# Patient Record
Sex: FEMALE | Race: WHITE | NOT HISPANIC OR LATINO | Employment: OTHER | URBAN - METROPOLITAN AREA
[De-identification: names, ages, dates, MRNs, and addresses within clinical notes are randomized per-mention and may not be internally consistent; named-entity substitution may affect disease eponyms.]

---

## 2021-05-06 ENCOUNTER — HOSPITAL ENCOUNTER (INPATIENT)
Facility: HOSPITAL | Age: 86
LOS: 6 days | Discharge: HOME HEALTH | DRG: 291 | End: 2021-05-12
Attending: EMERGENCY MEDICINE | Admitting: INTERNAL MEDICINE
Payer: MEDICARE

## 2021-05-06 DIAGNOSIS — I48.91 ATRIAL FIBRILLATION WITH RVR: ICD-10-CM

## 2021-05-06 DIAGNOSIS — Z95.828 STATUS POST PERIPHERALLY INSERTED CENTRAL CATHETER (PICC) CENTRAL LINE PLACEMENT: ICD-10-CM

## 2021-05-06 DIAGNOSIS — E11.9 TYPE 2 DIABETES MELLITUS WITHOUT COMPLICATION, WITHOUT LONG-TERM CURRENT USE OF INSULIN: ICD-10-CM

## 2021-05-06 DIAGNOSIS — R06.02 SHORTNESS OF BREATH: ICD-10-CM

## 2021-05-06 DIAGNOSIS — J18.9 MULTIFOCAL PNEUMONIA: ICD-10-CM

## 2021-05-06 DIAGNOSIS — I48.91 NEW ONSET ATRIAL FIBRILLATION: ICD-10-CM

## 2021-05-06 DIAGNOSIS — I50.9 HEART FAILURE: ICD-10-CM

## 2021-05-06 DIAGNOSIS — R57.0 CARDIOGENIC SHOCK: Primary | ICD-10-CM

## 2021-05-06 DIAGNOSIS — E66.9 OBESITY, UNSPECIFIED CLASSIFICATION, UNSPECIFIED OBESITY TYPE, UNSPECIFIED WHETHER SERIOUS COMORBIDITY PRESENT: ICD-10-CM

## 2021-05-06 PROBLEM — I10 HTN (HYPERTENSION): Status: ACTIVE | Noted: 2021-05-06

## 2021-05-06 PROBLEM — J96.02 ACUTE RESPIRATORY FAILURE WITH HYPERCAPNIA: Status: ACTIVE | Noted: 2021-05-06

## 2021-05-06 PROBLEM — E78.5 HLD (HYPERLIPIDEMIA): Status: ACTIVE | Noted: 2021-05-06

## 2021-05-06 LAB
ALBUMIN SERPL BCP-MCNC: 3.4 G/DL (ref 3.5–5.2)
ALLENS TEST: ABNORMAL
ALP SERPL-CCNC: 61 U/L (ref 55–135)
ALT SERPL W/O P-5'-P-CCNC: 38 U/L (ref 10–44)
ANION GAP SERPL CALC-SCNC: 12 MMOL/L (ref 8–16)
AST SERPL-CCNC: 49 U/L (ref 10–40)
BASOPHILS # BLD AUTO: 0.04 K/UL (ref 0–0.2)
BASOPHILS NFR BLD: 0.3 % (ref 0–1.9)
BILIRUB SERPL-MCNC: 0.9 MG/DL (ref 0.1–1)
BNP SERPL-MCNC: 698 PG/ML (ref 0–99)
BUN SERPL-MCNC: 22 MG/DL (ref 8–23)
CALCIUM SERPL-MCNC: 9.1 MG/DL (ref 8.7–10.5)
CHLORIDE SERPL-SCNC: 104 MMOL/L (ref 95–110)
CO2 SERPL-SCNC: 20 MMOL/L (ref 23–29)
CREAT SERPL-MCNC: 0.7 MG/DL (ref 0.5–1.4)
CTP QC/QA: YES
DELSYS: ABNORMAL
DIFFERENTIAL METHOD: ABNORMAL
EOSINOPHIL # BLD AUTO: 0.1 K/UL (ref 0–0.5)
EOSINOPHIL NFR BLD: 0.5 % (ref 0–8)
ERYTHROCYTE [DISTWIDTH] IN BLOOD BY AUTOMATED COUNT: 15.9 % (ref 11.5–14.5)
EST. GFR  (AFRICAN AMERICAN): >60 ML/MIN/1.73 M^2
EST. GFR  (NON AFRICAN AMERICAN): >60 ML/MIN/1.73 M^2
GLUCOSE SERPL-MCNC: 78 MG/DL (ref 70–110)
HCO3 UR-SCNC: 24.3 MMOL/L (ref 24–28)
HCT VFR BLD AUTO: 44.2 % (ref 37–48.5)
HGB BLD-MCNC: 14 G/DL (ref 12–16)
IMM GRANULOCYTES # BLD AUTO: 0.05 K/UL (ref 0–0.04)
IMM GRANULOCYTES NFR BLD AUTO: 0.4 % (ref 0–0.5)
LACTATE SERPL-SCNC: 8.2 MMOL/L (ref 0.5–2.2)
LYMPHOCYTES # BLD AUTO: 1.9 K/UL (ref 1–4.8)
LYMPHOCYTES NFR BLD: 14.4 % (ref 18–48)
MCH RBC QN AUTO: 29.9 PG (ref 27–31)
MCHC RBC AUTO-ENTMCNC: 31.7 G/DL (ref 32–36)
MCV RBC AUTO: 94 FL (ref 82–98)
MONOCYTES # BLD AUTO: 1.2 K/UL (ref 0.3–1)
MONOCYTES NFR BLD: 9 % (ref 4–15)
NEUTROPHILS # BLD AUTO: 9.9 K/UL (ref 1.8–7.7)
NEUTROPHILS NFR BLD: 75.4 % (ref 38–73)
NRBC BLD-RTO: 0 /100 WBC
PCO2 BLDA: 71 MMHG (ref 35–45)
PH SMN: 7.14 [PH] (ref 7.35–7.45)
PLATELET # BLD AUTO: 296 K/UL (ref 150–450)
PMV BLD AUTO: 11.3 FL (ref 9.2–12.9)
PO2 BLDA: 36 MMHG (ref 40–60)
POC BE: -5 MMOL/L
POC SATURATED O2: 50 % (ref 95–100)
POC TCO2: 26 MMOL/L (ref 24–29)
POCT GLUCOSE: 115 MG/DL (ref 70–110)
POCT GLUCOSE: 160 MG/DL (ref 70–110)
POCT GLUCOSE: 83 MG/DL (ref 70–110)
POTASSIUM SERPL-SCNC: 4.7 MMOL/L (ref 3.5–5.1)
PROCALCITONIN SERPL IA-MCNC: 0.05 NG/ML
PROT SERPL-MCNC: 6.6 G/DL (ref 6–8.4)
RBC # BLD AUTO: 4.68 M/UL (ref 4–5.4)
SAMPLE: ABNORMAL
SARS-COV-2 RDRP RESP QL NAA+PROBE: NEGATIVE
SITE: ABNORMAL
SODIUM SERPL-SCNC: 136 MMOL/L (ref 136–145)
TROPONIN I SERPL DL<=0.01 NG/ML-MCNC: 0.03 NG/ML (ref 0–0.03)
TROPONIN I SERPL DL<=0.01 NG/ML-MCNC: 0.05 NG/ML (ref 0–0.03)
TSH SERPL DL<=0.005 MIU/L-ACNC: 1.92 UIU/ML (ref 0.4–4)
WBC # BLD AUTO: 13.15 K/UL (ref 3.9–12.7)

## 2021-05-06 PROCEDURE — 96375 TX/PRO/DX INJ NEW DRUG ADDON: CPT

## 2021-05-06 PROCEDURE — 93010 ELECTROCARDIOGRAM REPORT: CPT | Mod: ,,, | Performed by: INTERNAL MEDICINE

## 2021-05-06 PROCEDURE — 80053 COMPREHEN METABOLIC PANEL: CPT | Performed by: STUDENT IN AN ORGANIZED HEALTH CARE EDUCATION/TRAINING PROGRAM

## 2021-05-06 PROCEDURE — 36620 INSERTION CATHETER ARTERY: CPT

## 2021-05-06 PROCEDURE — 27000221 HC OXYGEN, UP TO 24 HOURS

## 2021-05-06 PROCEDURE — 12000002 HC ACUTE/MED SURGE SEMI-PRIVATE ROOM

## 2021-05-06 PROCEDURE — 84484 ASSAY OF TROPONIN QUANT: CPT | Performed by: STUDENT IN AN ORGANIZED HEALTH CARE EDUCATION/TRAINING PROGRAM

## 2021-05-06 PROCEDURE — 83605 ASSAY OF LACTIC ACID: CPT | Performed by: STUDENT IN AN ORGANIZED HEALTH CARE EDUCATION/TRAINING PROGRAM

## 2021-05-06 PROCEDURE — 36620 INSERTION CATHETER ARTERY: CPT | Mod: ,,, | Performed by: EMERGENCY MEDICINE

## 2021-05-06 PROCEDURE — 84145 PROCALCITONIN (PCT): CPT | Performed by: STUDENT IN AN ORGANIZED HEALTH CARE EDUCATION/TRAINING PROGRAM

## 2021-05-06 PROCEDURE — 99285 EMERGENCY DEPT VISIT HI MDM: CPT | Mod: 25

## 2021-05-06 PROCEDURE — 36620 PR INSERT CATH,ART,PERCUT,SHORTTERM: ICD-10-PCS | Mod: ,,, | Performed by: EMERGENCY MEDICINE

## 2021-05-06 PROCEDURE — 99900035 HC TECH TIME PER 15 MIN (STAT)

## 2021-05-06 PROCEDURE — 99285 EMERGENCY DEPT VISIT HI MDM: CPT | Mod: CR,25,CS, | Performed by: EMERGENCY MEDICINE

## 2021-05-06 PROCEDURE — 25000003 PHARM REV CODE 250: Performed by: EMERGENCY MEDICINE

## 2021-05-06 PROCEDURE — 63600175 PHARM REV CODE 636 W HCPCS: Performed by: STUDENT IN AN ORGANIZED HEALTH CARE EDUCATION/TRAINING PROGRAM

## 2021-05-06 PROCEDURE — 83880 ASSAY OF NATRIURETIC PEPTIDE: CPT | Performed by: STUDENT IN AN ORGANIZED HEALTH CARE EDUCATION/TRAINING PROGRAM

## 2021-05-06 PROCEDURE — 93005 ELECTROCARDIOGRAM TRACING: CPT

## 2021-05-06 PROCEDURE — 94761 N-INVAS EAR/PLS OXIMETRY MLT: CPT

## 2021-05-06 PROCEDURE — U0002 COVID-19 LAB TEST NON-CDC: HCPCS | Performed by: EMERGENCY MEDICINE

## 2021-05-06 PROCEDURE — 94660 CPAP INITIATION&MGMT: CPT

## 2021-05-06 PROCEDURE — 25000003 PHARM REV CODE 250

## 2021-05-06 PROCEDURE — 85025 COMPLETE CBC W/AUTO DIFF WBC: CPT | Performed by: STUDENT IN AN ORGANIZED HEALTH CARE EDUCATION/TRAINING PROGRAM

## 2021-05-06 PROCEDURE — 25000003 PHARM REV CODE 250: Performed by: STUDENT IN AN ORGANIZED HEALTH CARE EDUCATION/TRAINING PROGRAM

## 2021-05-06 PROCEDURE — 96374 THER/PROPH/DIAG INJ IV PUSH: CPT

## 2021-05-06 PROCEDURE — 82962 GLUCOSE BLOOD TEST: CPT

## 2021-05-06 PROCEDURE — 99285 PR EMERGENCY DEPT VISIT,LEVEL V: ICD-10-PCS | Mod: CR,25,CS, | Performed by: EMERGENCY MEDICINE

## 2021-05-06 PROCEDURE — 84443 ASSAY THYROID STIM HORMONE: CPT | Performed by: STUDENT IN AN ORGANIZED HEALTH CARE EDUCATION/TRAINING PROGRAM

## 2021-05-06 PROCEDURE — 27000190 HC CPAP FULL FACE MASK W/VALVE

## 2021-05-06 PROCEDURE — 93010 EKG 12-LEAD: ICD-10-PCS | Mod: ,,, | Performed by: INTERNAL MEDICINE

## 2021-05-06 RX ORDER — NOREPINEPHRINE BITARTRATE/D5W 4MG/250ML
PLASTIC BAG, INJECTION (ML) INTRAVENOUS
Status: COMPLETED
Start: 2021-05-06 | End: 2021-05-06

## 2021-05-06 RX ORDER — NOREPINEPHRINE BITARTRATE/D5W 4MG/250ML
0-3 PLASTIC BAG, INJECTION (ML) INTRAVENOUS CONTINUOUS
Status: DISCONTINUED | OUTPATIENT
Start: 2021-05-06 | End: 2021-05-08

## 2021-05-06 RX ORDER — FUROSEMIDE 10 MG/ML
40 INJECTION INTRAMUSCULAR; INTRAVENOUS
Status: COMPLETED | OUTPATIENT
Start: 2021-05-06 | End: 2021-05-06

## 2021-05-06 RX ORDER — INSULIN DEGLUDEC 100 U/ML
40 INJECTION, SOLUTION SUBCUTANEOUS DAILY
Status: ON HOLD | COMMUNITY
Start: 2021-03-25 | End: 2021-05-12 | Stop reason: SDUPTHER

## 2021-05-06 RX ORDER — CEFTRIAXONE 1 G/1
1 INJECTION, POWDER, FOR SOLUTION INTRAMUSCULAR; INTRAVENOUS
Status: COMPLETED | OUTPATIENT
Start: 2021-05-06 | End: 2021-05-06

## 2021-05-06 RX ORDER — METOPROLOL TARTRATE 25 MG/1
25 TABLET, FILM COATED ORAL 2 TIMES DAILY
Status: DISCONTINUED | OUTPATIENT
Start: 2021-05-06 | End: 2021-05-06

## 2021-05-06 RX ORDER — ETOMIDATE 2 MG/ML
INJECTION INTRAVENOUS
Status: DISPENSED
Start: 2021-05-06 | End: 2021-05-07

## 2021-05-06 RX ORDER — DILTIAZEM HYDROCHLORIDE 5 MG/ML
10 INJECTION INTRAVENOUS
Status: COMPLETED | OUTPATIENT
Start: 2021-05-06 | End: 2021-05-06

## 2021-05-06 RX ORDER — ONDANSETRON 2 MG/ML
4 INJECTION INTRAMUSCULAR; INTRAVENOUS
Status: COMPLETED | OUTPATIENT
Start: 2021-05-06 | End: 2021-05-06

## 2021-05-06 RX ORDER — LIDOCAINE HYDROCHLORIDE 10 MG/ML
5 INJECTION INFILTRATION; PERINEURAL ONCE
Status: COMPLETED | OUTPATIENT
Start: 2021-05-07 | End: 2021-05-07

## 2021-05-06 RX ORDER — DOBUTAMINE HYDROCHLORIDE 400 MG/100ML
2.5 INJECTION INTRAVENOUS CONTINUOUS
Status: DISCONTINUED | OUTPATIENT
Start: 2021-05-06 | End: 2021-05-07

## 2021-05-06 RX ORDER — LEVOTHYROXINE SODIUM 75 UG/1
75 TABLET ORAL
COMMUNITY
Start: 2021-05-05

## 2021-05-06 RX ORDER — MECLIZINE HYDROCHLORIDE 25 MG/1
25 TABLET ORAL DAILY
COMMUNITY
Start: 2021-05-05

## 2021-05-06 RX ORDER — CITALOPRAM 20 MG/1
20 TABLET, FILM COATED ORAL DAILY
COMMUNITY
Start: 2021-02-11

## 2021-05-06 RX ORDER — SUCCINYLCHOLINE CHLORIDE 20 MG/ML
INJECTION INTRAMUSCULAR; INTRAVENOUS
Status: DISPENSED
Start: 2021-05-06 | End: 2021-05-07

## 2021-05-06 RX ADMIN — DOBUTAMINE HYDROCHLORIDE 2.5 MCG/KG/MIN: 400 INJECTION INTRAVENOUS at 10:05

## 2021-05-06 RX ADMIN — ONDANSETRON 4 MG: 2 INJECTION INTRAMUSCULAR; INTRAVENOUS at 09:05

## 2021-05-06 RX ADMIN — Medication 0.15 MCG/KG/MIN: at 10:05

## 2021-05-06 RX ADMIN — DILTIAZEM HYDROCHLORIDE 10 MG: 5 INJECTION INTRAVENOUS at 06:05

## 2021-05-06 RX ADMIN — AZITHROMYCIN MONOHYDRATE 500 MG: 500 INJECTION, POWDER, LYOPHILIZED, FOR SOLUTION INTRAVENOUS at 08:05

## 2021-05-06 RX ADMIN — METOPROLOL TARTRATE 25 MG: 25 TABLET, FILM COATED ORAL at 08:05

## 2021-05-06 RX ADMIN — FUROSEMIDE 40 MG: 10 INJECTION, SOLUTION INTRAMUSCULAR; INTRAVENOUS at 07:05

## 2021-05-06 RX ADMIN — Medication 0.1 MCG/KG/MIN: at 10:05

## 2021-05-06 RX ADMIN — CEFTRIAXONE 1 G: 1 INJECTION, POWDER, FOR SOLUTION INTRAMUSCULAR; INTRAVENOUS at 08:05

## 2021-05-07 PROBLEM — I48.91 ATRIAL FIBRILLATION WITH RVR: Status: ACTIVE | Noted: 2021-05-07

## 2021-05-07 PROBLEM — R57.0 CARDIOGENIC SHOCK: Status: ACTIVE | Noted: 2021-05-07

## 2021-05-07 LAB
ALBUMIN SERPL BCP-MCNC: 3.1 G/DL (ref 3.5–5.2)
ALBUMIN SERPL BCP-MCNC: 3.3 G/DL (ref 3.5–5.2)
ALLENS TEST: ABNORMAL
ALP SERPL-CCNC: 62 U/L (ref 55–135)
ALP SERPL-CCNC: 67 U/L (ref 55–135)
ALT SERPL W/O P-5'-P-CCNC: 46 U/L (ref 10–44)
ALT SERPL W/O P-5'-P-CCNC: 47 U/L (ref 10–44)
ANION GAP SERPL CALC-SCNC: 10 MMOL/L (ref 8–16)
ANION GAP SERPL CALC-SCNC: 12 MMOL/L (ref 8–16)
ANION GAP SERPL CALC-SCNC: 13 MMOL/L (ref 8–16)
APTT BLDCRRT: 25.4 SEC (ref 21–32)
APTT BLDCRRT: 55.2 SEC (ref 21–32)
ASCENDING AORTA: 3.66 CM
AST SERPL-CCNC: 65 U/L (ref 10–40)
AST SERPL-CCNC: 68 U/L (ref 10–40)
AV INDEX (PROSTH): 0.16
AV MEAN GRADIENT: 50 MMHG
AV PEAK GRADIENT: 73 MMHG
AV VALVE AREA: 0.5 CM2
AV VELOCITY RATIO: 0.16
BASOPHILS # BLD AUTO: 0.03 K/UL (ref 0–0.2)
BASOPHILS # BLD AUTO: 0.04 K/UL (ref 0–0.2)
BASOPHILS NFR BLD: 0.2 % (ref 0–1.9)
BASOPHILS NFR BLD: 0.2 % (ref 0–1.9)
BILIRUB SERPL-MCNC: 0.8 MG/DL (ref 0.1–1)
BILIRUB SERPL-MCNC: 0.9 MG/DL (ref 0.1–1)
BILIRUB UR QL STRIP: NEGATIVE
BSA FOR ECHO PROCEDURE: 1.68 M2
BUN SERPL-MCNC: 25 MG/DL (ref 8–23)
BUN SERPL-MCNC: 27 MG/DL (ref 8–23)
BUN SERPL-MCNC: 30 MG/DL (ref 8–23)
CALCIUM SERPL-MCNC: 8.5 MG/DL (ref 8.7–10.5)
CALCIUM SERPL-MCNC: 8.5 MG/DL (ref 8.7–10.5)
CALCIUM SERPL-MCNC: 9 MG/DL (ref 8.7–10.5)
CHLORIDE SERPL-SCNC: 102 MMOL/L (ref 95–110)
CHLORIDE SERPL-SCNC: 96 MMOL/L (ref 95–110)
CHLORIDE SERPL-SCNC: 98 MMOL/L (ref 95–110)
CLARITY UR REFRACT.AUTO: CLEAR
CO2 SERPL-SCNC: 19 MMOL/L (ref 23–29)
CO2 SERPL-SCNC: 24 MMOL/L (ref 23–29)
CO2 SERPL-SCNC: 32 MMOL/L (ref 23–29)
COLOR UR AUTO: COLORLESS
CREAT SERPL-MCNC: 1.1 MG/DL (ref 0.5–1.4)
CREAT SERPL-MCNC: 1.1 MG/DL (ref 0.5–1.4)
CREAT SERPL-MCNC: 1.3 MG/DL (ref 0.5–1.4)
CV ECHO LV RWT: 0.3 CM
DELSYS: ABNORMAL
DIFFERENTIAL METHOD: ABNORMAL
DIFFERENTIAL METHOD: ABNORMAL
DOP CALC AO PEAK VEL: 4.27 M/S
DOP CALC AO VTI: 96.51 CM
DOP CALC LVOT AREA: 3.2 CM2
DOP CALC LVOT DIAMETER: 2.01 CM
DOP CALC LVOT PEAK VEL: 0.68 M/S
DOP CALC LVOT STROKE VOLUME: 47.89 CM3
DOP CALCLVOT PEAK VEL VTI: 15.1 CM
ECHO LV POSTERIOR WALL: 0.8 CM (ref 0.6–1.1)
EJECTION FRACTION: 38 %
EOSINOPHIL # BLD AUTO: 0 K/UL (ref 0–0.5)
EOSINOPHIL # BLD AUTO: 0 K/UL (ref 0–0.5)
EOSINOPHIL NFR BLD: 0 % (ref 0–8)
EOSINOPHIL NFR BLD: 0 % (ref 0–8)
EP: 8
ERYTHROCYTE [DISTWIDTH] IN BLOOD BY AUTOMATED COUNT: 15.5 % (ref 11.5–14.5)
ERYTHROCYTE [DISTWIDTH] IN BLOOD BY AUTOMATED COUNT: 15.6 % (ref 11.5–14.5)
ERYTHROCYTE [SEDIMENTATION RATE] IN BLOOD BY WESTERGREN METHOD: 15 MM/H
EST. GFR  (AFRICAN AMERICAN): 42.9 ML/MIN/1.73 M^2
EST. GFR  (AFRICAN AMERICAN): 52.5 ML/MIN/1.73 M^2
EST. GFR  (AFRICAN AMERICAN): 52.5 ML/MIN/1.73 M^2
EST. GFR  (NON AFRICAN AMERICAN): 37.2 ML/MIN/1.73 M^2
EST. GFR  (NON AFRICAN AMERICAN): 45.6 ML/MIN/1.73 M^2
EST. GFR  (NON AFRICAN AMERICAN): 45.6 ML/MIN/1.73 M^2
ESTIMATED AVG GLUCOSE: 186 MG/DL (ref 68–131)
FIO2: 100
FIO2: 32
FLOW: 3
FRACTIONAL SHORTENING: 18 % (ref 28–44)
GLUCOSE SERPL-MCNC: 133 MG/DL (ref 70–110)
GLUCOSE SERPL-MCNC: 231 MG/DL (ref 70–110)
GLUCOSE SERPL-MCNC: 337 MG/DL (ref 70–110)
GLUCOSE UR QL STRIP: NEGATIVE
HBA1C MFR BLD: 8.1 % (ref 4–5.6)
HCO3 UR-SCNC: 20.1 MMOL/L (ref 24–28)
HCO3 UR-SCNC: 23.6 MMOL/L (ref 24–28)
HCO3 UR-SCNC: 26.8 MMOL/L (ref 24–28)
HCO3 UR-SCNC: 31.8 MMOL/L (ref 24–28)
HCT VFR BLD AUTO: 41 % (ref 37–48.5)
HCT VFR BLD AUTO: 42.4 % (ref 37–48.5)
HGB BLD-MCNC: 13.4 G/DL (ref 12–16)
HGB BLD-MCNC: 13.7 G/DL (ref 12–16)
HGB UR QL STRIP: NEGATIVE
IMM GRANULOCYTES # BLD AUTO: 0.1 K/UL (ref 0–0.04)
IMM GRANULOCYTES # BLD AUTO: 0.15 K/UL (ref 0–0.04)
IMM GRANULOCYTES NFR BLD AUTO: 0.7 % (ref 0–0.5)
IMM GRANULOCYTES NFR BLD AUTO: 0.8 % (ref 0–0.5)
INR PPP: 1.2 (ref 0.8–1.2)
INTERVENTRICULAR SEPTUM: 0.77 CM (ref 0.6–1.1)
IP: 15
KETONES UR QL STRIP: NEGATIVE
LA MAJOR: 5.62 CM
LA MINOR: 5.62 CM
LA WIDTH: 3.5 CM
LACTATE SERPL-SCNC: 1.6 MMOL/L (ref 0.5–2.2)
LACTATE SERPL-SCNC: 1.8 MMOL/L (ref 0.5–2.2)
LACTATE SERPL-SCNC: 2.2 MMOL/L (ref 0.5–2.2)
LACTATE SERPL-SCNC: 3.4 MMOL/L (ref 0.5–2.2)
LDH SERPL L TO P-CCNC: 3.55 MMOL/L (ref 0.36–1.25)
LEFT ATRIUM SIZE: 3.25 CM
LEFT ATRIUM VOLUME INDEX MOD: 32 ML/M2
LEFT ATRIUM VOLUME INDEX: 33.3 ML/M2
LEFT ATRIUM VOLUME MOD: 52.16 CM3
LEFT ATRIUM VOLUME: 54.34 CM3
LEFT INTERNAL DIMENSION IN SYSTOLE: 4.34 CM (ref 2.1–4)
LEFT VENTRICLE DIASTOLIC VOLUME INDEX: 82.07 ML/M2
LEFT VENTRICLE DIASTOLIC VOLUME: 133.77 ML
LEFT VENTRICLE MASS INDEX: 89 G/M2
LEFT VENTRICLE SYSTOLIC VOLUME INDEX: 52 ML/M2
LEFT VENTRICLE SYSTOLIC VOLUME: 84.72 ML
LEFT VENTRICULAR INTERNAL DIMENSION IN DIASTOLE: 5.27 CM (ref 3.5–6)
LEFT VENTRICULAR MASS: 145.08 G
LEUKOCYTE ESTERASE UR QL STRIP: NEGATIVE
LYMPHOCYTES # BLD AUTO: 1.4 K/UL (ref 1–4.8)
LYMPHOCYTES # BLD AUTO: 1.5 K/UL (ref 1–4.8)
LYMPHOCYTES NFR BLD: 10.3 % (ref 18–48)
LYMPHOCYTES NFR BLD: 7.7 % (ref 18–48)
MAGNESIUM SERPL-MCNC: 1.7 MG/DL (ref 1.6–2.6)
MAGNESIUM SERPL-MCNC: 2.3 MG/DL (ref 1.6–2.6)
MCH RBC QN AUTO: 30.3 PG (ref 27–31)
MCH RBC QN AUTO: 30.7 PG (ref 27–31)
MCHC RBC AUTO-ENTMCNC: 32.3 G/DL (ref 32–36)
MCHC RBC AUTO-ENTMCNC: 32.7 G/DL (ref 32–36)
MCV RBC AUTO: 94 FL (ref 82–98)
MCV RBC AUTO: 94 FL (ref 82–98)
MIN VOL: 6
MODE: ABNORMAL
MONOCYTES # BLD AUTO: 1.8 K/UL (ref 0.3–1)
MONOCYTES # BLD AUTO: 2.1 K/UL (ref 0.3–1)
MONOCYTES NFR BLD: 11.5 % (ref 4–15)
MONOCYTES NFR BLD: 11.9 % (ref 4–15)
MV PEAK E VEL: 1.33 M/S
NEUTROPHILS # BLD AUTO: 11.4 K/UL (ref 1.8–7.7)
NEUTROPHILS # BLD AUTO: 14.8 K/UL (ref 1.8–7.7)
NEUTROPHILS NFR BLD: 76.9 % (ref 38–73)
NEUTROPHILS NFR BLD: 79.8 % (ref 38–73)
NITRITE UR QL STRIP: NEGATIVE
NRBC BLD-RTO: 0 /100 WBC
NRBC BLD-RTO: 0 /100 WBC
PCO2 BLDA: 41.9 MMHG (ref 35–45)
PCO2 BLDA: 53.8 MMHG (ref 35–45)
PCO2 BLDA: 56.9 MMHG (ref 35–45)
PCO2 BLDA: 61.9 MMHG (ref 35–45)
PH SMN: 7.19 [PH] (ref 7.35–7.45)
PH SMN: 7.28 [PH] (ref 7.35–7.45)
PH SMN: 7.29 [PH] (ref 7.35–7.45)
PH SMN: 7.38 [PH] (ref 7.35–7.45)
PH UR STRIP: 5 [PH] (ref 5–8)
PISA TR MAX VEL: 3.11 M/S
PLATELET # BLD AUTO: 298 K/UL (ref 150–450)
PLATELET # BLD AUTO: 306 K/UL (ref 150–450)
PLATELET BLD QL SMEAR: ABNORMAL
PMV BLD AUTO: 10.9 FL (ref 9.2–12.9)
PMV BLD AUTO: 11.2 FL (ref 9.2–12.9)
PO2 BLDA: 312 MMHG (ref 80–100)
PO2 BLDA: 35 MMHG (ref 40–60)
PO2 BLDA: 36 MMHG (ref 40–60)
PO2 BLDA: 40 MMHG (ref 40–60)
POC BE: -5 MMOL/L
POC BE: -6 MMOL/L
POC BE: 0 MMOL/L
POC BE: 7 MMOL/L
POC SATURATED O2: 100 % (ref 95–100)
POC SATURATED O2: 53 % (ref 95–100)
POC SATURATED O2: 66 % (ref 95–100)
POC SATURATED O2: 67 % (ref 95–100)
POC TCO2: 21 MMOL/L (ref 23–27)
POC TCO2: 25 MMOL/L (ref 24–29)
POC TCO2: 29 MMOL/L (ref 24–29)
POC TCO2: 33 MMOL/L (ref 24–29)
POCT GLUCOSE: 110 MG/DL (ref 70–110)
POCT GLUCOSE: 158 MG/DL (ref 70–110)
POCT GLUCOSE: 216 MG/DL (ref 70–110)
POCT GLUCOSE: 231 MG/DL (ref 70–110)
POCT GLUCOSE: 317 MG/DL (ref 70–110)
POCT GLUCOSE: 349 MG/DL (ref 70–110)
POTASSIUM SERPL-SCNC: 4 MMOL/L (ref 3.5–5.1)
POTASSIUM SERPL-SCNC: 4.1 MMOL/L (ref 3.5–5.1)
POTASSIUM SERPL-SCNC: 4.2 MMOL/L (ref 3.5–5.1)
POTASSIUM SERPL-SCNC: 5.5 MMOL/L (ref 3.5–5.1)
PROT SERPL-MCNC: 6.1 G/DL (ref 6–8.4)
PROT SERPL-MCNC: 6.3 G/DL (ref 6–8.4)
PROT UR QL STRIP: NEGATIVE
PROTHROMBIN TIME: 12.9 SEC (ref 9–12.5)
RA MAJOR: 4.65 CM
RA PRESSURE: 8 MMHG
RA WIDTH: 3.6 CM
RBC # BLD AUTO: 4.36 M/UL (ref 4–5.4)
RBC # BLD AUTO: 4.52 M/UL (ref 4–5.4)
RIGHT VENTRICULAR END-DIASTOLIC DIMENSION: 3.42 CM
RV TISSUE DOPPLER FREE WALL SYSTOLIC VELOCITY 1 (APICAL 4 CHAMBER VIEW): 8.86 CM/S
SAMPLE: ABNORMAL
SINUS: 3.12 CM
SITE: ABNORMAL
SODIUM SERPL-SCNC: 134 MMOL/L (ref 136–145)
SODIUM SERPL-SCNC: 134 MMOL/L (ref 136–145)
SODIUM SERPL-SCNC: 138 MMOL/L (ref 136–145)
SP GR UR STRIP: 1 (ref 1–1.03)
SP02: 95
SP02: 99
SPONT RATE: 19
STJ: 2.7 CM
TR MAX PG: 39 MMHG
TRICUSPID ANNULAR PLANE SYSTOLIC EXCURSION: 1.01 CM
TROPONIN I SERPL DL<=0.01 NG/ML-MCNC: 0.07 NG/ML (ref 0–0.03)
TV REST PULMONARY ARTERY PRESSURE: 47 MMHG
URN SPEC COLLECT METH UR: ABNORMAL
VANCOMYCIN SERPL-MCNC: 13 UG/ML
WBC # BLD AUTO: 14.79 K/UL (ref 3.9–12.7)
WBC # BLD AUTO: 18.6 K/UL (ref 3.9–12.7)

## 2021-05-07 PROCEDURE — 99223 PR INITIAL HOSPITAL CARE,LEVL III: ICD-10-PCS | Mod: AI,GC,, | Performed by: HOSPITALIST

## 2021-05-07 PROCEDURE — 25000003 PHARM REV CODE 250

## 2021-05-07 PROCEDURE — 82803 BLOOD GASES ANY COMBINATION: CPT

## 2021-05-07 PROCEDURE — 84132 ASSAY OF SERUM POTASSIUM: CPT | Performed by: INTERNAL MEDICINE

## 2021-05-07 PROCEDURE — 27000221 HC OXYGEN, UP TO 24 HOURS

## 2021-05-07 PROCEDURE — 85610 PROTHROMBIN TIME: CPT | Performed by: STUDENT IN AN ORGANIZED HEALTH CARE EDUCATION/TRAINING PROGRAM

## 2021-05-07 PROCEDURE — 99223 1ST HOSP IP/OBS HIGH 75: CPT | Mod: AI,GC,, | Performed by: HOSPITALIST

## 2021-05-07 PROCEDURE — 80048 BASIC METABOLIC PNL TOTAL CA: CPT | Performed by: STUDENT IN AN ORGANIZED HEALTH CARE EDUCATION/TRAINING PROGRAM

## 2021-05-07 PROCEDURE — 87040 BLOOD CULTURE FOR BACTERIA: CPT | Mod: 59 | Performed by: INTERNAL MEDICINE

## 2021-05-07 PROCEDURE — 63600175 PHARM REV CODE 636 W HCPCS: Performed by: INTERNAL MEDICINE

## 2021-05-07 PROCEDURE — 85730 THROMBOPLASTIN TIME PARTIAL: CPT | Performed by: STUDENT IN AN ORGANIZED HEALTH CARE EDUCATION/TRAINING PROGRAM

## 2021-05-07 PROCEDURE — 20000000 HC ICU ROOM

## 2021-05-07 PROCEDURE — 25000003 PHARM REV CODE 250: Performed by: STUDENT IN AN ORGANIZED HEALTH CARE EDUCATION/TRAINING PROGRAM

## 2021-05-07 PROCEDURE — 84484 ASSAY OF TROPONIN QUANT: CPT | Performed by: STUDENT IN AN ORGANIZED HEALTH CARE EDUCATION/TRAINING PROGRAM

## 2021-05-07 PROCEDURE — 99233 SBSQ HOSP IP/OBS HIGH 50: CPT | Mod: ,,, | Performed by: INTERNAL MEDICINE

## 2021-05-07 PROCEDURE — 25000003 PHARM REV CODE 250: Performed by: INTERNAL MEDICINE

## 2021-05-07 PROCEDURE — 81003 URINALYSIS AUTO W/O SCOPE: CPT | Performed by: INTERNAL MEDICINE

## 2021-05-07 PROCEDURE — 85347 COAGULATION TIME ACTIVATED: CPT

## 2021-05-07 PROCEDURE — 25000242 PHARM REV CODE 250 ALT 637 W/ HCPCS: Performed by: INTERNAL MEDICINE

## 2021-05-07 PROCEDURE — 99900035 HC TECH TIME PER 15 MIN (STAT)

## 2021-05-07 PROCEDURE — 94660 CPAP INITIATION&MGMT: CPT

## 2021-05-07 PROCEDURE — 37799 UNLISTED PX VASCULAR SURGERY: CPT

## 2021-05-07 PROCEDURE — 63600175 PHARM REV CODE 636 W HCPCS: Performed by: STUDENT IN AN ORGANIZED HEALTH CARE EDUCATION/TRAINING PROGRAM

## 2021-05-07 PROCEDURE — 85025 COMPLETE CBC W/AUTO DIFF WBC: CPT | Mod: 91 | Performed by: INTERNAL MEDICINE

## 2021-05-07 PROCEDURE — 83036 HEMOGLOBIN GLYCOSYLATED A1C: CPT | Performed by: INTERNAL MEDICINE

## 2021-05-07 PROCEDURE — 83605 ASSAY OF LACTIC ACID: CPT | Performed by: INTERNAL MEDICINE

## 2021-05-07 PROCEDURE — 85025 COMPLETE CBC W/AUTO DIFF WBC: CPT | Performed by: STUDENT IN AN ORGANIZED HEALTH CARE EDUCATION/TRAINING PROGRAM

## 2021-05-07 PROCEDURE — 83605 ASSAY OF LACTIC ACID: CPT

## 2021-05-07 PROCEDURE — 85730 THROMBOPLASTIN TIME PARTIAL: CPT | Mod: 91 | Performed by: INTERNAL MEDICINE

## 2021-05-07 PROCEDURE — 83735 ASSAY OF MAGNESIUM: CPT | Mod: 91 | Performed by: INTERNAL MEDICINE

## 2021-05-07 PROCEDURE — 99233 PR SUBSEQUENT HOSPITAL CARE,LEVL III: ICD-10-PCS | Mod: ,,, | Performed by: INTERNAL MEDICINE

## 2021-05-07 PROCEDURE — 80053 COMPREHEN METABOLIC PANEL: CPT | Performed by: INTERNAL MEDICINE

## 2021-05-07 PROCEDURE — 94761 N-INVAS EAR/PLS OXIMETRY MLT: CPT

## 2021-05-07 PROCEDURE — 83735 ASSAY OF MAGNESIUM: CPT | Performed by: INTERNAL MEDICINE

## 2021-05-07 PROCEDURE — 80202 ASSAY OF VANCOMYCIN: CPT | Performed by: INTERNAL MEDICINE

## 2021-05-07 PROCEDURE — 25000003 PHARM REV CODE 250: Performed by: EMERGENCY MEDICINE

## 2021-05-07 RX ORDER — LEVOTHYROXINE SODIUM 75 UG/1
75 TABLET ORAL
Status: DISCONTINUED | OUTPATIENT
Start: 2021-05-07 | End: 2021-05-12 | Stop reason: HOSPADM

## 2021-05-07 RX ORDER — CEFEPIME HYDROCHLORIDE 1 G/1
1 INJECTION, POWDER, FOR SOLUTION INTRAMUSCULAR; INTRAVENOUS
Status: DISCONTINUED | OUTPATIENT
Start: 2021-05-07 | End: 2021-05-07

## 2021-05-07 RX ORDER — POTASSIUM CHLORIDE 29.8 MG/ML
80 INJECTION INTRAVENOUS
Status: DISCONTINUED | OUTPATIENT
Start: 2021-05-07 | End: 2021-05-12

## 2021-05-07 RX ORDER — SODIUM CHLORIDE 0.9 % (FLUSH) 0.9 %
.1-1 SYRINGE (ML) INJECTION
Status: DISCONTINUED | OUTPATIENT
Start: 2021-05-07 | End: 2021-05-12 | Stop reason: HOSPADM

## 2021-05-07 RX ORDER — SODIUM CHLORIDE 9 MG/ML
INJECTION, SOLUTION INTRAVENOUS CONTINUOUS
Status: DISCONTINUED | OUTPATIENT
Start: 2021-05-07 | End: 2021-05-12 | Stop reason: HOSPADM

## 2021-05-07 RX ORDER — FUROSEMIDE 10 MG/ML
60 INJECTION INTRAMUSCULAR; INTRAVENOUS ONCE
Status: COMPLETED | OUTPATIENT
Start: 2021-05-07 | End: 2021-05-07

## 2021-05-07 RX ORDER — FENOFIBRATE 160 MG/1
160 TABLET ORAL DAILY
Status: DISCONTINUED | OUTPATIENT
Start: 2021-05-07 | End: 2021-05-07

## 2021-05-07 RX ORDER — FUROSEMIDE 10 MG/ML
40 INJECTION INTRAMUSCULAR; INTRAVENOUS EVERY 8 HOURS
Status: DISCONTINUED | OUTPATIENT
Start: 2021-05-07 | End: 2021-05-07

## 2021-05-07 RX ORDER — SODIUM CHLORIDE 0.9 % (FLUSH) 0.9 %
10 SYRINGE (ML) INJECTION
Status: DISCONTINUED | OUTPATIENT
Start: 2021-05-07 | End: 2021-05-12 | Stop reason: HOSPADM

## 2021-05-07 RX ORDER — MAGNESIUM SULFATE HEPTAHYDRATE 40 MG/ML
2 INJECTION, SOLUTION INTRAVENOUS ONCE
Status: COMPLETED | OUTPATIENT
Start: 2021-05-07 | End: 2021-05-07

## 2021-05-07 RX ORDER — MAGNESIUM SULFATE HEPTAHYDRATE 40 MG/ML
4 INJECTION, SOLUTION INTRAVENOUS
Status: DISCONTINUED | OUTPATIENT
Start: 2021-05-07 | End: 2021-05-12

## 2021-05-07 RX ORDER — TALC
6 POWDER (GRAM) TOPICAL NIGHTLY PRN
Status: DISCONTINUED | OUTPATIENT
Start: 2021-05-07 | End: 2021-05-12 | Stop reason: HOSPADM

## 2021-05-07 RX ORDER — VANCOMYCIN HCL IN 5 % DEXTROSE 1G/250ML
15 PLASTIC BAG, INJECTION (ML) INTRAVENOUS ONCE
Status: COMPLETED | OUTPATIENT
Start: 2021-05-07 | End: 2021-05-07

## 2021-05-07 RX ORDER — MUPIROCIN 20 MG/G
OINTMENT TOPICAL 2 TIMES DAILY
Status: COMPLETED | OUTPATIENT
Start: 2021-05-07 | End: 2021-05-11

## 2021-05-07 RX ORDER — MAGNESIUM SULFATE HEPTAHYDRATE 40 MG/ML
2 INJECTION, SOLUTION INTRAVENOUS
Status: DISCONTINUED | OUTPATIENT
Start: 2021-05-07 | End: 2021-05-12

## 2021-05-07 RX ORDER — POTASSIUM CHLORIDE 29.8 MG/ML
40 INJECTION INTRAVENOUS
Status: DISCONTINUED | OUTPATIENT
Start: 2021-05-07 | End: 2021-05-12

## 2021-05-07 RX ORDER — ATORVASTATIN CALCIUM 10 MG/1
10 TABLET, FILM COATED ORAL NIGHTLY
Status: DISCONTINUED | OUTPATIENT
Start: 2021-05-08 | End: 2021-05-12 | Stop reason: HOSPADM

## 2021-05-07 RX ORDER — ACETAMINOPHEN 325 MG/1
650 TABLET ORAL EVERY 6 HOURS PRN
Status: DISCONTINUED | OUTPATIENT
Start: 2021-05-07 | End: 2021-05-12 | Stop reason: HOSPADM

## 2021-05-07 RX ORDER — HEPARIN SODIUM,PORCINE/D5W 25000/250
0-40 INTRAVENOUS SOLUTION INTRAVENOUS CONTINUOUS
Status: DISCONTINUED | OUTPATIENT
Start: 2021-05-07 | End: 2021-05-08

## 2021-05-07 RX ORDER — GLUCAGON 1 MG
1 KIT INJECTION
Status: DISCONTINUED | OUTPATIENT
Start: 2021-05-07 | End: 2021-05-08

## 2021-05-07 RX ORDER — POTASSIUM CHLORIDE 14.9 MG/ML
60 INJECTION INTRAVENOUS
Status: DISCONTINUED | OUTPATIENT
Start: 2021-05-07 | End: 2021-05-12

## 2021-05-07 RX ORDER — INSULIN ASPART 100 [IU]/ML
0-5 INJECTION, SOLUTION INTRAVENOUS; SUBCUTANEOUS EVERY 6 HOURS PRN
Status: DISCONTINUED | OUTPATIENT
Start: 2021-05-07 | End: 2021-05-08

## 2021-05-07 RX ORDER — CITALOPRAM 20 MG/1
20 TABLET, FILM COATED ORAL DAILY
Status: DISCONTINUED | OUTPATIENT
Start: 2021-05-07 | End: 2021-05-07

## 2021-05-07 RX ORDER — LIDOCAINE HYDROCHLORIDE 10 MG/ML
INJECTION INFILTRATION; PERINEURAL
Status: COMPLETED
Start: 2021-05-07 | End: 2021-05-07

## 2021-05-07 RX ORDER — CEFEPIME HYDROCHLORIDE 2 G/1
2 INJECTION, POWDER, FOR SOLUTION INTRAVENOUS
Status: DISCONTINUED | OUTPATIENT
Start: 2021-05-07 | End: 2021-05-10

## 2021-05-07 RX ADMIN — MUPIROCIN: 20 OINTMENT TOPICAL at 08:05

## 2021-05-07 RX ADMIN — VANCOMYCIN HYDROCHLORIDE 1000 MG: 1 INJECTION, POWDER, LYOPHILIZED, FOR SOLUTION INTRAVENOUS at 03:05

## 2021-05-07 RX ADMIN — CEFEPIME 1 G: 1 INJECTION, POWDER, FOR SOLUTION INTRAMUSCULAR; INTRAVENOUS at 12:05

## 2021-05-07 RX ADMIN — INSULIN ASPART 2 UNITS: 100 INJECTION, SOLUTION INTRAVENOUS; SUBCUTANEOUS at 06:05

## 2021-05-07 RX ADMIN — VANCOMYCIN HYDROCHLORIDE 1250 MG: 1.25 INJECTION, POWDER, LYOPHILIZED, FOR SOLUTION INTRAVENOUS at 01:05

## 2021-05-07 RX ADMIN — Medication 0.2 MCG/KG/MIN: at 02:05

## 2021-05-07 RX ADMIN — CEFEPIME 1 G: 1 INJECTION, POWDER, FOR SOLUTION INTRAMUSCULAR; INTRAVENOUS at 08:05

## 2021-05-07 RX ADMIN — ACETAMINOPHEN 650 MG: 325 TABLET ORAL at 08:05

## 2021-05-07 RX ADMIN — EPINEPHRINE 0.04 MCG/KG/MIN: 1 INJECTION INTRAMUSCULAR; INTRAVENOUS; SUBCUTANEOUS at 12:05

## 2021-05-07 RX ADMIN — LEVOTHYROXINE SODIUM 75 MCG: 75 TABLET ORAL at 05:05

## 2021-05-07 RX ADMIN — AMIODARONE HYDROCHLORIDE 1 MG/MIN: 1.8 INJECTION, SOLUTION INTRAVENOUS at 05:05

## 2021-05-07 RX ADMIN — CEFEPIME 2 G: 2 INJECTION, POWDER, FOR SOLUTION INTRAVENOUS at 08:05

## 2021-05-07 RX ADMIN — CITALOPRAM HYDROBROMIDE 20 MG: 20 TABLET ORAL at 08:05

## 2021-05-07 RX ADMIN — Medication 0.12 MCG/KG/MIN: at 01:05

## 2021-05-07 RX ADMIN — LIDOCAINE HYDROCHLORIDE: 10 INJECTION, SOLUTION INFILTRATION; PERINEURAL at 03:05

## 2021-05-07 RX ADMIN — FUROSEMIDE 20 MG/HR: 10 INJECTION, SOLUTION INTRAMUSCULAR; INTRAVENOUS at 08:05

## 2021-05-07 RX ADMIN — FUROSEMIDE 20 MG/HR: 10 INJECTION, SOLUTION INTRAMUSCULAR; INTRAVENOUS at 12:05

## 2021-05-07 RX ADMIN — INSULIN ASPART 2 UNITS: 100 INJECTION, SOLUTION INTRAVENOUS; SUBCUTANEOUS at 11:05

## 2021-05-07 RX ADMIN — MAGNESIUM SULFATE 2 G: 2 INJECTION INTRAVENOUS at 06:05

## 2021-05-07 RX ADMIN — SODIUM CHLORIDE: 0.9 INJECTION, SOLUTION INTRAVENOUS at 06:05

## 2021-05-07 RX ADMIN — Medication 0.18 MCG/KG/MIN: at 09:05

## 2021-05-07 RX ADMIN — FUROSEMIDE 40 MG: 10 INJECTION, SOLUTION INTRAMUSCULAR; INTRAVENOUS at 05:05

## 2021-05-07 RX ADMIN — HEPARIN SODIUM AND DEXTROSE 12 UNITS/KG/HR: 10000; 5 INJECTION INTRAVENOUS at 11:05

## 2021-05-07 RX ADMIN — FUROSEMIDE 60 MG: 10 INJECTION, SOLUTION INTRAMUSCULAR; INTRAVENOUS at 11:05

## 2021-05-07 RX ADMIN — LIDOCAINE HYDROCHLORIDE 5 ML: 10 INJECTION, SOLUTION INFILTRATION; PERINEURAL at 12:05

## 2021-05-07 RX ADMIN — FENOFIBRATE 160 MG: 160 TABLET ORAL at 08:05

## 2021-05-07 RX ADMIN — INSULIN ASPART 4 UNITS: 100 INJECTION, SOLUTION INTRAVENOUS; SUBCUTANEOUS at 06:05

## 2021-05-07 RX ADMIN — FUROSEMIDE 40 MG: 10 INJECTION, SOLUTION INTRAMUSCULAR; INTRAVENOUS at 02:05

## 2021-05-08 PROBLEM — J96.02 ACUTE RESPIRATORY FAILURE WITH HYPERCAPNIA: Status: RESOLVED | Noted: 2021-05-06 | Resolved: 2021-05-08

## 2021-05-08 PROBLEM — I35.0 SEVERE AORTIC STENOSIS: Status: ACTIVE | Noted: 2021-05-08

## 2021-05-08 LAB
ALBUMIN SERPL BCP-MCNC: 3.3 G/DL (ref 3.5–5.2)
ALLENS TEST: ABNORMAL
ALLENS TEST: ABNORMAL
ALP SERPL-CCNC: 71 U/L (ref 55–135)
ALT SERPL W/O P-5'-P-CCNC: 43 U/L (ref 10–44)
ANION GAP SERPL CALC-SCNC: 10 MMOL/L (ref 8–16)
ANION GAP SERPL CALC-SCNC: 13 MMOL/L (ref 8–16)
ANION GAP SERPL CALC-SCNC: 14 MMOL/L (ref 8–16)
APTT BLDCRRT: 36.2 SEC (ref 21–32)
APTT BLDCRRT: 40.7 SEC (ref 21–32)
APTT BLDCRRT: 74.1 SEC (ref 21–32)
AST SERPL-CCNC: 48 U/L (ref 10–40)
BASOPHILS # BLD AUTO: 0.05 K/UL (ref 0–0.2)
BASOPHILS # BLD AUTO: 0.05 K/UL (ref 0–0.2)
BASOPHILS NFR BLD: 0.3 % (ref 0–1.9)
BASOPHILS NFR BLD: 0.4 % (ref 0–1.9)
BILIRUB SERPL-MCNC: 0.6 MG/DL (ref 0.1–1)
BUN SERPL-MCNC: 28 MG/DL (ref 8–23)
BUN SERPL-MCNC: 29 MG/DL (ref 8–23)
BUN SERPL-MCNC: 30 MG/DL (ref 8–23)
CALCIUM SERPL-MCNC: 8.7 MG/DL (ref 8.7–10.5)
CALCIUM SERPL-MCNC: 8.8 MG/DL (ref 8.7–10.5)
CALCIUM SERPL-MCNC: 8.8 MG/DL (ref 8.7–10.5)
CHLORIDE SERPL-SCNC: 91 MMOL/L (ref 95–110)
CHLORIDE SERPL-SCNC: 93 MMOL/L (ref 95–110)
CHLORIDE SERPL-SCNC: 94 MMOL/L (ref 95–110)
CO2 SERPL-SCNC: 30 MMOL/L (ref 23–29)
CO2 SERPL-SCNC: 31 MMOL/L (ref 23–29)
CO2 SERPL-SCNC: 34 MMOL/L (ref 23–29)
CREAT SERPL-MCNC: 1.1 MG/DL (ref 0.5–1.4)
CREAT SERPL-MCNC: 1.2 MG/DL (ref 0.5–1.4)
CREAT SERPL-MCNC: 1.2 MG/DL (ref 0.5–1.4)
DELSYS: ABNORMAL
DELSYS: ABNORMAL
DIFFERENTIAL METHOD: ABNORMAL
DIFFERENTIAL METHOD: ABNORMAL
EOSINOPHIL # BLD AUTO: 0.1 K/UL (ref 0–0.5)
EOSINOPHIL # BLD AUTO: 0.1 K/UL (ref 0–0.5)
EOSINOPHIL NFR BLD: 0.3 % (ref 0–8)
EOSINOPHIL NFR BLD: 0.4 % (ref 0–8)
ERYTHROCYTE [DISTWIDTH] IN BLOOD BY AUTOMATED COUNT: 15.8 % (ref 11.5–14.5)
ERYTHROCYTE [DISTWIDTH] IN BLOOD BY AUTOMATED COUNT: 15.9 % (ref 11.5–14.5)
EST. GFR  (AFRICAN AMERICAN): 47.3 ML/MIN/1.73 M^2
EST. GFR  (AFRICAN AMERICAN): 47.3 ML/MIN/1.73 M^2
EST. GFR  (AFRICAN AMERICAN): 52.5 ML/MIN/1.73 M^2
EST. GFR  (NON AFRICAN AMERICAN): 41 ML/MIN/1.73 M^2
EST. GFR  (NON AFRICAN AMERICAN): 41 ML/MIN/1.73 M^2
EST. GFR  (NON AFRICAN AMERICAN): 45.6 ML/MIN/1.73 M^2
FIO2: 32
GLUCOSE SERPL-MCNC: 279 MG/DL (ref 70–110)
GLUCOSE SERPL-MCNC: 287 MG/DL (ref 70–110)
GLUCOSE SERPL-MCNC: 457 MG/DL (ref 70–110)
HCO3 UR-SCNC: 36.2 MMOL/L (ref 24–28)
HCO3 UR-SCNC: 36.8 MMOL/L (ref 24–28)
HCT VFR BLD AUTO: 43.1 % (ref 37–48.5)
HCT VFR BLD AUTO: 43.5 % (ref 37–48.5)
HGB BLD-MCNC: 13.8 G/DL (ref 12–16)
HGB BLD-MCNC: 13.9 G/DL (ref 12–16)
IMM GRANULOCYTES # BLD AUTO: 0.07 K/UL (ref 0–0.04)
IMM GRANULOCYTES # BLD AUTO: 0.07 K/UL (ref 0–0.04)
IMM GRANULOCYTES NFR BLD AUTO: 0.5 % (ref 0–0.5)
IMM GRANULOCYTES NFR BLD AUTO: 0.5 % (ref 0–0.5)
LYMPHOCYTES # BLD AUTO: 2.2 K/UL (ref 1–4.8)
LYMPHOCYTES # BLD AUTO: 2.2 K/UL (ref 1–4.8)
LYMPHOCYTES NFR BLD: 15.3 % (ref 18–48)
LYMPHOCYTES NFR BLD: 15.6 % (ref 18–48)
MAGNESIUM SERPL-MCNC: 2.1 MG/DL (ref 1.6–2.6)
MCH RBC QN AUTO: 29.6 PG (ref 27–31)
MCH RBC QN AUTO: 30 PG (ref 27–31)
MCHC RBC AUTO-ENTMCNC: 32 G/DL (ref 32–36)
MCHC RBC AUTO-ENTMCNC: 32 G/DL (ref 32–36)
MCV RBC AUTO: 93 FL (ref 82–98)
MCV RBC AUTO: 94 FL (ref 82–98)
MODE: ABNORMAL
MONOCYTES # BLD AUTO: 1.7 K/UL (ref 0.3–1)
MONOCYTES # BLD AUTO: 1.8 K/UL (ref 0.3–1)
MONOCYTES NFR BLD: 12.2 % (ref 4–15)
MONOCYTES NFR BLD: 12.2 % (ref 4–15)
NEUTROPHILS # BLD AUTO: 10 K/UL (ref 1.8–7.7)
NEUTROPHILS # BLD AUTO: 10.2 K/UL (ref 1.8–7.7)
NEUTROPHILS NFR BLD: 70.9 % (ref 38–73)
NEUTROPHILS NFR BLD: 71.4 % (ref 38–73)
NRBC BLD-RTO: 0 /100 WBC
NRBC BLD-RTO: 0 /100 WBC
PCO2 BLDA: 62.5 MMHG (ref 35–45)
PCO2 BLDA: 64.4 MMHG (ref 35–45)
PH SMN: 7.37 [PH] (ref 7.35–7.45)
PH SMN: 7.37 [PH] (ref 7.35–7.45)
PLATELET # BLD AUTO: 316 K/UL (ref 150–450)
PLATELET # BLD AUTO: 331 K/UL (ref 150–450)
PMV BLD AUTO: 11.2 FL (ref 9.2–12.9)
PMV BLD AUTO: 11.6 FL (ref 9.2–12.9)
PO2 BLDA: 31 MMHG (ref 40–60)
PO2 BLDA: 31 MMHG (ref 40–60)
POC BE: 11 MMOL/L
POC BE: 11 MMOL/L
POC SATURATED O2: 54 % (ref 95–100)
POC SATURATED O2: 55 % (ref 95–100)
POC TCO2: 38 MMOL/L (ref 24–29)
POC TCO2: 39 MMOL/L (ref 24–29)
POCT GLUCOSE: 227 MG/DL (ref 70–110)
POCT GLUCOSE: 250 MG/DL (ref 70–110)
POCT GLUCOSE: 377 MG/DL (ref 70–110)
POCT GLUCOSE: 455 MG/DL (ref 70–110)
POTASSIUM SERPL-SCNC: 3.7 MMOL/L (ref 3.5–5.1)
POTASSIUM SERPL-SCNC: 3.9 MMOL/L (ref 3.5–5.1)
POTASSIUM SERPL-SCNC: 5.3 MMOL/L (ref 3.5–5.1)
PROT SERPL-MCNC: 6.7 G/DL (ref 6–8.4)
RBC # BLD AUTO: 4.63 M/UL (ref 4–5.4)
RBC # BLD AUTO: 4.66 M/UL (ref 4–5.4)
SAMPLE: ABNORMAL
SAMPLE: ABNORMAL
SITE: ABNORMAL
SITE: ABNORMAL
SODIUM SERPL-SCNC: 135 MMOL/L (ref 136–145)
SODIUM SERPL-SCNC: 137 MMOL/L (ref 136–145)
SODIUM SERPL-SCNC: 138 MMOL/L (ref 136–145)
SP02: 97
VANCOMYCIN SERPL-MCNC: 16.8 UG/ML
WBC # BLD AUTO: 14.06 K/UL (ref 3.9–12.7)
WBC # BLD AUTO: 14.36 K/UL (ref 3.9–12.7)

## 2021-05-08 PROCEDURE — 27000221 HC OXYGEN, UP TO 24 HOURS

## 2021-05-08 PROCEDURE — 80053 COMPREHEN METABOLIC PANEL: CPT | Performed by: INTERNAL MEDICINE

## 2021-05-08 PROCEDURE — 63600175 PHARM REV CODE 636 W HCPCS: Performed by: STUDENT IN AN ORGANIZED HEALTH CARE EDUCATION/TRAINING PROGRAM

## 2021-05-08 PROCEDURE — 99233 SBSQ HOSP IP/OBS HIGH 50: CPT | Mod: ,,, | Performed by: INTERNAL MEDICINE

## 2021-05-08 PROCEDURE — 25000003 PHARM REV CODE 250: Performed by: STUDENT IN AN ORGANIZED HEALTH CARE EDUCATION/TRAINING PROGRAM

## 2021-05-08 PROCEDURE — 20000000 HC ICU ROOM

## 2021-05-08 PROCEDURE — 83735 ASSAY OF MAGNESIUM: CPT | Performed by: INTERNAL MEDICINE

## 2021-05-08 PROCEDURE — 80202 ASSAY OF VANCOMYCIN: CPT | Performed by: INTERNAL MEDICINE

## 2021-05-08 PROCEDURE — 85730 THROMBOPLASTIN TIME PARTIAL: CPT | Performed by: STUDENT IN AN ORGANIZED HEALTH CARE EDUCATION/TRAINING PROGRAM

## 2021-05-08 PROCEDURE — 80048 BASIC METABOLIC PNL TOTAL CA: CPT | Mod: 91 | Performed by: STUDENT IN AN ORGANIZED HEALTH CARE EDUCATION/TRAINING PROGRAM

## 2021-05-08 PROCEDURE — 85730 THROMBOPLASTIN TIME PARTIAL: CPT | Mod: 91 | Performed by: INTERNAL MEDICINE

## 2021-05-08 PROCEDURE — 25000003 PHARM REV CODE 250: Performed by: INTERNAL MEDICINE

## 2021-05-08 PROCEDURE — 63600175 PHARM REV CODE 636 W HCPCS: Performed by: INTERNAL MEDICINE

## 2021-05-08 PROCEDURE — 85025 COMPLETE CBC W/AUTO DIFF WBC: CPT | Performed by: STUDENT IN AN ORGANIZED HEALTH CARE EDUCATION/TRAINING PROGRAM

## 2021-05-08 PROCEDURE — 94761 N-INVAS EAR/PLS OXIMETRY MLT: CPT

## 2021-05-08 PROCEDURE — 25000003 PHARM REV CODE 250: Performed by: EMERGENCY MEDICINE

## 2021-05-08 PROCEDURE — 85025 COMPLETE CBC W/AUTO DIFF WBC: CPT | Mod: 91 | Performed by: INTERNAL MEDICINE

## 2021-05-08 PROCEDURE — C9399 UNCLASSIFIED DRUGS OR BIOLOG: HCPCS | Performed by: NURSE PRACTITIONER

## 2021-05-08 PROCEDURE — 99233 PR SUBSEQUENT HOSPITAL CARE,LEVL III: ICD-10-PCS | Mod: ,,, | Performed by: INTERNAL MEDICINE

## 2021-05-08 PROCEDURE — 99900035 HC TECH TIME PER 15 MIN (STAT)

## 2021-05-08 PROCEDURE — 63600175 PHARM REV CODE 636 W HCPCS: Performed by: NURSE PRACTITIONER

## 2021-05-08 PROCEDURE — 82803 BLOOD GASES ANY COMBINATION: CPT

## 2021-05-08 PROCEDURE — 25000003 PHARM REV CODE 250: Performed by: NURSE PRACTITIONER

## 2021-05-08 PROCEDURE — 80048 BASIC METABOLIC PNL TOTAL CA: CPT | Performed by: INTERNAL MEDICINE

## 2021-05-08 RX ORDER — IBUPROFEN 200 MG
16 TABLET ORAL
Status: DISCONTINUED | OUTPATIENT
Start: 2021-05-08 | End: 2021-05-12 | Stop reason: HOSPADM

## 2021-05-08 RX ORDER — IBUPROFEN 200 MG
24 TABLET ORAL
Status: DISCONTINUED | OUTPATIENT
Start: 2021-05-08 | End: 2021-05-12 | Stop reason: HOSPADM

## 2021-05-08 RX ORDER — MAGNESIUM SULFATE HEPTAHYDRATE 40 MG/ML
2 INJECTION, SOLUTION INTRAVENOUS ONCE
Status: COMPLETED | OUTPATIENT
Start: 2021-05-08 | End: 2021-05-08

## 2021-05-08 RX ORDER — GLUCAGON 1 MG
1 KIT INJECTION
Status: DISCONTINUED | OUTPATIENT
Start: 2021-05-08 | End: 2021-05-12 | Stop reason: HOSPADM

## 2021-05-08 RX ORDER — INSULIN ASPART 100 [IU]/ML
0-5 INJECTION, SOLUTION INTRAVENOUS; SUBCUTANEOUS
Status: DISCONTINUED | OUTPATIENT
Start: 2021-05-08 | End: 2021-05-12 | Stop reason: HOSPADM

## 2021-05-08 RX ORDER — INSULIN ASPART 100 [IU]/ML
5 INJECTION, SOLUTION INTRAVENOUS; SUBCUTANEOUS
Status: DISCONTINUED | OUTPATIENT
Start: 2021-05-08 | End: 2021-05-08

## 2021-05-08 RX ORDER — NOREPINEPHRINE BITARTRATE/D5W 8 MG/250ML
0-3 PLASTIC BAG, INJECTION (ML) INTRAVENOUS CONTINUOUS
Status: DISCONTINUED | OUTPATIENT
Start: 2021-05-08 | End: 2021-05-09

## 2021-05-08 RX ORDER — INSULIN ASPART 100 [IU]/ML
3 INJECTION, SOLUTION INTRAVENOUS; SUBCUTANEOUS
Status: DISCONTINUED | OUTPATIENT
Start: 2021-05-08 | End: 2021-05-09

## 2021-05-08 RX ORDER — HYDROCODONE BITARTRATE AND ACETAMINOPHEN 5; 325 MG/1; MG/1
1 TABLET ORAL EVERY 6 HOURS PRN
Status: DISCONTINUED | OUTPATIENT
Start: 2021-05-08 | End: 2021-05-12 | Stop reason: HOSPADM

## 2021-05-08 RX ADMIN — ACETAMINOPHEN 650 MG: 325 TABLET ORAL at 05:05

## 2021-05-08 RX ADMIN — APIXABAN 5 MG: 5 TABLET, FILM COATED ORAL at 09:05

## 2021-05-08 RX ADMIN — INSULIN ASPART 2 UNITS: 100 INJECTION, SOLUTION INTRAVENOUS; SUBCUTANEOUS at 05:05

## 2021-05-08 RX ADMIN — POTASSIUM BICARBONATE 40 MEQ: 391 TABLET, EFFERVESCENT ORAL at 09:05

## 2021-05-08 RX ADMIN — INSULIN ASPART 3 UNITS: 100 INJECTION, SOLUTION INTRAVENOUS; SUBCUTANEOUS at 05:05

## 2021-05-08 RX ADMIN — HYDROCODONE BITARTRATE AND ACETAMINOPHEN 1 TABLET: 5; 325 TABLET ORAL at 08:05

## 2021-05-08 RX ADMIN — Medication 0.18 MCG/KG/MIN: at 02:05

## 2021-05-08 RX ADMIN — HYDROCODONE BITARTRATE AND ACETAMINOPHEN 1 TABLET: 5; 325 TABLET ORAL at 03:05

## 2021-05-08 RX ADMIN — HYDROCODONE BITARTRATE AND ACETAMINOPHEN 1 TABLET: 5; 325 TABLET ORAL at 09:05

## 2021-05-08 RX ADMIN — AMIODARONE HYDROCHLORIDE 0.5 MG/MIN: 1.8 INJECTION, SOLUTION INTRAVENOUS at 12:05

## 2021-05-08 RX ADMIN — INSULIN DETEMIR 10 UNITS: 100 INJECTION, SOLUTION SUBCUTANEOUS at 09:05

## 2021-05-08 RX ADMIN — VANCOMYCIN HYDROCHLORIDE 750 MG: 750 INJECTION, POWDER, LYOPHILIZED, FOR SOLUTION INTRAVENOUS at 09:05

## 2021-05-08 RX ADMIN — LEVOTHYROXINE SODIUM 75 MCG: 75 TABLET ORAL at 05:05

## 2021-05-08 RX ADMIN — FUROSEMIDE 20 MG/HR: 10 INJECTION, SOLUTION INTRAMUSCULAR; INTRAVENOUS at 06:05

## 2021-05-08 RX ADMIN — HEPARIN SODIUM AND DEXTROSE 14 UNITS/KG/HR: 10000; 5 INJECTION INTRAVENOUS at 05:05

## 2021-05-08 RX ADMIN — ACETAMINOPHEN 650 MG: 325 TABLET ORAL at 11:05

## 2021-05-08 RX ADMIN — MAGNESIUM SULFATE 2 G: 2 INJECTION INTRAVENOUS at 08:05

## 2021-05-08 RX ADMIN — MUPIROCIN: 20 OINTMENT TOPICAL at 08:05

## 2021-05-08 RX ADMIN — AMIODARONE HYDROCHLORIDE 0.5 MG/MIN: 1.8 INJECTION, SOLUTION INTRAVENOUS at 09:05

## 2021-05-08 RX ADMIN — ATORVASTATIN CALCIUM 10 MG: 20 TABLET, FILM COATED ORAL at 09:05

## 2021-05-08 RX ADMIN — POTASSIUM CHLORIDE 40 MEQ: 400 INJECTION, SOLUTION INTRAVENOUS at 02:05

## 2021-05-08 RX ADMIN — INSULIN ASPART 8 UNITS: 100 INJECTION, SOLUTION INTRAVENOUS; SUBCUTANEOUS at 12:05

## 2021-05-08 RX ADMIN — POTASSIUM CHLORIDE 40 MEQ: 400 INJECTION, SOLUTION INTRAVENOUS at 05:05

## 2021-05-08 RX ADMIN — CEFEPIME 2 G: 2 INJECTION, POWDER, FOR SOLUTION INTRAVENOUS at 09:05

## 2021-05-08 RX ADMIN — CEFEPIME 2 G: 2 INJECTION, POWDER, FOR SOLUTION INTRAVENOUS at 08:05

## 2021-05-08 RX ADMIN — MUPIROCIN: 20 OINTMENT TOPICAL at 09:05

## 2021-05-08 RX ADMIN — Medication 0.14 MCG/KG/MIN: at 03:05

## 2021-05-09 PROBLEM — E66.9 OBESITY: Status: ACTIVE | Noted: 2021-05-09

## 2021-05-09 LAB
ALBUMIN SERPL BCP-MCNC: 3.4 G/DL (ref 3.5–5.2)
ALLENS TEST: ABNORMAL
ALP SERPL-CCNC: 68 U/L (ref 55–135)
ALT SERPL W/O P-5'-P-CCNC: 34 U/L (ref 10–44)
ANION GAP SERPL CALC-SCNC: 11 MMOL/L (ref 8–16)
ANION GAP SERPL CALC-SCNC: 9 MMOL/L (ref 8–16)
AST SERPL-CCNC: 31 U/L (ref 10–40)
BASOPHILS # BLD AUTO: 0.07 K/UL (ref 0–0.2)
BASOPHILS NFR BLD: 0.4 % (ref 0–1.9)
BILIRUB SERPL-MCNC: 0.5 MG/DL (ref 0.1–1)
BUN SERPL-MCNC: 25 MG/DL (ref 8–23)
BUN SERPL-MCNC: 29 MG/DL (ref 8–23)
CALCIUM SERPL-MCNC: 9.1 MG/DL (ref 8.7–10.5)
CALCIUM SERPL-MCNC: 9.7 MG/DL (ref 8.7–10.5)
CHLORIDE SERPL-SCNC: 91 MMOL/L (ref 95–110)
CHLORIDE SERPL-SCNC: 92 MMOL/L (ref 95–110)
CO2 SERPL-SCNC: 36 MMOL/L (ref 23–29)
CO2 SERPL-SCNC: 36 MMOL/L (ref 23–29)
CREAT SERPL-MCNC: 0.9 MG/DL (ref 0.5–1.4)
CREAT SERPL-MCNC: 0.9 MG/DL (ref 0.5–1.4)
DELSYS: ABNORMAL
DIFFERENTIAL METHOD: ABNORMAL
EOSINOPHIL # BLD AUTO: 0.3 K/UL (ref 0–0.5)
EOSINOPHIL NFR BLD: 1.9 % (ref 0–8)
ERYTHROCYTE [DISTWIDTH] IN BLOOD BY AUTOMATED COUNT: 15.9 % (ref 11.5–14.5)
EST. GFR  (AFRICAN AMERICAN): >60 ML/MIN/1.73 M^2
EST. GFR  (AFRICAN AMERICAN): >60 ML/MIN/1.73 M^2
EST. GFR  (NON AFRICAN AMERICAN): 58.1 ML/MIN/1.73 M^2
EST. GFR  (NON AFRICAN AMERICAN): 58.1 ML/MIN/1.73 M^2
FIO2: 32
FLOW: 3
GLUCOSE SERPL-MCNC: 238 MG/DL (ref 70–110)
GLUCOSE SERPL-MCNC: 248 MG/DL (ref 70–110)
HCO3 UR-SCNC: 40 MMOL/L (ref 24–28)
HCT VFR BLD AUTO: 45.6 % (ref 37–48.5)
HGB BLD-MCNC: 14.4 G/DL (ref 12–16)
IMM GRANULOCYTES # BLD AUTO: 0.06 K/UL (ref 0–0.04)
IMM GRANULOCYTES NFR BLD AUTO: 0.4 % (ref 0–0.5)
LYMPHOCYTES # BLD AUTO: 1.6 K/UL (ref 1–4.8)
LYMPHOCYTES NFR BLD: 10.3 % (ref 18–48)
MAGNESIUM SERPL-MCNC: 2 MG/DL (ref 1.6–2.6)
MCH RBC QN AUTO: 30.4 PG (ref 27–31)
MCHC RBC AUTO-ENTMCNC: 31.6 G/DL (ref 32–36)
MCV RBC AUTO: 96 FL (ref 82–98)
MODE: ABNORMAL
MONOCYTES # BLD AUTO: 1.8 K/UL (ref 0.3–1)
MONOCYTES NFR BLD: 11.3 % (ref 4–15)
NEUTROPHILS # BLD AUTO: 12 K/UL (ref 1.8–7.7)
NEUTROPHILS NFR BLD: 75.7 % (ref 38–73)
NRBC BLD-RTO: 0 /100 WBC
PCO2 BLDA: 68.9 MMHG (ref 35–45)
PH SMN: 7.37 [PH] (ref 7.35–7.45)
PLATELET # BLD AUTO: 330 K/UL (ref 150–450)
PMV BLD AUTO: 11.3 FL (ref 9.2–12.9)
PO2 BLDA: 32 MMHG (ref 40–60)
POC BE: 15 MMOL/L
POC SATURATED O2: 57 % (ref 95–100)
POC TCO2: 42 MMOL/L (ref 24–29)
POCT GLUCOSE: 134 MG/DL (ref 70–110)
POCT GLUCOSE: 203 MG/DL (ref 70–110)
POCT GLUCOSE: 210 MG/DL (ref 70–110)
POCT GLUCOSE: 321 MG/DL (ref 70–110)
POCT GLUCOSE: 380 MG/DL (ref 70–110)
POTASSIUM SERPL-SCNC: 3.8 MMOL/L (ref 3.5–5.1)
POTASSIUM SERPL-SCNC: 4.1 MMOL/L (ref 3.5–5.1)
PROT SERPL-MCNC: 7.1 G/DL (ref 6–8.4)
RBC # BLD AUTO: 4.74 M/UL (ref 4–5.4)
SAMPLE: ABNORMAL
SITE: ABNORMAL
SODIUM SERPL-SCNC: 136 MMOL/L (ref 136–145)
SODIUM SERPL-SCNC: 139 MMOL/L (ref 136–145)
SP02: 97
WBC # BLD AUTO: 15.9 K/UL (ref 3.9–12.7)

## 2021-05-09 PROCEDURE — 99233 PR SUBSEQUENT HOSPITAL CARE,LEVL III: ICD-10-PCS | Mod: ,,, | Performed by: INTERNAL MEDICINE

## 2021-05-09 PROCEDURE — 63600175 PHARM REV CODE 636 W HCPCS: Performed by: STUDENT IN AN ORGANIZED HEALTH CARE EDUCATION/TRAINING PROGRAM

## 2021-05-09 PROCEDURE — C9399 UNCLASSIFIED DRUGS OR BIOLOG: HCPCS | Performed by: NURSE PRACTITIONER

## 2021-05-09 PROCEDURE — 25000003 PHARM REV CODE 250: Performed by: INTERNAL MEDICINE

## 2021-05-09 PROCEDURE — 25000003 PHARM REV CODE 250: Performed by: STUDENT IN AN ORGANIZED HEALTH CARE EDUCATION/TRAINING PROGRAM

## 2021-05-09 PROCEDURE — 93010 ELECTROCARDIOGRAM REPORT: CPT | Mod: ,,, | Performed by: INTERNAL MEDICINE

## 2021-05-09 PROCEDURE — 20000000 HC ICU ROOM

## 2021-05-09 PROCEDURE — 93010 EKG 12-LEAD: ICD-10-PCS | Mod: ,,, | Performed by: INTERNAL MEDICINE

## 2021-05-09 PROCEDURE — 83735 ASSAY OF MAGNESIUM: CPT | Performed by: INTERNAL MEDICINE

## 2021-05-09 PROCEDURE — 99222 1ST HOSP IP/OBS MODERATE 55: CPT | Mod: ,,, | Performed by: NURSE PRACTITIONER

## 2021-05-09 PROCEDURE — 94761 N-INVAS EAR/PLS OXIMETRY MLT: CPT

## 2021-05-09 PROCEDURE — 25000003 PHARM REV CODE 250: Performed by: NURSE PRACTITIONER

## 2021-05-09 PROCEDURE — 99222 PR INITIAL HOSPITAL CARE,LEVL II: ICD-10-PCS | Mod: ,,, | Performed by: NURSE PRACTITIONER

## 2021-05-09 PROCEDURE — 99233 SBSQ HOSP IP/OBS HIGH 50: CPT | Mod: ,,, | Performed by: INTERNAL MEDICINE

## 2021-05-09 PROCEDURE — 99900035 HC TECH TIME PER 15 MIN (STAT)

## 2021-05-09 PROCEDURE — 27000221 HC OXYGEN, UP TO 24 HOURS

## 2021-05-09 PROCEDURE — 93005 ELECTROCARDIOGRAM TRACING: CPT

## 2021-05-09 PROCEDURE — 85025 COMPLETE CBC W/AUTO DIFF WBC: CPT | Performed by: STUDENT IN AN ORGANIZED HEALTH CARE EDUCATION/TRAINING PROGRAM

## 2021-05-09 PROCEDURE — 80048 BASIC METABOLIC PNL TOTAL CA: CPT | Performed by: STUDENT IN AN ORGANIZED HEALTH CARE EDUCATION/TRAINING PROGRAM

## 2021-05-09 PROCEDURE — 80053 COMPREHEN METABOLIC PANEL: CPT | Performed by: INTERNAL MEDICINE

## 2021-05-09 PROCEDURE — 82803 BLOOD GASES ANY COMBINATION: CPT

## 2021-05-09 RX ORDER — POLYETHYLENE GLYCOL 3350 17 G/17G
17 POWDER, FOR SOLUTION ORAL DAILY
Status: DISCONTINUED | OUTPATIENT
Start: 2021-05-09 | End: 2021-05-12 | Stop reason: HOSPADM

## 2021-05-09 RX ORDER — AMOXICILLIN 250 MG
1 CAPSULE ORAL 2 TIMES DAILY
Status: DISCONTINUED | OUTPATIENT
Start: 2021-05-09 | End: 2021-05-12 | Stop reason: HOSPADM

## 2021-05-09 RX ORDER — INSULIN ASPART 100 [IU]/ML
7 INJECTION, SOLUTION INTRAVENOUS; SUBCUTANEOUS
Status: DISCONTINUED | OUTPATIENT
Start: 2021-05-10 | End: 2021-05-10

## 2021-05-09 RX ORDER — NOREPINEPHRINE BITARTRATE/D5W 8 MG/250ML
0-3 PLASTIC BAG, INJECTION (ML) INTRAVENOUS CONTINUOUS
Status: DISCONTINUED | OUTPATIENT
Start: 2021-05-09 | End: 2021-05-11

## 2021-05-09 RX ORDER — INSULIN ASPART 100 [IU]/ML
5 INJECTION, SOLUTION INTRAVENOUS; SUBCUTANEOUS
Status: DISCONTINUED | OUTPATIENT
Start: 2021-05-09 | End: 2021-05-09

## 2021-05-09 RX ORDER — AMIODARONE HYDROCHLORIDE 200 MG/1
400 TABLET ORAL 2 TIMES DAILY
Status: DISCONTINUED | OUTPATIENT
Start: 2021-05-09 | End: 2021-05-11

## 2021-05-09 RX ADMIN — INSULIN ASPART 4 UNITS: 100 INJECTION, SOLUTION INTRAVENOUS; SUBCUTANEOUS at 08:05

## 2021-05-09 RX ADMIN — INSULIN DETEMIR 14 UNITS: 100 INJECTION, SOLUTION SUBCUTANEOUS at 09:05

## 2021-05-09 RX ADMIN — DOCUSATE SODIUM 50MG AND SENNOSIDES 8.6MG 1 TABLET: 8.6; 5 TABLET, FILM COATED ORAL at 08:05

## 2021-05-09 RX ADMIN — AMIODARONE HYDROCHLORIDE 0.5 MG/MIN: 1.8 INJECTION, SOLUTION INTRAVENOUS at 09:05

## 2021-05-09 RX ADMIN — Medication 0.12 MCG/KG/MIN: at 02:05

## 2021-05-09 RX ADMIN — INSULIN ASPART 5 UNITS: 100 INJECTION, SOLUTION INTRAVENOUS; SUBCUTANEOUS at 12:05

## 2021-05-09 RX ADMIN — MUPIROCIN: 20 OINTMENT TOPICAL at 08:05

## 2021-05-09 RX ADMIN — INSULIN ASPART 5 UNITS: 100 INJECTION, SOLUTION INTRAVENOUS; SUBCUTANEOUS at 08:05

## 2021-05-09 RX ADMIN — LEVOTHYROXINE SODIUM 75 MCG: 75 TABLET ORAL at 05:05

## 2021-05-09 RX ADMIN — SODIUM CHLORIDE 5 ML/HR: 0.9 INJECTION, SOLUTION INTRAVENOUS at 11:05

## 2021-05-09 RX ADMIN — AMIODARONE HYDROCHLORIDE 400 MG: 200 TABLET ORAL at 02:05

## 2021-05-09 RX ADMIN — CEFEPIME 2 G: 2 INJECTION, POWDER, FOR SOLUTION INTRAVENOUS at 09:05

## 2021-05-09 RX ADMIN — ATORVASTATIN CALCIUM 10 MG: 20 TABLET, FILM COATED ORAL at 08:05

## 2021-05-09 RX ADMIN — INSULIN ASPART 2 UNITS: 100 INJECTION, SOLUTION INTRAVENOUS; SUBCUTANEOUS at 04:05

## 2021-05-09 RX ADMIN — MUPIROCIN: 20 OINTMENT TOPICAL at 09:05

## 2021-05-09 RX ADMIN — CEFEPIME 2 G: 2 INJECTION, POWDER, FOR SOLUTION INTRAVENOUS at 08:05

## 2021-05-09 RX ADMIN — INSULIN DETEMIR 4 UNITS: 100 INJECTION, SOLUTION SUBCUTANEOUS at 10:05

## 2021-05-09 RX ADMIN — AMIODARONE HYDROCHLORIDE 400 MG: 200 TABLET ORAL at 08:05

## 2021-05-09 RX ADMIN — FUROSEMIDE 20 MG/HR: 10 INJECTION, SOLUTION INTRAMUSCULAR; INTRAVENOUS at 09:05

## 2021-05-09 RX ADMIN — Medication 0.16 MCG/KG/MIN: at 04:05

## 2021-05-09 RX ADMIN — APIXABAN 5 MG: 5 TABLET, FILM COATED ORAL at 08:05

## 2021-05-09 RX ADMIN — INSULIN ASPART 5 UNITS: 100 INJECTION, SOLUTION INTRAVENOUS; SUBCUTANEOUS at 04:05

## 2021-05-09 RX ADMIN — POTASSIUM CHLORIDE 40 MEQ: 400 INJECTION, SOLUTION INTRAVENOUS at 06:05

## 2021-05-09 RX ADMIN — POLYETHYLENE GLYCOL 3350 17 G: 17 POWDER, FOR SOLUTION ORAL at 02:05

## 2021-05-10 LAB
ALBUMIN SERPL BCP-MCNC: 3.3 G/DL (ref 3.5–5.2)
ALLENS TEST: ABNORMAL
ALLENS TEST: ABNORMAL
ALP SERPL-CCNC: 70 U/L (ref 55–135)
ALT SERPL W/O P-5'-P-CCNC: 30 U/L (ref 10–44)
ANION GAP SERPL CALC-SCNC: 12 MMOL/L (ref 8–16)
ANION GAP SERPL CALC-SCNC: 9 MMOL/L (ref 8–16)
ANION GAP SERPL CALC-SCNC: 9 MMOL/L (ref 8–16)
AST SERPL-CCNC: 26 U/L (ref 10–40)
BASOPHILS # BLD AUTO: 0.09 K/UL (ref 0–0.2)
BASOPHILS NFR BLD: 0.7 % (ref 0–1.9)
BILIRUB SERPL-MCNC: 0.9 MG/DL (ref 0.1–1)
BUN SERPL-MCNC: 24 MG/DL (ref 8–23)
BUN SERPL-MCNC: 33 MG/DL (ref 8–23)
BUN SERPL-MCNC: 33 MG/DL (ref 8–23)
CALCIUM SERPL-MCNC: 10 MG/DL (ref 8.7–10.5)
CALCIUM SERPL-MCNC: 10.2 MG/DL (ref 8.7–10.5)
CALCIUM SERPL-MCNC: 10.3 MG/DL (ref 8.7–10.5)
CHLORIDE SERPL-SCNC: 90 MMOL/L (ref 95–110)
CHLORIDE SERPL-SCNC: 90 MMOL/L (ref 95–110)
CHLORIDE SERPL-SCNC: 91 MMOL/L (ref 95–110)
CO2 SERPL-SCNC: 37 MMOL/L (ref 23–29)
CO2 SERPL-SCNC: 37 MMOL/L (ref 23–29)
CO2 SERPL-SCNC: 38 MMOL/L (ref 23–29)
CREAT SERPL-MCNC: 0.8 MG/DL (ref 0.5–1.4)
CREAT SERPL-MCNC: 1 MG/DL (ref 0.5–1.4)
CREAT SERPL-MCNC: 1 MG/DL (ref 0.5–1.4)
DELSYS: ABNORMAL
DELSYS: ABNORMAL
DIFFERENTIAL METHOD: ABNORMAL
EOSINOPHIL # BLD AUTO: 0.2 K/UL (ref 0–0.5)
EOSINOPHIL NFR BLD: 1.5 % (ref 0–8)
ERYTHROCYTE [DISTWIDTH] IN BLOOD BY AUTOMATED COUNT: 15.7 % (ref 11.5–14.5)
EST. GFR  (AFRICAN AMERICAN): 58.9 ML/MIN/1.73 M^2
EST. GFR  (AFRICAN AMERICAN): 58.9 ML/MIN/1.73 M^2
EST. GFR  (AFRICAN AMERICAN): >60 ML/MIN/1.73 M^2
EST. GFR  (NON AFRICAN AMERICAN): 51.1 ML/MIN/1.73 M^2
EST. GFR  (NON AFRICAN AMERICAN): 51.1 ML/MIN/1.73 M^2
EST. GFR  (NON AFRICAN AMERICAN): >60 ML/MIN/1.73 M^2
FIO2: 32
FLOW: 3
GLUCOSE SERPL-MCNC: 130 MG/DL (ref 70–110)
GLUCOSE SERPL-MCNC: 140 MG/DL (ref 70–110)
GLUCOSE SERPL-MCNC: 232 MG/DL (ref 70–110)
HCO3 UR-SCNC: 41.5 MMOL/L (ref 24–28)
HCO3 UR-SCNC: 42.6 MMOL/L (ref 24–28)
HCT VFR BLD AUTO: 46.2 % (ref 37–48.5)
HGB BLD-MCNC: 14.8 G/DL (ref 12–16)
IMM GRANULOCYTES # BLD AUTO: 0.04 K/UL (ref 0–0.04)
IMM GRANULOCYTES NFR BLD AUTO: 0.3 % (ref 0–0.5)
LACTATE SERPL-SCNC: 1.2 MMOL/L (ref 0.5–2.2)
LYMPHOCYTES # BLD AUTO: 2.4 K/UL (ref 1–4.8)
LYMPHOCYTES NFR BLD: 17.6 % (ref 18–48)
MAGNESIUM SERPL-MCNC: 1.8 MG/DL (ref 1.6–2.6)
MAGNESIUM SERPL-MCNC: 2.6 MG/DL (ref 1.6–2.6)
MCH RBC QN AUTO: 29.1 PG (ref 27–31)
MCHC RBC AUTO-ENTMCNC: 32 G/DL (ref 32–36)
MCV RBC AUTO: 91 FL (ref 82–98)
MODE: ABNORMAL
MONOCYTES # BLD AUTO: 1.7 K/UL (ref 0.3–1)
MONOCYTES NFR BLD: 12.1 % (ref 4–15)
NEUTROPHILS # BLD AUTO: 9.3 K/UL (ref 1.8–7.7)
NEUTROPHILS NFR BLD: 67.8 % (ref 38–73)
NRBC BLD-RTO: 0 /100 WBC
PCO2 BLDA: 64.4 MMHG (ref 35–45)
PCO2 BLDA: 66.6 MMHG (ref 35–45)
PH SMN: 7.41 [PH] (ref 7.35–7.45)
PH SMN: 7.42 [PH] (ref 7.35–7.45)
PLATELET # BLD AUTO: 362 K/UL (ref 150–450)
PMV BLD AUTO: 10.9 FL (ref 9.2–12.9)
PO2 BLDA: 35 MMHG (ref 40–60)
PO2 BLDA: 38 MMHG (ref 40–60)
POC BE: 17 MMOL/L
POC BE: 18 MMOL/L
POC SATURATED O2: 65 % (ref 95–100)
POC SATURATED O2: 70 % (ref 95–100)
POC TCO2: 43 MMOL/L (ref 24–29)
POC TCO2: 45 MMOL/L (ref 24–29)
POCT GLUCOSE: 122 MG/DL (ref 70–110)
POCT GLUCOSE: 148 MG/DL (ref 70–110)
POCT GLUCOSE: 161 MG/DL (ref 70–110)
POCT GLUCOSE: 236 MG/DL (ref 70–110)
POTASSIUM SERPL-SCNC: 4.1 MMOL/L (ref 3.5–5.1)
POTASSIUM SERPL-SCNC: 4.2 MMOL/L (ref 3.5–5.1)
POTASSIUM SERPL-SCNC: 4.2 MMOL/L (ref 3.5–5.1)
PROT SERPL-MCNC: 7.2 G/DL (ref 6–8.4)
RBC # BLD AUTO: 5.09 M/UL (ref 4–5.4)
SAMPLE: ABNORMAL
SAMPLE: ABNORMAL
SITE: ABNORMAL
SITE: ABNORMAL
SODIUM SERPL-SCNC: 136 MMOL/L (ref 136–145)
SODIUM SERPL-SCNC: 137 MMOL/L (ref 136–145)
SODIUM SERPL-SCNC: 140 MMOL/L (ref 136–145)
SP02: 95
WBC # BLD AUTO: 13.7 K/UL (ref 3.9–12.7)

## 2021-05-10 PROCEDURE — 97116 GAIT TRAINING THERAPY: CPT

## 2021-05-10 PROCEDURE — 82803 BLOOD GASES ANY COMBINATION: CPT

## 2021-05-10 PROCEDURE — 80048 BASIC METABOLIC PNL TOTAL CA: CPT | Performed by: STUDENT IN AN ORGANIZED HEALTH CARE EDUCATION/TRAINING PROGRAM

## 2021-05-10 PROCEDURE — 99233 PR SUBSEQUENT HOSPITAL CARE,LEVL III: ICD-10-PCS | Mod: ,,, | Performed by: INTERNAL MEDICINE

## 2021-05-10 PROCEDURE — 80048 BASIC METABOLIC PNL TOTAL CA: CPT | Mod: 91 | Performed by: STUDENT IN AN ORGANIZED HEALTH CARE EDUCATION/TRAINING PROGRAM

## 2021-05-10 PROCEDURE — 83605 ASSAY OF LACTIC ACID: CPT | Performed by: STUDENT IN AN ORGANIZED HEALTH CARE EDUCATION/TRAINING PROGRAM

## 2021-05-10 PROCEDURE — 97535 SELF CARE MNGMENT TRAINING: CPT

## 2021-05-10 PROCEDURE — 63600175 PHARM REV CODE 636 W HCPCS: Performed by: STUDENT IN AN ORGANIZED HEALTH CARE EDUCATION/TRAINING PROGRAM

## 2021-05-10 PROCEDURE — 83735 ASSAY OF MAGNESIUM: CPT | Performed by: INTERNAL MEDICINE

## 2021-05-10 PROCEDURE — 99233 SBSQ HOSP IP/OBS HIGH 50: CPT | Mod: ,,, | Performed by: INTERNAL MEDICINE

## 2021-05-10 PROCEDURE — 20000000 HC ICU ROOM

## 2021-05-10 PROCEDURE — 94761 N-INVAS EAR/PLS OXIMETRY MLT: CPT

## 2021-05-10 PROCEDURE — 25000003 PHARM REV CODE 250: Performed by: INTERNAL MEDICINE

## 2021-05-10 PROCEDURE — 25000003 PHARM REV CODE 250: Performed by: STUDENT IN AN ORGANIZED HEALTH CARE EDUCATION/TRAINING PROGRAM

## 2021-05-10 PROCEDURE — 80053 COMPREHEN METABOLIC PANEL: CPT | Performed by: INTERNAL MEDICINE

## 2021-05-10 PROCEDURE — 99900035 HC TECH TIME PER 15 MIN (STAT)

## 2021-05-10 PROCEDURE — 27000221 HC OXYGEN, UP TO 24 HOURS

## 2021-05-10 PROCEDURE — 97165 OT EVAL LOW COMPLEX 30 MIN: CPT

## 2021-05-10 PROCEDURE — 83735 ASSAY OF MAGNESIUM: CPT | Mod: 91 | Performed by: STUDENT IN AN ORGANIZED HEALTH CARE EDUCATION/TRAINING PROGRAM

## 2021-05-10 PROCEDURE — 97162 PT EVAL MOD COMPLEX 30 MIN: CPT

## 2021-05-10 PROCEDURE — 85025 COMPLETE CBC W/AUTO DIFF WBC: CPT | Performed by: STUDENT IN AN ORGANIZED HEALTH CARE EDUCATION/TRAINING PROGRAM

## 2021-05-10 RX ORDER — CITALOPRAM 20 MG/1
20 TABLET, FILM COATED ORAL DAILY
Status: DISCONTINUED | OUTPATIENT
Start: 2021-05-10 | End: 2021-05-12 | Stop reason: HOSPADM

## 2021-05-10 RX ORDER — FUROSEMIDE 40 MG/1
40 TABLET ORAL 2 TIMES DAILY
Status: DISCONTINUED | OUTPATIENT
Start: 2021-05-10 | End: 2021-05-11

## 2021-05-10 RX ORDER — INSULIN ASPART 100 [IU]/ML
6 INJECTION, SOLUTION INTRAVENOUS; SUBCUTANEOUS
Status: DISCONTINUED | OUTPATIENT
Start: 2021-05-10 | End: 2021-05-10

## 2021-05-10 RX ORDER — INSULIN ASPART 100 [IU]/ML
8 INJECTION, SOLUTION INTRAVENOUS; SUBCUTANEOUS
Status: DISCONTINUED | OUTPATIENT
Start: 2021-05-10 | End: 2021-05-12 | Stop reason: HOSPADM

## 2021-05-10 RX ADMIN — AMIODARONE HYDROCHLORIDE 400 MG: 200 TABLET ORAL at 08:05

## 2021-05-10 RX ADMIN — FUROSEMIDE 40 MG: 40 TABLET ORAL at 12:05

## 2021-05-10 RX ADMIN — CITALOPRAM HYDROBROMIDE 20 MG: 20 TABLET ORAL at 11:05

## 2021-05-10 RX ADMIN — INSULIN ASPART 8 UNITS: 100 INJECTION, SOLUTION INTRAVENOUS; SUBCUTANEOUS at 05:05

## 2021-05-10 RX ADMIN — INSULIN ASPART 2 UNITS: 100 INJECTION, SOLUTION INTRAVENOUS; SUBCUTANEOUS at 12:05

## 2021-05-10 RX ADMIN — DOCUSATE SODIUM 50MG AND SENNOSIDES 8.6MG 1 TABLET: 8.6; 5 TABLET, FILM COATED ORAL at 08:05

## 2021-05-10 RX ADMIN — CEFEPIME 2 G: 2 INJECTION, POWDER, FOR SOLUTION INTRAVENOUS at 08:05

## 2021-05-10 RX ADMIN — ATORVASTATIN CALCIUM 10 MG: 20 TABLET, FILM COATED ORAL at 08:05

## 2021-05-10 RX ADMIN — INSULIN DETEMIR 14 UNITS: 100 INJECTION, SOLUTION SUBCUTANEOUS at 09:05

## 2021-05-10 RX ADMIN — MUPIROCIN: 20 OINTMENT TOPICAL at 08:05

## 2021-05-10 RX ADMIN — INSULIN ASPART 6 UNITS: 100 INJECTION, SOLUTION INTRAVENOUS; SUBCUTANEOUS at 08:05

## 2021-05-10 RX ADMIN — APIXABAN 5 MG: 5 TABLET, FILM COATED ORAL at 08:05

## 2021-05-10 RX ADMIN — POLYETHYLENE GLYCOL 3350 17 G: 17 POWDER, FOR SOLUTION ORAL at 08:05

## 2021-05-10 RX ADMIN — MAGNESIUM SULFATE 2 G: 2 INJECTION INTRAVENOUS at 03:05

## 2021-05-10 RX ADMIN — LEVOTHYROXINE SODIUM 75 MCG: 75 TABLET ORAL at 08:05

## 2021-05-10 RX ADMIN — INSULIN ASPART 6 UNITS: 100 INJECTION, SOLUTION INTRAVENOUS; SUBCUTANEOUS at 12:05

## 2021-05-11 LAB
ALBUMIN SERPL BCP-MCNC: 2.9 G/DL (ref 3.5–5.2)
ALLENS TEST: ABNORMAL
ALP SERPL-CCNC: 56 U/L (ref 55–135)
ALT SERPL W/O P-5'-P-CCNC: 25 U/L (ref 10–44)
ANION GAP SERPL CALC-SCNC: 10 MMOL/L (ref 8–16)
ANION GAP SERPL CALC-SCNC: 11 MMOL/L (ref 8–16)
AST SERPL-CCNC: 29 U/L (ref 10–40)
BASOPHILS # BLD AUTO: 0.05 K/UL (ref 0–0.2)
BASOPHILS NFR BLD: 0.4 % (ref 0–1.9)
BILIRUB SERPL-MCNC: 0.8 MG/DL (ref 0.1–1)
BUN SERPL-MCNC: 33 MG/DL (ref 8–23)
BUN SERPL-MCNC: 39 MG/DL (ref 8–23)
CALCIUM SERPL-MCNC: 9.6 MG/DL (ref 8.7–10.5)
CALCIUM SERPL-MCNC: 9.8 MG/DL (ref 8.7–10.5)
CHLORIDE SERPL-SCNC: 90 MMOL/L (ref 95–110)
CHLORIDE SERPL-SCNC: 91 MMOL/L (ref 95–110)
CO2 SERPL-SCNC: 32 MMOL/L (ref 23–29)
CO2 SERPL-SCNC: 34 MMOL/L (ref 23–29)
CREAT SERPL-MCNC: 0.8 MG/DL (ref 0.5–1.4)
CREAT SERPL-MCNC: 1.3 MG/DL (ref 0.5–1.4)
DELSYS: ABNORMAL
DIFFERENTIAL METHOD: ABNORMAL
EOSINOPHIL # BLD AUTO: 0.2 K/UL (ref 0–0.5)
EOSINOPHIL NFR BLD: 1.7 % (ref 0–8)
ERYTHROCYTE [DISTWIDTH] IN BLOOD BY AUTOMATED COUNT: 15.5 % (ref 11.5–14.5)
ERYTHROCYTE [SEDIMENTATION RATE] IN BLOOD BY WESTERGREN METHOD: 18 MM/H
EST. GFR  (AFRICAN AMERICAN): 42.9 ML/MIN/1.73 M^2
EST. GFR  (AFRICAN AMERICAN): >60 ML/MIN/1.73 M^2
EST. GFR  (NON AFRICAN AMERICAN): 37.2 ML/MIN/1.73 M^2
EST. GFR  (NON AFRICAN AMERICAN): >60 ML/MIN/1.73 M^2
GLUCOSE SERPL-MCNC: 193 MG/DL (ref 70–110)
GLUCOSE SERPL-MCNC: 78 MG/DL (ref 70–110)
HCO3 UR-SCNC: 41.7 MMOL/L (ref 24–28)
HCT VFR BLD AUTO: 43.8 % (ref 37–48.5)
HGB BLD-MCNC: 14.1 G/DL (ref 12–16)
IMM GRANULOCYTES # BLD AUTO: 0.04 K/UL (ref 0–0.04)
IMM GRANULOCYTES NFR BLD AUTO: 0.3 % (ref 0–0.5)
LYMPHOCYTES # BLD AUTO: 2 K/UL (ref 1–4.8)
LYMPHOCYTES NFR BLD: 17.1 % (ref 18–48)
MAGNESIUM SERPL-MCNC: 2.3 MG/DL (ref 1.6–2.6)
MCH RBC QN AUTO: 30 PG (ref 27–31)
MCHC RBC AUTO-ENTMCNC: 32.2 G/DL (ref 32–36)
MCV RBC AUTO: 93 FL (ref 82–98)
MODE: ABNORMAL
MONOCYTES # BLD AUTO: 1.4 K/UL (ref 0.3–1)
MONOCYTES NFR BLD: 12 % (ref 4–15)
NEUTROPHILS # BLD AUTO: 7.9 K/UL (ref 1.8–7.7)
NEUTROPHILS NFR BLD: 68.5 % (ref 38–73)
NRBC BLD-RTO: 0 /100 WBC
PCO2 BLDA: 61.3 MMHG (ref 35–45)
PH SMN: 7.44 [PH] (ref 7.35–7.45)
PHOSPHATE SERPL-MCNC: 4.1 MG/DL (ref 2.7–4.5)
PLATELET # BLD AUTO: 329 K/UL (ref 150–450)
PMV BLD AUTO: 11 FL (ref 9.2–12.9)
PO2 BLDA: 34 MMHG (ref 40–60)
POC BE: 18 MMOL/L
POC SATURATED O2: 65 % (ref 95–100)
POC TCO2: 44 MMOL/L (ref 24–29)
POCT GLUCOSE: 119 MG/DL (ref 70–110)
POCT GLUCOSE: 134 MG/DL (ref 70–110)
POCT GLUCOSE: 174 MG/DL (ref 70–110)
POCT GLUCOSE: 236 MG/DL (ref 70–110)
POTASSIUM SERPL-SCNC: 3.9 MMOL/L (ref 3.5–5.1)
POTASSIUM SERPL-SCNC: 4.8 MMOL/L (ref 3.5–5.1)
POTASSIUM SERPL-SCNC: 5.1 MMOL/L (ref 3.5–5.1)
PROT SERPL-MCNC: 6.2 G/DL (ref 6–8.4)
RBC # BLD AUTO: 4.7 M/UL (ref 4–5.4)
SAMPLE: ABNORMAL
SITE: ABNORMAL
SODIUM SERPL-SCNC: 133 MMOL/L (ref 136–145)
SODIUM SERPL-SCNC: 135 MMOL/L (ref 136–145)
SP02: 95
WBC # BLD AUTO: 11.46 K/UL (ref 3.9–12.7)

## 2021-05-11 PROCEDURE — 82803 BLOOD GASES ANY COMBINATION: CPT

## 2021-05-11 PROCEDURE — 25000003 PHARM REV CODE 250: Performed by: STUDENT IN AN ORGANIZED HEALTH CARE EDUCATION/TRAINING PROGRAM

## 2021-05-11 PROCEDURE — 80053 COMPREHEN METABOLIC PANEL: CPT | Performed by: INTERNAL MEDICINE

## 2021-05-11 PROCEDURE — 83735 ASSAY OF MAGNESIUM: CPT | Performed by: INTERNAL MEDICINE

## 2021-05-11 PROCEDURE — 63600175 PHARM REV CODE 636 W HCPCS: Performed by: STUDENT IN AN ORGANIZED HEALTH CARE EDUCATION/TRAINING PROGRAM

## 2021-05-11 PROCEDURE — 99232 PR SUBSEQUENT HOSPITAL CARE,LEVL II: ICD-10-PCS | Mod: ,,, | Performed by: NURSE PRACTITIONER

## 2021-05-11 PROCEDURE — 25000003 PHARM REV CODE 250: Performed by: INTERNAL MEDICINE

## 2021-05-11 PROCEDURE — 99232 SBSQ HOSP IP/OBS MODERATE 35: CPT | Mod: ,,, | Performed by: NURSE PRACTITIONER

## 2021-05-11 PROCEDURE — 20600001 HC STEP DOWN PRIVATE ROOM

## 2021-05-11 PROCEDURE — 99233 SBSQ HOSP IP/OBS HIGH 50: CPT | Mod: ,,, | Performed by: INTERNAL MEDICINE

## 2021-05-11 PROCEDURE — 85025 COMPLETE CBC W/AUTO DIFF WBC: CPT | Performed by: STUDENT IN AN ORGANIZED HEALTH CARE EDUCATION/TRAINING PROGRAM

## 2021-05-11 PROCEDURE — C9399 UNCLASSIFIED DRUGS OR BIOLOG: HCPCS | Performed by: NURSE PRACTITIONER

## 2021-05-11 PROCEDURE — 99233 PR SUBSEQUENT HOSPITAL CARE,LEVL III: ICD-10-PCS | Mod: ,,, | Performed by: INTERNAL MEDICINE

## 2021-05-11 PROCEDURE — 99900035 HC TECH TIME PER 15 MIN (STAT)

## 2021-05-11 PROCEDURE — 84132 ASSAY OF SERUM POTASSIUM: CPT | Performed by: INTERNAL MEDICINE

## 2021-05-11 PROCEDURE — 63600175 PHARM REV CODE 636 W HCPCS: Performed by: NURSE PRACTITIONER

## 2021-05-11 PROCEDURE — 84100 ASSAY OF PHOSPHORUS: CPT | Performed by: INTERNAL MEDICINE

## 2021-05-11 PROCEDURE — 25000003 PHARM REV CODE 250: Performed by: NURSE PRACTITIONER

## 2021-05-11 PROCEDURE — 80048 BASIC METABOLIC PNL TOTAL CA: CPT | Performed by: STUDENT IN AN ORGANIZED HEALTH CARE EDUCATION/TRAINING PROGRAM

## 2021-05-11 RX ORDER — POTASSIUM CHLORIDE 20 MEQ/1
20 TABLET, EXTENDED RELEASE ORAL ONCE
Status: DISCONTINUED | OUTPATIENT
Start: 2021-05-11 | End: 2021-05-11

## 2021-05-11 RX ORDER — AMIODARONE HYDROCHLORIDE 200 MG/1
200 TABLET ORAL DAILY
Status: DISCONTINUED | OUTPATIENT
Start: 2021-05-20 | End: 2021-05-12 | Stop reason: HOSPADM

## 2021-05-11 RX ORDER — AMIODARONE HYDROCHLORIDE 200 MG/1
400 TABLET ORAL 2 TIMES DAILY
Status: DISCONTINUED | OUTPATIENT
Start: 2021-05-11 | End: 2021-05-12 | Stop reason: HOSPADM

## 2021-05-11 RX ORDER — FUROSEMIDE 40 MG/1
40 TABLET ORAL 2 TIMES DAILY
Status: DISCONTINUED | OUTPATIENT
Start: 2021-05-12 | End: 2021-05-12 | Stop reason: HOSPADM

## 2021-05-11 RX ORDER — NOREPINEPHRINE BITARTRATE/D5W 8 MG/250ML
0-3 PLASTIC BAG, INJECTION (ML) INTRAVENOUS CONTINUOUS
Status: DISCONTINUED | OUTPATIENT
Start: 2021-05-11 | End: 2021-05-11

## 2021-05-11 RX ADMIN — INSULIN ASPART 8 UNITS: 100 INJECTION, SOLUTION INTRAVENOUS; SUBCUTANEOUS at 08:05

## 2021-05-11 RX ADMIN — AMIODARONE HYDROCHLORIDE 400 MG: 200 TABLET ORAL at 08:05

## 2021-05-11 RX ADMIN — INSULIN ASPART 8 UNITS: 100 INJECTION, SOLUTION INTRAVENOUS; SUBCUTANEOUS at 12:05

## 2021-05-11 RX ADMIN — DOCUSATE SODIUM 50MG AND SENNOSIDES 8.6MG 1 TABLET: 8.6; 5 TABLET, FILM COATED ORAL at 09:05

## 2021-05-11 RX ADMIN — INSULIN DETEMIR 14 UNITS: 100 INJECTION, SOLUTION SUBCUTANEOUS at 09:05

## 2021-05-11 RX ADMIN — INSULIN ASPART 8 UNITS: 100 INJECTION, SOLUTION INTRAVENOUS; SUBCUTANEOUS at 05:05

## 2021-05-11 RX ADMIN — CITALOPRAM HYDROBROMIDE 20 MG: 20 TABLET ORAL at 09:05

## 2021-05-11 RX ADMIN — APIXABAN 5 MG: 5 TABLET, FILM COATED ORAL at 09:05

## 2021-05-11 RX ADMIN — Medication 0.02 MCG/KG/MIN: at 02:05

## 2021-05-11 RX ADMIN — AMIODARONE HYDROCHLORIDE 400 MG: 200 TABLET ORAL at 09:05

## 2021-05-11 RX ADMIN — APIXABAN 5 MG: 5 TABLET, FILM COATED ORAL at 08:05

## 2021-05-11 RX ADMIN — POTASSIUM CHLORIDE 40 MEQ: 400 INJECTION, SOLUTION INTRAVENOUS at 03:05

## 2021-05-11 RX ADMIN — MUPIROCIN: 20 OINTMENT TOPICAL at 08:05

## 2021-05-11 RX ADMIN — INSULIN ASPART 1 UNITS: 100 INJECTION, SOLUTION INTRAVENOUS; SUBCUTANEOUS at 09:05

## 2021-05-11 RX ADMIN — FUROSEMIDE 40 MG: 40 TABLET ORAL at 08:05

## 2021-05-11 RX ADMIN — ATORVASTATIN CALCIUM 10 MG: 20 TABLET, FILM COATED ORAL at 09:05

## 2021-05-11 RX ADMIN — LEVOTHYROXINE SODIUM 75 MCG: 75 TABLET ORAL at 05:05

## 2021-05-11 RX ADMIN — MUPIROCIN: 20 OINTMENT TOPICAL at 09:05

## 2021-05-12 VITALS
RESPIRATION RATE: 18 BRPM | BODY MASS INDEX: 30.24 KG/M2 | DIASTOLIC BLOOD PRESSURE: 51 MMHG | TEMPERATURE: 98 F | HEART RATE: 81 BPM | SYSTOLIC BLOOD PRESSURE: 85 MMHG | HEIGHT: 59 IN | WEIGHT: 150 LBS | OXYGEN SATURATION: 95 %

## 2021-05-12 LAB
ALBUMIN SERPL BCP-MCNC: 2.9 G/DL (ref 3.5–5.2)
ALP SERPL-CCNC: 60 U/L (ref 55–135)
ALT SERPL W/O P-5'-P-CCNC: 22 U/L (ref 10–44)
ANION GAP SERPL CALC-SCNC: 11 MMOL/L (ref 8–16)
AST SERPL-CCNC: 24 U/L (ref 10–40)
BACTERIA BLD CULT: NORMAL
BACTERIA BLD CULT: NORMAL
BASOPHILS # BLD AUTO: 0.08 K/UL (ref 0–0.2)
BASOPHILS NFR BLD: 0.6 % (ref 0–1.9)
BILIRUB SERPL-MCNC: 0.5 MG/DL (ref 0.1–1)
BUN SERPL-MCNC: 38 MG/DL (ref 8–23)
CALCIUM SERPL-MCNC: 10.3 MG/DL (ref 8.7–10.5)
CHLORIDE SERPL-SCNC: 98 MMOL/L (ref 95–110)
CO2 SERPL-SCNC: 27 MMOL/L (ref 23–29)
CREAT SERPL-MCNC: 1 MG/DL (ref 0.5–1.4)
DIFFERENTIAL METHOD: ABNORMAL
EOSINOPHIL # BLD AUTO: 0.3 K/UL (ref 0–0.5)
EOSINOPHIL NFR BLD: 2.6 % (ref 0–8)
ERYTHROCYTE [DISTWIDTH] IN BLOOD BY AUTOMATED COUNT: 15.3 % (ref 11.5–14.5)
EST. GFR  (AFRICAN AMERICAN): 58.9 ML/MIN/1.73 M^2
EST. GFR  (NON AFRICAN AMERICAN): 51.1 ML/MIN/1.73 M^2
GLUCOSE SERPL-MCNC: 205 MG/DL (ref 70–110)
HCT VFR BLD AUTO: 43.7 % (ref 37–48.5)
HGB BLD-MCNC: 14.3 G/DL (ref 12–16)
IMM GRANULOCYTES # BLD AUTO: 0.06 K/UL (ref 0–0.04)
IMM GRANULOCYTES NFR BLD AUTO: 0.5 % (ref 0–0.5)
LYMPHOCYTES # BLD AUTO: 2.1 K/UL (ref 1–4.8)
LYMPHOCYTES NFR BLD: 16.8 % (ref 18–48)
MAGNESIUM SERPL-MCNC: 2 MG/DL (ref 1.6–2.6)
MCH RBC QN AUTO: 30.1 PG (ref 27–31)
MCHC RBC AUTO-ENTMCNC: 32.7 G/DL (ref 32–36)
MCV RBC AUTO: 92 FL (ref 82–98)
MONOCYTES # BLD AUTO: 1.5 K/UL (ref 0.3–1)
MONOCYTES NFR BLD: 12.4 % (ref 4–15)
NEUTROPHILS # BLD AUTO: 8.3 K/UL (ref 1.8–7.7)
NEUTROPHILS NFR BLD: 67.1 % (ref 38–73)
NRBC BLD-RTO: 0 /100 WBC
PHOSPHATE SERPL-MCNC: 3.8 MG/DL (ref 2.7–4.5)
PLATELET # BLD AUTO: 328 K/UL (ref 150–450)
PMV BLD AUTO: 11.2 FL (ref 9.2–12.9)
POCT GLUCOSE: 154 MG/DL (ref 70–110)
POCT GLUCOSE: 236 MG/DL (ref 70–110)
POTASSIUM SERPL-SCNC: 4.4 MMOL/L (ref 3.5–5.1)
PROT SERPL-MCNC: 6.3 G/DL (ref 6–8.4)
RBC # BLD AUTO: 4.75 M/UL (ref 4–5.4)
SODIUM SERPL-SCNC: 136 MMOL/L (ref 136–145)
WBC # BLD AUTO: 12.34 K/UL (ref 3.9–12.7)

## 2021-05-12 PROCEDURE — 97116 GAIT TRAINING THERAPY: CPT | Mod: CQ

## 2021-05-12 PROCEDURE — 80053 COMPREHEN METABOLIC PANEL: CPT | Performed by: INTERNAL MEDICINE

## 2021-05-12 PROCEDURE — 25000003 PHARM REV CODE 250: Performed by: INTERNAL MEDICINE

## 2021-05-12 PROCEDURE — 84100 ASSAY OF PHOSPHORUS: CPT | Performed by: INTERNAL MEDICINE

## 2021-05-12 PROCEDURE — 83735 ASSAY OF MAGNESIUM: CPT | Performed by: INTERNAL MEDICINE

## 2021-05-12 PROCEDURE — 36415 COLL VENOUS BLD VENIPUNCTURE: CPT | Performed by: INTERNAL MEDICINE

## 2021-05-12 PROCEDURE — 25000003 PHARM REV CODE 250: Performed by: STUDENT IN AN ORGANIZED HEALTH CARE EDUCATION/TRAINING PROGRAM

## 2021-05-12 PROCEDURE — 85025 COMPLETE CBC W/AUTO DIFF WBC: CPT | Performed by: STUDENT IN AN ORGANIZED HEALTH CARE EDUCATION/TRAINING PROGRAM

## 2021-05-12 PROCEDURE — 97535 SELF CARE MNGMENT TRAINING: CPT | Mod: CO

## 2021-05-12 PROCEDURE — 99232 SBSQ HOSP IP/OBS MODERATE 35: CPT | Mod: ,,, | Performed by: INTERNAL MEDICINE

## 2021-05-12 PROCEDURE — 99232 PR SUBSEQUENT HOSPITAL CARE,LEVL II: ICD-10-PCS | Mod: ,,, | Performed by: INTERNAL MEDICINE

## 2021-05-12 RX ORDER — INSULIN DEGLUDEC 100 U/ML
18 INJECTION, SOLUTION SUBCUTANEOUS NIGHTLY
Qty: 3 SYRINGE | Refills: 3 | OUTPATIENT
Start: 2021-05-12

## 2021-05-12 RX ORDER — AMIODARONE HYDROCHLORIDE 400 MG/1
400 TABLET ORAL 2 TIMES DAILY
Qty: 60 TABLET | Refills: 11 | Status: SHIPPED | OUTPATIENT
Start: 2021-05-12 | End: 2021-05-12

## 2021-05-12 RX ORDER — FUROSEMIDE 40 MG/1
40 TABLET ORAL 2 TIMES DAILY
Qty: 60 TABLET | Refills: 11 | Status: SHIPPED | OUTPATIENT
Start: 2021-05-12 | End: 2021-06-02 | Stop reason: SDUPTHER

## 2021-05-12 RX ORDER — DAPAGLIFLOZIN 5 MG/1
5 TABLET, FILM COATED ORAL DAILY
Qty: 90 TABLET | Refills: 1 | Status: SHIPPED | OUTPATIENT
Start: 2021-05-12

## 2021-05-12 RX ORDER — AMIODARONE HYDROCHLORIDE 200 MG/1
200 TABLET ORAL DAILY
Qty: 54 TABLET | Refills: 11 | Status: SHIPPED | OUTPATIENT
Start: 2021-05-20 | End: 2021-06-02

## 2021-05-12 RX ORDER — ATORVASTATIN CALCIUM 10 MG/1
10 TABLET, FILM COATED ORAL NIGHTLY
Qty: 90 TABLET | Refills: 3 | Status: SHIPPED | OUTPATIENT
Start: 2021-05-12 | End: 2021-06-02

## 2021-05-12 RX ORDER — AMIODARONE HYDROCHLORIDE 400 MG/1
400 TABLET ORAL 2 TIMES DAILY
Qty: 16 TABLET | Refills: 0 | Status: SHIPPED | OUTPATIENT
Start: 2021-05-12 | End: 2021-05-20

## 2021-05-12 RX ORDER — AMIODARONE HYDROCHLORIDE 200 MG/1
200 TABLET ORAL DAILY
Qty: 50 TABLET | Refills: 11 | Status: SHIPPED | OUTPATIENT
Start: 2021-05-20 | End: 2021-05-12

## 2021-05-12 RX ADMIN — INSULIN ASPART 8 UNITS: 100 INJECTION, SOLUTION INTRAVENOUS; SUBCUTANEOUS at 11:05

## 2021-05-12 RX ADMIN — CITALOPRAM HYDROBROMIDE 20 MG: 20 TABLET ORAL at 08:05

## 2021-05-12 RX ADMIN — FUROSEMIDE 40 MG: 40 TABLET ORAL at 08:05

## 2021-05-12 RX ADMIN — LEVOTHYROXINE SODIUM 75 MCG: 75 TABLET ORAL at 05:05

## 2021-05-12 RX ADMIN — APIXABAN 5 MG: 5 TABLET, FILM COATED ORAL at 08:05

## 2021-05-12 RX ADMIN — INSULIN ASPART 2 UNITS: 100 INJECTION, SOLUTION INTRAVENOUS; SUBCUTANEOUS at 11:05

## 2021-05-12 RX ADMIN — AMIODARONE HYDROCHLORIDE 400 MG: 200 TABLET ORAL at 08:05

## 2021-05-12 RX ADMIN — INSULIN ASPART 8 UNITS: 100 INJECTION, SOLUTION INTRAVENOUS; SUBCUTANEOUS at 07:05

## 2021-05-26 DIAGNOSIS — I35.0 SEVERE AORTIC STENOSIS: ICD-10-CM

## 2021-05-26 DIAGNOSIS — I50.9 ACUTE DECOMPENSATED HEART FAILURE: Primary | ICD-10-CM

## 2021-05-27 DIAGNOSIS — I35.0 AORTIC VALVE STENOSIS, ETIOLOGY OF CARDIAC VALVE DISEASE UNSPECIFIED: Primary | ICD-10-CM

## 2021-05-27 DIAGNOSIS — I50.9 ACUTE DECOMPENSATED HEART FAILURE: ICD-10-CM

## 2021-05-27 RX ORDER — SODIUM CHLORIDE 9 MG/ML
INJECTION, SOLUTION INTRAVENOUS CONTINUOUS
Status: CANCELLED | OUTPATIENT
Start: 2021-05-27 | End: 2021-05-27

## 2021-05-27 RX ORDER — DIPHENHYDRAMINE HCL 50 MG
50 CAPSULE ORAL ONCE
Status: CANCELLED | OUTPATIENT
Start: 2021-05-27 | End: 2021-05-27

## 2021-05-28 ENCOUNTER — OFFICE VISIT (OUTPATIENT)
Dept: CARDIOLOGY | Facility: CLINIC | Age: 86
End: 2021-05-28
Payer: MEDICARE

## 2021-05-28 ENCOUNTER — HOSPITAL ENCOUNTER (OUTPATIENT)
Dept: RADIOLOGY | Facility: HOSPITAL | Age: 86
Discharge: HOME | End: 2021-05-28
Attending: INTERNAL MEDICINE
Payer: MEDICARE

## 2021-05-28 VITALS
HEART RATE: 71 BPM | SYSTOLIC BLOOD PRESSURE: 110 MMHG | OXYGEN SATURATION: 98 % | HEIGHT: 60 IN | WEIGHT: 142.63 LBS | DIASTOLIC BLOOD PRESSURE: 59 MMHG | BODY MASS INDEX: 28 KG/M2

## 2021-05-28 DIAGNOSIS — I50.9 ACUTE DECOMPENSATED HEART FAILURE: ICD-10-CM

## 2021-05-28 DIAGNOSIS — I35.0 SEVERE AORTIC STENOSIS: ICD-10-CM

## 2021-05-28 DIAGNOSIS — R57.0 CARDIOGENIC SHOCK: ICD-10-CM

## 2021-05-28 DIAGNOSIS — Z79.01 ANTICOAGULATED: ICD-10-CM

## 2021-05-28 DIAGNOSIS — I35.0 SEVERE AORTIC STENOSIS: Primary | ICD-10-CM

## 2021-05-28 DIAGNOSIS — I48.21 PERMANENT ATRIAL FIBRILLATION: ICD-10-CM

## 2021-05-28 DIAGNOSIS — I10 HYPERTENSION, UNSPECIFIED TYPE: ICD-10-CM

## 2021-05-28 PROCEDURE — 74174 CTA ABD&PLVS W/CONTRAST: CPT | Mod: TC

## 2021-05-28 PROCEDURE — 99999 PR PBB SHADOW E&M-EST. PATIENT-LVL IV: ICD-10-PCS | Mod: PBBFAC,,, | Performed by: INTERNAL MEDICINE

## 2021-05-28 PROCEDURE — 99204 OFFICE O/P NEW MOD 45 MIN: CPT | Mod: S$GLB,,, | Performed by: INTERNAL MEDICINE

## 2021-05-28 PROCEDURE — 1126F PR PAIN SEVERITY QUANTIFIED, NO PAIN PRESENT: ICD-10-PCS | Mod: S$GLB,,, | Performed by: INTERNAL MEDICINE

## 2021-05-28 PROCEDURE — 1159F MED LIST DOCD IN RCRD: CPT | Mod: S$GLB,,, | Performed by: INTERNAL MEDICINE

## 2021-05-28 PROCEDURE — 74174 CTA ABD&PLVS W/CONTRAST: CPT | Mod: 26,,, | Performed by: RADIOLOGY

## 2021-05-28 PROCEDURE — 99204 PR OFFICE/OUTPT VISIT, NEW, LEVL IV, 45-59 MIN: ICD-10-PCS | Mod: S$GLB,,, | Performed by: INTERNAL MEDICINE

## 2021-05-28 PROCEDURE — 25500020 PHARM REV CODE 255: Performed by: INTERNAL MEDICINE

## 2021-05-28 PROCEDURE — 1159F PR MEDICATION LIST DOCUMENTED IN MEDICAL RECORD: ICD-10-PCS | Mod: S$GLB,,, | Performed by: INTERNAL MEDICINE

## 2021-05-28 PROCEDURE — 71275 CT ANGIOGRAPHY CHEST: CPT | Mod: TC

## 2021-05-28 PROCEDURE — 99999 PR PBB SHADOW E&M-EST. PATIENT-LVL IV: CPT | Mod: PBBFAC,,, | Performed by: INTERNAL MEDICINE

## 2021-05-28 PROCEDURE — 71275 CT ANGIOGRAPHY CHEST: CPT | Mod: 26,,, | Performed by: RADIOLOGY

## 2021-05-28 PROCEDURE — 71275 CTA CARDIAC TAVR_PARTNERS (XPD): ICD-10-PCS | Mod: 26,,, | Performed by: RADIOLOGY

## 2021-05-28 PROCEDURE — 74174 CTA CARDIAC TAVR_PARTNERS (XPD): ICD-10-PCS | Mod: 26,,, | Performed by: RADIOLOGY

## 2021-05-28 PROCEDURE — 1126F AMNT PAIN NOTED NONE PRSNT: CPT | Mod: S$GLB,,, | Performed by: INTERNAL MEDICINE

## 2021-05-28 RX ADMIN — IOHEXOL 100 ML: 350 INJECTION, SOLUTION INTRAVENOUS at 09:05

## 2021-06-02 ENCOUNTER — OFFICE VISIT (OUTPATIENT)
Dept: CARDIOLOGY | Facility: CLINIC | Age: 86
End: 2021-06-02
Payer: MEDICARE

## 2021-06-02 VITALS
HEART RATE: 73 BPM | DIASTOLIC BLOOD PRESSURE: 67 MMHG | BODY MASS INDEX: 29.07 KG/M2 | HEIGHT: 59 IN | OXYGEN SATURATION: 95 % | SYSTOLIC BLOOD PRESSURE: 129 MMHG | WEIGHT: 144.19 LBS

## 2021-06-02 DIAGNOSIS — E78.5 HYPERLIPIDEMIA, UNSPECIFIED HYPERLIPIDEMIA TYPE: ICD-10-CM

## 2021-06-02 DIAGNOSIS — I48.91 ATRIAL FIBRILLATION, UNSPECIFIED TYPE: Primary | ICD-10-CM

## 2021-06-02 DIAGNOSIS — I10 ESSENTIAL HYPERTENSION: ICD-10-CM

## 2021-06-02 DIAGNOSIS — I35.0 SEVERE AORTIC STENOSIS: ICD-10-CM

## 2021-06-02 DIAGNOSIS — I50.20 HEART FAILURE WITH REDUCED EJECTION FRACTION: ICD-10-CM

## 2021-06-02 PROCEDURE — 99204 PR OFFICE/OUTPT VISIT, NEW, LEVL IV, 45-59 MIN: ICD-10-PCS | Mod: GC,S$GLB,, | Performed by: INTERNAL MEDICINE

## 2021-06-02 PROCEDURE — 99999 PR PBB SHADOW E&M-EST. PATIENT-LVL IV: ICD-10-PCS | Mod: PBBFAC,GC,, | Performed by: INTERNAL MEDICINE

## 2021-06-02 PROCEDURE — 99999 PR PBB SHADOW E&M-EST. PATIENT-LVL IV: CPT | Mod: PBBFAC,GC,, | Performed by: INTERNAL MEDICINE

## 2021-06-02 PROCEDURE — 99204 OFFICE O/P NEW MOD 45 MIN: CPT | Mod: GC,S$GLB,, | Performed by: INTERNAL MEDICINE

## 2021-06-02 RX ORDER — AMIODARONE HYDROCHLORIDE 200 MG/1
200 TABLET ORAL DAILY
Qty: 90 TABLET | Refills: 3 | Status: SHIPPED | OUTPATIENT
Start: 2021-06-02 | End: 2022-04-12 | Stop reason: SDUPTHER

## 2021-06-02 RX ORDER — CARVEDILOL 6.25 MG/1
3.12 TABLET ORAL 2 TIMES DAILY WITH MEALS
Qty: 30 TABLET | Refills: 11 | Status: SHIPPED | OUTPATIENT
Start: 2021-06-02 | End: 2022-04-11 | Stop reason: SDUPTHER

## 2021-06-02 RX ORDER — VALSARTAN 160 MG/1
80 TABLET ORAL DAILY
Qty: 45 TABLET | Refills: 3 | Status: SHIPPED | OUTPATIENT
Start: 2021-06-02 | End: 2022-04-12 | Stop reason: SDUPTHER

## 2021-06-02 RX ORDER — ATORVASTATIN CALCIUM 40 MG/1
40 TABLET, FILM COATED ORAL DAILY
COMMUNITY
Start: 2021-05-20 | End: 2022-04-12 | Stop reason: SDUPTHER

## 2021-06-02 RX ORDER — FUROSEMIDE 40 MG/1
40 TABLET ORAL 2 TIMES DAILY
Qty: 60 TABLET | Refills: 11 | Status: SHIPPED | OUTPATIENT
Start: 2021-06-02 | End: 2022-04-11 | Stop reason: SDUPTHER

## 2021-06-07 ENCOUNTER — OFFICE VISIT (OUTPATIENT)
Dept: PULMONOLOGY | Facility: CLINIC | Age: 86
End: 2021-06-07
Payer: MEDICARE

## 2021-06-07 ENCOUNTER — LAB VISIT (OUTPATIENT)
Dept: LAB | Facility: HOSPITAL | Age: 86
End: 2021-06-07
Attending: INTERNAL MEDICINE
Payer: MEDICARE

## 2021-06-07 ENCOUNTER — TELEPHONE (OUTPATIENT)
Dept: INTERVENTIONAL RADIOLOGY/VASCULAR | Facility: HOSPITAL | Age: 86
End: 2021-06-07

## 2021-06-07 VITALS
DIASTOLIC BLOOD PRESSURE: 70 MMHG | HEART RATE: 71 BPM | SYSTOLIC BLOOD PRESSURE: 130 MMHG | BODY MASS INDEX: 29.11 KG/M2 | WEIGHT: 144.38 LBS | OXYGEN SATURATION: 94 % | HEIGHT: 59 IN

## 2021-06-07 DIAGNOSIS — I10 ESSENTIAL HYPERTENSION: ICD-10-CM

## 2021-06-07 DIAGNOSIS — R91.1 PULMONARY NODULE: Primary | ICD-10-CM

## 2021-06-07 LAB
CHOLEST SERPL-MCNC: 134 MG/DL (ref 120–199)
CHOLEST/HDLC SERPL: 4.1 {RATIO} (ref 2–5)
HDLC SERPL-MCNC: 33 MG/DL (ref 40–75)
HDLC SERPL: 24.6 % (ref 20–50)
LDLC SERPL CALC-MCNC: 70.8 MG/DL (ref 63–159)
NONHDLC SERPL-MCNC: 101 MG/DL
TRIGL SERPL-MCNC: 151 MG/DL (ref 30–150)

## 2021-06-07 PROCEDURE — 1126F AMNT PAIN NOTED NONE PRSNT: CPT | Mod: S$GLB,,, | Performed by: INTERNAL MEDICINE

## 2021-06-07 PROCEDURE — 1101F PR PT FALLS ASSESS DOC 0-1 FALLS W/OUT INJ PAST YR: ICD-10-PCS | Mod: CPTII,S$GLB,, | Performed by: INTERNAL MEDICINE

## 2021-06-07 PROCEDURE — 3288F FALL RISK ASSESSMENT DOCD: CPT | Mod: CPTII,S$GLB,, | Performed by: INTERNAL MEDICINE

## 2021-06-07 PROCEDURE — 1111F DSCHRG MED/CURRENT MED MERGE: CPT | Mod: CPTII,S$GLB,, | Performed by: INTERNAL MEDICINE

## 2021-06-07 PROCEDURE — 99999 PR PBB SHADOW E&M-EST. PATIENT-LVL IV: CPT | Mod: PBBFAC,,, | Performed by: INTERNAL MEDICINE

## 2021-06-07 PROCEDURE — 99999 PR PBB SHADOW E&M-EST. PATIENT-LVL IV: ICD-10-PCS | Mod: PBBFAC,,, | Performed by: INTERNAL MEDICINE

## 2021-06-07 PROCEDURE — 99204 OFFICE O/P NEW MOD 45 MIN: CPT | Mod: S$GLB,,, | Performed by: INTERNAL MEDICINE

## 2021-06-07 PROCEDURE — 1111F PR DISCHARGE MEDS RECONCILED W/ CURRENT OUTPATIENT MED LIST: ICD-10-PCS | Mod: CPTII,S$GLB,, | Performed by: INTERNAL MEDICINE

## 2021-06-07 PROCEDURE — 1159F PR MEDICATION LIST DOCUMENTED IN MEDICAL RECORD: ICD-10-PCS | Mod: S$GLB,,, | Performed by: INTERNAL MEDICINE

## 2021-06-07 PROCEDURE — 1126F PR PAIN SEVERITY QUANTIFIED, NO PAIN PRESENT: ICD-10-PCS | Mod: S$GLB,,, | Performed by: INTERNAL MEDICINE

## 2021-06-07 PROCEDURE — 1159F MED LIST DOCD IN RCRD: CPT | Mod: S$GLB,,, | Performed by: INTERNAL MEDICINE

## 2021-06-07 PROCEDURE — 3288F PR FALLS RISK ASSESSMENT DOCUMENTED: ICD-10-PCS | Mod: CPTII,S$GLB,, | Performed by: INTERNAL MEDICINE

## 2021-06-07 PROCEDURE — 80061 LIPID PANEL: CPT | Performed by: INTERNAL MEDICINE

## 2021-06-07 PROCEDURE — 99204 PR OFFICE/OUTPT VISIT, NEW, LEVL IV, 45-59 MIN: ICD-10-PCS | Mod: S$GLB,,, | Performed by: INTERNAL MEDICINE

## 2021-06-07 PROCEDURE — 36415 COLL VENOUS BLD VENIPUNCTURE: CPT | Performed by: INTERNAL MEDICINE

## 2021-06-07 PROCEDURE — 1101F PT FALLS ASSESS-DOCD LE1/YR: CPT | Mod: CPTII,S$GLB,, | Performed by: INTERNAL MEDICINE

## 2021-06-10 ENCOUNTER — LAB VISIT (OUTPATIENT)
Dept: LAB | Facility: HOSPITAL | Age: 86
End: 2021-06-10
Payer: MEDICARE

## 2021-06-10 ENCOUNTER — OFFICE VISIT (OUTPATIENT)
Dept: INTERVENTIONAL RADIOLOGY/VASCULAR | Facility: CLINIC | Age: 86
End: 2021-06-10
Payer: MEDICARE

## 2021-06-10 VITALS
WEIGHT: 145.06 LBS | HEART RATE: 76 BPM | SYSTOLIC BLOOD PRESSURE: 93 MMHG | HEIGHT: 59 IN | BODY MASS INDEX: 29.24 KG/M2 | DIASTOLIC BLOOD PRESSURE: 59 MMHG

## 2021-06-10 DIAGNOSIS — D49.9 NEOPLASM: ICD-10-CM

## 2021-06-10 DIAGNOSIS — R91.1 LESION OF RIGHT LUNG: ICD-10-CM

## 2021-06-10 DIAGNOSIS — Z01.812 PRE-PROCEDURE LAB EXAM: ICD-10-CM

## 2021-06-10 DIAGNOSIS — R91.1 LESION OF RIGHT LUNG: Primary | ICD-10-CM

## 2021-06-10 LAB
BASOPHILS # BLD AUTO: 0.09 K/UL (ref 0–0.2)
BASOPHILS NFR BLD: 0.9 % (ref 0–1.9)
DIFFERENTIAL METHOD: ABNORMAL
EOSINOPHIL # BLD AUTO: 0.6 K/UL (ref 0–0.5)
EOSINOPHIL NFR BLD: 5.8 % (ref 0–8)
ERYTHROCYTE [DISTWIDTH] IN BLOOD BY AUTOMATED COUNT: 15.1 % (ref 11.5–14.5)
HCT VFR BLD AUTO: 47.1 % (ref 37–48.5)
HGB BLD-MCNC: 14.7 G/DL (ref 12–16)
IMM GRANULOCYTES # BLD AUTO: 0.04 K/UL (ref 0–0.04)
IMM GRANULOCYTES NFR BLD AUTO: 0.4 % (ref 0–0.5)
INR PPP: 1 (ref 0.8–1.2)
LYMPHOCYTES # BLD AUTO: 2.2 K/UL (ref 1–4.8)
LYMPHOCYTES NFR BLD: 22.7 % (ref 18–48)
MCH RBC QN AUTO: 29.2 PG (ref 27–31)
MCHC RBC AUTO-ENTMCNC: 31.2 G/DL (ref 32–36)
MCV RBC AUTO: 94 FL (ref 82–98)
MONOCYTES # BLD AUTO: 1.1 K/UL (ref 0.3–1)
MONOCYTES NFR BLD: 11.6 % (ref 4–15)
NEUTROPHILS # BLD AUTO: 5.6 K/UL (ref 1.8–7.7)
NEUTROPHILS NFR BLD: 58.6 % (ref 38–73)
NRBC BLD-RTO: 0 /100 WBC
PLATELET # BLD AUTO: 234 K/UL (ref 150–450)
PMV BLD AUTO: 11.6 FL (ref 9.2–12.9)
PROTHROMBIN TIME: 11.4 SEC (ref 9–12.5)
RBC # BLD AUTO: 5.04 M/UL (ref 4–5.4)
WBC # BLD AUTO: 9.55 K/UL (ref 3.9–12.7)

## 2021-06-10 PROCEDURE — 85610 PROTHROMBIN TIME: CPT | Performed by: FAMILY MEDICINE

## 2021-06-10 PROCEDURE — 99999 PR PBB SHADOW E&M-EST. PATIENT-LVL III: ICD-10-PCS | Mod: PBBFAC,,, | Performed by: FAMILY MEDICINE

## 2021-06-10 PROCEDURE — 1111F DSCHRG MED/CURRENT MED MERGE: CPT | Mod: CPTII,S$GLB,, | Performed by: FAMILY MEDICINE

## 2021-06-10 PROCEDURE — 1159F PR MEDICATION LIST DOCUMENTED IN MEDICAL RECORD: ICD-10-PCS | Mod: S$GLB,,, | Performed by: FAMILY MEDICINE

## 2021-06-10 PROCEDURE — 99204 PR OFFICE/OUTPT VISIT, NEW, LEVL IV, 45-59 MIN: ICD-10-PCS | Mod: S$GLB,CS,, | Performed by: FAMILY MEDICINE

## 2021-06-10 PROCEDURE — 1111F PR DISCHARGE MEDS RECONCILED W/ CURRENT OUTPATIENT MED LIST: ICD-10-PCS | Mod: CPTII,S$GLB,, | Performed by: FAMILY MEDICINE

## 2021-06-10 PROCEDURE — 1159F MED LIST DOCD IN RCRD: CPT | Mod: S$GLB,,, | Performed by: FAMILY MEDICINE

## 2021-06-10 PROCEDURE — 99999 PR PBB SHADOW E&M-EST. PATIENT-LVL III: CPT | Mod: PBBFAC,,, | Performed by: FAMILY MEDICINE

## 2021-06-10 PROCEDURE — 99204 OFFICE O/P NEW MOD 45 MIN: CPT | Mod: S$GLB,CS,, | Performed by: FAMILY MEDICINE

## 2021-06-10 PROCEDURE — 36415 COLL VENOUS BLD VENIPUNCTURE: CPT | Performed by: FAMILY MEDICINE

## 2021-06-10 PROCEDURE — 85025 COMPLETE CBC W/AUTO DIFF WBC: CPT | Performed by: FAMILY MEDICINE

## 2021-06-15 RX ORDER — FENTANYL CITRATE 50 UG/ML
50 INJECTION, SOLUTION INTRAMUSCULAR; INTRAVENOUS
Status: CANCELLED | OUTPATIENT
Start: 2021-06-15

## 2021-06-15 RX ORDER — MIDAZOLAM HYDROCHLORIDE 1 MG/ML
1 INJECTION INTRAMUSCULAR; INTRAVENOUS
Status: CANCELLED | OUTPATIENT
Start: 2021-06-15

## 2021-06-29 ENCOUNTER — HOSPITAL ENCOUNTER (OUTPATIENT)
Dept: RADIOLOGY | Facility: HOSPITAL | Age: 86
Discharge: HOME | End: 2021-06-29
Attending: RADIOLOGY
Payer: MEDICARE

## 2021-06-29 ENCOUNTER — HOSPITAL ENCOUNTER (OUTPATIENT)
Dept: INTERVENTIONAL RADIOLOGY/VASCULAR | Facility: HOSPITAL | Age: 86
Discharge: HOME | End: 2021-06-29
Attending: FAMILY MEDICINE
Payer: MEDICARE

## 2021-06-29 VITALS
HEART RATE: 68 BPM | TEMPERATURE: 97 F | HEIGHT: 59 IN | RESPIRATION RATE: 18 BRPM | WEIGHT: 143 LBS | DIASTOLIC BLOOD PRESSURE: 56 MMHG | SYSTOLIC BLOOD PRESSURE: 94 MMHG | BODY MASS INDEX: 28.83 KG/M2 | OXYGEN SATURATION: 94 %

## 2021-06-29 DIAGNOSIS — R91.1 LESION OF RIGHT LUNG: ICD-10-CM

## 2021-06-29 DIAGNOSIS — Z98.890 STATUS POST THORACENTESIS: ICD-10-CM

## 2021-06-29 DIAGNOSIS — D49.9 NEOPLASM: ICD-10-CM

## 2021-06-29 PROCEDURE — 88333 PATH CONSLTJ SURG CYTO XM 1: CPT | Performed by: PATHOLOGY

## 2021-06-29 PROCEDURE — 63600175 PHARM REV CODE 636 W HCPCS: Performed by: RADIOLOGY

## 2021-06-29 PROCEDURE — 99152 MOD SED SAME PHYS/QHP 5/>YRS: CPT | Mod: ,,, | Performed by: RADIOLOGY

## 2021-06-29 PROCEDURE — 25000003 PHARM REV CODE 250: Performed by: PHYSICIAN ASSISTANT

## 2021-06-29 PROCEDURE — 82962 GLUCOSE BLOOD TEST: CPT

## 2021-06-29 PROCEDURE — 88333 PATH CONSLTJ SURG CYTO XM 1: CPT | Mod: 26,,, | Performed by: PATHOLOGY

## 2021-06-29 PROCEDURE — 32408 IR BIOPSY LUNG W/ GUIDANCE: ICD-10-PCS | Mod: RT,,, | Performed by: RADIOLOGY

## 2021-06-29 PROCEDURE — 88360 TUMOR IMMUNOHISTOCHEM/MANUAL: CPT | Mod: 59 | Performed by: PATHOLOGY

## 2021-06-29 PROCEDURE — 88381 MICRODISSECTION MANUAL: CPT | Performed by: PATHOLOGY

## 2021-06-29 PROCEDURE — 88305 TISSUE EXAM BY PATHOLOGIST: CPT | Mod: 26,,, | Performed by: PATHOLOGY

## 2021-06-29 PROCEDURE — 71045 XR CHEST AP PORTABLE: ICD-10-PCS | Mod: 26,77,, | Performed by: RADIOLOGY

## 2021-06-29 PROCEDURE — 71045 X-RAY EXAM CHEST 1 VIEW: CPT | Mod: 26,77,, | Performed by: RADIOLOGY

## 2021-06-29 PROCEDURE — 81445 SO NEO GSAP 5-50DNA/DNA&RNA: CPT | Performed by: PATHOLOGY

## 2021-06-29 PROCEDURE — 27201068 IR BIOPSY LUNG W/ GUIDANCE

## 2021-06-29 PROCEDURE — 88305 TISSUE EXAM BY PATHOLOGIST: ICD-10-PCS | Mod: 26,,, | Performed by: PATHOLOGY

## 2021-06-29 PROCEDURE — 88333 PR  INTRAOPERATIVE CYTO PATH CONSULT, INITIAL SITE: ICD-10-PCS | Mod: 26,,, | Performed by: PATHOLOGY

## 2021-06-29 PROCEDURE — 71045 XR CHEST AP PORTABLE: ICD-10-PCS | Mod: 26,76,, | Performed by: RADIOLOGY

## 2021-06-29 PROCEDURE — 99152 PR MOD CONSCIOUS SEDATION, SAME PHYS, 5+ YRS, FIRST 15 MIN: ICD-10-PCS | Mod: ,,, | Performed by: RADIOLOGY

## 2021-06-29 PROCEDURE — 88305 TISSUE EXAM BY PATHOLOGIST: CPT | Performed by: PATHOLOGY

## 2021-06-29 PROCEDURE — 71045 X-RAY EXAM CHEST 1 VIEW: CPT | Mod: TC,FY

## 2021-06-29 PROCEDURE — 32408 CORE NDL BX LNG/MED PERQ: CPT | Mod: RT,,, | Performed by: RADIOLOGY

## 2021-06-29 PROCEDURE — 71045 X-RAY EXAM CHEST 1 VIEW: CPT | Mod: 26,76,, | Performed by: RADIOLOGY

## 2021-06-29 RX ORDER — FENTANYL CITRATE 50 UG/ML
INJECTION, SOLUTION INTRAMUSCULAR; INTRAVENOUS CODE/TRAUMA/SEDATION MEDICATION
Status: COMPLETED | OUTPATIENT
Start: 2021-06-29 | End: 2021-06-29

## 2021-06-29 RX ORDER — MIDAZOLAM HYDROCHLORIDE 1 MG/ML
INJECTION INTRAMUSCULAR; INTRAVENOUS CODE/TRAUMA/SEDATION MEDICATION
Status: COMPLETED | OUTPATIENT
Start: 2021-06-29 | End: 2021-06-29

## 2021-06-29 RX ORDER — SODIUM CHLORIDE 9 MG/ML
INJECTION, SOLUTION INTRAVENOUS CONTINUOUS
Status: DISCONTINUED | OUTPATIENT
Start: 2021-06-29 | End: 2021-06-30 | Stop reason: HOSPADM

## 2021-06-29 RX ADMIN — MIDAZOLAM HYDROCHLORIDE 0.5 MG: 1 INJECTION INTRAMUSCULAR; INTRAVENOUS at 09:06

## 2021-06-29 RX ADMIN — SODIUM CHLORIDE: 0.9 INJECTION, SOLUTION INTRAVENOUS at 08:06

## 2021-06-29 RX ADMIN — FENTANYL CITRATE 25 MCG: 50 INJECTION, SOLUTION INTRAMUSCULAR; INTRAVENOUS at 09:06

## 2021-07-01 ENCOUNTER — TELEPHONE (OUTPATIENT)
Dept: PULMONOLOGY | Facility: CLINIC | Age: 86
End: 2021-07-01

## 2021-07-08 ENCOUNTER — IMMUNIZATION (OUTPATIENT)
Dept: PRIMARY CARE CLINIC | Facility: CLINIC | Age: 86
End: 2021-07-08
Payer: MEDICARE

## 2021-07-08 DIAGNOSIS — Z23 NEED FOR VACCINATION: Primary | ICD-10-CM

## 2021-07-09 ENCOUNTER — TELEPHONE (OUTPATIENT)
Dept: PULMONOLOGY | Facility: CLINIC | Age: 86
End: 2021-07-09

## 2021-07-09 DIAGNOSIS — C34.90 MALIGNANT NEOPLASM OF UNSPECIFIED PART OF UNSPECIFIED BRONCHUS OR LUNG: ICD-10-CM

## 2021-07-09 DIAGNOSIS — C34.11 MALIGNANT NEOPLASM OF UPPER LOBE, RIGHT BRONCHUS OR LUNG: ICD-10-CM

## 2021-07-12 ENCOUNTER — TELEPHONE (OUTPATIENT)
Dept: HEMATOLOGY/ONCOLOGY | Facility: CLINIC | Age: 86
End: 2021-07-12

## 2021-07-12 ENCOUNTER — OFFICE VISIT (OUTPATIENT)
Dept: CARDIOLOGY | Facility: CLINIC | Age: 86
End: 2021-07-12
Payer: MEDICARE

## 2021-07-12 VITALS
BODY MASS INDEX: 29.11 KG/M2 | HEIGHT: 59 IN | WEIGHT: 144.38 LBS | HEART RATE: 70 BPM | SYSTOLIC BLOOD PRESSURE: 138 MMHG | DIASTOLIC BLOOD PRESSURE: 64 MMHG

## 2021-07-12 DIAGNOSIS — I10 ESSENTIAL HYPERTENSION: ICD-10-CM

## 2021-07-12 DIAGNOSIS — I35.0 SEVERE AORTIC STENOSIS: ICD-10-CM

## 2021-07-12 DIAGNOSIS — I50.42 CHRONIC COMBINED SYSTOLIC AND DIASTOLIC HEART FAILURE: Primary | ICD-10-CM

## 2021-07-12 DIAGNOSIS — I48.0 PAROXYSMAL ATRIAL FIBRILLATION: ICD-10-CM

## 2021-07-12 DIAGNOSIS — I50.20 HEART FAILURE WITH REDUCED EJECTION FRACTION: ICD-10-CM

## 2021-07-12 DIAGNOSIS — I34.0 NONRHEUMATIC MITRAL VALVE REGURGITATION: ICD-10-CM

## 2021-07-12 DIAGNOSIS — I48.91 ATRIAL FIBRILLATION, UNSPECIFIED TYPE: ICD-10-CM

## 2021-07-12 PROCEDURE — 99213 PR OFFICE/OUTPT VISIT, EST, LEVL III, 20-29 MIN: ICD-10-PCS | Mod: S$GLB,,, | Performed by: INTERNAL MEDICINE

## 2021-07-12 PROCEDURE — 1126F AMNT PAIN NOTED NONE PRSNT: CPT | Mod: S$GLB,,, | Performed by: INTERNAL MEDICINE

## 2021-07-12 PROCEDURE — 1126F PR PAIN SEVERITY QUANTIFIED, NO PAIN PRESENT: ICD-10-PCS | Mod: S$GLB,,, | Performed by: INTERNAL MEDICINE

## 2021-07-12 PROCEDURE — 99213 OFFICE O/P EST LOW 20 MIN: CPT | Mod: S$GLB,,, | Performed by: INTERNAL MEDICINE

## 2021-07-12 PROCEDURE — 1159F PR MEDICATION LIST DOCUMENTED IN MEDICAL RECORD: ICD-10-PCS | Mod: S$GLB,,, | Performed by: INTERNAL MEDICINE

## 2021-07-12 PROCEDURE — 99999 PR PBB SHADOW E&M-EST. PATIENT-LVL III: CPT | Mod: PBBFAC,,, | Performed by: INTERNAL MEDICINE

## 2021-07-12 PROCEDURE — 1159F MED LIST DOCD IN RCRD: CPT | Mod: S$GLB,,, | Performed by: INTERNAL MEDICINE

## 2021-07-12 PROCEDURE — 99999 PR PBB SHADOW E&M-EST. PATIENT-LVL III: ICD-10-PCS | Mod: PBBFAC,,, | Performed by: INTERNAL MEDICINE

## 2021-07-15 ENCOUNTER — HOSPITAL ENCOUNTER (OUTPATIENT)
Dept: RADIOLOGY | Facility: HOSPITAL | Age: 86
Discharge: HOME | End: 2021-07-15
Attending: INTERNAL MEDICINE
Payer: MEDICARE

## 2021-07-15 DIAGNOSIS — C34.90 MALIGNANT NEOPLASM OF UNSPECIFIED PART OF UNSPECIFIED BRONCHUS OR LUNG: ICD-10-CM

## 2021-07-15 DIAGNOSIS — C34.11 MALIGNANT NEOPLASM OF UPPER LOBE, RIGHT BRONCHUS OR LUNG: ICD-10-CM

## 2021-07-15 LAB — POCT GLUCOSE: 206 MG/DL (ref 70–110)

## 2021-07-15 PROCEDURE — 78815 PET IMAGE W/CT SKULL-THIGH: CPT | Mod: TC,PI

## 2021-07-15 PROCEDURE — 25500020 PHARM REV CODE 255: Performed by: INTERNAL MEDICINE

## 2021-07-15 PROCEDURE — A9698 NON-RAD CONTRAST MATERIALNOC: HCPCS | Performed by: INTERNAL MEDICINE

## 2021-07-15 PROCEDURE — 78815 PET IMAGE W/CT SKULL-THIGH: CPT | Mod: 26,PS,, | Performed by: RADIOLOGY

## 2021-07-15 PROCEDURE — 78815 NM PET CT ROUTINE: ICD-10-PCS | Mod: 26,PS,, | Performed by: RADIOLOGY

## 2021-07-15 RX ADMIN — IOHEXOL 1000 ML: 9 SOLUTION ORAL at 07:07

## 2021-07-19 ENCOUNTER — OFFICE VISIT (OUTPATIENT)
Dept: HEMATOLOGY/ONCOLOGY | Facility: CLINIC | Age: 86
End: 2021-07-19
Payer: MEDICARE

## 2021-07-19 VITALS
WEIGHT: 146.63 LBS | SYSTOLIC BLOOD PRESSURE: 105 MMHG | RESPIRATION RATE: 16 BRPM | HEIGHT: 59 IN | TEMPERATURE: 98 F | DIASTOLIC BLOOD PRESSURE: 56 MMHG | OXYGEN SATURATION: 94 % | HEART RATE: 77 BPM | BODY MASS INDEX: 29.56 KG/M2

## 2021-07-19 DIAGNOSIS — C34.11 MALIGNANT NEOPLASM OF UPPER LOBE, RIGHT BRONCHUS OR LUNG: ICD-10-CM

## 2021-07-19 DIAGNOSIS — C34.11 PRIMARY ADENOCARCINOMA OF UPPER LOBE OF RIGHT LUNG: ICD-10-CM

## 2021-07-19 PROCEDURE — 1159F MED LIST DOCD IN RCRD: CPT | Mod: CPTII,S$GLB,, | Performed by: STUDENT IN AN ORGANIZED HEALTH CARE EDUCATION/TRAINING PROGRAM

## 2021-07-19 PROCEDURE — 1101F PR PT FALLS ASSESS DOC 0-1 FALLS W/OUT INJ PAST YR: ICD-10-PCS | Mod: CPTII,S$GLB,, | Performed by: STUDENT IN AN ORGANIZED HEALTH CARE EDUCATION/TRAINING PROGRAM

## 2021-07-19 PROCEDURE — 99205 OFFICE O/P NEW HI 60 MIN: CPT | Mod: S$GLB,,, | Performed by: STUDENT IN AN ORGANIZED HEALTH CARE EDUCATION/TRAINING PROGRAM

## 2021-07-19 PROCEDURE — 99999 PR PBB SHADOW E&M-EST. PATIENT-LVL V: CPT | Mod: PBBFAC,,, | Performed by: STUDENT IN AN ORGANIZED HEALTH CARE EDUCATION/TRAINING PROGRAM

## 2021-07-19 PROCEDURE — 3288F FALL RISK ASSESSMENT DOCD: CPT | Mod: CPTII,S$GLB,, | Performed by: STUDENT IN AN ORGANIZED HEALTH CARE EDUCATION/TRAINING PROGRAM

## 2021-07-19 PROCEDURE — 1101F PT FALLS ASSESS-DOCD LE1/YR: CPT | Mod: CPTII,S$GLB,, | Performed by: STUDENT IN AN ORGANIZED HEALTH CARE EDUCATION/TRAINING PROGRAM

## 2021-07-19 PROCEDURE — 99999 PR PBB SHADOW E&M-EST. PATIENT-LVL V: ICD-10-PCS | Mod: PBBFAC,,, | Performed by: STUDENT IN AN ORGANIZED HEALTH CARE EDUCATION/TRAINING PROGRAM

## 2021-07-19 PROCEDURE — 1159F PR MEDICATION LIST DOCUMENTED IN MEDICAL RECORD: ICD-10-PCS | Mod: CPTII,S$GLB,, | Performed by: STUDENT IN AN ORGANIZED HEALTH CARE EDUCATION/TRAINING PROGRAM

## 2021-07-19 PROCEDURE — 99205 PR OFFICE/OUTPT VISIT, NEW, LEVL V, 60-74 MIN: ICD-10-PCS | Mod: S$GLB,,, | Performed by: STUDENT IN AN ORGANIZED HEALTH CARE EDUCATION/TRAINING PROGRAM

## 2021-07-19 PROCEDURE — 1126F AMNT PAIN NOTED NONE PRSNT: CPT | Mod: CPTII,S$GLB,, | Performed by: STUDENT IN AN ORGANIZED HEALTH CARE EDUCATION/TRAINING PROGRAM

## 2021-07-19 PROCEDURE — 1126F PR PAIN SEVERITY QUANTIFIED, NO PAIN PRESENT: ICD-10-PCS | Mod: CPTII,S$GLB,, | Performed by: STUDENT IN AN ORGANIZED HEALTH CARE EDUCATION/TRAINING PROGRAM

## 2021-07-19 PROCEDURE — 3288F PR FALLS RISK ASSESSMENT DOCUMENTED: ICD-10-PCS | Mod: CPTII,S$GLB,, | Performed by: STUDENT IN AN ORGANIZED HEALTH CARE EDUCATION/TRAINING PROGRAM

## 2021-07-23 ENCOUNTER — TELEPHONE (OUTPATIENT)
Dept: HEMATOLOGY/ONCOLOGY | Facility: CLINIC | Age: 86
End: 2021-07-23

## 2021-07-23 LAB
FINAL PATHOLOGIC DIAGNOSIS: NORMAL
GROSS: NORMAL
Lab: NORMAL
MICROSCOPIC EXAM: NORMAL
SUPPLEMENTAL DIAGNOSIS: NORMAL

## 2021-07-30 ENCOUNTER — OFFICE VISIT (OUTPATIENT)
Dept: RADIATION ONCOLOGY | Facility: CLINIC | Age: 86
End: 2021-07-30
Payer: MEDICARE

## 2021-07-30 ENCOUNTER — LAB VISIT (OUTPATIENT)
Dept: LAB | Facility: HOSPITAL | Age: 86
End: 2021-07-30
Attending: STUDENT IN AN ORGANIZED HEALTH CARE EDUCATION/TRAINING PROGRAM
Payer: MEDICARE

## 2021-07-30 ENCOUNTER — IMMUNIZATION (OUTPATIENT)
Dept: PRIMARY CARE CLINIC | Facility: CLINIC | Age: 86
End: 2021-07-30
Payer: MEDICARE

## 2021-07-30 VITALS
DIASTOLIC BLOOD PRESSURE: 68 MMHG | SYSTOLIC BLOOD PRESSURE: 120 MMHG | RESPIRATION RATE: 19 BRPM | HEART RATE: 65 BPM | BODY MASS INDEX: 28.83 KG/M2 | TEMPERATURE: 97 F | WEIGHT: 143 LBS | OXYGEN SATURATION: 96 % | HEIGHT: 59 IN

## 2021-07-30 DIAGNOSIS — C34.11 PRIMARY ADENOCARCINOMA OF UPPER LOBE OF RIGHT LUNG: ICD-10-CM

## 2021-07-30 DIAGNOSIS — Z23 NEED FOR VACCINATION: Primary | ICD-10-CM

## 2021-07-30 LAB
CREAT SERPL-MCNC: 0.9 MG/DL (ref 0.5–1.4)
EST. GFR  (AFRICAN AMERICAN): >60 ML/MIN/1.73 M^2
EST. GFR  (NON AFRICAN AMERICAN): 58.1 ML/MIN/1.73 M^2

## 2021-07-30 PROCEDURE — 1159F PR MEDICATION LIST DOCUMENTED IN MEDICAL RECORD: ICD-10-PCS | Mod: CPTII,S$GLB,, | Performed by: RADIOLOGY

## 2021-07-30 PROCEDURE — 99999 PR PBB SHADOW E&M-EST. PATIENT-LVL IV: CPT | Mod: PBBFAC,,, | Performed by: RADIOLOGY

## 2021-07-30 PROCEDURE — 99205 PR OFFICE/OUTPT VISIT, NEW, LEVL V, 60-74 MIN: ICD-10-PCS | Mod: S$GLB,,, | Performed by: RADIOLOGY

## 2021-07-30 PROCEDURE — 1126F PR PAIN SEVERITY QUANTIFIED, NO PAIN PRESENT: ICD-10-PCS | Mod: CPTII,S$GLB,, | Performed by: RADIOLOGY

## 2021-07-30 PROCEDURE — 3288F PR FALLS RISK ASSESSMENT DOCUMENTED: ICD-10-PCS | Mod: CPTII,S$GLB,, | Performed by: RADIOLOGY

## 2021-07-30 PROCEDURE — 1101F PT FALLS ASSESS-DOCD LE1/YR: CPT | Mod: CPTII,S$GLB,, | Performed by: RADIOLOGY

## 2021-07-30 PROCEDURE — 1101F PR PT FALLS ASSESS DOC 0-1 FALLS W/OUT INJ PAST YR: ICD-10-PCS | Mod: CPTII,S$GLB,, | Performed by: RADIOLOGY

## 2021-07-30 PROCEDURE — 82565 ASSAY OF CREATININE: CPT | Performed by: STUDENT IN AN ORGANIZED HEALTH CARE EDUCATION/TRAINING PROGRAM

## 2021-07-30 PROCEDURE — 36415 COLL VENOUS BLD VENIPUNCTURE: CPT | Performed by: STUDENT IN AN ORGANIZED HEALTH CARE EDUCATION/TRAINING PROGRAM

## 2021-07-30 PROCEDURE — 3288F FALL RISK ASSESSMENT DOCD: CPT | Mod: CPTII,S$GLB,, | Performed by: RADIOLOGY

## 2021-07-30 PROCEDURE — 99999 PR PBB SHADOW E&M-EST. PATIENT-LVL IV: ICD-10-PCS | Mod: PBBFAC,,, | Performed by: RADIOLOGY

## 2021-07-30 PROCEDURE — 1159F MED LIST DOCD IN RCRD: CPT | Mod: CPTII,S$GLB,, | Performed by: RADIOLOGY

## 2021-07-30 PROCEDURE — 99205 OFFICE O/P NEW HI 60 MIN: CPT | Mod: S$GLB,,, | Performed by: RADIOLOGY

## 2021-07-30 PROCEDURE — 1126F AMNT PAIN NOTED NONE PRSNT: CPT | Mod: CPTII,S$GLB,, | Performed by: RADIOLOGY

## 2021-08-06 ENCOUNTER — HOSPITAL ENCOUNTER (OUTPATIENT)
Dept: RADIATION THERAPY | Facility: HOSPITAL | Age: 86
Discharge: HOME | End: 2021-08-06
Attending: RADIOLOGY
Payer: MEDICARE

## 2021-08-06 PROCEDURE — 77334 RADIATION TREATMENT AID(S): CPT | Mod: 26,,, | Performed by: RADIOLOGY

## 2021-08-06 PROCEDURE — 77263 PR  RADIATION THERAPY PLAN COMPLEX: ICD-10-PCS | Mod: ,,, | Performed by: RADIOLOGY

## 2021-08-06 PROCEDURE — 77263 THER RADIOLOGY TX PLNG CPLX: CPT | Mod: ,,, | Performed by: RADIOLOGY

## 2021-08-06 PROCEDURE — 77334 RADIATION TREATMENT AID(S): CPT | Mod: TC | Performed by: RADIOLOGY

## 2021-08-06 PROCEDURE — 77290 THER RAD SIMULAJ FIELD CPLX: CPT | Mod: TC | Performed by: RADIOLOGY

## 2021-08-06 PROCEDURE — 77334 PR  RADN TREATMENT AID(S) COMPLX: ICD-10-PCS | Mod: 26,,, | Performed by: RADIOLOGY

## 2021-08-06 PROCEDURE — 77014 HC CT GUIDANCE RADIATION THERAPY FLDS PLACEMENT: CPT | Mod: TC | Performed by: RADIOLOGY

## 2021-08-09 PROCEDURE — 77301 RADIOTHERAPY DOSE PLAN IMRT: CPT | Mod: TC | Performed by: RADIOLOGY

## 2021-08-09 PROCEDURE — 77293 RESPIRATOR MOTION MGMT SIMUL: CPT | Mod: TC | Performed by: RADIOLOGY

## 2021-08-09 PROCEDURE — 77301 PR  INTEN MOD RADIOTHER PLAN W/DOSE VOL HIST: ICD-10-PCS | Mod: 26,,, | Performed by: RADIOLOGY

## 2021-08-09 PROCEDURE — 77293 PR RESPIRATORY MOTION MGMT SIMULATION: ICD-10-PCS | Mod: 26,,, | Performed by: RADIOLOGY

## 2021-08-09 PROCEDURE — 77293 RESPIRATOR MOTION MGMT SIMUL: CPT | Mod: 26,,, | Performed by: RADIOLOGY

## 2021-08-09 PROCEDURE — 77301 RADIOTHERAPY DOSE PLAN IMRT: CPT | Mod: 26,,, | Performed by: RADIOLOGY

## 2021-08-10 PROCEDURE — 77470 SPECIAL RADIATION TREATMENT: CPT | Mod: 26,59,, | Performed by: RADIOLOGY

## 2021-08-10 PROCEDURE — 77300 PR RADIATION THERAPY,DOSIMETRY PLAN: ICD-10-PCS | Mod: 26,,, | Performed by: RADIOLOGY

## 2021-08-10 PROCEDURE — 77370 RADIATION PHYSICS CONSULT: CPT | Performed by: RADIOLOGY

## 2021-08-10 PROCEDURE — 77470 SPECIAL RADIATION TREATMENT: CPT | Mod: 59,TC | Performed by: RADIOLOGY

## 2021-08-10 PROCEDURE — 77338 PR  MLC IMRT DESIGN & CONSTRUCTION PER IMRT PLAN: ICD-10-PCS | Mod: 26,,, | Performed by: RADIOLOGY

## 2021-08-10 PROCEDURE — 77470 PR  SPECIAL RADIATION TREATMENT: ICD-10-PCS | Mod: 26,59,, | Performed by: RADIOLOGY

## 2021-08-10 PROCEDURE — 77338 DESIGN MLC DEVICE FOR IMRT: CPT | Mod: 26,,, | Performed by: RADIOLOGY

## 2021-08-10 PROCEDURE — 77338 DESIGN MLC DEVICE FOR IMRT: CPT | Mod: TC | Performed by: RADIOLOGY

## 2021-08-10 PROCEDURE — 77300 RADIATION THERAPY DOSE PLAN: CPT | Mod: 26,,, | Performed by: RADIOLOGY

## 2021-08-10 PROCEDURE — 77300 RADIATION THERAPY DOSE PLAN: CPT | Mod: TC | Performed by: RADIOLOGY

## 2021-08-16 PROCEDURE — 77373 STRTCTC BDY RAD THER TX DLVR: CPT | Performed by: RADIOLOGY

## 2021-08-16 PROCEDURE — 77014 PR  CT GUIDANCE PLACEMENT RAD THERAPY FIELDS: CPT | Mod: 26,,, | Performed by: RADIOLOGY

## 2021-08-16 PROCEDURE — 77014 PR  CT GUIDANCE PLACEMENT RAD THERAPY FIELDS: ICD-10-PCS | Mod: 26,,, | Performed by: RADIOLOGY

## 2021-08-16 PROCEDURE — 77014 HC CT GUIDANCE RADIATION THERAPY FLDS PLACEMENT: CPT | Mod: TC | Performed by: RADIOLOGY

## 2021-08-17 ENCOUNTER — LAB VISIT (OUTPATIENT)
Dept: LAB | Facility: HOSPITAL | Age: 86
End: 2021-08-17
Attending: INTERNAL MEDICINE
Payer: MEDICARE

## 2021-08-17 ENCOUNTER — OFFICE VISIT (OUTPATIENT)
Dept: CARDIOLOGY | Facility: CLINIC | Age: 86
End: 2021-08-17
Payer: MEDICARE

## 2021-08-17 VITALS
DIASTOLIC BLOOD PRESSURE: 53 MMHG | WEIGHT: 144.38 LBS | HEART RATE: 63 BPM | HEIGHT: 59 IN | SYSTOLIC BLOOD PRESSURE: 96 MMHG | BODY MASS INDEX: 29.11 KG/M2

## 2021-08-17 DIAGNOSIS — I50.42 CHRONIC COMBINED SYSTOLIC AND DIASTOLIC HEART FAILURE: ICD-10-CM

## 2021-08-17 DIAGNOSIS — I34.0 NONRHEUMATIC MITRAL VALVE REGURGITATION: ICD-10-CM

## 2021-08-17 DIAGNOSIS — I48.91 ATRIAL FIBRILLATION, UNSPECIFIED TYPE: ICD-10-CM

## 2021-08-17 DIAGNOSIS — C34.90 ADENOCARCINOMA OF LUNG, UNSPECIFIED LATERALITY: ICD-10-CM

## 2021-08-17 DIAGNOSIS — I35.0 SEVERE AORTIC STENOSIS: ICD-10-CM

## 2021-08-17 DIAGNOSIS — Z79.01 LONG TERM (CURRENT) USE OF ANTICOAGULANTS: ICD-10-CM

## 2021-08-17 DIAGNOSIS — E78.5 HYPERLIPIDEMIA, UNSPECIFIED HYPERLIPIDEMIA TYPE: ICD-10-CM

## 2021-08-17 DIAGNOSIS — I50.20 HEART FAILURE WITH REDUCED EJECTION FRACTION: Primary | ICD-10-CM

## 2021-08-17 LAB
ANION GAP SERPL CALC-SCNC: 11 MMOL/L (ref 8–16)
BUN SERPL-MCNC: 24 MG/DL (ref 8–23)
CALCIUM SERPL-MCNC: 9.8 MG/DL (ref 8.7–10.5)
CHLORIDE SERPL-SCNC: 96 MMOL/L (ref 95–110)
CO2 SERPL-SCNC: 31 MMOL/L (ref 23–29)
CREAT SERPL-MCNC: 1 MG/DL (ref 0.5–1.4)
EST. GFR  (AFRICAN AMERICAN): 58.9 ML/MIN/1.73 M^2
EST. GFR  (NON AFRICAN AMERICAN): 51.1 ML/MIN/1.73 M^2
GLUCOSE SERPL-MCNC: 233 MG/DL (ref 70–110)
POTASSIUM SERPL-SCNC: 4.2 MMOL/L (ref 3.5–5.1)
SODIUM SERPL-SCNC: 138 MMOL/L (ref 136–145)

## 2021-08-17 PROCEDURE — 99214 OFFICE O/P EST MOD 30 MIN: CPT | Mod: S$GLB,,, | Performed by: INTERNAL MEDICINE

## 2021-08-17 PROCEDURE — 3288F FALL RISK ASSESSMENT DOCD: CPT | Mod: CPTII,S$GLB,, | Performed by: INTERNAL MEDICINE

## 2021-08-17 PROCEDURE — 3288F PR FALLS RISK ASSESSMENT DOCUMENTED: ICD-10-PCS | Mod: CPTII,S$GLB,, | Performed by: INTERNAL MEDICINE

## 2021-08-17 PROCEDURE — 1126F AMNT PAIN NOTED NONE PRSNT: CPT | Mod: CPTII,S$GLB,, | Performed by: INTERNAL MEDICINE

## 2021-08-17 PROCEDURE — 1159F PR MEDICATION LIST DOCUMENTED IN MEDICAL RECORD: ICD-10-PCS | Mod: CPTII,S$GLB,, | Performed by: INTERNAL MEDICINE

## 2021-08-17 PROCEDURE — 99214 PR OFFICE/OUTPT VISIT, EST, LEVL IV, 30-39 MIN: ICD-10-PCS | Mod: S$GLB,,, | Performed by: INTERNAL MEDICINE

## 2021-08-17 PROCEDURE — 1126F PR PAIN SEVERITY QUANTIFIED, NO PAIN PRESENT: ICD-10-PCS | Mod: CPTII,S$GLB,, | Performed by: INTERNAL MEDICINE

## 2021-08-17 PROCEDURE — 99999 PR PBB SHADOW E&M-EST. PATIENT-LVL IV: ICD-10-PCS | Mod: PBBFAC,,, | Performed by: INTERNAL MEDICINE

## 2021-08-17 PROCEDURE — 99999 PR PBB SHADOW E&M-EST. PATIENT-LVL IV: CPT | Mod: PBBFAC,,, | Performed by: INTERNAL MEDICINE

## 2021-08-17 PROCEDURE — 1101F PT FALLS ASSESS-DOCD LE1/YR: CPT | Mod: CPTII,S$GLB,, | Performed by: INTERNAL MEDICINE

## 2021-08-17 PROCEDURE — 80048 BASIC METABOLIC PNL TOTAL CA: CPT | Performed by: INTERNAL MEDICINE

## 2021-08-17 PROCEDURE — 36415 COLL VENOUS BLD VENIPUNCTURE: CPT | Performed by: INTERNAL MEDICINE

## 2021-08-17 PROCEDURE — 1101F PR PT FALLS ASSESS DOC 0-1 FALLS W/OUT INJ PAST YR: ICD-10-PCS | Mod: CPTII,S$GLB,, | Performed by: INTERNAL MEDICINE

## 2021-08-17 PROCEDURE — 1159F MED LIST DOCD IN RCRD: CPT | Mod: CPTII,S$GLB,, | Performed by: INTERNAL MEDICINE

## 2021-08-18 DIAGNOSIS — C34.11 PRIMARY ADENOCARCINOMA OF UPPER LOBE OF RIGHT LUNG: Primary | ICD-10-CM

## 2021-08-18 PROCEDURE — 77014 PR  CT GUIDANCE PLACEMENT RAD THERAPY FIELDS: ICD-10-PCS | Mod: 26,,, | Performed by: RADIOLOGY

## 2021-08-18 PROCEDURE — 77014 PR  CT GUIDANCE PLACEMENT RAD THERAPY FIELDS: CPT | Mod: 26,,, | Performed by: RADIOLOGY

## 2021-08-18 PROCEDURE — 77014 HC CT GUIDANCE RADIATION THERAPY FLDS PLACEMENT: CPT | Mod: TC,59 | Performed by: RADIOLOGY

## 2021-08-18 PROCEDURE — 77373 STRTCTC BDY RAD THER TX DLVR: CPT | Performed by: RADIOLOGY

## 2021-08-23 PROCEDURE — 77014 PR  CT GUIDANCE PLACEMENT RAD THERAPY FIELDS: ICD-10-PCS | Mod: 26,,, | Performed by: RADIOLOGY

## 2021-08-23 PROCEDURE — 77014 PR  CT GUIDANCE PLACEMENT RAD THERAPY FIELDS: CPT | Mod: 26,,, | Performed by: RADIOLOGY

## 2021-08-23 PROCEDURE — 77014 HC CT GUIDANCE RADIATION THERAPY FLDS PLACEMENT: CPT | Mod: TC | Performed by: RADIOLOGY

## 2021-08-23 PROCEDURE — 77373 STRTCTC BDY RAD THER TX DLVR: CPT | Performed by: RADIOLOGY

## 2021-08-25 ENCOUNTER — TELEPHONE (OUTPATIENT)
Dept: HEMATOLOGY/ONCOLOGY | Facility: CLINIC | Age: 86
End: 2021-08-25

## 2021-08-25 PROCEDURE — 77373 STRTCTC BDY RAD THER TX DLVR: CPT | Performed by: RADIOLOGY

## 2021-08-25 PROCEDURE — 77014 PR  CT GUIDANCE PLACEMENT RAD THERAPY FIELDS: CPT | Mod: 26,,, | Performed by: RADIOLOGY

## 2021-08-25 PROCEDURE — 77014 PR  CT GUIDANCE PLACEMENT RAD THERAPY FIELDS: ICD-10-PCS | Mod: 26,,, | Performed by: RADIOLOGY

## 2021-08-25 PROCEDURE — 77014 HC CT GUIDANCE RADIATION THERAPY FLDS PLACEMENT: CPT | Mod: TC | Performed by: RADIOLOGY

## 2021-08-27 PROCEDURE — 77336 RADIATION PHYSICS CONSULT: CPT | Performed by: RADIOLOGY

## 2021-09-15 ENCOUNTER — TELEPHONE (OUTPATIENT)
Dept: RADIATION ONCOLOGY | Facility: CLINIC | Age: 86
End: 2021-09-15

## 2021-10-19 ENCOUNTER — TELEPHONE (OUTPATIENT)
Dept: PALLIATIVE MEDICINE | Facility: CLINIC | Age: 86
End: 2021-10-19

## 2021-11-12 ENCOUNTER — HOSPITAL ENCOUNTER (OUTPATIENT)
Dept: RADIOLOGY | Facility: HOSPITAL | Age: 86
Discharge: HOME | End: 2021-11-12
Attending: RADIOLOGY
Payer: MEDICARE

## 2021-11-12 ENCOUNTER — OFFICE VISIT (OUTPATIENT)
Dept: RADIATION ONCOLOGY | Facility: CLINIC | Age: 86
End: 2021-11-12
Payer: MEDICARE

## 2021-11-12 VITALS
SYSTOLIC BLOOD PRESSURE: 104 MMHG | DIASTOLIC BLOOD PRESSURE: 57 MMHG | TEMPERATURE: 98 F | RESPIRATION RATE: 18 BRPM | OXYGEN SATURATION: 98 % | HEART RATE: 59 BPM | HEIGHT: 59 IN | BODY MASS INDEX: 28.83 KG/M2 | WEIGHT: 143 LBS

## 2021-11-12 DIAGNOSIS — C34.11 PRIMARY ADENOCARCINOMA OF UPPER LOBE OF RIGHT LUNG: ICD-10-CM

## 2021-11-12 DIAGNOSIS — C34.11 PRIMARY ADENOCARCINOMA OF UPPER LOBE OF RIGHT LUNG: Primary | ICD-10-CM

## 2021-11-12 PROCEDURE — 1126F AMNT PAIN NOTED NONE PRSNT: CPT | Mod: CPTII,S$GLB,, | Performed by: RADIOLOGY

## 2021-11-12 PROCEDURE — 1101F PR PT FALLS ASSESS DOC 0-1 FALLS W/OUT INJ PAST YR: ICD-10-PCS | Mod: CPTII,S$GLB,, | Performed by: RADIOLOGY

## 2021-11-12 PROCEDURE — 99999 PR PBB SHADOW E&M-EST. PATIENT-LVL IV: CPT | Mod: PBBFAC,,, | Performed by: RADIOLOGY

## 2021-11-12 PROCEDURE — 1126F PR PAIN SEVERITY QUANTIFIED, NO PAIN PRESENT: ICD-10-PCS | Mod: CPTII,S$GLB,, | Performed by: RADIOLOGY

## 2021-11-12 PROCEDURE — 99999 PR PBB SHADOW E&M-EST. PATIENT-LVL IV: ICD-10-PCS | Mod: PBBFAC,,, | Performed by: RADIOLOGY

## 2021-11-12 PROCEDURE — 1159F MED LIST DOCD IN RCRD: CPT | Mod: CPTII,S$GLB,, | Performed by: RADIOLOGY

## 2021-11-12 PROCEDURE — 3288F PR FALLS RISK ASSESSMENT DOCUMENTED: ICD-10-PCS | Mod: CPTII,S$GLB,, | Performed by: RADIOLOGY

## 2021-11-12 PROCEDURE — 99024 POSTOP FOLLOW-UP VISIT: CPT | Mod: S$GLB,,, | Performed by: RADIOLOGY

## 2021-11-12 PROCEDURE — 1101F PT FALLS ASSESS-DOCD LE1/YR: CPT | Mod: CPTII,S$GLB,, | Performed by: RADIOLOGY

## 2021-11-12 PROCEDURE — 3288F FALL RISK ASSESSMENT DOCD: CPT | Mod: CPTII,S$GLB,, | Performed by: RADIOLOGY

## 2021-11-12 PROCEDURE — 71250 CT CHEST WITHOUT CONTRAST: ICD-10-PCS | Mod: 26,,, | Performed by: RADIOLOGY

## 2021-11-12 PROCEDURE — 99024 PR POST-OP FOLLOW-UP VISIT: ICD-10-PCS | Mod: S$GLB,,, | Performed by: RADIOLOGY

## 2021-11-12 PROCEDURE — 71250 CT THORAX DX C-: CPT | Mod: 26,,, | Performed by: RADIOLOGY

## 2021-11-12 PROCEDURE — 71250 CT THORAX DX C-: CPT | Mod: TC

## 2021-11-12 PROCEDURE — 1159F PR MEDICATION LIST DOCUMENTED IN MEDICAL RECORD: ICD-10-PCS | Mod: CPTII,S$GLB,, | Performed by: RADIOLOGY

## 2021-11-30 ENCOUNTER — LAB VISIT (OUTPATIENT)
Dept: LAB | Facility: HOSPITAL | Age: 86
End: 2021-11-30
Attending: INTERNAL MEDICINE
Payer: MEDICARE

## 2021-11-30 ENCOUNTER — OFFICE VISIT (OUTPATIENT)
Dept: CARDIOLOGY | Facility: CLINIC | Age: 86
End: 2021-11-30
Payer: MEDICARE

## 2021-11-30 VITALS
WEIGHT: 146.19 LBS | DIASTOLIC BLOOD PRESSURE: 62 MMHG | HEART RATE: 55 BPM | SYSTOLIC BLOOD PRESSURE: 146 MMHG | BODY MASS INDEX: 29.47 KG/M2 | HEIGHT: 59 IN

## 2021-11-30 DIAGNOSIS — C34.90 MALIGNANT NEOPLASM OF LUNG, UNSPECIFIED LATERALITY, UNSPECIFIED PART OF LUNG: ICD-10-CM

## 2021-11-30 DIAGNOSIS — I48.91 ATRIAL FIBRILLATION, UNSPECIFIED TYPE: ICD-10-CM

## 2021-11-30 DIAGNOSIS — E78.5 HYPERLIPIDEMIA, UNSPECIFIED HYPERLIPIDEMIA TYPE: ICD-10-CM

## 2021-11-30 DIAGNOSIS — Z79.01 LONG TERM (CURRENT) USE OF ANTICOAGULANTS: ICD-10-CM

## 2021-11-30 DIAGNOSIS — I35.0 SEVERE AORTIC STENOSIS: Primary | ICD-10-CM

## 2021-11-30 DIAGNOSIS — I51.9 SYSTOLIC DYSFUNCTION: ICD-10-CM

## 2021-11-30 DIAGNOSIS — I50.20 HEART FAILURE WITH REDUCED EJECTION FRACTION: ICD-10-CM

## 2021-11-30 LAB
ALBUMIN SERPL BCP-MCNC: 3.8 G/DL (ref 3.5–5.2)
ALP SERPL-CCNC: 102 U/L (ref 55–135)
ALT SERPL W/O P-5'-P-CCNC: 26 U/L (ref 10–44)
ANION GAP SERPL CALC-SCNC: 10 MMOL/L (ref 8–16)
AST SERPL-CCNC: 23 U/L (ref 10–40)
BASOPHILS # BLD AUTO: 0.06 K/UL (ref 0–0.2)
BASOPHILS NFR BLD: 0.7 % (ref 0–1.9)
BILIRUB SERPL-MCNC: 0.4 MG/DL (ref 0.1–1)
BUN SERPL-MCNC: 27 MG/DL (ref 8–23)
CALCIUM SERPL-MCNC: 9.8 MG/DL (ref 8.7–10.5)
CHLORIDE SERPL-SCNC: 99 MMOL/L (ref 95–110)
CHOLEST SERPL-MCNC: 172 MG/DL (ref 120–199)
CHOLEST/HDLC SERPL: 4.4 {RATIO} (ref 2–5)
CO2 SERPL-SCNC: 26 MMOL/L (ref 23–29)
CREAT SERPL-MCNC: 0.8 MG/DL (ref 0.5–1.4)
DIFFERENTIAL METHOD: ABNORMAL
EOSINOPHIL # BLD AUTO: 0.2 K/UL (ref 0–0.5)
EOSINOPHIL NFR BLD: 2.4 % (ref 0–8)
ERYTHROCYTE [DISTWIDTH] IN BLOOD BY AUTOMATED COUNT: 13.7 % (ref 11.5–14.5)
EST. GFR  (AFRICAN AMERICAN): >60 ML/MIN/1.73 M^2
EST. GFR  (NON AFRICAN AMERICAN): >60 ML/MIN/1.73 M^2
GLUCOSE SERPL-MCNC: 207 MG/DL (ref 70–110)
HCT VFR BLD AUTO: 45.6 % (ref 37–48.5)
HDLC SERPL-MCNC: 39 MG/DL (ref 40–75)
HDLC SERPL: 22.7 % (ref 20–50)
HGB BLD-MCNC: 14.5 G/DL (ref 12–16)
IMM GRANULOCYTES # BLD AUTO: 0.03 K/UL (ref 0–0.04)
IMM GRANULOCYTES NFR BLD AUTO: 0.4 % (ref 0–0.5)
LDLC SERPL CALC-MCNC: 84 MG/DL (ref 63–159)
LYMPHOCYTES # BLD AUTO: 1.9 K/UL (ref 1–4.8)
LYMPHOCYTES NFR BLD: 22.5 % (ref 18–48)
MCH RBC QN AUTO: 31.3 PG (ref 27–31)
MCHC RBC AUTO-ENTMCNC: 31.8 G/DL (ref 32–36)
MCV RBC AUTO: 98 FL (ref 82–98)
MONOCYTES # BLD AUTO: 1 K/UL (ref 0.3–1)
MONOCYTES NFR BLD: 12.2 % (ref 4–15)
NEUTROPHILS # BLD AUTO: 5.2 K/UL (ref 1.8–7.7)
NEUTROPHILS NFR BLD: 61.8 % (ref 38–73)
NONHDLC SERPL-MCNC: 133 MG/DL
NRBC BLD-RTO: 0 /100 WBC
PLATELET # BLD AUTO: 245 K/UL (ref 150–450)
PMV BLD AUTO: 10.9 FL (ref 9.2–12.9)
POTASSIUM SERPL-SCNC: 4.2 MMOL/L (ref 3.5–5.1)
PROT SERPL-MCNC: 7.4 G/DL (ref 6–8.4)
RBC # BLD AUTO: 4.64 M/UL (ref 4–5.4)
SODIUM SERPL-SCNC: 135 MMOL/L (ref 136–145)
TRIGL SERPL-MCNC: 245 MG/DL (ref 30–150)
TSH SERPL DL<=0.005 MIU/L-ACNC: 1.97 UIU/ML (ref 0.4–4)
WBC # BLD AUTO: 8.45 K/UL (ref 3.9–12.7)

## 2021-11-30 PROCEDURE — 85025 COMPLETE CBC W/AUTO DIFF WBC: CPT | Performed by: INTERNAL MEDICINE

## 2021-11-30 PROCEDURE — 99999 PR PBB SHADOW E&M-EST. PATIENT-LVL III: ICD-10-PCS | Mod: PBBFAC,,, | Performed by: INTERNAL MEDICINE

## 2021-11-30 PROCEDURE — 80053 COMPREHEN METABOLIC PANEL: CPT | Performed by: INTERNAL MEDICINE

## 2021-11-30 PROCEDURE — 99213 PR OFFICE/OUTPT VISIT, EST, LEVL III, 20-29 MIN: ICD-10-PCS | Mod: S$GLB,,, | Performed by: INTERNAL MEDICINE

## 2021-11-30 PROCEDURE — 80061 LIPID PANEL: CPT | Performed by: INTERNAL MEDICINE

## 2021-11-30 PROCEDURE — 99213 OFFICE O/P EST LOW 20 MIN: CPT | Mod: S$GLB,,, | Performed by: INTERNAL MEDICINE

## 2021-11-30 PROCEDURE — 99999 PR PBB SHADOW E&M-EST. PATIENT-LVL III: CPT | Mod: PBBFAC,,, | Performed by: INTERNAL MEDICINE

## 2021-11-30 PROCEDURE — 84443 ASSAY THYROID STIM HORMONE: CPT | Performed by: INTERNAL MEDICINE

## 2021-11-30 PROCEDURE — 36415 COLL VENOUS BLD VENIPUNCTURE: CPT | Performed by: INTERNAL MEDICINE

## 2022-01-08 ENCOUNTER — DOCUMENTATION ONLY (OUTPATIENT)
Dept: HEMATOLOGY/ONCOLOGY | Facility: CLINIC | Age: 87
End: 2022-01-08
Payer: MEDICARE

## 2022-01-08 NOTE — NURSING
Oncology Navigation   Intake  Cancer Type: Thoracic  Internal / External Referral: Internal  Referral Source: Referral  Date of Referral: 2021  Date Worked: 2022  First Appointment Available: 2021  Appointment Date: 2021  First Available Date vs. Scheduled Date (days): 0  Multiple appointments: Yes (PET Scan )     Treatment  Current Status: Surveillance    Surgery: N/A    Medical Oncologist: Dr. Penny  Consult Date: 2021    Radiation Oncologist: Dr. Kaur  Start Date: 2021  End Date: 2021  Total cGy: 48  Total Treatments: 4 (6 month f/u with Dr. Kaur from 2021)    Procedures: PET scan  PET Scan Schedule Date: 7/15/2021          Radiation Oncologist: Dr. Kaur    Support Systems: Children; Family members     Acuity  Stage: I-II  Systemic Treatment - predicted or initiated: Oral targeted therapy only (0)  ECO-1 (+0)  Comorbidities in Medical History: 3-5 (+1)   Needed: No  Support: Patient reports adequate support system  Verbalizes Financial Concerns: No  Transportation: Adequate transportation for treatment  Verbalizes the need for more education: No  Other Factors (+1 for Each): 0  Navigation Acuity: 3     Follow Up  No follow-ups on file.

## 2022-01-21 ENCOUNTER — IMMUNIZATION (OUTPATIENT)
Dept: PRIMARY CARE CLINIC | Facility: CLINIC | Age: 87
End: 2022-01-21
Payer: MEDICARE

## 2022-01-21 DIAGNOSIS — Z23 NEED FOR VACCINATION: Primary | ICD-10-CM

## 2022-01-21 PROCEDURE — 0004A COVID-19, MRNA, LNP-S, PF, 30 MCG/0.3 ML DOSE VACCINE: CPT | Mod: PBBFAC | Performed by: FAMILY MEDICINE

## 2022-02-02 ENCOUNTER — OFFICE VISIT (OUTPATIENT)
Dept: HEMATOLOGY/ONCOLOGY | Facility: CLINIC | Age: 87
End: 2022-02-02
Payer: MEDICARE

## 2022-02-02 VITALS
WEIGHT: 147.06 LBS | SYSTOLIC BLOOD PRESSURE: 138 MMHG | OXYGEN SATURATION: 96 % | TEMPERATURE: 98 F | HEART RATE: 58 BPM | BODY MASS INDEX: 29.65 KG/M2 | HEIGHT: 59 IN | DIASTOLIC BLOOD PRESSURE: 65 MMHG | RESPIRATION RATE: 16 BRPM

## 2022-02-02 DIAGNOSIS — E11.9 TYPE 2 DIABETES MELLITUS WITHOUT COMPLICATION, WITHOUT LONG-TERM CURRENT USE OF INSULIN: ICD-10-CM

## 2022-02-02 DIAGNOSIS — C34.11 PRIMARY ADENOCARCINOMA OF UPPER LOBE OF RIGHT LUNG: Primary | ICD-10-CM

## 2022-02-02 DIAGNOSIS — I48.0 PAROXYSMAL ATRIAL FIBRILLATION: ICD-10-CM

## 2022-02-02 DIAGNOSIS — J84.10 CALCIFIED GRANULOMA OF LUNG: ICD-10-CM

## 2022-02-02 DIAGNOSIS — I48.91 ATRIAL FIBRILLATION, UNSPECIFIED TYPE: ICD-10-CM

## 2022-02-02 DIAGNOSIS — I70.0 ATHEROSCLEROSIS OF AORTA: ICD-10-CM

## 2022-02-02 PROBLEM — J98.4 CALCIFIED GRANULOMA OF LUNG: Status: ACTIVE | Noted: 2021-06-29

## 2022-02-02 PROCEDURE — 3052F PR MOST RECENT HEMOGLOBIN A1C LEVEL 8.0 - < 9.0%: ICD-10-PCS | Mod: CPTII,S$GLB,, | Performed by: STUDENT IN AN ORGANIZED HEALTH CARE EDUCATION/TRAINING PROGRAM

## 2022-02-02 PROCEDURE — 99215 PR OFFICE/OUTPT VISIT, EST, LEVL V, 40-54 MIN: ICD-10-PCS | Mod: S$GLB,,, | Performed by: STUDENT IN AN ORGANIZED HEALTH CARE EDUCATION/TRAINING PROGRAM

## 2022-02-02 PROCEDURE — 99999 PR PBB SHADOW E&M-EST. PATIENT-LVL III: CPT | Mod: PBBFAC,,, | Performed by: STUDENT IN AN ORGANIZED HEALTH CARE EDUCATION/TRAINING PROGRAM

## 2022-02-02 PROCEDURE — 1101F PR PT FALLS ASSESS DOC 0-1 FALLS W/OUT INJ PAST YR: ICD-10-PCS | Mod: CPTII,S$GLB,, | Performed by: STUDENT IN AN ORGANIZED HEALTH CARE EDUCATION/TRAINING PROGRAM

## 2022-02-02 PROCEDURE — 1159F MED LIST DOCD IN RCRD: CPT | Mod: CPTII,S$GLB,, | Performed by: STUDENT IN AN ORGANIZED HEALTH CARE EDUCATION/TRAINING PROGRAM

## 2022-02-02 PROCEDURE — 3052F HG A1C>EQUAL 8.0%<EQUAL 9.0%: CPT | Mod: CPTII,S$GLB,, | Performed by: STUDENT IN AN ORGANIZED HEALTH CARE EDUCATION/TRAINING PROGRAM

## 2022-02-02 PROCEDURE — 99999 PR PBB SHADOW E&M-EST. PATIENT-LVL III: ICD-10-PCS | Mod: PBBFAC,,, | Performed by: STUDENT IN AN ORGANIZED HEALTH CARE EDUCATION/TRAINING PROGRAM

## 2022-02-02 PROCEDURE — 1126F PR PAIN SEVERITY QUANTIFIED, NO PAIN PRESENT: ICD-10-PCS | Mod: CPTII,S$GLB,, | Performed by: STUDENT IN AN ORGANIZED HEALTH CARE EDUCATION/TRAINING PROGRAM

## 2022-02-02 PROCEDURE — 1159F PR MEDICATION LIST DOCUMENTED IN MEDICAL RECORD: ICD-10-PCS | Mod: CPTII,S$GLB,, | Performed by: STUDENT IN AN ORGANIZED HEALTH CARE EDUCATION/TRAINING PROGRAM

## 2022-02-02 PROCEDURE — 1101F PT FALLS ASSESS-DOCD LE1/YR: CPT | Mod: CPTII,S$GLB,, | Performed by: STUDENT IN AN ORGANIZED HEALTH CARE EDUCATION/TRAINING PROGRAM

## 2022-02-02 PROCEDURE — 3288F PR FALLS RISK ASSESSMENT DOCUMENTED: ICD-10-PCS | Mod: CPTII,S$GLB,, | Performed by: STUDENT IN AN ORGANIZED HEALTH CARE EDUCATION/TRAINING PROGRAM

## 2022-02-02 PROCEDURE — 1126F AMNT PAIN NOTED NONE PRSNT: CPT | Mod: CPTII,S$GLB,, | Performed by: STUDENT IN AN ORGANIZED HEALTH CARE EDUCATION/TRAINING PROGRAM

## 2022-02-02 PROCEDURE — 99215 OFFICE O/P EST HI 40 MIN: CPT | Mod: S$GLB,,, | Performed by: STUDENT IN AN ORGANIZED HEALTH CARE EDUCATION/TRAINING PROGRAM

## 2022-02-02 PROCEDURE — 3288F FALL RISK ASSESSMENT DOCD: CPT | Mod: CPTII,S$GLB,, | Performed by: STUDENT IN AN ORGANIZED HEALTH CARE EDUCATION/TRAINING PROGRAM

## 2022-02-02 NOTE — ASSESSMENT & PLAN NOTE
Hemoglobin a1c 5/2021 8.1, adequately controlled.  Without symptoms of severe hyperglycemia.  -continue medical management with pcp

## 2022-02-02 NOTE — ASSESSMENT & PLAN NOTE
11/12/2021 CT showing expected post-treatment changes.  No new symptoms since her last visit.  -continue surveillance scans and follow up with radiation oncology  -follow up with me in 6 months.  -if her lung cancer recurs, one potential option would be targeted therapy with capmatinib.

## 2022-02-02 NOTE — PROGRESS NOTES
PATIENT: Stacia Perry  MRN: [id number]  DATE: 2/2/2022      Diagnosis:   1. Primary adenocarcinoma of upper lobe of right lung    2. Calcified granuloma of lung, right    3. Atherosclerosis of aorta    4. Type 2 diabetes mellitus without complication, without long-term current use of insulin    5. Atrial fibrillation, unspecified type    6. Paroxysmal atrial fibrillation        Chief Complaint: Malignant neoplasm of upper lobe, right bronchus or lung       Oncologic History:    Oncologic History 1. Right upper lobe lung adenocarcinoma    Oncologic Treatment 1. SBRT 8/16/21-8/25/21    Pathology 6/29/21  LUNG, RIGHT UPPER LOBE, CT-GUIDED BIOPSY WITH PATHOLOGIST ASSISTED ADEQUACY   (CYTOLOGY AND CELL BLOCK):   -Positive for at least adenocarcinoma in situ, with areas suspicious for   invasive adenocarcinoma.  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        Subjective:    Interval History: Ms. Perry is here for new patient consultation.    87 y/o woman with newly diagnosed heart failure of unknown etiology (LVEF 38%), severe aortic stenosis presenting with  cardiogenic 2/2 HFrEF and severe AS.  She was admitted to the hospital 5/6/21 when she was found to be in afib with RVR.  On admission, she decompensated with respiratory failure and hypotension and was admitted to the CCU.  Her afib was able to be controlled and they were also able to wean her off of pressors.  She was diuresed during her hospital stay.  She then followed up with cardiology for evaluation for TAVR.  As part of her TAVR workup, she had an incidental finding of a 2.5x1.9cm soft tissue mass in the right upper lobe.  She was referred to pulmonology and who then recommended an IR guided biopsy.  6/29/21 biopsy, pathology finalized of at least adenocarcinoma in situ with areas suspicious for invasive adenocarcinoma.  7/15/21 PET CT again identified the known RUL mass, SUV max 2 and similar size 2.4x1.9cm.  No hypermetabolic  lymphadenopathy identified.  Focal hypermetabolic uptake noted in ribs T6, T7, and T8 but are thought to be healing fractures.  No distant metastases identified.  She was treated with SBRT 21-21.  --  Overall no new symptoms to report. Denies fevers, chills, chest pain, shortness of breath, abdominal pain, nausea, vomiting, diarrhea, or constipation.  Appetite and weight are stable.  Activity level is at baseline.  No more falls since her last visit.  She is wearing a pinned picture of a recently  family member.  When asked if she needs onc psych, she tells me she does not need a referral at this time.  She is independent in ADLs.  She is a never smoker.    Her daughter accompanies her at this visit.    Past Medical History:   Past Medical History:   Diagnosis Date    Anticoagulant long-term use     Aortic valve stenosis     Arthritis     CHF (congestive heart failure)     Diabetes mellitus     Hypercholesteremia     Hypertension     Primary adenocarcinoma of upper lobe of right lung 2021    Thyroid disease        Past Surgical HIstory:   Past Surgical History:   Procedure Laterality Date    CATARACT EXTRACTION, BILATERAL      EYE SURGERY      FOOT FRACTURE SURGERY Right     HYSTERECTOMY         Family History:   Family History   Problem Relation Age of Onset    Diabetes Mother     Heart disease Father     Diabetes Father     Diabetes Sister     Diabetes Brother     No Known Problems Maternal Aunt     No Known Problems Maternal Uncle     No Known Problems Paternal Aunt     No Known Problems Paternal Uncle     No Known Problems Maternal Grandmother     No Known Problems Maternal Grandfather     No Known Problems Paternal Grandmother     No Known Problems Paternal Grandfather     Heart attack Brother     Diabetes Brother     Stroke Son     Diabetes Daughter     Anemia Neg Hx     Arrhythmia Neg Hx     Asthma Neg Hx     Clotting disorder Neg Hx     Fainting Neg  Hx     Heart failure Neg Hx     Hyperlipidemia Neg Hx     Hypertension Neg Hx     Atrial Septal Defect Neg Hx        Social History:  reports that she has never smoked. She has never used smokeless tobacco. She reports that she does not drink alcohol and does not use drugs.    Allergies:  Review of patient's allergies indicates:  No Known Allergies    Medications:  Current Outpatient Medications   Medication Sig Dispense Refill    amiodarone (PACERONE) 200 MG Tab Take 1 tablet (200 mg total) by mouth once daily. 90 tablet 3    apixaban (ELIQUIS) 5 mg Tab Take 1 tablet (5 mg total) by mouth 2 (two) times daily. 180 tablet 3    atorvastatin (LIPITOR) 40 MG tablet Take 40 mg by mouth once daily.      carvediloL (COREG) 6.25 MG tablet Take 0.5 tablets (3.125 mg total) by mouth 2 (two) times daily with meals. 30 tablet 11    citalopram (CELEXA) 20 MG tablet Take 20 mg by mouth once daily.      dapagliflozin (FARXIGA) 5 mg Tab tablet Take 1 tablet (5 mg total) by mouth once daily. 90 tablet 1    furosemide (LASIX) 40 MG tablet Take 1 tablet (40 mg total) by mouth 2 (two) times daily. 60 tablet 11    levothyroxine (SYNTHROID) 75 MCG tablet Take 75 mcg by mouth before breakfast.       meclizine (ANTIVERT) 25 mg tablet Take 25 mg by mouth once daily.       metformin (GLUCOPHAGE) 500 MG tablet Take 1,000 mg by mouth 2 (two) times daily with meals.       TRESIBA FLEXTOUCH U-100 100 unit/mL (3 mL) InPn Inject 18 Units into the skin every evening. 3 Syringe 3    valsartan (DIOVAN) 160 MG tablet Take 0.5 tablets (80 mg total) by mouth once daily. 45 tablet 3     No current facility-administered medications for this visit.       Review of Systems   Constitutional: Negative for activity change, appetite change, chills, diaphoresis, fatigue, fever and unexpected weight change.   HENT: Negative for nosebleeds and trouble swallowing.    Eyes: Negative for visual disturbance.   Respiratory: Negative for cough, chest  "tightness, shortness of breath and wheezing.    Cardiovascular: Negative for chest pain and leg swelling.   Gastrointestinal: Negative for abdominal distention, abdominal pain, blood in stool, constipation, diarrhea, nausea and vomiting.   Endocrine: Negative for cold intolerance and heat intolerance.   Genitourinary: Negative for difficulty urinating and dysuria.   Musculoskeletal: Negative for arthralgias and back pain.   Skin: Negative for color change.   Neurological: Negative for dizziness, weakness, light-headedness, numbness and headaches.   Hematological: Negative for adenopathy. Does not bruise/bleed easily.   Psychiatric/Behavioral: Positive for dysphoric mood. Negative for confusion.       ECOG Performance Status:     ECOG SCORE    1 - Restricted in strenuous activity-ambulatory and able to carry out work of a light nature         Objective:      Vitals:   Vitals:    02/02/22 0938   BP: 138/65   BP Location: Left arm   Patient Position: Sitting   BP Method: Medium (Automatic)   Pulse: (!) 58   Resp: 16   Temp: 98 °F (36.7 °C)   TempSrc: Oral   SpO2: 96%   Weight: 66.7 kg (147 lb 0.8 oz)   Height: 4' 11" (1.499 m)     BMI: Body mass index is 29.7 kg/m².    Physical Exam  Constitutional:       General: She is not in acute distress.     Appearance: Normal appearance. She is not ill-appearing.   HENT:      Head: Normocephalic and atraumatic.      Mouth/Throat:      Pharynx: No oropharyngeal exudate or posterior oropharyngeal erythema.   Eyes:      General: No scleral icterus.     Extraocular Movements: Extraocular movements intact.      Conjunctiva/sclera: Conjunctivae normal.      Pupils: Pupils are equal, round, and reactive to light.   Cardiovascular:      Rate and Rhythm: Normal rate and regular rhythm.      Heart sounds: Murmur heard.   No friction rub. No gallop.    Pulmonary:      Effort: Pulmonary effort is normal. No respiratory distress.      Breath sounds: No stridor. No wheezing, rhonchi or rales. "      Comments: Right upper lobe slightly decreased breath sounds  Abdominal:      General: Bowel sounds are normal. There is no distension.      Palpations: Abdomen is soft. There is no mass.      Tenderness: There is no abdominal tenderness. There is no guarding or rebound.   Musculoskeletal:         General: Normal range of motion.      Cervical back: Normal range of motion and neck supple.      Right lower leg: No edema.      Left lower leg: No edema.   Skin:     General: Skin is warm and dry.   Neurological:      General: No focal deficit present.      Mental Status: She is alert.         Laboratory Data:   Results for HOMER OLMOS (MRN [id number]) as of 2/2/2022 10:03   Ref. Range 11/30/2021 09:41   WBC Latest Ref Range: 3.90 - 12.70 K/uL 8.45   RBC Latest Ref Range: 4.00 - 5.40 M/uL 4.64   Hemoglobin Latest Ref Range: 12.0 - 16.0 g/dL 14.5   Hematocrit Latest Ref Range: 37.0 - 48.5 % 45.6   MCV Latest Ref Range: 82 - 98 fL 98   MCH Latest Ref Range: 27.0 - 31.0 pg 31.3 (H)   MCHC Latest Ref Range: 32.0 - 36.0 g/dL 31.8 (L)   RDW Latest Ref Range: 11.5 - 14.5 % 13.7   Platelets Latest Ref Range: 150 - 450 K/uL 245   MPV Latest Ref Range: 9.2 - 12.9 fL 10.9   Gran % Latest Ref Range: 38.0 - 73.0 % 61.8   Lymph % Latest Ref Range: 18.0 - 48.0 % 22.5   Mono % Latest Ref Range: 4.0 - 15.0 % 12.2   Eosinophil % Latest Ref Range: 0.0 - 8.0 % 2.4   Basophil % Latest Ref Range: 0.0 - 1.9 % 0.7   Immature Granulocytes Latest Ref Range: 0.0 - 0.5 % 0.4   Gran # (ANC) Latest Ref Range: 1.8 - 7.7 K/uL 5.2   Lymph # Latest Ref Range: 1.0 - 4.8 K/uL 1.9   Mono # Latest Ref Range: 0.3 - 1.0 K/uL 1.0   Eos # Latest Ref Range: 0.0 - 0.5 K/uL 0.2   Baso # Latest Ref Range: 0.00 - 0.20 K/uL 0.06   Immature Grans (Abs) Latest Ref Range: 0.00 - 0.04 K/uL 0.03   nRBC Latest Ref Range: 0 /100 WBC 0   Differential Method Unknown Automated   Sodium Latest Ref Range: 136 - 145 mmol/L 135 (L)   Potassium Latest Ref Range: 3.5 - 5.1  mmol/L 4.2   Chloride Latest Ref Range: 95 - 110 mmol/L 99   CO2 Latest Ref Range: 23 - 29 mmol/L 26   Anion Gap Latest Ref Range: 8 - 16 mmol/L 10   BUN Latest Ref Range: 8 - 23 mg/dL 27 (H)   Creatinine Latest Ref Range: 0.5 - 1.4 mg/dL 0.8   eGFR if non African American Latest Ref Range: >60 mL/min/1.73 m^2 >60.0   eGFR if African American Latest Ref Range: >60 mL/min/1.73 m^2 >60.0   Glucose Latest Ref Range: 70 - 110 mg/dL 207 (H)   Calcium Latest Ref Range: 8.7 - 10.5 mg/dL 9.8   Alkaline Phosphatase Latest Ref Range: 55 - 135 U/L 102   PROTEIN TOTAL Latest Ref Range: 6.0 - 8.4 g/dL 7.4   Albumin Latest Ref Range: 3.5 - 5.2 g/dL 3.8   BILIRUBIN TOTAL Latest Ref Range: 0.1 - 1.0 mg/dL 0.4   AST Latest Ref Range: 10 - 40 U/L 23   ALT Latest Ref Range: 10 - 44 U/L 26   Triglycerides Latest Ref Range: 30 - 150 mg/dL 245 (H)   Cholesterol Latest Ref Range: 120 - 199 mg/dL 172   HDL Latest Ref Range: 40 - 75 mg/dL 39 (L)   HDL/Cholesterol Ratio Latest Ref Range: 20.0 - 50.0 % 22.7   LDL Cholesterol External Latest Ref Range: 63.0 - 159.0 mg/dL 84.0   Non-HDL Cholesterol Latest Units: mg/dL 133   Total Cholesterol/HDL Ratio Latest Ref Range: 2.0 - 5.0  4.4   TSH Latest Ref Range: 0.400 - 4.000 uIU/mL 1.970     Imagin/12/21 CT chest without contrast  COMPARISON:  Chest radiograph 2021     FINDINGS:  Base of Neck: Punctate calcification in the left thyroid lobe.     Aorta: Left-sided aortic arch with 3 arterial branches. The aorta maintains normal caliber, contour and course. There is moderate calcification of the aortic arch and descending aorta.     Heart/pericardium: Normal size.  No pericardial effusion. There is significant multi-vessel coronary artery calcification.  Calcifications of the aortic valve.     Lora/Mediastinum: No pathologic roger enlargement.     Esophagus: Normal.     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     Thoracic soft tissues: Normal.     Bones: Kyphotic curvature of thoracic spine.  Degenerative changes of the spine.  No acute fracture. No suspicious lytic or sclerotic lesion.  Healing rib fractures on the right.     Airways: Patent.     Lungs/Pleura: Wedge-shaped area of consolidation with air bronchograms within the posterior segment right upper lobe.  The patient's known biopsy-proven mass is difficult to clearly define in the setting of consolidation.     No new nodules or masses.  No evidence of pneumothorax or large pleural effusion.     Mild mosaic attenuation bilaterally, more predominant in the right lung, which is nonspecific.     Calcified granuloma in the right middle lobe.     Bibasilar dependent atelectasis.     No pleural fluid or thickening.No pleural calcification.     Impression:     Status post radiation to patient's known right upper lobe adenocarcinoma.  Wedge-shaped area of consolidation with air bronchograms in the right upper lobe.  The lesion is difficult to define in the setting of consolidation.  This is likely predominantly post treatment change.  Superimposed infectious process is not totally excluded.  No evidence to suggest metastatic disease.       Assessment:       1. Primary adenocarcinoma of upper lobe of right lung    2. Calcified granuloma of lung, right    3. Atherosclerosis of aorta    4. Type 2 diabetes mellitus without complication, without long-term current use of insulin    5. Atrial fibrillation, unspecified type    6. Paroxysmal atrial fibrillation           Plan:       Problem List Items Addressed This Visit        Pulmonary    Calcified granuloma of lung, right    Overview     CXR-5/6/2021   "Right lung base calcified granuloma unchanged."         Current Assessment & Plan     Asymptomatic  -will be monitored with scans for her cancer            Cardiac/Vascular    Atrial fibrillation    Current Assessment & Plan     Sinus rhythm on exam. No new " "symptoms.  -continue carvedilol, amiodarone, and apixaban         Atherosclerosis of aorta    Overview     CT Chest-11/12/2021   "There is moderate calcification of the aortic arch and descending aorta."         Current Assessment & Plan     Asymptomatic.  -continue medical management            Oncology    Primary adenocarcinoma of upper lobe of right lung - Primary    Overview     6/29/21 biopsy, pathology finalized of at least adenocarcinoma in situ with areas suspicious for invasive adenocarcinoma.  PD-L1 0%, MET Exon 14 skipping mutation ([id number]) identified.  7/15/21 PET CT again identified the known RUL mass, SUV max 2 and similar size 2.4x1.9cm.  No hypermetabolic lymphadenopathy identified.  Focal hypermetabolic uptake noted in ribs T6, T7, and T8 but are thought to be healing fractures.  No distant metastases identified.  8/16/21-8/25/21 completed SBRT 48 Gy/4 Fx         Current Assessment & Plan     11/12/2021 CT showing expected post-treatment changes.  No new symptoms since her last visit.  -continue surveillance scans and follow up with radiation oncology  -follow up with me in 6 months.  -if her lung cancer recurs, one potential option would be targeted therapy with capmatinib.            Endocrine    Type 2 diabetes mellitus without complication, without long-term current use of insulin    Current Assessment & Plan     Hemoglobin a1c 5/2021 8.1, adequately controlled.  Without symptoms of severe hyperglycemia.  -continue medical management with pcp               No orders of the defined types were placed in this encounter.        Jon Penny MD  Hematology Oncology    "

## 2022-03-07 ENCOUNTER — TELEPHONE (OUTPATIENT)
Dept: CARDIOLOGY | Facility: CLINIC | Age: 87
End: 2022-03-07

## 2022-03-07 NOTE — TELEPHONE ENCOUNTER
----- Message from Juan Ramon Thorne sent at 3/7/2022  9:24 AM CST -----  Regarding: Labs  Please contact Kesha regarding labs for PT before her June visit.         Thanks       Kesha [phone]

## 2022-04-11 DIAGNOSIS — I50.20 HEART FAILURE WITH REDUCED EJECTION FRACTION: ICD-10-CM

## 2022-04-11 DIAGNOSIS — I48.0 PAROXYSMAL ATRIAL FIBRILLATION: ICD-10-CM

## 2022-04-11 DIAGNOSIS — I48.91 ATRIAL FIBRILLATION, UNSPECIFIED TYPE: ICD-10-CM

## 2022-04-11 DIAGNOSIS — I10 ESSENTIAL HYPERTENSION: ICD-10-CM

## 2022-04-11 RX ORDER — FUROSEMIDE 40 MG/1
40 TABLET ORAL 2 TIMES DAILY
Qty: 60 TABLET | Refills: 11 | Status: SHIPPED | OUTPATIENT
Start: 2022-04-11 | End: 2022-04-12 | Stop reason: SDUPTHER

## 2022-04-11 RX ORDER — CARVEDILOL 6.25 MG/1
3.12 TABLET ORAL 2 TIMES DAILY WITH MEALS
Qty: 30 TABLET | Refills: 11 | Status: SHIPPED | OUTPATIENT
Start: 2022-04-11 | End: 2022-04-12 | Stop reason: SDUPTHER

## 2022-04-12 RX ORDER — FUROSEMIDE 40 MG/1
40 TABLET ORAL 2 TIMES DAILY
Qty: 60 TABLET | Refills: 11 | Status: SHIPPED | OUTPATIENT
Start: 2022-04-12 | End: 2023-06-27

## 2022-04-12 RX ORDER — AMIODARONE HYDROCHLORIDE 200 MG/1
200 TABLET ORAL DAILY
Qty: 90 TABLET | Refills: 3 | Status: SHIPPED | OUTPATIENT
Start: 2022-04-12 | End: 2023-03-30

## 2022-04-12 RX ORDER — VALSARTAN 160 MG/1
80 TABLET ORAL DAILY
Qty: 45 TABLET | Refills: 3 | Status: SHIPPED | OUTPATIENT
Start: 2022-04-12 | End: 2023-03-30

## 2022-04-12 RX ORDER — ATORVASTATIN CALCIUM 40 MG/1
40 TABLET, FILM COATED ORAL DAILY
Qty: 30 TABLET | Refills: 6 | Status: SHIPPED | OUTPATIENT
Start: 2022-04-12 | End: 2022-11-09 | Stop reason: SDUPTHER

## 2022-04-12 RX ORDER — CARVEDILOL 6.25 MG/1
3.12 TABLET ORAL 2 TIMES DAILY WITH MEALS
Qty: 30 TABLET | Refills: 11 | Status: SHIPPED | OUTPATIENT
Start: 2022-04-12 | End: 2023-06-29

## 2022-05-06 ENCOUNTER — HOSPITAL ENCOUNTER (OUTPATIENT)
Dept: RADIOLOGY | Facility: HOSPITAL | Age: 87
Discharge: HOME | End: 2022-05-06
Attending: RADIOLOGY
Payer: MEDICARE

## 2022-05-06 ENCOUNTER — OFFICE VISIT (OUTPATIENT)
Dept: RADIATION ONCOLOGY | Facility: CLINIC | Age: 87
End: 2022-05-06
Payer: MEDICARE

## 2022-05-06 VITALS
TEMPERATURE: 97 F | DIASTOLIC BLOOD PRESSURE: 60 MMHG | HEIGHT: 59 IN | HEART RATE: 61 BPM | RESPIRATION RATE: 16 BRPM | OXYGEN SATURATION: 95 % | BODY MASS INDEX: 29.56 KG/M2 | SYSTOLIC BLOOD PRESSURE: 122 MMHG | WEIGHT: 146.63 LBS

## 2022-05-06 DIAGNOSIS — C34.11 PRIMARY ADENOCARCINOMA OF UPPER LOBE OF RIGHT LUNG: ICD-10-CM

## 2022-05-06 DIAGNOSIS — C34.11 PRIMARY ADENOCARCINOMA OF UPPER LOBE OF RIGHT LUNG: Primary | ICD-10-CM

## 2022-05-06 PROCEDURE — 1159F PR MEDICATION LIST DOCUMENTED IN MEDICAL RECORD: ICD-10-PCS | Mod: CPTII,S$GLB,, | Performed by: RADIOLOGY

## 2022-05-06 PROCEDURE — 99999 PR PBB SHADOW E&M-EST. PATIENT-LVL IV: CPT | Mod: PBBFAC,,, | Performed by: RADIOLOGY

## 2022-05-06 PROCEDURE — 1126F PR PAIN SEVERITY QUANTIFIED, NO PAIN PRESENT: ICD-10-PCS | Mod: CPTII,S$GLB,, | Performed by: RADIOLOGY

## 2022-05-06 PROCEDURE — 1101F PR PT FALLS ASSESS DOC 0-1 FALLS W/OUT INJ PAST YR: ICD-10-PCS | Mod: CPTII,S$GLB,, | Performed by: RADIOLOGY

## 2022-05-06 PROCEDURE — 1159F MED LIST DOCD IN RCRD: CPT | Mod: CPTII,S$GLB,, | Performed by: RADIOLOGY

## 2022-05-06 PROCEDURE — 1101F PT FALLS ASSESS-DOCD LE1/YR: CPT | Mod: CPTII,S$GLB,, | Performed by: RADIOLOGY

## 2022-05-06 PROCEDURE — 3288F PR FALLS RISK ASSESSMENT DOCUMENTED: ICD-10-PCS | Mod: CPTII,S$GLB,, | Performed by: RADIOLOGY

## 2022-05-06 PROCEDURE — 71250 CT CHEST WITHOUT CONTRAST: ICD-10-PCS | Mod: 26,,, | Performed by: RADIOLOGY

## 2022-05-06 PROCEDURE — 1126F AMNT PAIN NOTED NONE PRSNT: CPT | Mod: CPTII,S$GLB,, | Performed by: RADIOLOGY

## 2022-05-06 PROCEDURE — 99213 OFFICE O/P EST LOW 20 MIN: CPT | Mod: S$GLB,,, | Performed by: RADIOLOGY

## 2022-05-06 PROCEDURE — 3288F FALL RISK ASSESSMENT DOCD: CPT | Mod: CPTII,S$GLB,, | Performed by: RADIOLOGY

## 2022-05-06 PROCEDURE — 99999 PR PBB SHADOW E&M-EST. PATIENT-LVL IV: ICD-10-PCS | Mod: PBBFAC,,, | Performed by: RADIOLOGY

## 2022-05-06 PROCEDURE — 99213 PR OFFICE/OUTPT VISIT, EST, LEVL III, 20-29 MIN: ICD-10-PCS | Mod: S$GLB,,, | Performed by: RADIOLOGY

## 2022-05-06 PROCEDURE — 71250 CT THORAX DX C-: CPT | Mod: TC

## 2022-05-06 PROCEDURE — 71250 CT THORAX DX C-: CPT | Mod: 26,,, | Performed by: RADIOLOGY

## 2022-05-06 NOTE — PROGRESS NOTES
05/06/2022    Radiation Oncology Follow-Up Visit    Prior Radiation History:    Site  Technique  Energy  Dose/Fx (Gy)  #Fx  Total Dose (Gy)  Start Date  End Date  Elapsed Days    RUL Lung  SBRT  6X  12  4 / 4  48  8/16/2021 8/25/2021  9        Assessment   This is an 87 y.o. y/o female with Stage IA3 (cT1c cN0 M0) RUL NSCLC (adeno) diagnosed on biopsy 6/29/21. She completed definitive SBRT 48 Gy in 4 fx on 8/25/21.     No late toxicity from treatment. CT Chest today demonstrates improvement in radiation pneumonitis in the RUL but increased consolidation in the treatment field. I think this is most likely fibrosis from prior radiation, but given rounded appearance inferiorly, I will plan for short interval f/u.        Plan   1) I will see her back in 3 months with CT Chest prior for re-staging.        Chief Complaint   Patient presents with    Follow-up       HPI: Feeling well since last visit. Denies fevers, weight loss, chest pain, cough, or dyspnea.       Past Medical History:   Diagnosis Date    Anticoagulant long-term use     Aortic valve stenosis     Arthritis     CHF (congestive heart failure)     Diabetes mellitus     Hypercholesteremia     Hypertension     Primary adenocarcinoma of upper lobe of right lung 7/19/2021    Thyroid disease        Past Surgical History:   Procedure Laterality Date    CATARACT EXTRACTION, BILATERAL      EYE SURGERY      FOOT FRACTURE SURGERY Right     HYSTERECTOMY         Social History     Tobacco Use    Smoking status: Never Smoker    Smokeless tobacco: Never Used   Substance Use Topics    Alcohol use: No    Drug use: No       Cancer-related family history is not on file.    Current Outpatient Medications on File Prior to Visit   Medication Sig Dispense Refill    amiodarone (PACERONE) 200 MG Tab Take 1 tablet (200 mg total) by mouth once daily. 90 tablet 3    apixaban (ELIQUIS) 5 mg Tab Take 1 tablet (5 mg total) by mouth 2 (two) times daily. 180 tablet 3     atorvastatin (LIPITOR) 40 MG tablet Take 1 tablet (40 mg total) by mouth once daily. 30 tablet 6    carvediloL (COREG) 6.25 MG tablet Take 0.5 tablets (3.125 mg total) by mouth 2 (two) times daily with meals. 30 tablet 11    citalopram (CELEXA) 20 MG tablet Take 20 mg by mouth once daily.      dapagliflozin (FARXIGA) 5 mg Tab tablet Take 1 tablet (5 mg total) by mouth once daily. 90 tablet 1    furosemide (LASIX) 40 MG tablet Take 1 tablet (40 mg total) by mouth 2 (two) times daily. 60 tablet 11    levothyroxine (SYNTHROID) 75 MCG tablet Take 75 mcg by mouth before breakfast.       meclizine (ANTIVERT) 25 mg tablet Take 25 mg by mouth once daily.       metformin (GLUCOPHAGE) 500 MG tablet Take 1,000 mg by mouth 2 (two) times daily with meals.       TRESIBA FLEXTOUCH U-100 100 unit/mL (3 mL) InPn Inject 18 Units into the skin every evening. 3 Syringe 3    valsartan (DIOVAN) 160 MG tablet Take 0.5 tablets (80 mg total) by mouth once daily. 45 tablet 3     No current facility-administered medications on file prior to visit.       Review of patient's allergies indicates:  No Known Allergies    Review of Systems   Constitutional: Negative for fever and weight loss.   HENT: Positive for hearing loss. Negative for ear pain and sore throat.    Eyes: Negative for blurred vision and double vision.   Respiratory: Negative for cough, hemoptysis and shortness of breath.    Cardiovascular: Negative for chest pain and leg swelling.   Gastrointestinal: Negative for abdominal pain, constipation, diarrhea, heartburn and nausea.   Genitourinary: Negative for dysuria and hematuria.   Musculoskeletal: Negative for falls and joint pain.   Neurological: Negative for tingling, speech change, focal weakness, seizures and headaches.   Psychiatric/Behavioral: Negative for depression. The patient is not nervous/anxious.         Vital Signs: /60 (BP Location: Right arm, Patient Position: Sitting)   Pulse 61   Temp 97.1 °F (36.2  "°C)   Resp 16   Ht 4' 11" (1.499 m)   Wt 66.5 kg (146 lb 9.7 oz)   SpO2 95%   BMI 29.61 kg/m²     ECOG Performance Status: 2 - Ambulates, capable of self care only    Physical Exam  Vitals reviewed.   Constitutional:       Appearance: Normal appearance.   HENT:      Head: Normocephalic and atraumatic.   Eyes:      General: No scleral icterus.     Extraocular Movements: Extraocular movements intact.   Pulmonary:      Effort: No accessory muscle usage or respiratory distress.   Abdominal:      General: There is no distension.   Musculoskeletal:         General: Normal range of motion.      Cervical back: Normal range of motion and neck supple.   Lymphadenopathy:      Cervical: No cervical adenopathy.   Skin:     General: Skin is warm and dry.   Neurological:      Mental Status: She is alert and oriented to person, place, and time.      Cranial Nerves: No cranial nerve deficit.   Psychiatric:         Mood and Affect: Mood and affect normal.         Judgment: Judgment normal.          Labs:    Imaging: I have personally reviewed the patient's available images and reports and summarized pertinent findings above in HPI.     Pathology: No new path        "

## 2022-06-21 ENCOUNTER — OFFICE VISIT (OUTPATIENT)
Dept: CARDIOLOGY | Facility: CLINIC | Age: 87
End: 2022-06-21
Payer: MEDICARE

## 2022-06-21 ENCOUNTER — LAB VISIT (OUTPATIENT)
Dept: LAB | Facility: HOSPITAL | Age: 87
End: 2022-06-21
Attending: INTERNAL MEDICINE
Payer: MEDICARE

## 2022-06-21 ENCOUNTER — TELEPHONE (OUTPATIENT)
Dept: CARDIOLOGY | Facility: CLINIC | Age: 87
End: 2022-06-21

## 2022-06-21 VITALS
DIASTOLIC BLOOD PRESSURE: 60 MMHG | OXYGEN SATURATION: 91 % | WEIGHT: 148.81 LBS | HEIGHT: 59 IN | BODY MASS INDEX: 30 KG/M2 | SYSTOLIC BLOOD PRESSURE: 118 MMHG | HEART RATE: 64 BPM

## 2022-06-21 DIAGNOSIS — I10 ESSENTIAL HYPERTENSION: ICD-10-CM

## 2022-06-21 DIAGNOSIS — I35.0 SEVERE AORTIC STENOSIS: ICD-10-CM

## 2022-06-21 DIAGNOSIS — E11.9 TYPE 2 DIABETES MELLITUS WITHOUT COMPLICATION, WITHOUT LONG-TERM CURRENT USE OF INSULIN: ICD-10-CM

## 2022-06-21 DIAGNOSIS — I35.0 SEVERE AORTIC STENOSIS: Primary | ICD-10-CM

## 2022-06-21 DIAGNOSIS — I50.20 HEART FAILURE WITH REDUCED EJECTION FRACTION: ICD-10-CM

## 2022-06-21 DIAGNOSIS — I70.0 ATHEROSCLEROSIS OF AORTA: ICD-10-CM

## 2022-06-21 DIAGNOSIS — E78.5 HYPERLIPIDEMIA, UNSPECIFIED HYPERLIPIDEMIA TYPE: ICD-10-CM

## 2022-06-21 LAB
ALBUMIN SERPL BCP-MCNC: 3.7 G/DL (ref 3.5–5.2)
ALP SERPL-CCNC: 74 U/L (ref 55–135)
ALT SERPL W/O P-5'-P-CCNC: 16 U/L (ref 10–44)
ANION GAP SERPL CALC-SCNC: 10 MMOL/L (ref 8–16)
AST SERPL-CCNC: 14 U/L (ref 10–40)
BASOPHILS # BLD AUTO: 0.06 K/UL (ref 0–0.2)
BASOPHILS NFR BLD: 0.6 % (ref 0–1.9)
BILIRUB SERPL-MCNC: 0.3 MG/DL (ref 0.1–1)
BNP SERPL-MCNC: 127 PG/ML (ref 0–99)
BUN SERPL-MCNC: 24 MG/DL (ref 8–23)
CALCIUM SERPL-MCNC: 9.7 MG/DL (ref 8.7–10.5)
CHLORIDE SERPL-SCNC: 101 MMOL/L (ref 95–110)
CO2 SERPL-SCNC: 32 MMOL/L (ref 23–29)
CREAT SERPL-MCNC: 0.9 MG/DL (ref 0.5–1.4)
DIFFERENTIAL METHOD: ABNORMAL
EOSINOPHIL # BLD AUTO: 0.2 K/UL (ref 0–0.5)
EOSINOPHIL NFR BLD: 1.9 % (ref 0–8)
ERYTHROCYTE [DISTWIDTH] IN BLOOD BY AUTOMATED COUNT: 14.2 % (ref 11.5–14.5)
EST. GFR  (AFRICAN AMERICAN): >60 ML/MIN/1.73 M^2
EST. GFR  (NON AFRICAN AMERICAN): 57.7 ML/MIN/1.73 M^2
GLUCOSE SERPL-MCNC: 158 MG/DL (ref 70–110)
HCT VFR BLD AUTO: 42.9 % (ref 37–48.5)
HGB BLD-MCNC: 13.9 G/DL (ref 12–16)
IMM GRANULOCYTES # BLD AUTO: 0.05 K/UL (ref 0–0.04)
IMM GRANULOCYTES NFR BLD AUTO: 0.5 % (ref 0–0.5)
LYMPHOCYTES # BLD AUTO: 1.7 K/UL (ref 1–4.8)
LYMPHOCYTES NFR BLD: 17.6 % (ref 18–48)
MCH RBC QN AUTO: 31.2 PG (ref 27–31)
MCHC RBC AUTO-ENTMCNC: 32.4 G/DL (ref 32–36)
MCV RBC AUTO: 96 FL (ref 82–98)
MONOCYTES # BLD AUTO: 1.1 K/UL (ref 0.3–1)
MONOCYTES NFR BLD: 11.1 % (ref 4–15)
NEUTROPHILS # BLD AUTO: 6.6 K/UL (ref 1.8–7.7)
NEUTROPHILS NFR BLD: 68.3 % (ref 38–73)
NRBC BLD-RTO: 0 /100 WBC
PLATELET # BLD AUTO: 280 K/UL (ref 150–450)
PMV BLD AUTO: 10.9 FL (ref 9.2–12.9)
POTASSIUM SERPL-SCNC: 4.6 MMOL/L (ref 3.5–5.1)
PROT SERPL-MCNC: 7.2 G/DL (ref 6–8.4)
RBC # BLD AUTO: 4.45 M/UL (ref 4–5.4)
SODIUM SERPL-SCNC: 143 MMOL/L (ref 136–145)
WBC # BLD AUTO: 9.72 K/UL (ref 3.9–12.7)

## 2022-06-21 PROCEDURE — 1159F MED LIST DOCD IN RCRD: CPT | Mod: CPTII,S$GLB,, | Performed by: INTERNAL MEDICINE

## 2022-06-21 PROCEDURE — 85025 COMPLETE CBC W/AUTO DIFF WBC: CPT | Performed by: INTERNAL MEDICINE

## 2022-06-21 PROCEDURE — 1126F AMNT PAIN NOTED NONE PRSNT: CPT | Mod: CPTII,S$GLB,, | Performed by: INTERNAL MEDICINE

## 2022-06-21 PROCEDURE — 1126F PR PAIN SEVERITY QUANTIFIED, NO PAIN PRESENT: ICD-10-PCS | Mod: CPTII,S$GLB,, | Performed by: INTERNAL MEDICINE

## 2022-06-21 PROCEDURE — 99999 PR PBB SHADOW E&M-EST. PATIENT-LVL III: ICD-10-PCS | Mod: PBBFAC,,, | Performed by: INTERNAL MEDICINE

## 2022-06-21 PROCEDURE — 99999 PR PBB SHADOW E&M-EST. PATIENT-LVL III: CPT | Mod: PBBFAC,,, | Performed by: INTERNAL MEDICINE

## 2022-06-21 PROCEDURE — 3288F FALL RISK ASSESSMENT DOCD: CPT | Mod: CPTII,S$GLB,, | Performed by: INTERNAL MEDICINE

## 2022-06-21 PROCEDURE — 1159F PR MEDICATION LIST DOCUMENTED IN MEDICAL RECORD: ICD-10-PCS | Mod: CPTII,S$GLB,, | Performed by: INTERNAL MEDICINE

## 2022-06-21 PROCEDURE — 83880 ASSAY OF NATRIURETIC PEPTIDE: CPT | Performed by: INTERNAL MEDICINE

## 2022-06-21 PROCEDURE — 36415 COLL VENOUS BLD VENIPUNCTURE: CPT | Performed by: INTERNAL MEDICINE

## 2022-06-21 PROCEDURE — 1101F PR PT FALLS ASSESS DOC 0-1 FALLS W/OUT INJ PAST YR: ICD-10-PCS | Mod: CPTII,S$GLB,, | Performed by: INTERNAL MEDICINE

## 2022-06-21 PROCEDURE — 99214 PR OFFICE/OUTPT VISIT, EST, LEVL IV, 30-39 MIN: ICD-10-PCS | Mod: S$GLB,,, | Performed by: INTERNAL MEDICINE

## 2022-06-21 PROCEDURE — 80053 COMPREHEN METABOLIC PANEL: CPT | Performed by: INTERNAL MEDICINE

## 2022-06-21 PROCEDURE — 3288F PR FALLS RISK ASSESSMENT DOCUMENTED: ICD-10-PCS | Mod: CPTII,S$GLB,, | Performed by: INTERNAL MEDICINE

## 2022-06-21 PROCEDURE — 99214 OFFICE O/P EST MOD 30 MIN: CPT | Mod: S$GLB,,, | Performed by: INTERNAL MEDICINE

## 2022-06-21 PROCEDURE — 1101F PT FALLS ASSESS-DOCD LE1/YR: CPT | Mod: CPTII,S$GLB,, | Performed by: INTERNAL MEDICINE

## 2022-06-21 NOTE — PROGRESS NOTES
Subjective:    Patient ID:  Stacia Perry is a 87 y.o. female who presents for follow-up of aortic stenosis atrial fibrillation    HPI   The patient is a 87 year old female  admitted at [facility] from 5/7 - 5/12/21 for acute decompensated HF and AF (new dx) with RVR.TTE with EF 38%, low-normal RV function, severe aortic stenosis , mod-severe MR and PASP 50. She was seen in IC clinic 5/28 with plans to evaluate incidental mass found on CT scan before proceeding with TAVR evaluation.  Lung biopsy revealed adenocarcinoma .She was evaluated by Oncology. She was deemed not a surgical candidate and stereotactic body radiotherapy (SBRT) was done. See recent oncology and radiotherapy notes that indicate stability. Her daughter reports increased fatigue and TORRES since her last visit but no chest pain      Summary 5/7/21    · The left ventricle is normal in size with moderately decreased systolic function.  · The estimated ejection fraction is 38%.  · There is moderate left ventricular global hypokinesis.  · Left ventricular diastolic dysfunction.  · Normal right ventricular size with low normal right ventricular systolic function.  · There is severe aortic valve stenosis.  · Aortic valve area is 0.50 cm2; peak velocity is 4.27 m/s; mean gradient is 50 mmHg.  · The MR is moderate to moderate-severe ( 2-3+).  · Mild tricuspid regurgitation.  · Intermediate central venous pressure (8 mmHg).  · The estimated PA systolic pressure is 47 mmHg.  · There is pulmonary hypertension.       Lab Results   Component Value Date     (L) 11/30/2021    K 4.2 11/30/2021    CL 99 11/30/2021    CO2 26 11/30/2021    BUN 27 (H) 11/30/2021    CREATININE 0.8 11/30/2021     (H) 11/30/2021    HGBA1C 8.1 (H) 05/07/2021    MG 2.0 05/12/2021    AST 23 11/30/2021    ALT 26 11/30/2021    ALBUMIN 3.8 11/30/2021    PROT 7.4 11/30/2021    BILITOT 0.4 11/30/2021    WBC 8.45 11/30/2021    HGB 14.5 11/30/2021    HCT 45.6 11/30/2021    MCV 98 11/30/2021      11/30/2021    INR 1.0 06/10/2021    TSH 1.970 11/30/2021         Lab Results   Component Value Date    CHOL 172 11/30/2021    HDL 39 (L) 11/30/2021    TRIG 245 (H) 11/30/2021       Lab Results   Component Value Date    LDLCALC 84.0 11/30/2021       Past Medical History:   Diagnosis Date    Anticoagulant long-term use     Aortic valve stenosis     Arthritis     CHF (congestive heart failure)     Diabetes mellitus     Hypercholesteremia     Hypertension     Primary adenocarcinoma of upper lobe of right lung 7/19/2021    Thyroid disease        Current Outpatient Medications:     amiodarone (PACERONE) 200 MG Tab, Take 1 tablet (200 mg total) by mouth once daily., Disp: 90 tablet, Rfl: 3    apixaban (ELIQUIS) 5 mg Tab, Take 1 tablet (5 mg total) by mouth 2 (two) times daily., Disp: 180 tablet, Rfl: 3    atorvastatin (LIPITOR) 40 MG tablet, Take 1 tablet (40 mg total) by mouth once daily., Disp: 30 tablet, Rfl: 6    carvediloL (COREG) 6.25 MG tablet, Take 0.5 tablets (3.125 mg total) by mouth 2 (two) times daily with meals., Disp: 30 tablet, Rfl: 11    citalopram (CELEXA) 20 MG tablet, Take 20 mg by mouth once daily., Disp: , Rfl:     dapagliflozin (FARXIGA) 5 mg Tab tablet, Take 1 tablet (5 mg total) by mouth once daily., Disp: 90 tablet, Rfl: 1    furosemide (LASIX) 40 MG tablet, Take 1 tablet (40 mg total) by mouth 2 (two) times daily., Disp: 60 tablet, Rfl: 11    levothyroxine (SYNTHROID) 75 MCG tablet, Take 75 mcg by mouth before breakfast. , Disp: , Rfl:     meclizine (ANTIVERT) 25 mg tablet, Take 25 mg by mouth once daily. , Disp: , Rfl:     metformin (GLUCOPHAGE) 500 MG tablet, Take 1,000 mg by mouth 2 (two) times daily with meals. , Disp: , Rfl:     TRESIBA FLEXTOUCH U-100 100 unit/mL (3 mL) InPn, Inject 18 Units into the skin every evening., Disp: 3 Syringe, Rfl: 3    valsartan (DIOVAN) 160 MG tablet, Take 0.5 tablets (80 mg total) by mouth once daily., Disp: 45 tablet, Rfl:  "3          Review of Systems   Constitutional: Positive for malaise/fatigue. Negative for decreased appetite, diaphoresis, fever, weight gain and weight loss.   HENT: Negative for congestion, ear discharge, ear pain and nosebleeds.    Eyes: Negative for blurred vision, double vision and visual disturbance.   Cardiovascular: Positive for dyspnea on exertion. Negative for chest pain, claudication, cyanosis, irregular heartbeat, leg swelling, near-syncope, orthopnea, palpitations, paroxysmal nocturnal dyspnea and syncope.   Respiratory: Negative for cough, hemoptysis, shortness of breath, sleep disturbances due to breathing, snoring, sputum production and wheezing.    Endocrine: Negative for polydipsia, polyphagia and polyuria.   Hematologic/Lymphatic: Negative for adenopathy and bleeding problem. Does not bruise/bleed easily.   Skin: Negative for color change, nail changes, poor wound healing and rash.   Musculoskeletal: Negative for muscle cramps and muscle weakness.   Gastrointestinal: Negative for abdominal pain, anorexia, change in bowel habit, hematochezia, nausea and vomiting.   Genitourinary: Negative for dysuria, frequency and hematuria.   Neurological: Negative for brief paralysis, difficulty with concentration, excessive daytime sleepiness, dizziness, focal weakness, headaches, light-headedness, seizures, vertigo and weakness.   Psychiatric/Behavioral: Negative for altered mental status and depression.   Allergic/Immunologic: Negative for persistent infections.        Objective:/60   Pulse 64   Ht 4' 11" (1.499 m)   Wt 67.5 kg (148 lb 13 oz)   SpO2 (!) 91%   BMI 30.06 kg/m²             Physical Exam  Constitutional:       Appearance: She is well-developed. She is obese.   HENT:      Head: Normocephalic.      Right Ear: External ear normal.      Left Ear: External ear normal.      Nose: Nose normal.   Eyes:      General: No scleral icterus.     Conjunctiva/sclera: Conjunctivae normal.      Pupils: " Pupils are equal, round, and reactive to light.   Neck:      Thyroid: No thyromegaly.      Vascular: No JVD.      Trachea: No tracheal deviation.   Cardiovascular:      Rate and Rhythm: Normal rate and regular rhythm.      Pulses: Intact distal pulses.           Carotid pulses are 2+ on the right side with bruit and 2+ on the left side with bruit.       Dorsalis pedis pulses are 0 on the right side and 1+ on the left side.        Posterior tibial pulses are 1+ on the right side and 0 on the left side.      Heart sounds: Murmur heard.    Harsh early systolic murmur is present at the upper right sternal border and upper left sternal border radiating to the neck.    No friction rub. No gallop.      Comments: JVP normal  Pulmonary:      Effort: Pulmonary effort is normal. No respiratory distress.      Breath sounds: Normal breath sounds. No wheezing or rales.   Chest:      Chest wall: No tenderness.   Abdominal:      General: Bowel sounds are normal. There is no distension.      Palpations: Abdomen is soft.      Tenderness: There is no abdominal tenderness. There is no guarding.   Musculoskeletal:         General: No tenderness. Normal range of motion.      Cervical back: Normal range of motion.   Lymphadenopathy:      Comments: Palpation of lymph nodes of neck and groin normal   Skin:     General: Skin is warm and dry.      Coloration: Skin is not pale.      Findings: No erythema or rash.      Comments: Palpation of skin normal   Neurological:      Mental Status: She is alert and oriented to person, place, and time.      Cranial Nerves: No cranial nerve deficit.      Motor: No abnormal muscle tone.      Coordination: Coordination normal.   Psychiatric:         Behavior: Behavior normal.         Thought Content: Thought content normal.         Judgment: Judgment normal.           Assessment:       1. Severe aortic stenosis    2. Atherosclerosis of aorta    3. Essential hypertension    4. Heart failure with reduced  ejection fraction    5. Type 2 diabetes mellitus without complication, without long-term current use of insulin    6. Hyperlipidemia, unspecified hyperlipidemia type         Plan:       Stacia was seen today for atrial fibrillation.    Diagnoses and all orders for this visit:    Severe aortic stenosis  -     Echo Saline Bubble? No; Future  -     CBC Auto Differential; Future; Expected date: 06/21/2022  -     B-TYPE NATRIURETIC PEPTIDE; Future; Expected date: 06/21/2022    Atherosclerosis of aorta    Essential hypertension  -     Comprehensive Metabolic Panel; Future; Expected date: 06/21/2022    Heart failure with reduced ejection fraction  -     Echo Saline Bubble? No; Future  -     B-TYPE NATRIURETIC PEPTIDE; Future; Expected date: 06/21/2022    Type 2 diabetes mellitus without complication, without long-term current use of insulin    Hyperlipidemia, unspecified hyperlipidemia type

## 2022-07-14 ENCOUNTER — TELEPHONE (OUTPATIENT)
Dept: CARDIOLOGY | Facility: CLINIC | Age: 87
End: 2022-07-14
Payer: MEDICARE

## 2022-07-14 ENCOUNTER — HOSPITAL ENCOUNTER (OUTPATIENT)
Dept: CARDIOLOGY | Facility: HOSPITAL | Age: 87
Discharge: HOME | End: 2022-07-14
Attending: INTERNAL MEDICINE
Payer: MEDICARE

## 2022-07-14 VITALS
SYSTOLIC BLOOD PRESSURE: 108 MMHG | HEIGHT: 59 IN | DIASTOLIC BLOOD PRESSURE: 60 MMHG | WEIGHT: 148 LBS | HEART RATE: 62 BPM | BODY MASS INDEX: 29.84 KG/M2

## 2022-07-14 DIAGNOSIS — I35.0 SEVERE AORTIC STENOSIS: ICD-10-CM

## 2022-07-14 DIAGNOSIS — I50.20 HEART FAILURE WITH REDUCED EJECTION FRACTION: ICD-10-CM

## 2022-07-14 LAB
ASCENDING AORTA: 3.18 CM
AV INDEX (PROSTH): 0.15
AV MEAN GRADIENT: 53 MMHG
AV PEAK GRADIENT: 75 MMHG
AV VALVE AREA: 0.55 CM2
AV VELOCITY RATIO: 0.16
BSA FOR ECHO PROCEDURE: 1.67 M2
CV ECHO LV RWT: 0.38 CM
DOP CALC AO PEAK VEL: 4.32 M/S
DOP CALC AO VTI: 119.89 CM
DOP CALC LVOT AREA: 3.6 CM2
DOP CALC LVOT DIAMETER: 2.14 CM
DOP CALC LVOT PEAK VEL: 0.69 M/S
DOP CALC LVOT STROKE VOLUME: 66.51 CM3
DOP CALCLVOT PEAK VEL VTI: 18.5 CM
E WAVE DECELERATION TIME: 169.71 MSEC
E/A RATIO: 3.57
E/E' RATIO: 25 M/S
ECHO LV POSTERIOR WALL: 0.96 CM (ref 0.6–1.1)
EJECTION FRACTION: 55 %
FRACTIONAL SHORTENING: 30 % (ref 28–44)
INTERVENTRICULAR SEPTUM: 0.96 CM (ref 0.6–1.1)
LA MAJOR: 5.89 CM
LA MINOR: 5.98 CM
LA WIDTH: 4.65 CM
LEFT ATRIUM SIZE: 4.02 CM
LEFT ATRIUM VOLUME INDEX MOD: 59.3 ML/M2
LEFT ATRIUM VOLUME INDEX: 58.2 ML/M2
LEFT ATRIUM VOLUME MOD: 96.14 CM3
LEFT ATRIUM VOLUME: 94.3 CM3
LEFT INTERNAL DIMENSION IN SYSTOLE: 3.49 CM (ref 2.1–4)
LEFT VENTRICLE DIASTOLIC VOLUME INDEX: 73.53 ML/M2
LEFT VENTRICLE DIASTOLIC VOLUME: 119.12 ML
LEFT VENTRICLE MASS INDEX: 107 G/M2
LEFT VENTRICLE SYSTOLIC VOLUME INDEX: 31.3 ML/M2
LEFT VENTRICLE SYSTOLIC VOLUME: 50.69 ML
LEFT VENTRICULAR INTERNAL DIMENSION IN DIASTOLE: 5.02 CM (ref 3.5–6)
LEFT VENTRICULAR MASS: 173.45 G
LV LATERAL E/E' RATIO: 17.86 M/S
LV SEPTAL E/E' RATIO: 41.67 M/S
MV PEAK A VEL: 0.35 M/S
MV PEAK E VEL: 1.25 M/S
MV STENOSIS PRESSURE HALF TIME: 49.22 MS
MV VALVE AREA P 1/2 METHOD: 4.47 CM2
PISA TR MAX VEL: 3.48 M/S
RA MAJOR: 4.86 CM
RA PRESSURE: 3 MMHG
RA WIDTH: 3.86 CM
RIGHT VENTRICULAR END-DIASTOLIC DIMENSION: 3.41 CM
RV TISSUE DOPPLER FREE WALL SYSTOLIC VELOCITY 1 (APICAL 4 CHAMBER VIEW): 4.48 CM/S
SINUS: 2.96 CM
STJ: 2.8 CM
TDI LATERAL: 0.07 M/S
TDI SEPTAL: 0.03 M/S
TDI: 0.05 M/S
TR MAX PG: 48 MMHG
TRICUSPID ANNULAR PLANE SYSTOLIC EXCURSION: 1.23 CM
TV REST PULMONARY ARTERY PRESSURE: 51 MMHG

## 2022-07-14 PROCEDURE — 93306 TTE W/DOPPLER COMPLETE: CPT | Mod: 26,,, | Performed by: INTERNAL MEDICINE

## 2022-07-14 PROCEDURE — 93306 ECHO (CUPID ONLY): ICD-10-PCS | Mod: 26,,, | Performed by: INTERNAL MEDICINE

## 2022-07-14 PROCEDURE — 93306 TTE W/DOPPLER COMPLETE: CPT

## 2022-07-19 ENCOUNTER — TELEPHONE (OUTPATIENT)
Dept: CARDIOLOGY | Facility: CLINIC | Age: 87
End: 2022-07-19
Payer: MEDICARE

## 2022-08-05 ENCOUNTER — HOSPITAL ENCOUNTER (OUTPATIENT)
Dept: RADIOLOGY | Facility: HOSPITAL | Age: 87
Discharge: HOME | End: 2022-08-05
Attending: RADIOLOGY
Payer: MEDICARE

## 2022-08-05 DIAGNOSIS — C34.11 PRIMARY ADENOCARCINOMA OF UPPER LOBE OF RIGHT LUNG: ICD-10-CM

## 2022-08-05 PROCEDURE — 71250 CT THORAX DX C-: CPT | Mod: 26,,, | Performed by: RADIOLOGY

## 2022-08-05 PROCEDURE — 71250 CT THORAX DX C-: CPT | Mod: TC

## 2022-08-05 PROCEDURE — 71250 CT CHEST WITHOUT CONTRAST: ICD-10-PCS | Mod: 26,,, | Performed by: RADIOLOGY

## 2022-08-12 ENCOUNTER — OFFICE VISIT (OUTPATIENT)
Dept: RADIATION ONCOLOGY | Facility: CLINIC | Age: 87
End: 2022-08-12
Payer: MEDICARE

## 2022-08-12 VITALS
RESPIRATION RATE: 15 BRPM | HEART RATE: 59 BPM | HEIGHT: 59 IN | OXYGEN SATURATION: 94 % | SYSTOLIC BLOOD PRESSURE: 113 MMHG | DIASTOLIC BLOOD PRESSURE: 55 MMHG | BODY MASS INDEX: 29.82 KG/M2 | WEIGHT: 147.94 LBS

## 2022-08-12 DIAGNOSIS — Z85.118 PERSONAL HISTORY OF LUNG CANCER: Primary | ICD-10-CM

## 2022-08-12 DIAGNOSIS — C34.11 PRIMARY ADENOCARCINOMA OF UPPER LOBE OF RIGHT LUNG: ICD-10-CM

## 2022-08-12 PROCEDURE — 1159F MED LIST DOCD IN RCRD: CPT | Mod: CPTII,S$GLB,, | Performed by: RADIOLOGY

## 2022-08-12 PROCEDURE — 1101F PT FALLS ASSESS-DOCD LE1/YR: CPT | Mod: CPTII,S$GLB,, | Performed by: RADIOLOGY

## 2022-08-12 PROCEDURE — 1101F PR PT FALLS ASSESS DOC 0-1 FALLS W/OUT INJ PAST YR: ICD-10-PCS | Mod: CPTII,S$GLB,, | Performed by: RADIOLOGY

## 2022-08-12 PROCEDURE — 1159F PR MEDICATION LIST DOCUMENTED IN MEDICAL RECORD: ICD-10-PCS | Mod: CPTII,S$GLB,, | Performed by: RADIOLOGY

## 2022-08-12 PROCEDURE — 99213 OFFICE O/P EST LOW 20 MIN: CPT | Mod: S$GLB,,, | Performed by: RADIOLOGY

## 2022-08-12 PROCEDURE — 99999 PR PBB SHADOW E&M-EST. PATIENT-LVL III: ICD-10-PCS | Mod: PBBFAC,,, | Performed by: RADIOLOGY

## 2022-08-12 PROCEDURE — 3288F FALL RISK ASSESSMENT DOCD: CPT | Mod: CPTII,S$GLB,, | Performed by: RADIOLOGY

## 2022-08-12 PROCEDURE — 3288F PR FALLS RISK ASSESSMENT DOCUMENTED: ICD-10-PCS | Mod: CPTII,S$GLB,, | Performed by: RADIOLOGY

## 2022-08-12 PROCEDURE — 99999 PR PBB SHADOW E&M-EST. PATIENT-LVL III: CPT | Mod: PBBFAC,,, | Performed by: RADIOLOGY

## 2022-08-12 PROCEDURE — 1126F AMNT PAIN NOTED NONE PRSNT: CPT | Mod: CPTII,S$GLB,, | Performed by: RADIOLOGY

## 2022-08-12 PROCEDURE — 1126F PR PAIN SEVERITY QUANTIFIED, NO PAIN PRESENT: ICD-10-PCS | Mod: CPTII,S$GLB,, | Performed by: RADIOLOGY

## 2022-08-12 PROCEDURE — 99213 PR OFFICE/OUTPT VISIT, EST, LEVL III, 20-29 MIN: ICD-10-PCS | Mod: S$GLB,,, | Performed by: RADIOLOGY

## 2022-08-12 NOTE — PROGRESS NOTES
08/12/2022    Radiation Oncology Follow-Up Visit    Prior Radiation History:    Site  Technique  Energy  Dose/Fx (Gy)  #Fx  Total Dose (Gy)  Start Date  End Date  Elapsed Days    RUL Lung  SBRT  6X  12  4 / 4  48  8/16/2021 8/25/2021  9        Assessment   This is an 87 y.o. y/o female with Stage IA3 (cT1c cN0 M0) RUL NSCLC (adeno) diagnosed on biopsy 6/29/21. She completed definitive SBRT 48 Gy in 4 fx on 8/25/21.     No late toxicity from treatment. CT Chest 8/5/22 demonstrated stable size of the treated RUL consolidation indicating post-radiation fibrosis. Mild interval enlargement of mediastinal nodes compared to imaging from 1 year ago; this may be inflammatory reaction from the RUL changes. Will plan to perform PET staging at next follow-up in 3 months to better assess.     At this point she has no evidence of active disease and should be okay for any cardiac procedures from my standpoint.        Plan   1) I will see her back in 3 months with PET/CT prior for re-staging.        Chief Complaint   Patient presents with    Follow-up       HPI: Feeling well since last visit. Stable dry cough, otherwise denies fevers, weight loss, chest pain, or dyspnea.       Past Medical History:   Diagnosis Date    Anticoagulant long-term use     Aortic valve stenosis     Arthritis     CHF (congestive heart failure)     Diabetes mellitus     Hypercholesteremia     Hypertension     Primary adenocarcinoma of upper lobe of right lung 7/19/2021    Thyroid disease        Past Surgical History:   Procedure Laterality Date    CATARACT EXTRACTION, BILATERAL      EYE SURGERY      FOOT FRACTURE SURGERY Right     HYSTERECTOMY         Social History     Tobacco Use    Smoking status: Never Smoker    Smokeless tobacco: Never Used   Substance Use Topics    Alcohol use: No    Drug use: No       Cancer-related family history is not on file.    Current Outpatient Medications on File Prior to Visit   Medication Sig Dispense  Refill    amiodarone (PACERONE) 200 MG Tab Take 1 tablet (200 mg total) by mouth once daily. 90 tablet 3    apixaban (ELIQUIS) 5 mg Tab Take 1 tablet (5 mg total) by mouth 2 (two) times daily. 180 tablet 3    atorvastatin (LIPITOR) 40 MG tablet Take 1 tablet (40 mg total) by mouth once daily. 30 tablet 6    carvediloL (COREG) 6.25 MG tablet Take 0.5 tablets (3.125 mg total) by mouth 2 (two) times daily with meals. 30 tablet 11    citalopram (CELEXA) 20 MG tablet Take 20 mg by mouth once daily.      dapagliflozin (FARXIGA) 5 mg Tab tablet Take 1 tablet (5 mg total) by mouth once daily. 90 tablet 1    furosemide (LASIX) 40 MG tablet Take 1 tablet (40 mg total) by mouth 2 (two) times daily. 60 tablet 11    levothyroxine (SYNTHROID) 75 MCG tablet Take 75 mcg by mouth before breakfast.       meclizine (ANTIVERT) 25 mg tablet Take 25 mg by mouth once daily.       metformin (GLUCOPHAGE) 500 MG tablet Take 1,000 mg by mouth 2 (two) times daily with meals.       TRESIBA FLEXTOUCH U-100 100 unit/mL (3 mL) InPn Inject 18 Units into the skin every evening. 3 Syringe 3    valsartan (DIOVAN) 160 MG tablet Take 0.5 tablets (80 mg total) by mouth once daily. 45 tablet 3     No current facility-administered medications on file prior to visit.       Review of patient's allergies indicates:  No Known Allergies    Review of Systems   Constitutional: Negative for fever and weight loss.   HENT: Positive for hearing loss. Negative for ear pain and sore throat.    Eyes: Negative for blurred vision and double vision.   Respiratory: Positive for cough. Negative for hemoptysis and shortness of breath.    Cardiovascular: Negative for chest pain and leg swelling.   Gastrointestinal: Negative for abdominal pain, constipation, diarrhea, heartburn and nausea.   Genitourinary: Negative for dysuria and hematuria.   Musculoskeletal: Negative for falls and joint pain.   Neurological: Negative for tingling, speech change, focal weakness,  "seizures and headaches.   Psychiatric/Behavioral: Negative for depression. The patient is not nervous/anxious.         Vital Signs: BP (!) 113/55 (BP Location: Right arm, Patient Position: Sitting)   Pulse (!) 59   Resp 15   Ht 4' 11" (1.499 m)   Wt 67.1 kg (147 lb 14.9 oz)   SpO2 (!) 94%   BMI 29.88 kg/m²     ECOG Performance Status: 2 - Ambulates, capable of self care only    Physical Exam  Vitals reviewed.   Constitutional:       Appearance: Normal appearance.   HENT:      Head: Normocephalic and atraumatic.   Eyes:      General: No scleral icterus.     Extraocular Movements: Extraocular movements intact.   Pulmonary:      Effort: No accessory muscle usage or respiratory distress.   Abdominal:      General: There is no distension.   Musculoskeletal:         General: Normal range of motion.      Cervical back: Normal range of motion and neck supple.   Lymphadenopathy:      Cervical: No cervical adenopathy.   Skin:     General: Skin is warm and dry.   Neurological:      Mental Status: She is alert and oriented to person, place, and time.      Cranial Nerves: No cranial nerve deficit.   Psychiatric:         Mood and Affect: Mood and affect normal.         Judgment: Judgment normal.          Labs:    Imaging: I have personally reviewed the patient's available images and reports and summarized pertinent findings above in HPI.     Pathology: No new path          "

## 2022-11-07 ENCOUNTER — HOSPITAL ENCOUNTER (INPATIENT)
Facility: HOSPITAL | Age: 87
LOS: 2 days | Discharge: HOME | DRG: 291 | End: 2022-11-09
Attending: STUDENT IN AN ORGANIZED HEALTH CARE EDUCATION/TRAINING PROGRAM | Admitting: HOSPITALIST
Payer: MEDICARE

## 2022-11-07 DIAGNOSIS — I50.20 HEART FAILURE WITH REDUCED EJECTION FRACTION: Chronic | ICD-10-CM

## 2022-11-07 DIAGNOSIS — R09.02 HYPOXIA: ICD-10-CM

## 2022-11-07 DIAGNOSIS — I50.9 CONGESTIVE HEART FAILURE, UNSPECIFIED HF CHRONICITY, UNSPECIFIED HEART FAILURE TYPE: Primary | ICD-10-CM

## 2022-11-07 DIAGNOSIS — R06.02 SOB (SHORTNESS OF BREATH): ICD-10-CM

## 2022-11-07 DIAGNOSIS — R07.9 CHEST PAIN: ICD-10-CM

## 2022-11-07 LAB
ALBUMIN SERPL BCP-MCNC: 2.9 G/DL (ref 3.5–5.2)
ALP SERPL-CCNC: 144 U/L (ref 55–135)
ALT SERPL W/O P-5'-P-CCNC: 126 U/L (ref 10–44)
ANION GAP SERPL CALC-SCNC: 11 MMOL/L (ref 8–16)
ANISOCYTOSIS BLD QL SMEAR: SLIGHT
AST SERPL-CCNC: 100 U/L (ref 10–40)
BASOPHILS # BLD AUTO: 0.02 K/UL (ref 0–0.2)
BASOPHILS NFR BLD: 0.3 % (ref 0–1.9)
BILIRUB SERPL-MCNC: 0.5 MG/DL (ref 0.1–1)
BNP SERPL-MCNC: 1014 PG/ML (ref 0–99)
BUN SERPL-MCNC: 18 MG/DL (ref 8–23)
BURR CELLS BLD QL SMEAR: ABNORMAL
CALCIUM SERPL-MCNC: 8.7 MG/DL (ref 8.7–10.5)
CHLORIDE SERPL-SCNC: 102 MMOL/L (ref 95–110)
CO2 SERPL-SCNC: 27 MMOL/L (ref 23–29)
CREAT SERPL-MCNC: 1 MG/DL (ref 0.5–1.4)
DIFFERENTIAL METHOD: ABNORMAL
EOSINOPHIL # BLD AUTO: 0 K/UL (ref 0–0.5)
EOSINOPHIL NFR BLD: 0.2 % (ref 0–8)
ERYTHROCYTE [DISTWIDTH] IN BLOOD BY AUTOMATED COUNT: 16.2 % (ref 11.5–14.5)
EST. GFR  (NO RACE VARIABLE): 54.2 ML/MIN/1.73 M^2
GLUCOSE SERPL-MCNC: 191 MG/DL (ref 70–110)
HCT VFR BLD AUTO: 40 % (ref 37–48.5)
HCV AB SERPL QL IA: NORMAL
HGB BLD-MCNC: 12.7 G/DL (ref 12–16)
HIV 1+2 AB+HIV1 P24 AG SERPL QL IA: NORMAL
IMM GRANULOCYTES # BLD AUTO: 0.02 K/UL (ref 0–0.04)
IMM GRANULOCYTES NFR BLD AUTO: 0.3 % (ref 0–0.5)
INFLUENZA A, MOLECULAR: NOT DETECTED
INFLUENZA B, MOLECULAR: NOT DETECTED
INR PPP: 1.1 (ref 0.8–1.2)
LACTATE SERPL-SCNC: 2 MMOL/L (ref 0.5–2.2)
LYMPHOCYTES # BLD AUTO: 1.4 K/UL (ref 1–4.8)
LYMPHOCYTES NFR BLD: 23 % (ref 18–48)
MAGNESIUM SERPL-MCNC: 2 MG/DL (ref 1.6–2.6)
MCH RBC QN AUTO: 29.9 PG (ref 27–31)
MCHC RBC AUTO-ENTMCNC: 31.8 G/DL (ref 32–36)
MCV RBC AUTO: 94 FL (ref 82–98)
MONOCYTES # BLD AUTO: 0.8 K/UL (ref 0.3–1)
MONOCYTES NFR BLD: 12.8 % (ref 4–15)
NEUTROPHILS # BLD AUTO: 3.9 K/UL (ref 1.8–7.7)
NEUTROPHILS NFR BLD: 63.4 % (ref 38–73)
NRBC BLD-RTO: 0 /100 WBC
OVALOCYTES BLD QL SMEAR: ABNORMAL
PLATELET # BLD AUTO: 226 K/UL (ref 150–450)
PLATELET BLD QL SMEAR: ABNORMAL
PMV BLD AUTO: 11 FL (ref 9.2–12.9)
POIKILOCYTOSIS BLD QL SMEAR: SLIGHT
POLYCHROMASIA BLD QL SMEAR: ABNORMAL
POTASSIUM SERPL-SCNC: 4.7 MMOL/L (ref 3.5–5.1)
PROT SERPL-MCNC: 6.4 G/DL (ref 6–8.4)
PROTHROMBIN TIME: 11.7 SEC (ref 9–12.5)
RBC # BLD AUTO: 4.25 M/UL (ref 4–5.4)
RSV AG BY MOLECULAR METHOD: NOT DETECTED
SARS-COV-2 RNA RESP QL NAA+PROBE: NOT DETECTED
SODIUM SERPL-SCNC: 140 MMOL/L (ref 136–145)
TROPONIN I SERPL DL<=0.01 NG/ML-MCNC: 0.06 NG/ML (ref 0–0.03)
WBC # BLD AUTO: 6.17 K/UL (ref 3.9–12.7)

## 2022-11-07 PROCEDURE — 81001 URINALYSIS AUTO W/SCOPE: CPT

## 2022-11-07 PROCEDURE — 99285 EMERGENCY DEPT VISIT HI MDM: CPT | Mod: CS,,, | Performed by: STUDENT IN AN ORGANIZED HEALTH CARE EDUCATION/TRAINING PROGRAM

## 2022-11-07 PROCEDURE — 99285 EMERGENCY DEPT VISIT HI MDM: CPT | Mod: 25

## 2022-11-07 PROCEDURE — 83735 ASSAY OF MAGNESIUM: CPT | Performed by: EMERGENCY MEDICINE

## 2022-11-07 PROCEDURE — 63600175 PHARM REV CODE 636 W HCPCS

## 2022-11-07 PROCEDURE — 80053 COMPREHEN METABOLIC PANEL: CPT | Performed by: EMERGENCY MEDICINE

## 2022-11-07 PROCEDURE — 12000002 HC ACUTE/MED SURGE SEMI-PRIVATE ROOM

## 2022-11-07 PROCEDURE — 0241U SARS-COV2 (COVID) WITH FLU/RSV BY PCR: CPT | Performed by: EMERGENCY MEDICINE

## 2022-11-07 PROCEDURE — 87389 HIV-1 AG W/HIV-1&-2 AB AG IA: CPT | Performed by: PHYSICIAN ASSISTANT

## 2022-11-07 PROCEDURE — 93010 EKG 12-LEAD: ICD-10-PCS | Mod: ,,, | Performed by: INTERNAL MEDICINE

## 2022-11-07 PROCEDURE — 96374 THER/PROPH/DIAG INJ IV PUSH: CPT

## 2022-11-07 PROCEDURE — 93005 ELECTROCARDIOGRAM TRACING: CPT

## 2022-11-07 PROCEDURE — 84484 ASSAY OF TROPONIN QUANT: CPT | Mod: 91

## 2022-11-07 PROCEDURE — 84484 ASSAY OF TROPONIN QUANT: CPT | Performed by: EMERGENCY MEDICINE

## 2022-11-07 PROCEDURE — 86803 HEPATITIS C AB TEST: CPT | Performed by: PHYSICIAN ASSISTANT

## 2022-11-07 PROCEDURE — 93010 ELECTROCARDIOGRAM REPORT: CPT | Mod: ,,, | Performed by: INTERNAL MEDICINE

## 2022-11-07 PROCEDURE — 83605 ASSAY OF LACTIC ACID: CPT | Performed by: EMERGENCY MEDICINE

## 2022-11-07 PROCEDURE — 83036 HEMOGLOBIN GLYCOSYLATED A1C: CPT | Performed by: PHYSICIAN ASSISTANT

## 2022-11-07 PROCEDURE — 85025 COMPLETE CBC W/AUTO DIFF WBC: CPT | Performed by: EMERGENCY MEDICINE

## 2022-11-07 PROCEDURE — 83880 ASSAY OF NATRIURETIC PEPTIDE: CPT | Performed by: EMERGENCY MEDICINE

## 2022-11-07 PROCEDURE — 99285 PR EMERGENCY DEPT VISIT,LEVEL V: ICD-10-PCS | Mod: CS,,, | Performed by: STUDENT IN AN ORGANIZED HEALTH CARE EDUCATION/TRAINING PROGRAM

## 2022-11-07 PROCEDURE — 85610 PROTHROMBIN TIME: CPT | Performed by: EMERGENCY MEDICINE

## 2022-11-07 RX ORDER — SODIUM CHLORIDE 0.9 % (FLUSH) 0.9 %
5 SYRINGE (ML) INJECTION
Status: DISCONTINUED | OUTPATIENT
Start: 2022-11-07 | End: 2022-11-09 | Stop reason: HOSPADM

## 2022-11-07 RX ORDER — INSULIN ASPART 100 [IU]/ML
0-5 INJECTION, SOLUTION INTRAVENOUS; SUBCUTANEOUS
Status: DISCONTINUED | OUTPATIENT
Start: 2022-11-07 | End: 2022-11-09 | Stop reason: HOSPADM

## 2022-11-07 RX ORDER — IBUPROFEN 200 MG
24 TABLET ORAL
Status: DISCONTINUED | OUTPATIENT
Start: 2022-11-07 | End: 2022-11-09 | Stop reason: HOSPADM

## 2022-11-07 RX ORDER — GLUCAGON 1 MG
1 KIT INJECTION
Status: DISCONTINUED | OUTPATIENT
Start: 2022-11-07 | End: 2022-11-09 | Stop reason: HOSPADM

## 2022-11-07 RX ORDER — IBUPROFEN 200 MG
16 TABLET ORAL
Status: DISCONTINUED | OUTPATIENT
Start: 2022-11-07 | End: 2022-11-09 | Stop reason: HOSPADM

## 2022-11-07 RX ORDER — MAG HYDROX/ALUMINUM HYD/SIMETH 200-200-20
30 SUSPENSION, ORAL (FINAL DOSE FORM) ORAL 4 TIMES DAILY PRN
Status: DISCONTINUED | OUTPATIENT
Start: 2022-11-07 | End: 2022-11-09 | Stop reason: HOSPADM

## 2022-11-07 RX ORDER — CARVEDILOL 3.12 MG/1
3.12 TABLET ORAL 2 TIMES DAILY WITH MEALS
Status: DISCONTINUED | OUTPATIENT
Start: 2022-11-08 | End: 2022-11-08

## 2022-11-07 RX ORDER — AMIODARONE HYDROCHLORIDE 200 MG/1
200 TABLET ORAL DAILY
Status: DISCONTINUED | OUTPATIENT
Start: 2022-11-08 | End: 2022-11-09 | Stop reason: HOSPADM

## 2022-11-07 RX ORDER — ONDANSETRON 8 MG/1
8 TABLET, ORALLY DISINTEGRATING ORAL EVERY 8 HOURS PRN
Status: DISCONTINUED | OUTPATIENT
Start: 2022-11-07 | End: 2022-11-09 | Stop reason: HOSPADM

## 2022-11-07 RX ORDER — BISACODYL 10 MG
10 SUPPOSITORY, RECTAL RECTAL DAILY PRN
Status: DISCONTINUED | OUTPATIENT
Start: 2022-11-07 | End: 2022-11-09 | Stop reason: HOSPADM

## 2022-11-07 RX ORDER — VALSARTAN 40 MG/1
80 TABLET ORAL DAILY
Status: DISCONTINUED | OUTPATIENT
Start: 2022-11-08 | End: 2022-11-08

## 2022-11-07 RX ORDER — ACETAMINOPHEN 500 MG
1000 TABLET ORAL EVERY 8 HOURS PRN
Status: DISCONTINUED | OUTPATIENT
Start: 2022-11-07 | End: 2022-11-09 | Stop reason: HOSPADM

## 2022-11-07 RX ORDER — MECLIZINE HYDROCHLORIDE 25 MG/1
25 TABLET ORAL DAILY
Status: DISCONTINUED | OUTPATIENT
Start: 2022-11-08 | End: 2022-11-09 | Stop reason: HOSPADM

## 2022-11-07 RX ORDER — CITALOPRAM 20 MG/1
20 TABLET, FILM COATED ORAL DAILY
Status: DISCONTINUED | OUTPATIENT
Start: 2022-11-08 | End: 2022-11-09 | Stop reason: HOSPADM

## 2022-11-07 RX ORDER — IPRATROPIUM BROMIDE AND ALBUTEROL SULFATE 2.5; .5 MG/3ML; MG/3ML
3 SOLUTION RESPIRATORY (INHALATION) EVERY 4 HOURS PRN
Status: DISCONTINUED | OUTPATIENT
Start: 2022-11-07 | End: 2022-11-09 | Stop reason: HOSPADM

## 2022-11-07 RX ORDER — ATORVASTATIN CALCIUM 40 MG/1
40 TABLET, FILM COATED ORAL DAILY
Status: DISCONTINUED | OUTPATIENT
Start: 2022-11-08 | End: 2022-11-09 | Stop reason: HOSPADM

## 2022-11-07 RX ORDER — TALC
6 POWDER (GRAM) TOPICAL NIGHTLY PRN
Status: DISCONTINUED | OUTPATIENT
Start: 2022-11-07 | End: 2022-11-09 | Stop reason: HOSPADM

## 2022-11-07 RX ORDER — POLYETHYLENE GLYCOL 3350 17 G/17G
17 POWDER, FOR SOLUTION ORAL 2 TIMES DAILY PRN
Status: DISCONTINUED | OUTPATIENT
Start: 2022-11-07 | End: 2022-11-09 | Stop reason: HOSPADM

## 2022-11-07 RX ORDER — PROCHLORPERAZINE EDISYLATE 5 MG/ML
5 INJECTION INTRAMUSCULAR; INTRAVENOUS EVERY 6 HOURS PRN
Status: DISCONTINUED | OUTPATIENT
Start: 2022-11-07 | End: 2022-11-09 | Stop reason: HOSPADM

## 2022-11-07 RX ORDER — FUROSEMIDE 10 MG/ML
80 INJECTION INTRAMUSCULAR; INTRAVENOUS
Status: COMPLETED | OUTPATIENT
Start: 2022-11-07 | End: 2022-11-07

## 2022-11-07 RX ORDER — ACETAMINOPHEN 325 MG/1
650 TABLET ORAL EVERY 4 HOURS PRN
Status: DISCONTINUED | OUTPATIENT
Start: 2022-11-07 | End: 2022-11-09 | Stop reason: HOSPADM

## 2022-11-07 RX ORDER — SIMETHICONE 80 MG
1 TABLET,CHEWABLE ORAL 4 TIMES DAILY PRN
Status: DISCONTINUED | OUTPATIENT
Start: 2022-11-07 | End: 2022-11-09 | Stop reason: HOSPADM

## 2022-11-07 RX ORDER — NALOXONE HCL 0.4 MG/ML
0.02 VIAL (ML) INJECTION
Status: DISCONTINUED | OUTPATIENT
Start: 2022-11-07 | End: 2022-11-09 | Stop reason: HOSPADM

## 2022-11-07 RX ORDER — FUROSEMIDE 10 MG/ML
40 INJECTION INTRAMUSCULAR; INTRAVENOUS 2 TIMES DAILY
Status: DISCONTINUED | OUTPATIENT
Start: 2022-11-08 | End: 2022-11-08

## 2022-11-07 RX ADMIN — FUROSEMIDE 80 MG: 10 INJECTION, SOLUTION INTRAMUSCULAR; INTRAVENOUS at 10:11

## 2022-11-07 NOTE — Clinical Note
Diagnosis: SOB (shortness of breath) [105790]   Admitting Provider:: NELIDA FRANCIS [[id number]]   Future Attending Provider: NELIDA FRANCIS [[id number]]   Reason for IP Medical Treatment  (Clinical interventions that can only be accomplished in the IP setting? ) :: CHF exacerbation   Estimated Length of Stay:: 2 midnights   I certify that Inpatient services for greater than or equal to 2 midnights are medically necessary:: Yes   Plans for Post-Acute care--if anticipated (pick the single best option):: A. No post acute care anticipated at this time   Special Needs:: No Special Needs [1]

## 2022-11-07 NOTE — FIRST PROVIDER EVALUATION
"Medical screening examination initiated.  I have conducted a focused provider triage encounter, findings are as follows:    Brief history of present illness:  87 YO F presents w/ progressive exertional dyspnea. Now unable to speak in complete sentences. H/o HF. +increased leg swelling. SpO2 noted to be 91% on RA. No prior oxygen requirement    Vitals:    11/07/22 1727   BP: (!) 153/61   Pulse: 61   Resp: (!) 24   Temp: (!) 61 °F (16.1 °C)   TempSrc: Oral   SpO2: (!) 92%   Weight: 64.9 kg (143 lb)   Height: 4' 11" (1.499 m)       Pertinent physical exam:  tachypneic. 1+ nonpitting edema BLE    Brief workup plan:  CBC, BNP, TnI, ECG    Preliminary workup initiated; this workup will be continued and followed by the physician or advanced practice provider that is assigned to the patient when roomed.  "

## 2022-11-08 PROBLEM — E78.5 HLD (HYPERLIPIDEMIA): Chronic | Status: ACTIVE | Noted: 2021-05-06

## 2022-11-08 PROBLEM — C34.11 PRIMARY ADENOCARCINOMA OF UPPER LOBE OF RIGHT LUNG: Chronic | Status: ACTIVE | Noted: 2021-07-19

## 2022-11-08 PROBLEM — I70.0 ATHEROSCLEROSIS OF AORTA: Chronic | Status: ACTIVE | Noted: 2022-02-02

## 2022-11-08 PROBLEM — I10 ESSENTIAL HYPERTENSION: Chronic | Status: ACTIVE | Noted: 2021-05-06

## 2022-11-08 PROBLEM — I48.91 ATRIAL FIBRILLATION: Chronic | Status: ACTIVE | Noted: 2021-05-07

## 2022-11-08 PROBLEM — R57.0 CARDIOGENIC SHOCK: Status: RESOLVED | Noted: 2021-05-07 | Resolved: 2022-11-08

## 2022-11-08 PROBLEM — I50.20 HEART FAILURE WITH REDUCED EJECTION FRACTION: Chronic | Status: ACTIVE | Noted: 2021-05-06

## 2022-11-08 PROBLEM — I50.23 ACUTE ON CHRONIC HFREF (HEART FAILURE WITH REDUCED EJECTION FRACTION): Status: ACTIVE | Noted: 2022-11-07

## 2022-11-08 PROBLEM — I35.0 SEVERE AORTIC STENOSIS: Chronic | Status: ACTIVE | Noted: 2021-05-08

## 2022-11-08 PROBLEM — R74.01 TRANSAMINITIS: Status: ACTIVE | Noted: 2022-11-08

## 2022-11-08 PROBLEM — J18.9 PNEUMONIA DUE TO INFECTIOUS ORGANISM: Status: RESOLVED | Noted: 2021-05-06 | Resolved: 2022-11-08

## 2022-11-08 PROBLEM — R79.89 ELEVATED TROPONIN: Status: ACTIVE | Noted: 2022-11-08

## 2022-11-08 PROBLEM — E11.9 TYPE 2 DIABETES MELLITUS WITHOUT COMPLICATION, WITHOUT LONG-TERM CURRENT USE OF INSULIN: Chronic | Status: ACTIVE | Noted: 2021-05-06

## 2022-11-08 LAB
ALBUMIN SERPL BCP-MCNC: 2.9 G/DL (ref 3.5–5.2)
ALP SERPL-CCNC: 144 U/L (ref 55–135)
ALT SERPL W/O P-5'-P-CCNC: 114 U/L (ref 10–44)
ANION GAP SERPL CALC-SCNC: 11 MMOL/L (ref 8–16)
ANISOCYTOSIS BLD QL SMEAR: SLIGHT
AST SERPL-CCNC: 75 U/L (ref 10–40)
BACTERIA #/AREA URNS AUTO: ABNORMAL /HPF
BASOPHILS # BLD AUTO: 0.03 K/UL (ref 0–0.2)
BASOPHILS NFR BLD: 0.4 % (ref 0–1.9)
BILIRUB SERPL-MCNC: 0.6 MG/DL (ref 0.1–1)
BILIRUB UR QL STRIP: NEGATIVE
BUN SERPL-MCNC: 17 MG/DL (ref 8–23)
BURR CELLS BLD QL SMEAR: ABNORMAL
CALCIUM SERPL-MCNC: 8.9 MG/DL (ref 8.7–10.5)
CHLORIDE SERPL-SCNC: 102 MMOL/L (ref 95–110)
CHOLEST SERPL-MCNC: 90 MG/DL (ref 120–199)
CHOLEST/HDLC SERPL: 3.8 {RATIO} (ref 2–5)
CLARITY UR REFRACT.AUTO: CLEAR
CO2 SERPL-SCNC: 26 MMOL/L (ref 23–29)
COLOR UR AUTO: ABNORMAL
CREAT SERPL-MCNC: 0.8 MG/DL (ref 0.5–1.4)
DIFFERENTIAL METHOD: ABNORMAL
EOSINOPHIL # BLD AUTO: 0 K/UL (ref 0–0.5)
EOSINOPHIL NFR BLD: 0.4 % (ref 0–8)
ERYTHROCYTE [DISTWIDTH] IN BLOOD BY AUTOMATED COUNT: 16 % (ref 11.5–14.5)
EST. GFR  (NO RACE VARIABLE): >60 ML/MIN/1.73 M^2
ESTIMATED AVG GLUCOSE: 171 MG/DL (ref 68–131)
GLUCOSE SERPL-MCNC: 192 MG/DL (ref 70–110)
GLUCOSE UR QL STRIP: ABNORMAL
HBA1C MFR BLD: 7.6 % (ref 4–5.6)
HCT VFR BLD AUTO: 41.7 % (ref 37–48.5)
HDLC SERPL-MCNC: 24 MG/DL (ref 40–75)
HDLC SERPL: 26.7 % (ref 20–50)
HGB BLD-MCNC: 13 G/DL (ref 12–16)
HGB UR QL STRIP: NEGATIVE
HYALINE CASTS UR QL AUTO: 3 /LPF
IMM GRANULOCYTES # BLD AUTO: 0.02 K/UL (ref 0–0.04)
IMM GRANULOCYTES NFR BLD AUTO: 0.3 % (ref 0–0.5)
KETONES UR QL STRIP: NEGATIVE
LDLC SERPL CALC-MCNC: 34.6 MG/DL (ref 63–159)
LEUKOCYTE ESTERASE UR QL STRIP: NEGATIVE
LYMPHOCYTES # BLD AUTO: 1.6 K/UL (ref 1–4.8)
LYMPHOCYTES NFR BLD: 24.3 % (ref 18–48)
MAGNESIUM SERPL-MCNC: 2.1 MG/DL (ref 1.6–2.6)
MCH RBC QN AUTO: 30 PG (ref 27–31)
MCHC RBC AUTO-ENTMCNC: 31.2 G/DL (ref 32–36)
MCV RBC AUTO: 96 FL (ref 82–98)
MICROSCOPIC COMMENT: ABNORMAL
MONOCYTES # BLD AUTO: 0.7 K/UL (ref 0.3–1)
MONOCYTES NFR BLD: 10.4 % (ref 4–15)
NEUTROPHILS # BLD AUTO: 4.3 K/UL (ref 1.8–7.7)
NEUTROPHILS NFR BLD: 64.2 % (ref 38–73)
NITRITE UR QL STRIP: NEGATIVE
NONHDLC SERPL-MCNC: 66 MG/DL
NRBC BLD-RTO: 0 /100 WBC
OVALOCYTES BLD QL SMEAR: ABNORMAL
PH UR STRIP: 7 [PH] (ref 5–8)
PLATELET # BLD AUTO: 223 K/UL (ref 150–450)
PLATELET BLD QL SMEAR: ABNORMAL
PMV BLD AUTO: 10.4 FL (ref 9.2–12.9)
POCT GLUCOSE: 118 MG/DL (ref 70–110)
POCT GLUCOSE: 144 MG/DL (ref 70–110)
POCT GLUCOSE: 157 MG/DL (ref 70–110)
POCT GLUCOSE: 199 MG/DL (ref 70–110)
POCT GLUCOSE: 215 MG/DL (ref 70–110)
POIKILOCYTOSIS BLD QL SMEAR: SLIGHT
POTASSIUM SERPL-SCNC: 3.9 MMOL/L (ref 3.5–5.1)
PROT SERPL-MCNC: 6.4 G/DL (ref 6–8.4)
PROT UR QL STRIP: NEGATIVE
RBC # BLD AUTO: 4.34 M/UL (ref 4–5.4)
RBC #/AREA URNS AUTO: 2 /HPF (ref 0–4)
SODIUM SERPL-SCNC: 139 MMOL/L (ref 136–145)
SP GR UR STRIP: 1.01 (ref 1–1.03)
TRIGL SERPL-MCNC: 157 MG/DL (ref 30–150)
TROPONIN I SERPL DL<=0.01 NG/ML-MCNC: 0.06 NG/ML (ref 0–0.03)
TROPONIN I SERPL DL<=0.01 NG/ML-MCNC: 0.07 NG/ML (ref 0–0.03)
TSH SERPL DL<=0.005 MIU/L-ACNC: 1.49 UIU/ML (ref 0.4–4)
URN SPEC COLLECT METH UR: ABNORMAL
WBC # BLD AUTO: 6.74 K/UL (ref 3.9–12.7)
WBC #/AREA URNS AUTO: 3 /HPF (ref 0–5)
YEAST UR QL AUTO: ABNORMAL

## 2022-11-08 PROCEDURE — 80053 COMPREHEN METABOLIC PANEL: CPT

## 2022-11-08 PROCEDURE — 12000002 HC ACUTE/MED SURGE SEMI-PRIVATE ROOM

## 2022-11-08 PROCEDURE — 99223 PR INITIAL HOSPITAL CARE,LEVL III: ICD-10-PCS | Mod: ,,,

## 2022-11-08 PROCEDURE — 99223 1ST HOSP IP/OBS HIGH 75: CPT | Mod: ,,,

## 2022-11-08 PROCEDURE — 84443 ASSAY THYROID STIM HORMONE: CPT

## 2022-11-08 PROCEDURE — 84484 ASSAY OF TROPONIN QUANT: CPT

## 2022-11-08 PROCEDURE — 63600175 PHARM REV CODE 636 W HCPCS

## 2022-11-08 PROCEDURE — 36415 COLL VENOUS BLD VENIPUNCTURE: CPT

## 2022-11-08 PROCEDURE — 83735 ASSAY OF MAGNESIUM: CPT

## 2022-11-08 PROCEDURE — 85025 COMPLETE CBC W/AUTO DIFF WBC: CPT

## 2022-11-08 PROCEDURE — 25000003 PHARM REV CODE 250: Performed by: HOSPITALIST

## 2022-11-08 PROCEDURE — 63600175 PHARM REV CODE 636 W HCPCS: Performed by: HOSPITALIST

## 2022-11-08 PROCEDURE — 25000003 PHARM REV CODE 250

## 2022-11-08 PROCEDURE — 80061 LIPID PANEL: CPT

## 2022-11-08 RX ORDER — INSULIN ASPART 100 [IU]/ML
3 INJECTION, SOLUTION INTRAVENOUS; SUBCUTANEOUS
Status: DISCONTINUED | OUTPATIENT
Start: 2022-11-08 | End: 2022-11-09 | Stop reason: HOSPADM

## 2022-11-08 RX ORDER — FUROSEMIDE 10 MG/ML
40 INJECTION INTRAMUSCULAR; INTRAVENOUS 3 TIMES DAILY
Status: DISCONTINUED | OUTPATIENT
Start: 2022-11-08 | End: 2022-11-09

## 2022-11-08 RX ORDER — MUPIROCIN 20 MG/G
OINTMENT TOPICAL 2 TIMES DAILY
Status: DISCONTINUED | OUTPATIENT
Start: 2022-11-08 | End: 2022-11-09 | Stop reason: HOSPADM

## 2022-11-08 RX ADMIN — CARVEDILOL 3.12 MG: 3.12 TABLET, FILM COATED ORAL at 08:11

## 2022-11-08 RX ADMIN — AMIODARONE HYDROCHLORIDE 200 MG: 200 TABLET ORAL at 08:11

## 2022-11-08 RX ADMIN — MECLIZINE HYDROCHLORIDE 25 MG: 25 TABLET ORAL at 08:11

## 2022-11-08 RX ADMIN — INSULIN ASPART 3 UNITS: 100 INJECTION, SOLUTION INTRAVENOUS; SUBCUTANEOUS at 04:11

## 2022-11-08 RX ADMIN — INSULIN ASPART 2 UNITS: 100 INJECTION, SOLUTION INTRAVENOUS; SUBCUTANEOUS at 11:11

## 2022-11-08 RX ADMIN — FUROSEMIDE 40 MG: 10 INJECTION, SOLUTION INTRAMUSCULAR; INTRAVENOUS at 08:11

## 2022-11-08 RX ADMIN — LEVOTHYROXINE SODIUM 75 MCG: 25 TABLET ORAL at 07:11

## 2022-11-08 RX ADMIN — APIXABAN 5 MG: 5 TABLET, FILM COATED ORAL at 08:11

## 2022-11-08 RX ADMIN — INSULIN ASPART 3 UNITS: 100 INJECTION, SOLUTION INTRAVENOUS; SUBCUTANEOUS at 11:11

## 2022-11-08 RX ADMIN — FUROSEMIDE 40 MG: 10 INJECTION, SOLUTION INTRAMUSCULAR; INTRAVENOUS at 03:11

## 2022-11-08 RX ADMIN — CITALOPRAM HYDROBROMIDE 20 MG: 20 TABLET ORAL at 08:11

## 2022-11-08 RX ADMIN — MUPIROCIN: 20 OINTMENT TOPICAL at 09:11

## 2022-11-08 RX ADMIN — ATORVASTATIN CALCIUM 40 MG: 40 TABLET, FILM COATED ORAL at 08:11

## 2022-11-08 RX ADMIN — CARVEDILOL 3.12 MG: 3.12 TABLET, FILM COATED ORAL at 04:11

## 2022-11-08 RX ADMIN — INSULIN ASPART 3 UNITS: 100 INJECTION, SOLUTION INTRAVENOUS; SUBCUTANEOUS at 08:11

## 2022-11-08 NOTE — ASSESSMENT & PLAN NOTE
Severe aortic stenosis  SOB (shortness of breath)  She takes furosemide 40 mg BID at home. Give IV furosemide 40 mg TID. Continue home carvedilol. Hold home valsartan.

## 2022-11-08 NOTE — ASSESSMENT & PLAN NOTE
Patient's FSGs are controlled on current medication regimen.  Last A1c reviewed-   Lab Results   Component Value Date    HGBA1C 7.6 (H) 11/07/2022     Most recent fingerstick glucose reviewed-   Recent Labs   Lab 11/08/22  0101   POCTGLUCOSE 118*     Current correctional scale  Low  Maintain anti-hyperglycemic dose as follows-   Antihyperglycemics (From admission, onward)    Start     Stop Route Frequency Ordered    11/09/22 2100  insulin detemir U-100 pen 10 Units         -- SubQ Nightly 11/08/22 0135    11/08/22 0715  insulin aspart U-100 pen 3 Units         -- SubQ 3 times daily with meals 11/08/22 0135    11/07/22 2356  insulin aspart U-100 pen 0-5 Units         -- SubQ Before meals & nightly PRN 11/07/22 2259        Hold Oral hypoglycemics while patient is in the hospital.

## 2022-11-08 NOTE — ASSESSMENT & PLAN NOTE
Shortness of Breath  - Patient is identified as having Combined Systolic and Diastolic heart failure that is Acute on Chronic.   - CHF is currently uncontrolled due to volume overload due to: Continued edema of extremities and Hepatic congestion/ascites and Pulmonary edema/pleural effusion on CXR.   - Latest ECHO performed and demonstrates- Results for orders placed during the hospital encounter of 07/14/22    Echo Saline Bubble? No    Interpretation Summary  · The left ventricle is normal in size with mild eccentric hypertrophy and low normal systolic function.  · The estimated ejection fraction is 55%.  · Grade III left ventricular diastolic dysfunction.  · Normal right ventricular size with mildly reduced right ventricular systolic function.  · Severe left atrial enlargement.  · Mild right atrial enlargement.  · There is severe aortic valve stenosis.  · Aortic valve area is 0.55 cm2; peak velocity is 4.32 m/s; mean gradient is 53 mmHg.  · Mild aortic regurgitation.  · Moderate-to-severe mitral regurgitation.  · Moderate tricuspid regurgitation.  · Normal central venous pressure (3 mmHg).  · The estimated PA systolic pressure is 51 mmHg.  · There is mild-moderate pulmonary hypertension.  .   - Continue Beta Blocker ACE/ARB Furosemide and monitor clinical status closely.   - given IV lasix 80mg x1 in ED. Will continue IV lasix at this time.  - tachypneic and hypoxic on admit; now satting >95% on 2L O2  - Monitor on telemetry.   - Patient is on CHF pathway.    - Monitor strict Is&Os and daily weights.    - Place on fluid restriction of 1.5 L.   - Continue to stress to patient importance of self efficacy and  on diet for CHF.   - Last BNP reviewed- and noted below   Recent Labs   Lab 11/07/22  1815   BNP 1,014*

## 2022-11-08 NOTE — PLAN OF CARE
[facility] ([place] [address])  Initial Discharge Assessment       Primary Care Provider: Maureen Parnell MD    Admission Diagnosis: SOB (shortness of breath) [R06.02]  Chest pain [R07.9]    Admission Date: 11/7/2022  Expected Discharge Date:     Discharge Barriers Identified: None    Payor: [employer] / Plan: Home Team Therapy 65 / Product Type: Medicare Advantage /     Extended Emergency Contact Information  Primary Emergency Contact: Kesha Marino  Mobile Phone: [phone]  Relation: Daughter  Secondary Emergency Contact: Alecia Saeed  Mobile Phone: [phone]  Relation: Daughter  Preferred language: English   needed? No    Discharge Plan A: Home Health  Discharge Plan B: Home with family      [facility] [place] [id number]  [place] - [address]  [address]  [place] [address]  Phone: [phone] Fax: [phone]      Initial Assessment (most recent)       Adult Discharge Assessment - 11/08/22 1432          Discharge Assessment    Assessment Type Discharge Planning Assessment     Confirmed/corrected address, phone number and insurance Yes     Confirmed Demographics Correct on Facesheet     Source of Information patient;family     When was your last doctors appointment? 09/05/22     Communicated MICHAEL with patient/caregiver Date not available/Unable to determine     Reason For Admission shortness of breath     Lives With alone     Facility Arrived From: home     Do you expect to return to your current living situation? Yes     Do you have help at home or someone to help you manage your care at home? Yes     Who are your caregiver(s) and their phone number(s)? daughter's Kesha [phone] and Alecia [phone]     Prior to hospitilization cognitive status: No Deficits     Current cognitive status: No Deficits     Walking or Climbing Stairs Difficulty other (see comments)   has been more unsteady at home    Dressing/Bathing  Difficulty none     Do you have any problems with: Errands/Grocery     Home Layout Able to live on 1st floor     Equipment Currently Used at Home none     Readmission within 30 days? No     Patient currently being followed by outpatient case management? No     Do you currently have service(s) that help you manage your care at home? No     Do you take prescription medications? Yes     Do you have prescription coverage? Yes     Coverage [employer]     Do you have any problems affording any of your prescribed medications? No     Is the patient taking medications as prescribed? yes     Who is going to help you get home at discharge? daughter's Shawna     How do you get to doctors appointments? family or friend will provide     Are you on dialysis? No     Do you take coumadin? No     Discharge Plan A Home Health     Discharge Plan B Home with family     DME Needed Upon Discharge  other (see comments)   pending PT/OT evals    Discharge Plan discussed with: Patient;Adult children     Discharge Barriers Identified None        Physical Activity    On average, how many days per week do you engage in moderate to strenuous exercise (like a brisk walk)? 0 days     On average, how many minutes do you engage in exercise at this level? 0 min        Financial Resource Strain    How hard is it for you to pay for the very basics like food, housing, medical care, and heating? Not hard at all        Housing Stability    In the last 12 months, was there a time when you were not able to pay the mortgage or rent on time? No     In the last 12 months, how many places have you lived? 1     In the last 12 months, was there a time when you did not have a steady place to sleep or slept in a shelter (including now)? No        Transportation Needs    In the past 12 months, has lack of transportation kept you from medical appointments or from getting medications? No     In the past 12 months, has lack of  transportation kept you from meetings, work, or from getting things needed for daily living? No        Food Insecurity    Within the past 12 months, you worried that your food would run out before you got the money to buy more. Never true     Within the past 12 months, the food you bought just didn't last and you didn't have money to get more. Never true                      SW met with pateint and her adult dtr Lisandra Azevedo [phone] at bedside.  Patient lives alone in a [place] with one step up.  Her dtr reports she has been falling more frequently and that her 2 sisters, Kesha and Alecia, will be helping her at home after discharge.  Patient's PCP is Ricky Chanel at [address] [facility] [address] [phone].  She last saw him about 2 months ago.  Patient's pharmacy is [facility] [phone].  Patient will likely discharge home either with family care of HH at discharge.

## 2022-11-08 NOTE — SUBJECTIVE & OBJECTIVE
Past Medical History:   Diagnosis Date    Anticoagulant long-term use     Aortic valve stenosis     Arthritis     CHF (congestive heart failure)     Diabetes mellitus     Hypercholesteremia     Hypertension     Primary adenocarcinoma of upper lobe of right lung 7/19/2021    Thyroid disease        Past Surgical History:   Procedure Laterality Date    CATARACT EXTRACTION, BILATERAL      EYE SURGERY      FOOT FRACTURE SURGERY Right     HYSTERECTOMY         Review of patient's allergies indicates:  No Known Allergies    No current facility-administered medications on file prior to encounter.     Current Outpatient Medications on File Prior to Encounter   Medication Sig    amiodarone (PACERONE) 200 MG Tab Take 1 tablet (200 mg total) by mouth once daily.    apixaban (ELIQUIS) 5 mg Tab Take 1 tablet (5 mg total) by mouth 2 (two) times daily.    atorvastatin (LIPITOR) 40 MG tablet Take 1 tablet (40 mg total) by mouth once daily.    carvediloL (COREG) 6.25 MG tablet Take 0.5 tablets (3.125 mg total) by mouth 2 (two) times daily with meals.    citalopram (CELEXA) 20 MG tablet Take 20 mg by mouth once daily.    dapagliflozin (FARXIGA) 5 mg Tab tablet Take 1 tablet (5 mg total) by mouth once daily.    furosemide (LASIX) 40 MG tablet Take 1 tablet (40 mg total) by mouth 2 (two) times daily.    levothyroxine (SYNTHROID) 75 MCG tablet Take 75 mcg by mouth before breakfast.     meclizine (ANTIVERT) 25 mg tablet Take 25 mg by mouth once daily.     metformin (GLUCOPHAGE) 500 MG tablet Take 1,000 mg by mouth 2 (two) times daily with meals.     TRESIBA FLEXTOUCH U-100 100 unit/mL (3 mL) InPn Inject 18 Units into the skin every evening.    valsartan (DIOVAN) 160 MG tablet Take 0.5 tablets (80 mg total) by mouth once daily.     Family History       Problem Relation (Age of Onset)    Diabetes Mother, Father, Sister, Brother, Brother, Daughter    Heart attack Brother    Heart disease Father    No Known Problems Maternal Aunt, Maternal  Uncle, Paternal Aunt, Paternal Uncle, Maternal Grandmother, Maternal Grandfather, Paternal Grandmother, Paternal Grandfather    Stroke Son          Tobacco Use    Smoking status: Never    Smokeless tobacco: Never   Substance and Sexual Activity    Alcohol use: No    Drug use: No    Sexual activity: Not on file     Review of Systems   Constitutional:  Negative for activity change, chills, fatigue and fever.   HENT:  Negative for trouble swallowing.    Eyes:  Negative for photophobia and visual disturbance.   Respiratory:  Positive for cough and shortness of breath. Negative for chest tightness and wheezing.    Cardiovascular:  Positive for leg swelling. Negative for chest pain and palpitations.   Gastrointestinal:  Negative for abdominal pain, constipation, diarrhea, nausea and vomiting.   Genitourinary:  Negative for dysuria, frequency and hematuria.   Musculoskeletal:  Negative for back pain, gait problem and neck pain.   Skin:  Negative for rash and wound.   Neurological:  Negative for dizziness, syncope, speech difficulty and light-headedness.   Psychiatric/Behavioral:  Negative for agitation and confusion. The patient is not nervous/anxious.    Objective:     Vital Signs (Most Recent):  Temp: 97.8 °F (36.6 °C) (11/07/22 2135)  Pulse: 61 (11/07/22 2135)  Resp: 18 (11/07/22 2135)  BP: 120/63 (11/07/22 2135)  SpO2: 97 % (11/07/22 2135) Vital Signs (24h Range):  Temp:  [97.8 °F (36.6 °C)-98.3 °F (36.8 °C)] 97.8 °F (36.6 °C)  Pulse:  [61] 61  Resp:  [18-24] 18  SpO2:  [92 %-97 %] 97 %  BP: (120-153)/(61-76) 120/63     Weight: 65.6 kg (144 lb 10 oz)  Body mass index is 29.21 kg/m².    Physical Exam  Vitals and nursing note reviewed.   Constitutional:       General: She is not in acute distress.     Appearance: She is well-developed.   HENT:      Head: Normocephalic and atraumatic.      Mouth/Throat:      Pharynx: No oropharyngeal exudate.   Eyes:      Conjunctiva/sclera: Conjunctivae normal.      Pupils: Pupils are  equal, round, and reactive to light.   Cardiovascular:      Rate and Rhythm: Normal rate and regular rhythm.      Heart sounds: Murmur heard.   Pulmonary:      Effort: Pulmonary effort is normal. No respiratory distress.      Breath sounds: Rales present. No wheezing.      Comments: On 2L NC. Rales throughout bilateral lung fields  Abdominal:      General: Bowel sounds are normal. There is no distension.      Palpations: Abdomen is soft.      Tenderness: There is no abdominal tenderness.   Musculoskeletal:         General: No tenderness. Normal range of motion.      Cervical back: Normal range of motion and neck supple.      Right lower leg: Edema (2+) present.      Left lower leg: Edema (1+) present.   Lymphadenopathy:      Cervical: No cervical adenopathy.   Skin:     General: Skin is warm and dry.      Capillary Refill: Capillary refill takes less than 2 seconds.      Findings: No rash.   Neurological:      Mental Status: She is alert and oriented to person, place, and time.      Cranial Nerves: No cranial nerve deficit.      Sensory: No sensory deficit.      Coordination: Coordination normal.   Psychiatric:         Behavior: Behavior normal.         Thought Content: Thought content normal.         Judgment: Judgment normal.         CRANIAL NERVES     CN III, IV, VI   Pupils are equal, round, and reactive to light.     Significant Labs: All pertinent labs within the past 24 hours have been reviewed.  CBC:   Recent Labs   Lab 11/07/22 1815   WBC 6.17   HGB 12.7   HCT 40.0        CMP:   Recent Labs   Lab 11/07/22 1815      K 4.7      CO2 27   *   BUN 18   CREATININE 1.0   CALCIUM 8.7   PROT 6.4   ALBUMIN 2.9*   BILITOT 0.5   ALKPHOS 144*   *   *   ANIONGAP 11     Cardiac Markers:   Recent Labs   Lab 11/07/22  1815   BNP 1,014*     Troponin:   Recent Labs   Lab 11/07/22  1815 11/07/22  2347   TROPONINI 0.058* 0.073*       Significant Imaging: I have reviewed all pertinent  imaging results/findings within the past 24 hours.  Imaging Results              X-Ray Chest PA And Lateral (Final result)  Result time 11/07/22 22:27:27      Final result by Cristobal Valle MD (11/07/22 22:27:27)                   Impression:      Cardiomegaly with pulmonary vascular congestion and bilateral edema.    Area of masslike consolidation in the right upper lobe, new from prior chest x-ray 06/29/2021, corresponding to finding seen on CT chest 08/05/2022.    Blunting of the right CP angle and posterior sulcus suggesting small effusion.      Electronically signed by: Cristobal Valle MD  Date:    11/07/2022  Time:    22:27               Narrative:    EXAMINATION:  XR CHEST PA AND LATERAL    CLINICAL HISTORY:  Shortness of breath    TECHNIQUE:  PA and lateral views of the chest were performed.    COMPARISON:  Chest x-ray 06/29/2021.  CT chest 05/06/2022.    FINDINGS:  Cardiomegaly with pulmonary vascular congestion and bilateral edema.    Area of masslike consolidation in the right upper lobe, new from prior chest x-ray 06/29/2021, corresponding to finding seen on CT chest 08/05/2022.    Blunting of the right CP angle and posterior sulcus suggesting small effusion.  No left effusion identified.  No pneumothorax.    Regional osseous structures are similar to prior.

## 2022-11-08 NOTE — H&P
[facility] ([address] [facility]  History & Physical    Patient Name: Stacia Perry  MRN: [id number]  Patient Class: IP- Inpatient  Admission Date: 11/7/2022  Attending Physician: Jaycob Chapman MD   Primary Care Provider: Maureen Parnell MD         Patient information was obtained from patient, ER records and daughter.     Subjective:     Principal Problem:Acute on chronic heart failure    Chief Complaint:   Chief Complaint   Patient presents with    Shortness of Breath     Edema, hx chf,, placed on 2l nc in triage        HPI: Stacia Perry is a 89 yo F with PMHx of combined systolic and diastolic HF, AV stenosis, A fib (on eliquis), DM who presented to ED with worsening shortness of breath for the past 3 days. Admits productive cough and  associated BLE swelling for the past 2 weeks. Daughter at bedside and reports that patient has never had an acute HF exacerbation in the past. Patient follows with cardiologist Dr. Shabazz and has been compliant with her home meds (except takes coreg 6.25 qhs rather than 3.125 BID). Denies fever, chills, chest pain, abdominal pain, n/v/d.    In ED: Initially tachypneic and hypoxic satting 92%; placed on 2L O2 with SpO2>95%. CBC unremarkable. . Transaminitis with , , Alk phos 144. BNP 1,014. Troponin 0.058. EKG sinus lorena with 1st degree AV block. Lactate 2.0. Cardiomegaly with pulmonary vascular congestion and bilateral edema, small R sided effusion, and known R upper lobe mass. Given IV lasix 80 x1.      Past Medical History:   Diagnosis Date    Anticoagulant long-term use     Aortic valve stenosis     Arthritis     CHF (congestive heart failure)     Diabetes mellitus     Hypercholesteremia     Hypertension     Primary adenocarcinoma of upper lobe of right lung 7/19/2021    Thyroid disease        Past Surgical History:   Procedure Laterality Date    CATARACT EXTRACTION, BILATERAL      EYE SURGERY      FOOT FRACTURE  SURGERY Right     HYSTERECTOMY         Review of patient's allergies indicates:  No Known Allergies    No current facility-administered medications on file prior to encounter.     Current Outpatient Medications on File Prior to Encounter   Medication Sig    amiodarone (PACERONE) 200 MG Tab Take 1 tablet (200 mg total) by mouth once daily.    apixaban (ELIQUIS) 5 mg Tab Take 1 tablet (5 mg total) by mouth 2 (two) times daily.    atorvastatin (LIPITOR) 40 MG tablet Take 1 tablet (40 mg total) by mouth once daily.    carvediloL (COREG) 6.25 MG tablet Take 0.5 tablets (3.125 mg total) by mouth 2 (two) times daily with meals.    citalopram (CELEXA) 20 MG tablet Take 20 mg by mouth once daily.    dapagliflozin (FARXIGA) 5 mg Tab tablet Take 1 tablet (5 mg total) by mouth once daily.    furosemide (LASIX) 40 MG tablet Take 1 tablet (40 mg total) by mouth 2 (two) times daily.    levothyroxine (SYNTHROID) 75 MCG tablet Take 75 mcg by mouth before breakfast.     meclizine (ANTIVERT) 25 mg tablet Take 25 mg by mouth once daily.     metformin (GLUCOPHAGE) 500 MG tablet Take 1,000 mg by mouth 2 (two) times daily with meals.     TRESIBA FLEXTOUCH U-100 100 unit/mL (3 mL) InPn Inject 18 Units into the skin every evening.    valsartan (DIOVAN) 160 MG tablet Take 0.5 tablets (80 mg total) by mouth once daily.     Family History       Problem Relation (Age of Onset)    Diabetes Mother, Father, Sister, Brother, Brother, Daughter    Heart attack Brother    Heart disease Father    No Known Problems Maternal Aunt, Maternal Uncle, Paternal Aunt, Paternal Uncle, Maternal Grandmother, Maternal Grandfather, Paternal Grandmother, Paternal Grandfather    Stroke Son          Tobacco Use    Smoking status: Never    Smokeless tobacco: Never   Substance and Sexual Activity    Alcohol use: No    Drug use: No    Sexual activity: Not on file     Review of Systems   Constitutional:  Negative for activity change, chills, fatigue and  fever.   HENT:  Negative for trouble swallowing.    Eyes:  Negative for photophobia and visual disturbance.   Respiratory:  Positive for cough and shortness of breath. Negative for chest tightness and wheezing.    Cardiovascular:  Positive for leg swelling. Negative for chest pain and palpitations.   Gastrointestinal:  Negative for abdominal pain, constipation, diarrhea, nausea and vomiting.   Genitourinary:  Negative for dysuria, frequency and hematuria.   Musculoskeletal:  Negative for back pain, gait problem and neck pain.   Skin:  Negative for rash and wound.   Neurological:  Negative for dizziness, syncope, speech difficulty and light-headedness.   Psychiatric/Behavioral:  Negative for agitation and confusion. The patient is not nervous/anxious.    Objective:     Vital Signs (Most Recent):  Temp: 97.8 °F (36.6 °C) (11/07/22 2135)  Pulse: 61 (11/07/22 2135)  Resp: 18 (11/07/22 2135)  BP: 120/63 (11/07/22 2135)  SpO2: 97 % (11/07/22 2135) Vital Signs (24h Range):  Temp:  [97.8 °F (36.6 °C)-98.3 °F (36.8 °C)] 97.8 °F (36.6 °C)  Pulse:  [61] 61  Resp:  [18-24] 18  SpO2:  [92 %-97 %] 97 %  BP: (120-153)/(61-76) 120/63     Weight: 65.6 kg (144 lb 10 oz)  Body mass index is 29.21 kg/m².    Physical Exam  Vitals and nursing note reviewed.   Constitutional:       General: She is not in acute distress.     Appearance: She is well-developed.   HENT:      Head: Normocephalic and atraumatic.      Mouth/Throat:      Pharynx: No oropharyngeal exudate.   Eyes:      Conjunctiva/sclera: Conjunctivae normal.      Pupils: Pupils are equal, round, and reactive to light.   Cardiovascular:      Rate and Rhythm: Normal rate and regular rhythm.      Heart sounds: Murmur heard.   Pulmonary:      Effort: Pulmonary effort is normal. No respiratory distress.      Breath sounds: Rales present. No wheezing.      Comments: On 2L NC. Rales throughout bilateral lung fields  Abdominal:      General: Bowel sounds are normal. There is no  distension.      Palpations: Abdomen is soft.      Tenderness: There is no abdominal tenderness.   Musculoskeletal:         General: No tenderness. Normal range of motion.      Cervical back: Normal range of motion and neck supple.      Right lower leg: Edema (2+) present.      Left lower leg: Edema (1+) present.   Lymphadenopathy:      Cervical: No cervical adenopathy.   Skin:     General: Skin is warm and dry.      Capillary Refill: Capillary refill takes less than 2 seconds.      Findings: No rash.   Neurological:      Mental Status: She is alert and oriented to person, place, and time.      Cranial Nerves: No cranial nerve deficit.      Sensory: No sensory deficit.      Coordination: Coordination normal.   Psychiatric:         Behavior: Behavior normal.         Thought Content: Thought content normal.         Judgment: Judgment normal.         CRANIAL NERVES     CN III, IV, VI   Pupils are equal, round, and reactive to light.     Significant Labs: All pertinent labs within the past 24 hours have been reviewed.  CBC:   Recent Labs   Lab 11/07/22 1815   WBC 6.17   HGB 12.7   HCT 40.0        CMP:   Recent Labs   Lab 11/07/22  1815      K 4.7      CO2 27   *   BUN 18   CREATININE 1.0   CALCIUM 8.7   PROT 6.4   ALBUMIN 2.9*   BILITOT 0.5   ALKPHOS 144*   *   *   ANIONGAP 11     Cardiac Markers:   Recent Labs   Lab 11/07/22  1815   BNP 1,014*     Troponin:   Recent Labs   Lab 11/07/22  1815 11/07/22  2347   TROPONINI 0.058* 0.073*       Significant Imaging: I have reviewed all pertinent imaging results/findings within the past 24 hours.  Imaging Results              X-Ray Chest PA And Lateral (Final result)  Result time 11/07/22 22:27:27      Final result by Cristobal Valle MD (11/07/22 22:27:27)                   Impression:      Cardiomegaly with pulmonary vascular congestion and bilateral edema.    Area of masslike consolidation in the right upper lobe, new from prior  chest x-ray 06/29/2021, corresponding to finding seen on CT chest 08/05/2022.    Blunting of the right CP angle and posterior sulcus suggesting small effusion.      Electronically signed by: Cristobal Valle MD  Date:    11/07/2022  Time:    22:27               Narrative:    EXAMINATION:  XR CHEST PA AND LATERAL    CLINICAL HISTORY:  Shortness of breath    TECHNIQUE:  PA and lateral views of the chest were performed.    COMPARISON:  Chest x-ray 06/29/2021.  CT chest 05/06/2022.    FINDINGS:  Cardiomegaly with pulmonary vascular congestion and bilateral edema.    Area of masslike consolidation in the right upper lobe, new from prior chest x-ray 06/29/2021, corresponding to finding seen on CT chest 08/05/2022.    Blunting of the right CP angle and posterior sulcus suggesting small effusion.  No left effusion identified.  No pneumothorax.    Regional osseous structures are similar to prior.                                    Assessment/Plan:     * Acute on chronic heart failure  Shortness of Breath  - Patient is identified as having Combined Systolic and Diastolic heart failure that is Acute on Chronic.   - CHF is currently uncontrolled due to volume overload due to: Continued edema of extremities and Hepatic congestion/ascites and Pulmonary edema/pleural effusion on CXR.   - Latest ECHO performed and demonstrates- Results for orders placed during the hospital encounter of 07/14/22    Echo Saline Bubble? No    Interpretation Summary  · The left ventricle is normal in size with mild eccentric hypertrophy and low normal systolic function.  · The estimated ejection fraction is 55%.  · Grade III left ventricular diastolic dysfunction.  · Normal right ventricular size with mildly reduced right ventricular systolic function.  · Severe left atrial enlargement.  · Mild right atrial enlargement.  · There is severe aortic valve stenosis.  · Aortic valve area is 0.55 cm2; peak velocity is 4.32 m/s; mean gradient is 53 mmHg.  ·  Mild aortic regurgitation.  · Moderate-to-severe mitral regurgitation.  · Moderate tricuspid regurgitation.  · Normal central venous pressure (3 mmHg).  · The estimated PA systolic pressure is 51 mmHg.  · There is mild-moderate pulmonary hypertension.  .   - Continue Beta Blocker ACE/ARB Furosemide and monitor clinical status closely.   - given IV lasix 80mg x1 in ED. Will continue IV lasix at this time.  - tachypneic and hypoxic on admit; now satting >95% on 2L O2  - Monitor on telemetry.   - Patient is on CHF pathway.    - Monitor strict Is&Os and daily weights.    - Place on fluid restriction of 1.5 L.   - Continue to stress to patient importance of self efficacy and  on diet for CHF.   - Last BNP reviewed- and noted below   Recent Labs   Lab 11/07/22  1815   BNP 1,014*       Transaminitis  - ,  on admit  - likely 2/2 hepatic congestion from CHF exacerbation  - will likely improve with diuresis  - monitor with daily cmp    Elevated troponin  - trop 0.058 >> 0.073; continue to trend  - likely 2/2 CHF exacerbation  - denies chest pain  - EKG without ischemic changes    Primary adenocarcinoma of upper lobe of right lung  - biopsy 06/2021 with adenocarcinoma  - follows with radiation oncology    Atrial fibrillation  Patient with Persistent (7 days or more) atrial fibrillation which is controlled currently with Beta Blocker and Amiodarone. Patient is currently in sinus rhythm.[id number] Score: 4.  Anticoagulation indicated. Anticoagulation done with eliquis.    HLD (hyperlipidemia)  Atherosclerosis of aorta  - continue home statin    Type 2 diabetes mellitus without complication, without long-term current use of insulin  Patient's FSGs are controlled on current medication regimen.  Last A1c reviewed-   Lab Results   Component Value Date    HGBA1C 7.6 (H) 11/07/2022     Most recent fingerstick glucose reviewed-   Recent Labs   Lab 11/08/22  0101   POCTGLUCOSE 118*     Current correctional scale   Low  Maintain anti-hyperglycemic dose as follows-   Antihyperglycemics (From admission, onward)    Start     Stop Route Frequency Ordered    11/09/22 2100  insulin detemir U-100 pen 10 Units         -- SubQ Nightly 11/08/22 0135    11/08/22 0715  insulin aspart U-100 pen 3 Units         -- SubQ 3 times daily with meals 11/08/22 0135 11/07/22 2356  insulin aspart U-100 pen 0-5 Units         -- SubQ Before meals & nightly PRN 11/07/22 2259        Hold Oral hypoglycemics while patient is in the hospital.    Essential hypertension  - controlled on admit  - continue home valsartan and coreg      VTE Risk Mitigation (From admission, onward)         Ordered     apixaban tablet 5 mg  2 times daily         11/07/22 2258     Reason for No Pharmacological VTE Prophylaxis  Once        Question:  Reasons:  Answer:  Already adequately anticoagulated on oral Anticoagulants    11/07/22 2259     IP VTE HIGH RISK PATIENT  Once         11/07/22 2259                   Vi Rios PA-C  Department of Hospital Medicine   [facility] - Intensive Care ([address])

## 2022-11-08 NOTE — SUBJECTIVE & OBJECTIVE
Interval History: Patient's two daughters are in the room with her.    Review of Systems   Constitutional:  Negative for chills and fever.   Gastrointestinal:  Negative for nausea and vomiting.   Neurological:  Negative for seizures and syncope.   Objective:     Vital Signs (Most Recent):  Temp: 98.2 °F (36.8 °C) (11/08/22 1139)  Pulse: (!) 58 (11/08/22 1139)  Resp: 19 (11/08/22 1139)  BP: (!) 91/54 (11/08/22 1139)  SpO2: (!) 94 % (11/08/22 1139)   Vital Signs (24h Range):  Temp:  [97.4 °F (36.3 °C)-98.3 °F (36.8 °C)] 98.2 °F (36.8 °C)  Pulse:  [52-61] 58  Resp:  [17-24] 19  SpO2:  [92 %-97 %] 94 %  BP: ()/(54-76) 91/54     Weight: 65.6 kg (144 lb 10 oz)  Body mass index is 29.21 kg/m².    Intake/Output Summary (Last 24 hours) at 11/8/2022 1229  Last data filed at 11/8/2022 0550  Gross per 24 hour   Intake --   Output 300 ml   Net -300 ml      Physical Exam  Vitals and nursing note reviewed.   Constitutional:       General: She is not in acute distress.     Appearance: She is well-developed. She is not toxic-appearing or diaphoretic.   Pulmonary:      Effort: Pulmonary effort is normal. No respiratory distress.   Neurological:      Mental Status: She is alert. Mental status is at baseline.      Motor: No tremor or seizure activity.   Psychiatric:         Attention and Perception: Attention normal.         Mood and Affect: Mood and affect normal.         Behavior: Behavior is cooperative.       Significant Labs: All pertinent labs within the past 24 hours have been reviewed.    Significant Imaging: I have reviewed all pertinent imaging results/findings within the past 24 hours.  X-Ray Chest PA And Lateral 11/07/22: FINDINGS:   Cardiomegaly with pulmonary vascular congestion and bilateral edema.   Area of masslike consolidation in the right upper lobe, new from prior chest x-ray 06/29/2021, corresponding to finding seen on CT chest 08/05/2022.   Blunting of the right CP angle and posterior sulcus suggesting  small effusion.  No left effusion identified.  No pneumothorax.   Regional osseous structures are similar to prior.   Impression:  Cardiomegaly with pulmonary vascular congestion and bilateral edema.   Area of masslike consolidation in the right upper lobe, new from prior chest x-ray 06/29/2021, corresponding to finding seen on CT chest 08/05/2022.   Blunting of the right CP angle and posterior sulcus suggesting small effusion.

## 2022-11-08 NOTE — ASSESSMENT & PLAN NOTE
Hold home metformin and dapagliflozin. She takes Tresiba 18 units HS. Giving insulin detemir 10 units HS, insulin aspart 3 units TID with meals. Insulin aspart sliding scale.

## 2022-11-08 NOTE — ED TRIAGE NOTES
Stacia Perry, a 88 y.o. female presents to the ED w/ complaint of SOB. Pt stated she's been progressively getting worse, denies any home O2 but is on 2L NC    Triage note:  Chief Complaint   Patient presents with    Shortness of Breath     Edema, hx chf,, placed on 2l nc in triage     Review of patient's allergies indicates:  No Known Allergies  Past Medical History:   Diagnosis Date    Anticoagulant long-term use     Aortic valve stenosis     Arthritis     CHF (congestive heart failure)     Diabetes mellitus     Hypercholesteremia     Hypertension     Primary adenocarcinoma of upper lobe of right lung 7/19/2021    Thyroid disease

## 2022-11-08 NOTE — ED PROVIDER NOTES
Source of History:  Patient and Medical Record, without language barrier or      Chief complaint:  Shortness of Breath (Edema, hx chf,, placed on 2l nc in triage)      HPI:  Stacia Perry is a 88 y.o. female with history of CHF and AV stenosis presenting with chief complaint of shortness of breath.  Patient reports a few days of progressively worsening shortness of breath that is worse on exertion.  Patient's daughter also states that she has been retaining fluid in her legs for the past 2 weeks.  Patient was paced on 2 L nasal cannula in triage for hypoxia.  Patient denies chest pain, abdominal pain, vomiting, diarrhea.    This is the extent to the patients complaints today here in the emergency department.    ROS: As per HPI and below:  Review of Systems   Constitutional:  Negative for chills and fever.   HENT:  Negative for congestion.    Eyes:  Negative for double vision.   Respiratory:  Positive for shortness of breath. Negative for cough.    Cardiovascular:  Positive for leg swelling. Negative for chest pain.   Gastrointestinal:  Negative for abdominal pain and nausea.   Genitourinary:  Negative for dysuria and hematuria.   Musculoskeletal:  Negative for myalgias.   Skin:  Negative for rash.   Neurological:  Negative for dizziness.     Review of patient's allergies indicates:  No Known Allergies    PMH:  As per HPI and below:  Past Medical History:   Diagnosis Date    Anticoagulant long-term use     Aortic valve stenosis     Arthritis     CHF (congestive heart failure)     Diabetes mellitus     Hypercholesteremia     Hypertension     Primary adenocarcinoma of upper lobe of right lung 7/19/2021    Thyroid disease      Past Surgical History:   Procedure Laterality Date    CATARACT EXTRACTION, BILATERAL      EYE SURGERY      FOOT FRACTURE SURGERY Right     HYSTERECTOMY         Social History     Tobacco Use    Smoking status: Never    Smokeless tobacco: Never   Substance Use Topics    Alcohol  "use: No    Drug use: No       Vitals:    /63 (BP Location: Right arm, Patient Position: Sitting)   Pulse 61   Temp 97.8 °F (36.6 °C) (Oral)   Resp 18   Ht 4' 11" (1.499 m)   Wt 65.6 kg (144 lb 10 oz)   SpO2 97%   Breastfeeding No   BMI 29.21 kg/m²     Physical Exam  Vitals and nursing note reviewed.   Constitutional:       General: She is not in acute distress.     Appearance: She is not toxic-appearing.   HENT:      Head: Normocephalic and atraumatic.      Nose: Nose normal.      Mouth/Throat:      Mouth: Mucous membranes are moist.   Eyes:      General: No scleral icterus.     Conjunctiva/sclera: Conjunctivae normal.   Cardiovascular:      Rate and Rhythm: Normal rate and regular rhythm.      Pulses: Normal pulses.      Heart sounds: Normal heart sounds. No murmur heard.    No friction rub. No gallop.   Pulmonary:      Effort: Pulmonary effort is normal. No respiratory distress.      Breath sounds: No stridor. Examination of the right-middle field reveals rales. Examination of the left-middle field reveals rales. Examination of the right-lower field reveals rales. Examination of the left-lower field reveals rales. Rales present. No wheezing or rhonchi.   Abdominal:      General: Abdomen is flat. There is no distension.      Palpations: Abdomen is soft.      Tenderness: There is no abdominal tenderness. There is no guarding.   Musculoskeletal:         General: No swelling or deformity.      Cervical back: Normal range of motion and neck supple.      Right lower leg: Edema (Trace) present.      Left lower leg: Edema (trace) present.   Skin:     General: Skin is warm and dry.      Capillary Refill: Capillary refill takes less than 2 seconds.      Coloration: Skin is not jaundiced.      Findings: No bruising or rash.   Neurological:      Mental Status: She is alert and oriented to person, place, and time. Mental status is at baseline.   Psychiatric:         Mood and Affect: Mood normal.         Behavior: " Behavior normal.       Procedures    Laboratory Studies:  Labs that have been ordered have been independently reviewed and interpreted by myself.  Labs Reviewed   COMPREHENSIVE METABOLIC PANEL - Abnormal; Notable for the following components:       Result Value    Glucose 191 (*)     Albumin 2.9 (*)     Alkaline Phosphatase 144 (*)      (*)      (*)     eGFR 54.2 (*)     All other components within normal limits    Narrative:     Release to patient->Immediate   CBC W/ AUTO DIFFERENTIAL - Abnormal; Notable for the following components:    MCHC 31.8 (*)     RDW 16.2 (*)     All other components within normal limits    Narrative:     Release to patient->Immediate   B-TYPE NATRIURETIC PEPTIDE - Abnormal; Notable for the following components:    BNP 1,014 (*)     All other components within normal limits    Narrative:     Release to patient->Immediate   TROPONIN I - Abnormal; Notable for the following components:    Troponin I 0.058 (*)     All other components within normal limits    Narrative:     Release to patient->Immediate   LACTIC ACID, PLASMA    Narrative:     Release to patient->Immediate   MAGNESIUM    Narrative:     Release to patient->Immediate   PROTIME-INR    Narrative:     Release to patient->Immediate   SARS-COV2 (COVID) WITH FLU/RSV BY PCR   HIV 1 / 2 ANTIBODY    Narrative:     Release to patient->Immediate   HEPATITIS C ANTIBODY    Narrative:     Release to patient->Immediate   HEMOGLOBIN A1C       Imaging Results              X-Ray Chest PA And Lateral (Final result)  Result time 11/07/22 22:27:27      Final result by Cristobal Valle MD (11/07/22 22:27:27)                   Impression:      Cardiomegaly with pulmonary vascular congestion and bilateral edema.    Area of masslike consolidation in the right upper lobe, new from prior chest x-ray 06/29/2021, corresponding to finding seen on CT chest 08/05/2022.    Blunting of the right CP angle and posterior sulcus suggesting small  effusion.      Electronically signed by: Cristobal Valle MD  Date:    11/07/2022  Time:    22:27               Narrative:    EXAMINATION:  XR CHEST PA AND LATERAL    CLINICAL HISTORY:  Shortness of breath    TECHNIQUE:  PA and lateral views of the chest were performed.    COMPARISON:  Chest x-ray 06/29/2021.  CT chest 05/06/2022.    FINDINGS:  Cardiomegaly with pulmonary vascular congestion and bilateral edema.    Area of masslike consolidation in the right upper lobe, new from prior chest x-ray 06/29/2021, corresponding to finding seen on CT chest 08/05/2022.    Blunting of the right CP angle and posterior sulcus suggesting small effusion.  No left effusion identified.  No pneumothorax.    Regional osseous structures are similar to prior.                                      EKG (independently interpreted by me):  Rate of 58, normal sinus rhythm, first-degree AV block, no ST elevations or depressions    X-rays (independently interpreted by me):  Bilateral interstitial edema, no effusions or consolidations    I decided to obtain the patient's medical records.      Medications   amiodarone tablet 200 mg (has no administration in time range)   apixaban tablet 5 mg (has no administration in time range)   atorvastatin tablet 40 mg (has no administration in time range)   carvediloL tablet 3.125 mg (has no administration in time range)   citalopram tablet 20 mg (has no administration in time range)   levothyroxine tablet 75 mcg (has no administration in time range)   meclizine tablet 25 mg (has no administration in time range)   valsartan tablet 80 mg (has no administration in time range)   sodium chloride 0.9% flush 5 mL (has no administration in time range)   albuterol-ipratropium 2.5 mg-0.5 mg/3 mL nebulizer solution 3 mL (has no administration in time range)   melatonin tablet 6 mg (has no administration in time range)   ondansetron disintegrating tablet 8 mg (has no administration in time range)   prochlorperazine  injection Soln 5 mg (has no administration in time range)   polyethylene glycol packet 17 g (has no administration in time range)   bisacodyL suppository 10 mg (has no administration in time range)   simethicone chewable tablet 80 mg (has no administration in time range)   aluminum-magnesium hydroxide-simethicone 200-200-20 mg/5 mL suspension 30 mL (has no administration in time range)   acetaminophen tablet 650 mg (has no administration in time range)   acetaminophen tablet 1,000 mg (has no administration in time range)   naloxone 0.4 mg/mL injection 0.02 mg (has no administration in time range)   glucose chewable tablet 16 g (has no administration in time range)   glucose chewable tablet 24 g (has no administration in time range)   glucagon (human recombinant) injection 1 mg (has no administration in time range)   dextrose 10% bolus 125 mL (has no administration in time range)   dextrose 10% bolus 250 mL (has no administration in time range)   insulin aspart U-100 pen 0-5 Units (has no administration in time range)   furosemide injection 40 mg (has no administration in time range)   furosemide injection 80 mg (80 mg Intravenous Given 11/7/22 2230)       MDM:    88 y.o. female with shortness of breath, hypoxia, lower extremity edema    Differential Dx:  Differential includes but is not limited to CHF exacerbation, ACS, pneumonia    ED Management:  CHF workup started.  Patient has significantly elevated BNP in the 1 thousands and mild troponin elevation.  Patient also has lower extremity edema and pulmonary edema on CXR.  Will give patient IV Lasix and admit to hospital medicine for further IV diuresis and treatment of her heart failure                   Diagnostic Impression:    1. Congestive heart failure, unspecified HF chronicity, unspecified heart failure type    2. SOB (shortness of breath)    3. Chest pain    4. Hypoxia         ED Disposition Condition    Admit Stable                  Ford Santo  MD  Resident  11/08/22 0033

## 2022-11-08 NOTE — PLAN OF CARE
Problem: Adult Inpatient Plan of Care  Goal: Plan of Care Review  Outcome: Ongoing, Progressing     Problem: Infection  Goal: Absence of Infection Signs and Symptoms  Outcome: Ongoing, Progressing     Problem: Fall Injury Risk  Goal: Absence of Fall and Fall-Related Injury  Outcome: Ongoing, Progressing   Plan of care reviewed with patient. Srict I/O's in place, O2 2l NC. Cardiac monitoring without ectopy. Will continue to monitor closely.

## 2022-11-08 NOTE — HOSPITAL COURSE
She was put on 40 mg of intravenous furosemide twice daily. Her valsartan was held to prevent hypotension but blood pressures increased as she improved. She was able to walk around on room air and felt ready to go home on 11/9/2022.

## 2022-11-08 NOTE — PLAN OF CARE
Problem: Adult Inpatient Plan of Care  Goal: Plan of Care Review  Outcome: Ongoing, Progressing     Problem: Infection  Goal: Absence of Infection Signs and Symptoms  Outcome: Ongoing, Progressing     Problem: Diabetes Comorbidity  Goal: Blood Glucose Level Within Targeted Range  Outcome: Ongoing, Progressing     Problem: Fluid Imbalance (Pneumonia)  Goal: Fluid Balance  Outcome: Ongoing, Progressing     Problem: Infection (Pneumonia)  Goal: Resolution of Infection Signs and Symptoms  Outcome: Ongoing, Progressing     Problem: Fall Injury Risk  Goal: Absence of Fall and Fall-Related Injury  Outcome: Ongoing, Progressing

## 2022-11-08 NOTE — ASSESSMENT & PLAN NOTE
- trop 0.058 >> 0.073; continue to trend  - likely 2/2 CHF exacerbation  - denies chest pain  - EKG without ischemic changes

## 2022-11-08 NOTE — ASSESSMENT & PLAN NOTE
- ,  on admit  - likely 2/2 hepatic congestion from CHF exacerbation  - will likely improve with diuresis  - monitor with daily cmp

## 2022-11-08 NOTE — ASSESSMENT & PLAN NOTE
Patient with Persistent (7 days or more) atrial fibrillation which is controlled currently with Beta Blocker and Amiodarone. Patient is currently in sinus rhythm.[id number] Score: 4.  Anticoagulation indicated. Anticoagulation done with eliquis.

## 2022-11-08 NOTE — CONSULTS
Nutrition-Related Cardiac Education      Time Spent: 15 min    Learners: Pt and Family    Nutrition Education with handouts: Educated pt on fluid and salt restriction diet for people with HF. Pt verbalized understanding. Emphasized the importance of diet adherence. Pt with no additional questions. No other needs identified. Left all education material with pt at bedside.    Comments: Pt reports good appetite. Tolerating % of meals. Pt follows diabetic diet at home. Usually 3 full meals per day. Wt stable. Denies any significant wt changes. 2+ bilateral ankle edema per chart. Pt appears nourished, NFPE not warranted. No indicators of malnutrition.    Barriers to Learning: No    Follow up: Yes    Please consult as needed.  Thank you!    Oh COBIAN

## 2022-11-08 NOTE — HPI
Stacia Perry is an 88 year old white woman with hypertension, hyperlipidemia, diabetes mellitus type 2, aortic atherosclerosis, heart failure with reduced ejection fraction and diastolic dysfunction, severe aortic stenosis, atrial fibrillation (anticoagulated on apixaban), right upper lobe lung adenocarcinoma diagnosed on 6/29/2021. She lives in [facility]. She is . Her primary care physician is Dr. Maureen Parnell. Her cardiologist is Dr. Van Shabazz.    She presented to [facility] Emergency Department on 11/7/2022 with worsening shortness of breath over the past 3 days with productive cough and bilateral lower extremity swelling for the past 2 weeks. Her daughter reported that she had a heart failure exacerbation in the past, although chart review shows that she was hospitalized at [facility] from 5/6/2021 to 5/12/2021 for cardiogenic shock during which she was treated with furosemide drip. She is prescribed furosemide 40 mg twice daily, carvedilol 3.125 mg twice daily (but takes it 6.25 mg nightly), and valsartan 160 mg daily for her heart failure.   In the emergency department, she had tachypnea, oxygen saturation of 92% (improved on 2 liters/minute of supplemental oxygen), elevated AST (100 U/L), ALT (126 U/L), alkaline phosphatase (144 U/L), BNP (1014 pg/mL), troponin (0.058 ng/mL). EKG showed sinus bradycardia with 1st degree AV block. Chest X-ray showed pulmonary vascular congestion and pulmonary edema and small right pleural effusion. She was given 80 mg of intravenous furosemide. She was admitted to Hospital Medicine Team S.

## 2022-11-08 NOTE — PROGRESS NOTES
[facility] - Intensive Care ([address] Medicine  Progress Note    Patient Name: Stacia Perry  MRN: [id number]  Patient Class: IP- Inpatient   Admission Date: 11/7/2022  Length of Stay: 1 days  Attending Physician: Jaycob Chapman MD  Primary Care Provider: Maureen Parnell MD        Subjective:     Principal Problem:Acute on chronic HFrEF (heart failure with reduced ejection fraction)        HPI:  Stacia Perry is an 88 year old white woman with hypertension, hyperlipidemia, diabetes mellitus type 2, aortic atherosclerosis, heart failure with reduced ejection fraction and diastolic dysfunction, severe aortic stenosis, atrial fibrillation (anticoagulated on apixaban), right upper lobe lung adenocarcinoma diagnosed on 6/29/2021. She lives in [facility]. She is . Her primary care physician is Dr. Maureen Parnell. Her cardiologist is Dr. Van Shabazz.    She presented to [facility] Emergency Department on 11/7/2022 with worsening shortness of breath over the past 3 days with productive cough and bilateral lower extremity swelling for the past 2 weeks. Her daughter reported that she had a heart failure exacerbation in the past, although chart review shows that she was hospitalized at [facility] from 5/6/2021 to 5/12/2021 for cardiogenic shock during which she was treated with furosemide drip. She is prescribed furosemide 40 mg twice daily, carvedilol 3.125 mg twice daily (but takes it 6.25 mg nightly), and valsartan 160 mg daily for her heart failure.   In the emergency department, she had tachypnea, oxygen saturation of 92% (improved on 2 liters/minute of supplemental oxygen), elevated AST (100 U/L), ALT (126 U/L), alkaline phosphatase (144 U/L), BNP (1014 pg/mL), troponin (0.058 ng/mL). EKG showed sinus bradycardia with 1st degree AV block. Chest X-ray showed pulmonary vascular congestion and pulmonary edema and small right pleural effusion. She was  given 80 mg of intravenous furosemide. She was admitted to Hospital Medicine Team S.      Overview/Hospital Course:  She was put on 40 mg of intravenous furosemide twice daily.      Interval History: Patient's two daughters are in the room with her.    Review of Systems   Constitutional:  Negative for chills and fever.   Gastrointestinal:  Negative for nausea and vomiting.   Neurological:  Negative for seizures and syncope.   Objective:     Vital Signs (Most Recent):  Temp: 98.2 °F (36.8 °C) (11/08/22 1139)  Pulse: (!) 58 (11/08/22 1139)  Resp: 19 (11/08/22 1139)  BP: (!) 91/54 (11/08/22 1139)  SpO2: (!) 94 % (11/08/22 1139)   Vital Signs (24h Range):  Temp:  [97.4 °F (36.3 °C)-98.3 °F (36.8 °C)] 98.2 °F (36.8 °C)  Pulse:  [52-61] 58  Resp:  [17-24] 19  SpO2:  [92 %-97 %] 94 %  BP: ()/(54-76) 91/54     Weight: 65.6 kg (144 lb 10 oz)  Body mass index is 29.21 kg/m².    Intake/Output Summary (Last 24 hours) at 11/8/2022 1229  Last data filed at 11/8/2022 0550  Gross per 24 hour   Intake --   Output 300 ml   Net -300 ml      Physical Exam  Vitals and nursing note reviewed.   Constitutional:       General: She is not in acute distress.     Appearance: She is well-developed. She is not toxic-appearing or diaphoretic.   Pulmonary:      Effort: Pulmonary effort is normal. No respiratory distress.   Neurological:      Mental Status: She is alert. Mental status is at baseline.      Motor: No tremor or seizure activity.   Psychiatric:         Attention and Perception: Attention normal.         Mood and Affect: Mood and affect normal.         Behavior: Behavior is cooperative.       Significant Labs: All pertinent labs within the past 24 hours have been reviewed.    Significant Imaging: I have reviewed all pertinent imaging results/findings within the past 24 hours.  X-Ray Chest PA And Lateral 11/07/22: FINDINGS:   Cardiomegaly with pulmonary vascular congestion and bilateral edema.   Area of masslike consolidation in  the right upper lobe, new from prior chest x-ray 06/29/2021, corresponding to finding seen on CT chest 08/05/2022.   Blunting of the right CP angle and posterior sulcus suggesting small effusion.  No left effusion identified.  No pneumothorax.   Regional osseous structures are similar to prior.   Impression:  Cardiomegaly with pulmonary vascular congestion and bilateral edema.   Area of masslike consolidation in the right upper lobe, new from prior chest x-ray 06/29/2021, corresponding to finding seen on CT chest 08/05/2022.   Blunting of the right CP angle and posterior sulcus suggesting small effusion.       Assessment/Plan:      * Acute on chronic HFrEF (heart failure with reduced ejection fraction)  Severe aortic stenosis  SOB (shortness of breath)  She takes furosemide 40 mg BID at home. Give IV furosemide 40 mg TID. Continue home carvedilol. Hold home valsartan.    Transaminitis  Hepatic congestion. Improving with diuresis.    Elevated troponin  Due to CHF.    Primary adenocarcinoma of upper lobe of right lung  Follow up with Radiation Oncology.    Atrial fibrillation  Continue home apixaban and amiodarone.    HLD (hyperlipidemia)  Atherosclerosis of aorta  Continue home atorvastatin.    Type 2 diabetes mellitus without complication, without long-term current use of insulin  Hold home metformin and dapagliflozin. She takes Tresiba 18 units HS. Giving insulin detemir 10 units HS, insulin aspart 3 units TID with meals. Insulin aspart sliding scale.    Essential hypertension  See primary problem.      VTE Risk Mitigation (From admission, onward)         Ordered     apixaban tablet 5 mg  2 times daily         11/07/22 2258     Reason for No Pharmacological VTE Prophylaxis  Once        Question:  Reasons:  Answer:  Already adequately anticoagulated on oral Anticoagulants    11/07/22 2259     IP VTE HIGH RISK PATIENT  Once         11/07/22 2259                Discharge Planning   MICHAEL:      Code Status: Full Code    Is the patient medically ready for discharge?:     Reason for patient still in hospital (select all that apply): Patient unstable, Patient trending condition and Treatment                     Jaycob Chapman MD  Department of Hospital Medicine   [facility] - Intensive Care ([place]

## 2022-11-09 VITALS
TEMPERATURE: 98 F | SYSTOLIC BLOOD PRESSURE: 106 MMHG | WEIGHT: 144.19 LBS | HEIGHT: 59 IN | OXYGEN SATURATION: 90 % | RESPIRATION RATE: 17 BRPM | DIASTOLIC BLOOD PRESSURE: 53 MMHG | BODY MASS INDEX: 29.07 KG/M2 | HEART RATE: 52 BPM

## 2022-11-09 PROBLEM — R74.01 TRANSAMINITIS: Status: RESOLVED | Noted: 2022-11-08 | Resolved: 2022-11-09

## 2022-11-09 PROBLEM — R06.02 SOB (SHORTNESS OF BREATH): Status: RESOLVED | Noted: 2022-11-07 | Resolved: 2022-11-09

## 2022-11-09 PROBLEM — I50.23 ACUTE ON CHRONIC HFREF (HEART FAILURE WITH REDUCED EJECTION FRACTION): Status: RESOLVED | Noted: 2022-11-07 | Resolved: 2022-11-09

## 2022-11-09 PROBLEM — R79.89 ELEVATED TROPONIN: Status: RESOLVED | Noted: 2022-11-08 | Resolved: 2022-11-09

## 2022-11-09 LAB
ALBUMIN SERPL BCP-MCNC: 2.7 G/DL (ref 3.5–5.2)
ALP SERPL-CCNC: 122 U/L (ref 55–135)
ALT SERPL W/O P-5'-P-CCNC: 81 U/L (ref 10–44)
ANION GAP SERPL CALC-SCNC: 12 MMOL/L (ref 8–16)
ANISOCYTOSIS BLD QL SMEAR: SLIGHT
AST SERPL-CCNC: 37 U/L (ref 10–40)
BASOPHILS # BLD AUTO: 0.02 K/UL (ref 0–0.2)
BASOPHILS NFR BLD: 0.3 % (ref 0–1.9)
BILIRUB SERPL-MCNC: 0.6 MG/DL (ref 0.1–1)
BUN SERPL-MCNC: 16 MG/DL (ref 8–23)
BURR CELLS BLD QL SMEAR: ABNORMAL
CALCIUM SERPL-MCNC: 8.5 MG/DL (ref 8.7–10.5)
CHLORIDE SERPL-SCNC: 102 MMOL/L (ref 95–110)
CO2 SERPL-SCNC: 27 MMOL/L (ref 23–29)
CREAT SERPL-MCNC: 0.8 MG/DL (ref 0.5–1.4)
DACRYOCYTES BLD QL SMEAR: ABNORMAL
DIFFERENTIAL METHOD: ABNORMAL
EOSINOPHIL # BLD AUTO: 0.1 K/UL (ref 0–0.5)
EOSINOPHIL NFR BLD: 1.7 % (ref 0–8)
ERYTHROCYTE [DISTWIDTH] IN BLOOD BY AUTOMATED COUNT: 15.9 % (ref 11.5–14.5)
EST. GFR  (NO RACE VARIABLE): >60 ML/MIN/1.73 M^2
GLUCOSE SERPL-MCNC: 163 MG/DL (ref 70–110)
HCT VFR BLD AUTO: 39.6 % (ref 37–48.5)
HGB BLD-MCNC: 12.3 G/DL (ref 12–16)
HYPOCHROMIA BLD QL SMEAR: ABNORMAL
IMM GRANULOCYTES # BLD AUTO: 0.02 K/UL (ref 0–0.04)
IMM GRANULOCYTES NFR BLD AUTO: 0.3 % (ref 0–0.5)
LYMPHOCYTES # BLD AUTO: 1.4 K/UL (ref 1–4.8)
LYMPHOCYTES NFR BLD: 23.7 % (ref 18–48)
MAGNESIUM SERPL-MCNC: 2 MG/DL (ref 1.6–2.6)
MCH RBC QN AUTO: 29.6 PG (ref 27–31)
MCHC RBC AUTO-ENTMCNC: 31.1 G/DL (ref 32–36)
MCV RBC AUTO: 95 FL (ref 82–98)
MONOCYTES # BLD AUTO: 0.6 K/UL (ref 0.3–1)
MONOCYTES NFR BLD: 10.9 % (ref 4–15)
NEUTROPHILS # BLD AUTO: 3.7 K/UL (ref 1.8–7.7)
NEUTROPHILS NFR BLD: 63.1 % (ref 38–73)
NRBC BLD-RTO: 0 /100 WBC
OVALOCYTES BLD QL SMEAR: ABNORMAL
PLATELET # BLD AUTO: 240 K/UL (ref 150–450)
PMV BLD AUTO: 10.7 FL (ref 9.2–12.9)
POCT GLUCOSE: 150 MG/DL (ref 70–110)
POCT GLUCOSE: 237 MG/DL (ref 70–110)
POIKILOCYTOSIS BLD QL SMEAR: SLIGHT
POLYCHROMASIA BLD QL SMEAR: ABNORMAL
POTASSIUM SERPL-SCNC: 3.2 MMOL/L (ref 3.5–5.1)
PROT SERPL-MCNC: 6 G/DL (ref 6–8.4)
RBC # BLD AUTO: 4.16 M/UL (ref 4–5.4)
SODIUM SERPL-SCNC: 141 MMOL/L (ref 136–145)
WBC # BLD AUTO: 5.79 K/UL (ref 3.9–12.7)

## 2022-11-09 PROCEDURE — 25000003 PHARM REV CODE 250

## 2022-11-09 PROCEDURE — 99239 PR HOSPITAL DISCHARGE DAY,>30 MIN: ICD-10-PCS | Mod: ,,, | Performed by: HOSPITALIST

## 2022-11-09 PROCEDURE — 99239 HOSP IP/OBS DSCHRG MGMT >30: CPT | Mod: ,,, | Performed by: HOSPITALIST

## 2022-11-09 PROCEDURE — 80053 COMPREHEN METABOLIC PANEL: CPT

## 2022-11-09 PROCEDURE — 36415 COLL VENOUS BLD VENIPUNCTURE: CPT

## 2022-11-09 PROCEDURE — 83735 ASSAY OF MAGNESIUM: CPT

## 2022-11-09 PROCEDURE — 85025 COMPLETE CBC W/AUTO DIFF WBC: CPT

## 2022-11-09 PROCEDURE — 63600175 PHARM REV CODE 636 W HCPCS: Performed by: HOSPITALIST

## 2022-11-09 PROCEDURE — 25000003 PHARM REV CODE 250: Performed by: HOSPITALIST

## 2022-11-09 RX ORDER — POTASSIUM CHLORIDE 20 MEQ/1
40 TABLET, EXTENDED RELEASE ORAL EVERY 4 HOURS
Status: DISCONTINUED | OUTPATIENT
Start: 2022-11-09 | End: 2022-11-09 | Stop reason: HOSPADM

## 2022-11-09 RX ORDER — ATORVASTATIN CALCIUM 40 MG/1
40 TABLET, FILM COATED ORAL DAILY
Qty: 30 TABLET | Refills: 6 | Status: SHIPPED | OUTPATIENT
Start: 2022-11-09

## 2022-11-09 RX ADMIN — AMIODARONE HYDROCHLORIDE 200 MG: 200 TABLET ORAL at 09:11

## 2022-11-09 RX ADMIN — MECLIZINE HYDROCHLORIDE 25 MG: 25 TABLET ORAL at 09:11

## 2022-11-09 RX ADMIN — POTASSIUM CHLORIDE 40 MEQ: 1500 TABLET, EXTENDED RELEASE ORAL at 09:11

## 2022-11-09 RX ADMIN — MUPIROCIN: 20 OINTMENT TOPICAL at 09:11

## 2022-11-09 RX ADMIN — FUROSEMIDE 40 MG: 10 INJECTION, SOLUTION INTRAMUSCULAR; INTRAVENOUS at 09:11

## 2022-11-09 RX ADMIN — APIXABAN 5 MG: 5 TABLET, FILM COATED ORAL at 09:11

## 2022-11-09 RX ADMIN — INSULIN ASPART 3 UNITS: 100 INJECTION, SOLUTION INTRAVENOUS; SUBCUTANEOUS at 09:11

## 2022-11-09 RX ADMIN — LEVOTHYROXINE SODIUM 75 MCG: 25 TABLET ORAL at 05:11

## 2022-11-09 RX ADMIN — CITALOPRAM HYDROBROMIDE 20 MG: 20 TABLET ORAL at 09:11

## 2022-11-09 NOTE — PLAN OF CARE
Pt A/Ox4, denies pain, denies SOB, SPO2 90% on RA, lung sounds clear.  Pt ambulatory with standby assist, gait strong/balanced.  No visible edema, peripheral pulses present. Skin warm/dry, afebrile.  Pt voiding spontaneously, tolerating PO intake well, VSS.  Pt agrees to plan of care.

## 2022-11-09 NOTE — DISCHARGE SUMMARY
[facility] - Intensive Care ([address] Medicine  Discharge Summary      Patient Name: Stacia Perry  MRN: [id number]  [facility]: [id number]  Patient Class: IP- Inpatient  Admission Date: 11/7/2022  Hospital Length of Stay: 2 days  Discharge Date and Time: 11/9/2022  1:02 PM  Attending Physician: Jaycob Chapman MD   Discharging Provider: Jaycob Chapman MD  Primary Care Provider: Maureen Parnell MD  [facility] Medicine Team: [facility] MED S Jaycob Chapman MD  Primary Care Team: [facility] MED S    HPI:   Stacia Perry is an 88 year old white woman with hypertension, hyperlipidemia, diabetes mellitus type 2, aortic atherosclerosis, heart failure with reduced ejection fraction and diastolic dysfunction, severe aortic stenosis, atrial fibrillation (anticoagulated on apixaban), right upper lobe lung adenocarcinoma diagnosed on 6/29/2021. She lives in [facility]. She is . Her primary care physician is Dr. Maureen Parnell. Her cardiologist is Dr. Van Shabazz.    She presented to [facility] Emergency Department on 11/7/2022 with worsening shortness of breath over the past 3 days with productive cough and bilateral lower extremity swelling for the past 2 weeks. Her daughter reported that she had a heart failure exacerbation in the past, although chart review shows that she was hospitalized at [facility] from 5/6/2021 to 5/12/2021 for cardiogenic shock during which she was treated with furosemide drip. She is prescribed furosemide 40 mg twice daily, carvedilol 3.125 mg twice daily (but takes it 6.25 mg nightly), and valsartan 160 mg daily for her heart failure.   In the emergency department, she had tachypnea, oxygen saturation of 92% (improved on 2 liters/minute of supplemental oxygen), elevated AST (100 U/L), ALT (126 U/L), alkaline phosphatase (144 U/L), BNP (1014 pg/mL), troponin (0.058 ng/mL). EKG showed sinus bradycardia with 1st degree AV block. Chest  X-ray showed pulmonary vascular congestion and pulmonary edema and small right pleural effusion. She was given 80 mg of intravenous furosemide. She was admitted to Hospital Medicine Team S.        Hospital Course:   She was put on 40 mg of intravenous furosemide twice daily. Her valsartan was held to prevent hypotension but blood pressures increased as she improved. She was able to walk around on room air and felt ready to go home on 11/9/2022.        Goals of Care Treatment Preferences:  Code Status: Full Code      Consults:   Consults (From admission, onward)          Status Ordering Provider     Inpatient consult to Social Work/Case Management  Once        Provider:  (Not yet assigned)    Acknowledged SHIRLEY HENLEY     Inpatient consult to Registered Dietitian/Nutritionist  Once        Provider:  (Not yet assigned)    Completed SHIRLEY HENLEY          Final Active Diagnoses:    Diagnosis Date Noted POA    Atherosclerosis of aorta [I70.0] 02/02/2022 Yes     Chronic    Primary adenocarcinoma of upper lobe of right lung [C34.11] 07/19/2021 Yes     Chronic    Severe aortic stenosis [I35.0] 05/08/2021 Yes     Chronic    Atrial fibrillation [I48.91] 05/07/2021 Yes     Chronic    Essential hypertension [I10] 05/06/2021 Yes     Chronic    Type 2 diabetes mellitus without complication, without long-term current use of insulin [E11.9] 05/06/2021 Yes     Chronic    HLD (hyperlipidemia) [E78.5] 05/06/2021 Yes     Chronic    Heart failure with reduced ejection fraction [I50.20] 05/06/2021 Yes     Chronic      Problems Resolved During this Admission:    Diagnosis Date Noted Date Resolved POA    PRINCIPAL PROBLEM:  Acute on chronic HFrEF (heart failure with reduced ejection fraction) [I50.23] 11/07/2022 11/09/2022 Yes    Elevated troponin [R77.8] 11/08/2022 11/09/2022 Yes    Transaminitis [R74.01] 11/08/2022 11/09/2022 Yes    SOB (shortness of breath) [R06.02] 11/07/2022 11/09/2022 Yes       Discharged Condition:  good    Disposition: Home or Self Care    Follow Up:   Follow-up Information       Maureen Parnell MD Follow up.    Specialty: Internal Medicine  Why: As needed  Contact information:  [address]  [facility] [id number] [phone]                           Patient Instructions:      Diet Adult Regular     Order Specific Question Answer Comments   Na restriction, if any: 2gNa    Additional restrictions: Diabetic 1800      Notify your health care provider if you experience any of the following:  difficulty breathing or increased cough     Activity as tolerated       Significant Diagnostic Studies:   X-Ray Chest PA And Lateral 11/07/22: FINDINGS:   Cardiomegaly with pulmonary vascular congestion and bilateral edema.   Area of masslike consolidation in the right upper lobe, new from prior chest x-ray 06/29/2021, corresponding to finding seen on CT chest 08/05/2022.   Blunting of the right CP angle and posterior sulcus suggesting small effusion.  No left effusion identified.  No pneumothorax.   Regional osseous structures are similar to prior.   Impression:  Cardiomegaly with pulmonary vascular congestion and bilateral edema.   Area of masslike consolidation in the right upper lobe, new from prior chest x-ray 06/29/2021, corresponding to finding seen on CT chest 08/05/2022.   Blunting of the right CP angle and posterior sulcus suggesting small effusion.      Medications:  Reconciled Home Medications:      Medication List        CONTINUE taking these medications      amiodarone 200 MG Tab  Commonly known as: PACERONE  Take 1 tablet (200 mg total) by mouth once daily.     apixaban 5 mg Tab  Commonly known as: ELIQUIS  Take 1 tablet (5 mg total) by mouth 2 (two) times daily.     atorvastatin 40 MG tablet  Commonly known as: LIPITOR  Take 1 tablet (40 mg total) by mouth once daily.     carvediloL 6.25 MG tablet  Commonly known as: COREG  Take 0.5 tablets (3.125 mg total) by mouth 2 (two) times daily with meals.      citalopram 20 MG tablet  Commonly known as: CeleXA  Take 20 mg by mouth once daily.     FARXIGA 5 mg Tab tablet  Generic drug: dapagliflozin  Take 1 tablet (5 mg total) by mouth once daily.     furosemide 40 MG tablet  Commonly known as: LASIX  Take 1 tablet (40 mg total) by mouth 2 (two) times daily.     levothyroxine 75 MCG tablet  Commonly known as: SYNTHROID  Take 75 mcg by mouth before breakfast.     meclizine 25 mg tablet  Commonly known as: ANTIVERT  Take 25 mg by mouth once daily.     metFORMIN 500 MG tablet  Commonly known as: GLUCOPHAGE  Take 1,000 mg by mouth 2 (two) times daily with meals.     TRESIBA FLEXTOUCH U-100 100 unit/mL (3 mL) insulin pen  Generic drug: insulin degludec  Inject 18 Units into the skin every evening.     valsartan 160 MG tablet  Commonly known as: DIOVAN  Take 0.5 tablets (80 mg total) by mouth once daily.              Indwelling Lines/Drains at time of discharge: None    Time spent on the discharge of patient: 35 minutes         Jaycob Chapman MD  Department of Hospital Medicine  [facility] - Intensive Care ([place]-[facility])

## 2022-11-09 NOTE — PLAN OF CARE
11/09/22 1144   Post-Acute Status   Post-Acute Authorization Other   Other Status No Post-Acute Service Needs   Discharge Delays None known at this time     Patient is being D/C today with no Social Service needs identified at this time.       Romelia Sanchez LMSW  PRN - [facility]  [phone]

## 2022-11-09 NOTE — NURSING
Patient and daughter re-educated on the need for accurate intake and output monitoring; voiced understanding. Hat in toilet.

## 2022-11-09 NOTE — PLAN OF CARE
Problem: Adult Inpatient Plan of Care  Goal: Plan of Care Review  Outcome: Ongoing, Progressing  Goal: Optimal Comfort and Wellbeing  Outcome: Ongoing, Progressing  Goal: Readiness for Transition of Care  Outcome: Ongoing, Progressing     Problem: Diabetes Comorbidity  Goal: Blood Glucose Level Within Targeted Range  Outcome: Ongoing, Progressing     Problem: Respiratory Compromise (Pneumonia)  Goal: Effective Oxygenation and Ventilation  Outcome: Ongoing, Progressing     Problem: Fall Injury Risk  Goal: Absence of Fall and Fall-Related Injury  Outcome: Ongoing, Progressing

## 2022-11-09 NOTE — NURSING
Patient due to get 40 of IV lasix, BP soft 101/54 MAP 72. Okay to give per CHARLOTTE Chamorro PA-C.

## 2022-11-10 ENCOUNTER — PATIENT OUTREACH (OUTPATIENT)
Dept: ADMINISTRATIVE | Facility: CLINIC | Age: 87
End: 2022-11-10
Payer: MEDICARE

## 2022-11-10 NOTE — PROGRESS NOTES
C3 nurse spoke with Stacia Perry 's daughter Kesha for a TCC post hospital discharge follow up call. The patient does not have a scheduled HOSFU appointment with Maureen Parnell MD  within 5-7 days post hospital discharge date 11/9/22. C3 nurse was unable to schedule HOSFU appointment in [facility].    Message sent to PCP staff requesting they contact patient and schedule follow up appointment.

## 2022-11-11 ENCOUNTER — OFFICE VISIT (OUTPATIENT)
Dept: RADIATION ONCOLOGY | Facility: CLINIC | Age: 87
End: 2022-11-11
Payer: MEDICARE

## 2022-11-11 ENCOUNTER — TELEPHONE (OUTPATIENT)
Dept: PRIMARY CARE CLINIC | Facility: CLINIC | Age: 87
End: 2022-11-11
Payer: MEDICARE

## 2022-11-11 ENCOUNTER — TELEPHONE (OUTPATIENT)
Dept: CARDIOLOGY | Facility: CLINIC | Age: 87
End: 2022-11-11
Payer: MEDICARE

## 2022-11-11 ENCOUNTER — HOSPITAL ENCOUNTER (OUTPATIENT)
Dept: RADIOLOGY | Facility: HOSPITAL | Age: 87
Discharge: HOME | End: 2022-11-11
Attending: RADIOLOGY
Payer: MEDICARE

## 2022-11-11 VITALS
RESPIRATION RATE: 16 BRPM | HEIGHT: 59 IN | TEMPERATURE: 97 F | OXYGEN SATURATION: 91 % | BODY MASS INDEX: 28.89 KG/M2 | DIASTOLIC BLOOD PRESSURE: 56 MMHG | HEART RATE: 60 BPM | SYSTOLIC BLOOD PRESSURE: 116 MMHG | WEIGHT: 143.31 LBS

## 2022-11-11 DIAGNOSIS — Z85.118 PERSONAL HISTORY OF LUNG CANCER: ICD-10-CM

## 2022-11-11 DIAGNOSIS — Z85.118 PERSONAL HISTORY OF LUNG CANCER: Primary | ICD-10-CM

## 2022-11-11 LAB — POCT GLUCOSE: 96 MG/DL (ref 70–110)

## 2022-11-11 PROCEDURE — A9698 NON-RAD CONTRAST MATERIALNOC: HCPCS | Performed by: RADIOLOGY

## 2022-11-11 PROCEDURE — 99213 PR OFFICE/OUTPT VISIT, EST, LEVL III, 20-29 MIN: ICD-10-PCS | Mod: S$GLB,,, | Performed by: RADIOLOGY

## 2022-11-11 PROCEDURE — 1111F PR DISCHARGE MEDS RECONCILED W/ CURRENT OUTPATIENT MED LIST: ICD-10-PCS | Mod: CPTII,S$GLB,, | Performed by: RADIOLOGY

## 2022-11-11 PROCEDURE — 78815 PET IMAGE W/CT SKULL-THIGH: CPT | Mod: 26,PS,, | Performed by: RADIOLOGY

## 2022-11-11 PROCEDURE — 1126F AMNT PAIN NOTED NONE PRSNT: CPT | Mod: CPTII,S$GLB,, | Performed by: RADIOLOGY

## 2022-11-11 PROCEDURE — 78815 PET IMAGE W/CT SKULL-THIGH: CPT | Mod: PS,TC

## 2022-11-11 PROCEDURE — 78815 NM PET CT ROUTINE: ICD-10-PCS | Mod: 26,PS,, | Performed by: RADIOLOGY

## 2022-11-11 PROCEDURE — 99999 PR PBB SHADOW E&M-EST. PATIENT-LVL III: CPT | Mod: PBBFAC,,, | Performed by: RADIOLOGY

## 2022-11-11 PROCEDURE — 1111F DSCHRG MED/CURRENT MED MERGE: CPT | Mod: CPTII,S$GLB,, | Performed by: RADIOLOGY

## 2022-11-11 PROCEDURE — 99999 PR PBB SHADOW E&M-EST. PATIENT-LVL III: ICD-10-PCS | Mod: PBBFAC,,, | Performed by: RADIOLOGY

## 2022-11-11 PROCEDURE — 99213 OFFICE O/P EST LOW 20 MIN: CPT | Mod: S$GLB,,, | Performed by: RADIOLOGY

## 2022-11-11 PROCEDURE — 1126F PR PAIN SEVERITY QUANTIFIED, NO PAIN PRESENT: ICD-10-PCS | Mod: CPTII,S$GLB,, | Performed by: RADIOLOGY

## 2022-11-11 PROCEDURE — 25500020 PHARM REV CODE 255: Performed by: RADIOLOGY

## 2022-11-11 RX ADMIN — BARIUM SULFATE 450 ML: 20 SUSPENSION ORAL at 07:11

## 2022-11-11 NOTE — TELEPHONE ENCOUNTER
Spoke to pt's daughter Kesha Marino. She's in the process of scheduling a hospital follow up appt. With Dr. Chanel' office.

## 2022-11-11 NOTE — TELEPHONE ENCOUNTER
"Heart Failure [facility]itional [facility]    Attempted to call pt to complete 24-72hour post discharge "check in" call. Unable to reach pt at listed phone numbers.  Was able to leave message on voicemail [facility]     Will continue to try to reach patient.      "

## 2022-11-11 NOTE — PROGRESS NOTES
11/11/2022    Radiation Oncology Follow-Up Visit    Prior Radiation History:    Site  Technique  Energy  Dose/Fx (Gy)  #Fx  Total Dose (Gy)  Start Date  End Date  Elapsed Days    RUL Lung  SBRT  6X  12  4 / 4  48  8/16/2021 8/25/2021  9        Assessment   This is an 88 y.o. y/o female with Stage IA3 (cT1c cN0 M0) RUL NSCLC (adeno) diagnosed on biopsy 6/29/21. She completed definitive SBRT 48 Gy in 4 fx on 8/25/21.     No late toxicity from treatment. PET/CT today 11/11/22 demonstrates post-radiation changes in the Right lung with only mild uptake; no concerning uptake in mediastinal nodes. No evidence of disease. She does have anterior rib fractures on the right; her daughter reports she did fall some time ago. She reports no pain today.     At this point she has no evidence of disease and should be okay for any cardiac procedures from my standpoint.        Plan   1) I will see her back in 6 months with CT Chest prior for re-staging.        Chief Complaint   Patient presents with    Follow-up         HPI: Recently admitted 11/7-11/9 for acute CHF exacerbation. Today denies any change in her baseline dyspnea on exertion. No fevers, weight loss, chest pain, or cough.       Past Medical History:   Diagnosis Date    Anticoagulant long-term use     Aortic valve stenosis     Arthritis     Cardiogenic shock 5/7/2021    CHF (congestive heart failure)     Diabetes mellitus     Hypercholesteremia     Hypertension     Pneumonia due to infectious organism 5/6/2021    Primary adenocarcinoma of upper lobe of right lung 7/19/2021    Thyroid disease        Past Surgical History:   Procedure Laterality Date    CATARACT EXTRACTION, BILATERAL      EYE SURGERY      FOOT FRACTURE SURGERY Right     HYSTERECTOMY         Social History     Tobacco Use    Smoking status: Never    Smokeless tobacco: Never   Substance Use Topics    Alcohol use: No    Drug use: No       Cancer-related family history is not on file.    Current Outpatient  Medications on File Prior to Visit   Medication Sig Dispense Refill    amiodarone (PACERONE) 200 MG Tab Take 1 tablet (200 mg total) by mouth once daily. 90 tablet 3    apixaban (ELIQUIS) 5 mg Tab Take 1 tablet (5 mg total) by mouth 2 (two) times daily. 180 tablet 3    atorvastatin (LIPITOR) 40 MG tablet Take 1 tablet (40 mg total) by mouth once daily. 30 tablet 6    carvediloL (COREG) 6.25 MG tablet Take 0.5 tablets (3.125 mg total) by mouth 2 (two) times daily with meals. 30 tablet 11    citalopram (CELEXA) 20 MG tablet Take 20 mg by mouth once daily.      dapagliflozin (FARXIGA) 5 mg Tab tablet Take 1 tablet (5 mg total) by mouth once daily. 90 tablet 1    furosemide (LASIX) 40 MG tablet Take 1 tablet (40 mg total) by mouth 2 (two) times daily. 60 tablet 11    levothyroxine (SYNTHROID) 75 MCG tablet Take 75 mcg by mouth before breakfast.       meclizine (ANTIVERT) 25 mg tablet Take 25 mg by mouth once daily.       metformin (GLUCOPHAGE) 500 MG tablet Take 1,000 mg by mouth 2 (two) times daily with meals.       TRESIBA FLEXTOUCH U-100 100 unit/mL (3 mL) InPn Inject 18 Units into the skin every evening. 3 Syringe 3    valsartan (DIOVAN) 160 MG tablet Take 0.5 tablets (80 mg total) by mouth once daily. 45 tablet 3     Current Facility-Administered Medications on File Prior to Visit   Medication Dose Route Frequency Provider Last Rate Last Admin    [COMPLETED] barium sulfate (READI-CAT) suspension 450 mL  450 mL Oral Once Randolph Kaur MD   450 mL at 11/11/22 0725       Review of patient's allergies indicates:  No Known Allergies    Review of Systems   Constitutional:  Negative for fever and weight loss.   HENT:  Positive for hearing loss. Negative for ear pain and sore throat.    Eyes:  Negative for blurred vision and double vision.   Respiratory:  Positive for shortness of breath. Negative for cough and hemoptysis.    Cardiovascular:  Negative for chest pain and leg swelling.   Gastrointestinal:  Negative for  "abdominal pain, constipation, diarrhea, heartburn and nausea.   Genitourinary:  Negative for dysuria and hematuria.   Musculoskeletal:  Negative for falls and joint pain.   Neurological:  Negative for tingling, speech change, focal weakness, seizures and headaches.   Psychiatric/Behavioral:  Positive for memory loss. Negative for depression. The patient is not nervous/anxious.       Vital Signs: BP (!) 116/56 (BP Location: Left arm, Patient Position: Sitting)   Pulse 60   Temp 97 °F (36.1 °C)   Resp 16   Ht 4' 11" (1.499 m)   Wt 65 kg (143 lb 4.8 oz)   SpO2 (!) 91%   BMI 28.94 kg/m²     ECOG Performance Status: 2 - Ambulates, capable of self care only    Physical Exam  Vitals reviewed.   Constitutional:       Appearance: Normal appearance.      Comments: Frail appearing   HENT:      Head: Normocephalic and atraumatic.   Eyes:      General: No scleral icterus.     Extraocular Movements: Extraocular movements intact.   Pulmonary:      Effort: No accessory muscle usage or respiratory distress.   Abdominal:      General: There is no distension.   Musculoskeletal:         General: Normal range of motion.      Cervical back: Normal range of motion and neck supple.   Lymphadenopathy:      Cervical: No cervical adenopathy.   Skin:     General: Skin is dry.      Coloration: Skin is not jaundiced.   Neurological:      Mental Status: She is alert and oriented to person, place, and time.      Cranial Nerves: No cranial nerve deficit.   Psychiatric:         Mood and Affect: Mood and affect normal.         Judgment: Judgment normal.        Labs:    Imaging: I have personally reviewed the patient's available images and reports and summarized pertinent findings above in HPI.     Pathology: No new path            "

## 2022-11-14 ENCOUNTER — TELEPHONE (OUTPATIENT)
Dept: CARDIOLOGY | Facility: CLINIC | Age: 87
End: 2022-11-14
Payer: MEDICARE

## 2022-11-14 NOTE — TELEPHONE ENCOUNTER
Heart Failure Transitional [facility] (HFTCC) Team notified of pt referral via [facility] Failure [facility] (automated inbasket notification) .    Patient screened on 11/9/2022 by provider and RN for enrollment to program.      Pt was deemed not a candidate for enrollment at this time related to patient refused.    Pt will require additional follow up planning per primary team.     If pt status, diagnosis, or treatment plan changes , please place AMB referral to Heart Failure [facility]itional [facility] ([id number]) for HFTCC enrollment re-evalution.

## 2022-11-16 NOTE — PHYSICIAN QUERY
PT Name: Stacia Perry  MR #: [id number]     DOCUMENTATION CLARIFICATION     CDS/: Jason Yu Jr, RN CCDS              Contact information:[email or web address]  This form is a permanent document in the medical record.     Query Date: November 16, 2022    By submitting this query, we are merely seeking further clarification of documentation.  Please utilize your independent clinical judgment when addressing the question(s) below.    The Medical Record contains the following   Indicators Supporting Clinical Findings Location in Medical Record   x Heart Failure documented Patient is identified as having Combined Systolic and Diastolic heart failure that is Acute on Chronic    PRINCIPAL PROBLEM:  Acute on chronic HFrEF (heart failure with reduced ejection fraction)    11/8 H&P      11/9 Discharge Summary   x BNP 1014   11/8 H&P   x EF/Echo  Latest ECHO performed and demonstrates- Results for orders placed during the hospital encounter of 07/14/22     Echo Saline Bubble? No     Interpretation Summary  · The left ventricle is normal in size with mild eccentric hypertrophy and low normal systolic function.  · The estimated ejection fraction is 55%.  · Grade III left ventricular diastolic dysfunction.  · Normal right ventricular size with mildly reduced right ventricular systolic function.  · Severe left atrial enlargement.  · Mild right atrial enlargement.  · There is severe aortic valve stenosis.  · Aortic valve area is 0.55 cm2; peak velocity is 4.32 m/s; mean gradient is 53 mmHg.  · Mild aortic regurgitation.  · Moderate-to-severe mitral regurgitation.  · Moderate tricuspid regurgitation.  · Normal central venous pressure (3 mmHg).  · The estimated PA systolic pressure is 51 mmHg.  · There is mild-moderate pulmonary hypertension. 11/8 H&P   x Radiology findings Chest X-ray showed pulmonary vascular congestion and pulmonary edema and small right pleural effusion   11/8 H&P   x Subjective/Objective  Respiratory Conditions Respiratory:  Positive for cough and shortness of breath 11/8 H&P    Recent/Current MI      Heart Transplant, LVAD     x Edema, JVD  Right lower leg: Edema (2+) present.      Left lower leg: Edema (1+) present. 11/8 H&P    Ascites     x Diuretics/Meds She was put on 40 mg of intravenous furosemide twice daily. 11/8 H&P    Other Treatment      Other       Heart failure is a clinical diagnosis which includes symptomatic fluid retention, elevated intracardiac pressures, and/or the inability of the heart to deliver adequate blood flow.    Heart Failure with reduced Ejection Fraction (HFrEF) or Systolic Heart Failure (loses ability to contract normally, EF is <40%)    Heart Failure with preserved Ejection Fraction (HFpEF) or Diastolic Heart Failure (stiff ventricles, does not relax properly, EF is >50%)     Heart Failure with Combined Systolic and Diastolic Failure (stiff ventricles, does not relax properly and EF is <50%)    Mid-range or mildly reduced ejection fraction (HFmrEF) is classified as systolic heart failure.  Congestive heart failure with a recovered EF is classified as Diastolic Heart Failure.  Common clues to acute exacerbation:  Rapidly progressive symptoms (w/in 2 weeks of presentation), using IV diuretics, using supplemental O2, pulmonary edema on Xray, new or worsening pleural effusion, +JVD or other signs of volume overload, MI w/in 4 weeks, and/or BNP >500  The clinical guidelines noted are only system guidelines, and do not replace the providers clinical judgment.    Provider, due to documentation conflict please clarify the        Acute on Chronic Heart Failure       Diagnosis     [x   ]  Acute on Chronic Systolic Heart Failure (HFrEF or HFmrEF) - worsening of CHF signs/symptoms in preexisting CHF   [   ]  Acute on Chronic Combined Systolic and Diastolic Heart Failure - worsening of CHF signs/symptoms in preexisting CHF   [   ]  Other (please specify):    [   ]  Clinically  Undetermined       Please document in your progress notes daily for the duration of treatment until resolved and include in your discharge summary.    References:  American Heart Association editorial staff. (2017, May). Ejection Fraction Heart Failure Measurement. American Heart Association. https://www.heart.org/en/health-topics/heart-failure/diagnosing-heart-failure/ejection-fraction-heart-failure-measurement#:~:text=Ejection%20fraction%20(EF)%20is%20a,pushed%20out%20with%20each%20heartbeat  LEIGH Osborne (2020, December 15). Heart failure with preserved ejection fraction: Clinical manifestations and diagnosis. [employer]. https://www.[employer].Maaguzi/contents/heart-failure-with-preserved-ejection-fraction-clinical-manifestations-and-diagnosis.  ICD-10-CM/PCS Coding Clinic Third Quarter ICD-10, Effective with discharges: September 8, 2020 [facility] § Heart failure with mid-range or mildly reduced ejection fraction (2020).  ICD-10-CM/PCS Coding Clinic Third Quarter ICD-10, Effective with discharges: September 8, 2020 [facility] § Heart failure with recovered ejection fraction (2020).  Form No. 76612

## 2022-12-01 ENCOUNTER — TELEPHONE (OUTPATIENT)
Dept: CARDIOLOGY | Facility: CLINIC | Age: 87
End: 2022-12-01
Payer: MEDICARE

## 2022-12-01 NOTE — TELEPHONE ENCOUNTER
----- Message from Gwen Newton MA sent at 11/30/2022  5:44 PM CST -----  Lucia - Can you please call pt about appt with Dr Shabazz - Maren Newberry  ----- Message -----  From: Luanne Watts MA  Sent: 11/30/2022   3:07 PM CST  To: Mele HERNANDEZ Staff    The patient daughter would like to talk to you about an appointment April is too late her mother was in the hospital in November please call Kesha at [phone]. Thank you.

## 2022-12-19 ENCOUNTER — OFFICE VISIT (OUTPATIENT)
Dept: CARDIOLOGY | Facility: CLINIC | Age: 87
End: 2022-12-19
Payer: MEDICARE

## 2022-12-19 ENCOUNTER — LAB VISIT (OUTPATIENT)
Dept: LAB | Facility: HOSPITAL | Age: 87
End: 2022-12-19
Attending: INTERNAL MEDICINE
Payer: MEDICARE

## 2022-12-19 VITALS
OXYGEN SATURATION: 96 % | DIASTOLIC BLOOD PRESSURE: 57 MMHG | WEIGHT: 145.5 LBS | HEART RATE: 68 BPM | HEIGHT: 59 IN | BODY MASS INDEX: 29.33 KG/M2 | SYSTOLIC BLOOD PRESSURE: 116 MMHG

## 2022-12-19 DIAGNOSIS — I50.20 HEART FAILURE WITH REDUCED EJECTION FRACTION: Chronic | ICD-10-CM

## 2022-12-19 DIAGNOSIS — E11.9 TYPE 2 DIABETES MELLITUS WITHOUT COMPLICATION, WITHOUT LONG-TERM CURRENT USE OF INSULIN: ICD-10-CM

## 2022-12-19 DIAGNOSIS — I35.0 SEVERE AORTIC STENOSIS: Primary | Chronic | ICD-10-CM

## 2022-12-19 DIAGNOSIS — I50.30 DIASTOLIC CONGESTIVE HEART FAILURE, UNSPECIFIED HF CHRONICITY: ICD-10-CM

## 2022-12-19 DIAGNOSIS — I48.0 PAROXYSMAL ATRIAL FIBRILLATION: ICD-10-CM

## 2022-12-19 DIAGNOSIS — J84.10 CALCIFIED GRANULOMA OF LUNG: ICD-10-CM

## 2022-12-19 DIAGNOSIS — I10 ESSENTIAL HYPERTENSION: ICD-10-CM

## 2022-12-19 DIAGNOSIS — I35.0 AORTIC VALVE STENOSIS, ETIOLOGY OF CARDIAC VALVE DISEASE UNSPECIFIED: Primary | ICD-10-CM

## 2022-12-19 DIAGNOSIS — R06.09 DOE (DYSPNEA ON EXERTION): ICD-10-CM

## 2022-12-19 LAB — BNP SERPL-MCNC: 593 PG/ML (ref 0–99)

## 2022-12-19 PROCEDURE — 1101F PT FALLS ASSESS-DOCD LE1/YR: CPT | Mod: CPTII,S$GLB,, | Performed by: INTERNAL MEDICINE

## 2022-12-19 PROCEDURE — 1126F PR PAIN SEVERITY QUANTIFIED, NO PAIN PRESENT: ICD-10-PCS | Mod: CPTII,S$GLB,, | Performed by: INTERNAL MEDICINE

## 2022-12-19 PROCEDURE — 3288F FALL RISK ASSESSMENT DOCD: CPT | Mod: CPTII,S$GLB,, | Performed by: INTERNAL MEDICINE

## 2022-12-19 PROCEDURE — 3288F PR FALLS RISK ASSESSMENT DOCUMENTED: ICD-10-PCS | Mod: CPTII,S$GLB,, | Performed by: INTERNAL MEDICINE

## 2022-12-19 PROCEDURE — 83880 ASSAY OF NATRIURETIC PEPTIDE: CPT | Performed by: INTERNAL MEDICINE

## 2022-12-19 PROCEDURE — 1159F PR MEDICATION LIST DOCUMENTED IN MEDICAL RECORD: ICD-10-PCS | Mod: CPTII,S$GLB,, | Performed by: INTERNAL MEDICINE

## 2022-12-19 PROCEDURE — 99999 PR PBB SHADOW E&M-EST. PATIENT-LVL V: ICD-10-PCS | Mod: PBBFAC,,, | Performed by: INTERNAL MEDICINE

## 2022-12-19 PROCEDURE — 36415 COLL VENOUS BLD VENIPUNCTURE: CPT | Performed by: INTERNAL MEDICINE

## 2022-12-19 PROCEDURE — 1159F MED LIST DOCD IN RCRD: CPT | Mod: CPTII,S$GLB,, | Performed by: INTERNAL MEDICINE

## 2022-12-19 PROCEDURE — 99213 PR OFFICE/OUTPT VISIT, EST, LEVL III, 20-29 MIN: ICD-10-PCS | Mod: S$GLB,,, | Performed by: INTERNAL MEDICINE

## 2022-12-19 PROCEDURE — 1126F AMNT PAIN NOTED NONE PRSNT: CPT | Mod: CPTII,S$GLB,, | Performed by: INTERNAL MEDICINE

## 2022-12-19 PROCEDURE — 99213 OFFICE O/P EST LOW 20 MIN: CPT | Mod: S$GLB,,, | Performed by: INTERNAL MEDICINE

## 2022-12-19 PROCEDURE — 99999 PR PBB SHADOW E&M-EST. PATIENT-LVL V: CPT | Mod: PBBFAC,,, | Performed by: INTERNAL MEDICINE

## 2022-12-19 PROCEDURE — 1101F PR PT FALLS ASSESS DOC 0-1 FALLS W/OUT INJ PAST YR: ICD-10-PCS | Mod: CPTII,S$GLB,, | Performed by: INTERNAL MEDICINE

## 2022-12-19 RX ORDER — SODIUM CHLORIDE 0.9 % (FLUSH) 0.9 %
10 SYRINGE (ML) INJECTION
Status: CANCELLED | OUTPATIENT
Start: 2022-12-19

## 2022-12-19 RX ORDER — DIPHENHYDRAMINE HCL 50 MG
50 CAPSULE ORAL ONCE
Status: CANCELLED | OUTPATIENT
Start: 2022-12-19 | End: 2022-12-19

## 2022-12-19 RX ORDER — SODIUM CHLORIDE 9 MG/ML
INJECTION, SOLUTION INTRAVENOUS CONTINUOUS
Status: CANCELLED | OUTPATIENT
Start: 2022-12-19 | End: 2022-12-19

## 2022-12-19 NOTE — PROGRESS NOTES
Subjective:    Patient ID:  Stacia Perry is a 88 y.o. female who presents for follow-up of aortic stenosis, combined CHF    HPI  HPI   The patient is a 88 year old female  admitted at [facility] [place] from 5/7 - 5/12/21 for acute decompensated HF and AF (new dx) with RVR.TTE with EF 38%, low-normal RV function, severe aortic stenosis , mod-severe MR and PASP 50. She was seen in IC clinic 5/28 with plans to evaluate incidental mass found on CT scan before proceeding with TAVR evaluation.  Lung biopsy revealed adenocarcinoma .She was evaluated by Oncology. She was deemed not a surgical candidate and stereotactic body radiotherapy (SBRT).She was admitted 11/7/22 with execration of CHF . BNP 1014 and CRX pulmonary edema.She was seen by Dr Kaur with Radiation Oncology 11/1122 and reported  that at this point she has no evidence of disease and should be okay for any cardiac procedures from my standpoint. She continues in sinus rhythm. She is ambulatory but has TORRES.           Summary 7/14/22    The left ventricle is normal in size with mild eccentric hypertrophy and low normal systolic function.  The estimated ejection fraction is 55%.  Grade III left ventricular diastolic dysfunction.  Normal right ventricular size with mildly reduced right ventricular systolic function.  Severe left atrial enlargement.  Mild right atrial enlargement.  There is severe aortic valve stenosis.  Aortic valve area is 0.55 cm2; peak velocity is 4.32 m/s; mean gradient is 53 mmHg.  Mild aortic regurgitation.  Moderate-to-severe mitral regurgitation.  Moderate tricuspid regurgitation.  Normal central venous pressure (3 mmHg).  The estimated PA systolic pressure is 51 mmHg.  There is mild-moderate pulmonary hypertension.     Lab Results   Component Value Date     11/09/2022    K 3.2 (L) 11/09/2022     11/09/2022    CO2 27 11/09/2022    BUN 16 11/09/2022    CREATININE 0.8 11/09/2022     (H) 11/09/2022    HGBA1C 7.6 (H) 11/07/2022    MG  2.0 11/09/2022    AST 37 11/09/2022    ALT 81 (H) 11/09/2022    ALBUMIN 2.7 (L) 11/09/2022    PROT 6.0 11/09/2022    BILITOT 0.6 11/09/2022    WBC 5.79 11/09/2022    HGB 12.3 11/09/2022    HCT 39.6 11/09/2022    MCV 95 11/09/2022     11/09/2022    INR 1.1 11/07/2022    TSH 1.488 11/08/2022         Lab Results   Component Value Date    CHOL 90 (L) 11/08/2022    HDL 24 (L) 11/08/2022    TRIG 157 (H) 11/08/2022       Lab Results   Component Value Date    LDLCALC 34.6 (L) 11/08/2022       Past Medical History:   Diagnosis Date    Anticoagulant long-term use     Aortic valve stenosis     Arthritis     Cardiogenic shock 5/7/2021    CHF (congestive heart failure)     Diabetes mellitus     Hypercholesteremia     Hypertension     Pneumonia due to infectious organism 5/6/2021    Primary adenocarcinoma of upper lobe of right lung 7/19/2021    Thyroid disease        Current Outpatient Medications:     amiodarone (PACERONE) 200 MG Tab, Take 1 tablet (200 mg total) by mouth once daily., Disp: 90 tablet, Rfl: 3    apixaban (ELIQUIS) 5 mg Tab, Take 1 tablet (5 mg total) by mouth 2 (two) times daily., Disp: 180 tablet, Rfl: 3    atorvastatin (LIPITOR) 40 MG tablet, Take 1 tablet (40 mg total) by mouth once daily., Disp: 30 tablet, Rfl: 6    carvediloL (COREG) 6.25 MG tablet, Take 0.5 tablets (3.125 mg total) by mouth 2 (two) times daily with meals., Disp: 30 tablet, Rfl: 11    citalopram (CELEXA) 20 MG tablet, Take 20 mg by mouth once daily., Disp: , Rfl:     dapagliflozin (FARXIGA) 5 mg Tab tablet, Take 1 tablet (5 mg total) by mouth once daily., Disp: 90 tablet, Rfl: 1    furosemide (LASIX) 40 MG tablet, Take 1 tablet (40 mg total) by mouth 2 (two) times daily., Disp: 60 tablet, Rfl: 11    levothyroxine (SYNTHROID) 75 MCG tablet, Take 75 mcg by mouth before breakfast. , Disp: , Rfl:     meclizine (ANTIVERT) 25 mg tablet, Take 25 mg by mouth once daily. , Disp: , Rfl:     metformin (GLUCOPHAGE) 500 MG  tablet, Take 1,000 mg by mouth 2 (two) times daily with meals. , Disp: , Rfl:     TRESIBA FLEXTOUCH U-100 100 unit/mL (3 mL) InPn, Inject 18 Units into the skin every evening., Disp: 3 Syringe, Rfl: 3    valsartan (DIOVAN) 160 MG tablet, Take 0.5 tablets (80 mg total) by mouth once daily., Disp: 45 tablet, Rfl: 3          Review of Systems   Constitutional: Negative for decreased appetite, diaphoresis, fever, malaise/fatigue, weight gain and weight loss.   HENT:  Negative for congestion, ear discharge, ear pain and nosebleeds.    Eyes:  Negative for blurred vision, double vision and visual disturbance.   Cardiovascular:  Positive for dyspnea on exertion. Negative for chest pain, claudication, cyanosis, irregular heartbeat, leg swelling, near-syncope, orthopnea, palpitations, paroxysmal nocturnal dyspnea and syncope.   Respiratory:  Negative for cough, hemoptysis, shortness of breath, sleep disturbances due to breathing, snoring, sputum production and wheezing.    Endocrine: Negative for polydipsia, polyphagia and polyuria.   Hematologic/Lymphatic: Negative for adenopathy and bleeding problem. Does not bruise/bleed easily.   Skin:  Negative for color change, nail changes, poor wound healing and rash.   Musculoskeletal:  Negative for muscle cramps and muscle weakness.   Gastrointestinal:  Negative for abdominal pain, anorexia, change in bowel habit, hematochezia, nausea and vomiting.   Genitourinary:  Negative for dysuria, frequency and hematuria.   Neurological:  Negative for brief paralysis, difficulty with concentration, excessive daytime sleepiness, dizziness, focal weakness, headaches, light-headedness, seizures, vertigo and weakness.   Psychiatric/Behavioral:  Negative for altered mental status and depression.    Allergic/Immunologic: Negative for persistent infections.      Objective:There were no vitals taken for this visit.            Physical Exam  Constitutional:       Appearance: Normal appearance. She  is well-developed. She is obese.   HENT:      Head: Normocephalic.      Right Ear: External ear normal.      Left Ear: External ear normal.      Nose: Nose normal.   Eyes:      General: No scleral icterus.     Conjunctiva/sclera: Conjunctivae normal.      Pupils: Pupils are equal, round, and reactive to light.   Neck:      Thyroid: No thyromegaly.      Vascular: No JVD.      Trachea: No tracheal deviation.   Cardiovascular:      Rate and Rhythm: Normal rate and regular rhythm.      Pulses: Intact distal pulses.           Carotid pulses are 2+ on the right side with bruit and 2+ on the left side with bruit.       Dorsalis pedis pulses are 1+ on the right side and 0 on the left side.        Posterior tibial pulses are 1+ on the right side and 0 on the left side.      Heart sounds: Murmur heard.   High-pitched harsh early systolic murmur is present with a grade of 1/6 at the upper right sternal border and upper left sternal border radiating to the neck.     No friction rub. No gallop.      Comments: JVP to jaw  No edema  Pulmonary:      Effort: Pulmonary effort is normal. No respiratory distress.      Breath sounds: Normal breath sounds. No wheezing or rales.          Comments: rales  Chest:      Chest wall: No tenderness.   Abdominal:      General: Bowel sounds are normal. There is no distension.      Palpations: Abdomen is soft.      Tenderness: There is no abdominal tenderness. There is no guarding.   Musculoskeletal:         General: No tenderness. Normal range of motion.      Cervical back: Normal range of motion.   Lymphadenopathy:      Comments: Palpation of lymph nodes of neck and groin normal   Skin:     General: Skin is warm and dry.      Coloration: Skin is not pale.      Findings: No erythema or rash.      Comments: Palpation of skin normal   Neurological:      Mental Status: She is alert and oriented to person, place, and time.      Cranial Nerves: No cranial nerve deficit.      Motor: No abnormal muscle  tone.      Coordination: Coordination normal.   Psychiatric:         Behavior: Behavior normal.         Thought Content: Thought content normal.         Judgment: Judgment normal.       Assessment:       No diagnosis found.     Plan:       There are no diagnoses linked to this encounter.

## 2023-01-10 ENCOUNTER — TELEPHONE (OUTPATIENT)
Dept: CARDIOLOGY | Facility: CLINIC | Age: 88
End: 2023-01-10
Payer: MEDICARE

## 2023-01-10 NOTE — TELEPHONE ENCOUNTER
"Called and spoke to Kesha, daughter.  She stated that patient is "adamant" that she does not want any procedure done on her heart.  Clinic and cath both cancelled.  Will call back if she changes her mind.       ----- Message from Juan Ramon Thorne sent at 1/10/2023 12:51 PM CST -----  Regarding: Appt  PT called to cancel procedure an appts.      Thanks     "

## 2023-05-11 NOTE — PROGRESS NOTES
05/12/2023    Radiation Oncology Follow-Up Visit    Prior Radiation History:    Site  Technique  Energy  Dose/Fx (Gy)  #Fx  Total Dose (Gy)  Start Date  End Date  Elapsed Days    RUL Lung  SBRT  6X  12  4 / 4  48  8/16/2021 8/25/2021  9        Assessment   This is an 88 y.o. y/o female with Stage IA3 (cT1c cN0 M0) RUL NSCLC (adeno) diagnosed on biopsy 6/29/21. She completed definitive SBRT 48 Gy in 4 fx on 8/25/21.     No late toxicity from treatment. CT Chest today 5/12/23 demonstrates stable post-radiation changes in the RUL lung. No evidence of disease by my review.        Plan   1) I will see her back in 6 months with CT Chest prior for re-staging.        Chief Complaint   Patient presents with    Follow-up         HPI: Feeling well, denies any increased dyspnea on exertion, cough, or chest pain.     Past Medical History:   Diagnosis Date    Anticoagulant long-term use     Aortic valve stenosis     Arthritis     Cardiogenic shock 5/7/2021    CHF (congestive heart failure)     Diabetes mellitus     Hypercholesteremia     Hypertension     Pneumonia due to infectious organism 5/6/2021    Primary adenocarcinoma of upper lobe of right lung 7/19/2021    Thyroid disease        Past Surgical History:   Procedure Laterality Date    CATARACT EXTRACTION, BILATERAL      EYE SURGERY      FOOT FRACTURE SURGERY Right     HYSTERECTOMY         Social History     Tobacco Use    Smoking status: Never    Smokeless tobacco: Never   Substance Use Topics    Alcohol use: No    Drug use: No       Cancer-related family history is not on file.    Current Outpatient Medications on File Prior to Visit   Medication Sig Dispense Refill    amiodarone (PACERONE) 200 MG Tab Take 1 tablet by mouth once daily 90 tablet 3    apixaban (ELIQUIS) 5 mg Tab Take 1 tablet (5 mg total) by mouth 2 (two) times daily. 180 tablet 3    atorvastatin (LIPITOR) 40 MG tablet Take 1 tablet (40 mg total) by mouth once daily. 30 tablet 6    carvediloL (COREG) 6.25  "MG tablet Take 0.5 tablets (3.125 mg total) by mouth 2 (two) times daily with meals. 30 tablet 11    citalopram (CELEXA) 20 MG tablet Take 20 mg by mouth once daily.      dapagliflozin (FARXIGA) 5 mg Tab tablet Take 1 tablet (5 mg total) by mouth once daily. 90 tablet 1    furosemide (LASIX) 40 MG tablet Take 1 tablet (40 mg total) by mouth 2 (two) times daily. 60 tablet 11    levothyroxine (SYNTHROID) 75 MCG tablet Take 75 mcg by mouth before breakfast.       meclizine (ANTIVERT) 25 mg tablet Take 25 mg by mouth once daily.       metformin (GLUCOPHAGE) 500 MG tablet Take 1,000 mg by mouth 2 (two) times daily with meals.       TRESIBA FLEXTOUCH U-100 100 unit/mL (3 mL) InPn Inject 18 Units into the skin every evening. 3 Syringe 3    valsartan (DIOVAN) 160 MG tablet Take 1/2 (one-half) tablet by mouth once daily 45 tablet 3     No current facility-administered medications on file prior to visit.       Review of patient's allergies indicates:  No Known Allergies    Vital Signs: BP (!) 124/57 (BP Location: Right arm, Patient Position: Sitting)   Pulse 64   Temp 96.8 °F (36 °C)   Resp 16   Ht 4' 11" (1.499 m)   Wt 67 kg (147 lb 11.3 oz)   SpO2 (!) 90%   BMI 29.83 kg/m²     ECOG Performance Status: 2 - Ambulates, capable of self care only    Physical Exam  Vitals reviewed.   Constitutional:       Appearance: Normal appearance.      Comments: Frail appearing   HENT:      Head: Normocephalic and atraumatic.   Eyes:      General: No scleral icterus.     Extraocular Movements: Extraocular movements intact.   Pulmonary:      Effort: No accessory muscle usage or respiratory distress.   Abdominal:      General: There is no distension.   Musculoskeletal:         General: Normal range of motion.      Cervical back: Normal range of motion and neck supple.   Lymphadenopathy:      Cervical: No cervical adenopathy.   Skin:     General: Skin is dry.      Coloration: Skin is not jaundiced.   Neurological:      Mental Status: She " is alert and oriented to person, place, and time.      Cranial Nerves: No cranial nerve deficit.   Psychiatric:         Mood and Affect: Mood and affect normal.         Judgment: Judgment normal.        Labs:    Imaging: I have personally reviewed the patient's available images and reports and summarized pertinent findings above in HPI.     Pathology: No new path

## 2023-05-12 ENCOUNTER — OFFICE VISIT (OUTPATIENT)
Dept: RADIATION ONCOLOGY | Facility: CLINIC | Age: 88
End: 2023-05-12
Payer: MEDICARE

## 2023-05-12 ENCOUNTER — HOSPITAL ENCOUNTER (OUTPATIENT)
Dept: RADIOLOGY | Facility: HOSPITAL | Age: 88
Discharge: HOME | End: 2023-05-12
Attending: RADIOLOGY
Payer: MEDICARE

## 2023-05-12 VITALS
BODY MASS INDEX: 29.77 KG/M2 | WEIGHT: 147.69 LBS | HEIGHT: 59 IN | TEMPERATURE: 97 F | OXYGEN SATURATION: 90 % | RESPIRATION RATE: 16 BRPM | DIASTOLIC BLOOD PRESSURE: 57 MMHG | HEART RATE: 64 BPM | SYSTOLIC BLOOD PRESSURE: 124 MMHG

## 2023-05-12 DIAGNOSIS — Z85.118 PERSONAL HISTORY OF LUNG CANCER: ICD-10-CM

## 2023-05-12 DIAGNOSIS — Z85.118 PERSONAL HISTORY OF LUNG CANCER: Primary | ICD-10-CM

## 2023-05-12 PROCEDURE — 71250 CT THORAX DX C-: CPT | Mod: 26,,, | Performed by: RADIOLOGY

## 2023-05-12 PROCEDURE — 1159F MED LIST DOCD IN RCRD: CPT | Mod: CPTII,S$GLB,, | Performed by: RADIOLOGY

## 2023-05-12 PROCEDURE — 99213 OFFICE O/P EST LOW 20 MIN: CPT | Mod: S$GLB,,, | Performed by: RADIOLOGY

## 2023-05-12 PROCEDURE — 3288F PR FALLS RISK ASSESSMENT DOCUMENTED: ICD-10-PCS | Mod: CPTII,S$GLB,, | Performed by: RADIOLOGY

## 2023-05-12 PROCEDURE — 99999 PR PBB SHADOW E&M-EST. PATIENT-LVL IV: ICD-10-PCS | Mod: PBBFAC,,, | Performed by: RADIOLOGY

## 2023-05-12 PROCEDURE — 3288F FALL RISK ASSESSMENT DOCD: CPT | Mod: CPTII,S$GLB,, | Performed by: RADIOLOGY

## 2023-05-12 PROCEDURE — 1159F PR MEDICATION LIST DOCUMENTED IN MEDICAL RECORD: ICD-10-PCS | Mod: CPTII,S$GLB,, | Performed by: RADIOLOGY

## 2023-05-12 PROCEDURE — 71250 CT CHEST WITHOUT CONTRAST: ICD-10-PCS | Mod: 26,,, | Performed by: RADIOLOGY

## 2023-05-12 PROCEDURE — 1126F PR PAIN SEVERITY QUANTIFIED, NO PAIN PRESENT: ICD-10-PCS | Mod: CPTII,S$GLB,, | Performed by: RADIOLOGY

## 2023-05-12 PROCEDURE — 1101F PR PT FALLS ASSESS DOC 0-1 FALLS W/OUT INJ PAST YR: ICD-10-PCS | Mod: CPTII,S$GLB,, | Performed by: RADIOLOGY

## 2023-05-12 PROCEDURE — 1126F AMNT PAIN NOTED NONE PRSNT: CPT | Mod: CPTII,S$GLB,, | Performed by: RADIOLOGY

## 2023-05-12 PROCEDURE — 1101F PT FALLS ASSESS-DOCD LE1/YR: CPT | Mod: CPTII,S$GLB,, | Performed by: RADIOLOGY

## 2023-05-12 PROCEDURE — 99213 PR OFFICE/OUTPT VISIT, EST, LEVL III, 20-29 MIN: ICD-10-PCS | Mod: S$GLB,,, | Performed by: RADIOLOGY

## 2023-05-12 PROCEDURE — 71250 CT THORAX DX C-: CPT | Mod: TC

## 2023-05-12 PROCEDURE — 99999 PR PBB SHADOW E&M-EST. PATIENT-LVL IV: CPT | Mod: PBBFAC,,, | Performed by: RADIOLOGY

## 2023-06-27 DIAGNOSIS — I10 ESSENTIAL HYPERTENSION: ICD-10-CM

## 2023-06-27 DIAGNOSIS — I50.20 HEART FAILURE WITH REDUCED EJECTION FRACTION: ICD-10-CM

## 2023-06-27 RX ORDER — FUROSEMIDE 40 MG/1
40 TABLET ORAL 2 TIMES DAILY
Qty: 180 TABLET | Refills: 3 | Status: SHIPPED | OUTPATIENT
Start: 2023-06-27

## 2023-06-29 DIAGNOSIS — I10 ESSENTIAL HYPERTENSION: ICD-10-CM

## 2023-06-29 RX ORDER — CARVEDILOL 6.25 MG/1
TABLET ORAL
Qty: 90 TABLET | Refills: 3 | Status: SHIPPED | OUTPATIENT
Start: 2023-06-29

## 2023-08-08 ENCOUNTER — OFFICE VISIT (OUTPATIENT)
Dept: CARDIOLOGY | Facility: CLINIC | Age: 88
End: 2023-08-08
Payer: MEDICARE

## 2023-08-08 VITALS
HEART RATE: 56 BPM | WEIGHT: 146.63 LBS | HEIGHT: 59 IN | DIASTOLIC BLOOD PRESSURE: 56 MMHG | OXYGEN SATURATION: 90 % | SYSTOLIC BLOOD PRESSURE: 111 MMHG | BODY MASS INDEX: 29.56 KG/M2

## 2023-08-08 DIAGNOSIS — E11.9 TYPE 2 DIABETES MELLITUS WITHOUT COMPLICATION, WITHOUT LONG-TERM CURRENT USE OF INSULIN: Chronic | ICD-10-CM

## 2023-08-08 DIAGNOSIS — I48.91 ATRIAL FIBRILLATION, UNSPECIFIED TYPE: Chronic | ICD-10-CM

## 2023-08-08 DIAGNOSIS — E78.00 PURE HYPERCHOLESTEROLEMIA: Chronic | ICD-10-CM

## 2023-08-08 DIAGNOSIS — I48.0 PAROXYSMAL ATRIAL FIBRILLATION: ICD-10-CM

## 2023-08-08 DIAGNOSIS — I50.20 HEART FAILURE WITH REDUCED EJECTION FRACTION: Primary | Chronic | ICD-10-CM

## 2023-08-08 DIAGNOSIS — Z79.01 LONG TERM (CURRENT) USE OF ANTICOAGULANTS: ICD-10-CM

## 2023-08-08 DIAGNOSIS — C34.11 PRIMARY ADENOCARCINOMA OF UPPER LOBE OF RIGHT LUNG: Chronic | ICD-10-CM

## 2023-08-08 DIAGNOSIS — I10 ESSENTIAL HYPERTENSION: Chronic | ICD-10-CM

## 2023-08-08 DIAGNOSIS — I35.0 SEVERE AORTIC STENOSIS: Chronic | ICD-10-CM

## 2023-08-08 DIAGNOSIS — I70.0 ATHEROSCLEROSIS OF AORTA: Chronic | ICD-10-CM

## 2023-08-08 PROCEDURE — 99999 PR PBB SHADOW E&M-EST. PATIENT-LVL III: ICD-10-PCS | Mod: PBBFAC,,, | Performed by: INTERNAL MEDICINE

## 2023-08-08 PROCEDURE — 3288F FALL RISK ASSESSMENT DOCD: CPT | Mod: CPTII,S$GLB,, | Performed by: INTERNAL MEDICINE

## 2023-08-08 PROCEDURE — 1126F PR PAIN SEVERITY QUANTIFIED, NO PAIN PRESENT: ICD-10-PCS | Mod: CPTII,S$GLB,, | Performed by: INTERNAL MEDICINE

## 2023-08-08 PROCEDURE — 1159F MED LIST DOCD IN RCRD: CPT | Mod: CPTII,S$GLB,, | Performed by: INTERNAL MEDICINE

## 2023-08-08 PROCEDURE — 1101F PT FALLS ASSESS-DOCD LE1/YR: CPT | Mod: CPTII,S$GLB,, | Performed by: INTERNAL MEDICINE

## 2023-08-08 PROCEDURE — 1126F AMNT PAIN NOTED NONE PRSNT: CPT | Mod: CPTII,S$GLB,, | Performed by: INTERNAL MEDICINE

## 2023-08-08 PROCEDURE — 1159F PR MEDICATION LIST DOCUMENTED IN MEDICAL RECORD: ICD-10-PCS | Mod: CPTII,S$GLB,, | Performed by: INTERNAL MEDICINE

## 2023-08-08 PROCEDURE — 99999 PR PBB SHADOW E&M-EST. PATIENT-LVL III: CPT | Mod: PBBFAC,,, | Performed by: INTERNAL MEDICINE

## 2023-08-08 PROCEDURE — 99213 OFFICE O/P EST LOW 20 MIN: CPT | Mod: S$GLB,,, | Performed by: INTERNAL MEDICINE

## 2023-08-08 PROCEDURE — 99213 PR OFFICE/OUTPT VISIT, EST, LEVL III, 20-29 MIN: ICD-10-PCS | Mod: S$GLB,,, | Performed by: INTERNAL MEDICINE

## 2023-08-08 PROCEDURE — 1101F PR PT FALLS ASSESS DOC 0-1 FALLS W/OUT INJ PAST YR: ICD-10-PCS | Mod: CPTII,S$GLB,, | Performed by: INTERNAL MEDICINE

## 2023-08-08 PROCEDURE — 3288F PR FALLS RISK ASSESSMENT DOCUMENTED: ICD-10-PCS | Mod: CPTII,S$GLB,, | Performed by: INTERNAL MEDICINE

## 2023-08-08 NOTE — PROGRESS NOTES
Subjective:    Patient ID:  Stacia Perry is a 88 y.o. female who presents for follow-up of aortic stenosis, HFrEF adenocarcinoma of the lung    HPI    The patient is a 88 year old female with severe aortic stenosis and in the pre-TAVAR evaluation was found to have a lung mass that on biopsy noted to be adenocarcinoma. She was evaluated by Oncology. She was deemed not a surgical candidate and stereotactic body radiotherapy (SBRT).She was seen by Dr Kaur with Radiation Oncology 11/1122 and reported  that at this point she has no evidence of disease and should be okay for any cardiac procedures .She was found stable 5/12/23 by RadiationOncology. She was admitted 11/7/22 with exacerbation of CHF. She is now doing well and reports no SOB, TORRES on usual life activities and not edema. She decided not to peruse TAVAR.  Lab Results   Component Value Date     11/09/2022    K 3.2 (L) 11/09/2022     11/09/2022    CO2 27 11/09/2022    BUN 16 11/09/2022    CREATININE 0.8 11/09/2022     (H) 11/09/2022    HGBA1C 7.6 (H) 11/07/2022    MG 2.0 11/09/2022    AST 37 11/09/2022    ALT 81 (H) 11/09/2022    ALBUMIN 2.7 (L) 11/09/2022    PROT 6.0 11/09/2022    BILITOT 0.6 11/09/2022    WBC 5.79 11/09/2022    HGB 12.3 11/09/2022    HCT 39.6 11/09/2022    MCV 95 11/09/2022     11/09/2022    INR 1.1 11/07/2022    TSH 1.488 11/08/2022         Lab Results   Component Value Date    CHOL 90 (L) 11/08/2022    HDL 24 (L) 11/08/2022    TRIG 157 (H) 11/08/2022       Lab Results   Component Value Date    LDLCALC 34.6 (L) 11/08/2022       Past Medical History:   Diagnosis Date    Anticoagulant long-term use     Aortic valve stenosis     Arthritis     Cardiogenic shock 5/7/2021    CHF (congestive heart failure)     Diabetes mellitus     Hypercholesteremia     Hypertension     Pneumonia due to infectious organism 5/6/2021    Primary adenocarcinoma of upper lobe of right lung 7/19/2021    Thyroid disease        Current  Outpatient Medications:     amiodarone (PACERONE) 200 MG Tab, Take 1 tablet by mouth once daily, Disp: 90 tablet, Rfl: 3    apixaban (ELIQUIS) 5 mg Tab, Take 1 tablet (5 mg total) by mouth 2 (two) times daily., Disp: 180 tablet, Rfl: 3    atorvastatin (LIPITOR) 40 MG tablet, Take 1 tablet (40 mg total) by mouth once daily., Disp: 30 tablet, Rfl: 6    carvediloL (COREG) 6.25 MG tablet, TAKE 1/2 (ONE-HALF) TABLET BY MOUTH TWICE DAILY WITH MEALS, Disp: 90 tablet, Rfl: 3    citalopram (CELEXA) 20 MG tablet, Take 20 mg by mouth once daily., Disp: , Rfl:     dapagliflozin (FARXIGA) 5 mg Tab tablet, Take 1 tablet (5 mg total) by mouth once daily., Disp: 90 tablet, Rfl: 1    furosemide (LASIX) 40 MG tablet, Take 1 tablet (40 mg total) by mouth 2 (two) times daily., Disp: 180 tablet, Rfl: 3    levothyroxine (SYNTHROID) 75 MCG tablet, Take 75 mcg by mouth before breakfast. , Disp: , Rfl:     meclizine (ANTIVERT) 25 mg tablet, Take 25 mg by mouth once daily. , Disp: , Rfl:     metformin (GLUCOPHAGE) 500 MG tablet, Take 1,000 mg by mouth 2 (two) times daily with meals. , Disp: , Rfl:     TRESIBA FLEXTOUCH U-100 100 unit/mL (3 mL) InPn, Inject 18 Units into the skin every evening., Disp: 3 Syringe, Rfl: 3    valsartan (DIOVAN) 160 MG tablet, Take 1/2 (one-half) tablet by mouth once daily, Disp: 45 tablet, Rfl: 3          Review of Systems   Constitutional: Negative for decreased appetite, diaphoresis, fever, malaise/fatigue, weight gain and weight loss.   HENT:  Negative for congestion, ear discharge, ear pain and nosebleeds.    Eyes:  Negative for blurred vision, double vision and visual disturbance.   Cardiovascular:  Negative for chest pain, claudication, cyanosis, dyspnea on exertion, irregular heartbeat, leg swelling, near-syncope, orthopnea, palpitations, paroxysmal nocturnal dyspnea and syncope.   Respiratory:  Negative for cough, hemoptysis, shortness of breath, sleep disturbances due to breathing, snoring, sputum  "production and wheezing.    Endocrine: Negative for polydipsia, polyphagia and polyuria.   Hematologic/Lymphatic: Negative for adenopathy and bleeding problem. Does not bruise/bleed easily.   Skin:  Negative for color change, nail changes, poor wound healing and rash.   Musculoskeletal:  Negative for muscle cramps and muscle weakness.   Gastrointestinal:  Negative for abdominal pain, anorexia, change in bowel habit, hematochezia, nausea and vomiting.   Genitourinary:  Negative for dysuria, frequency and hematuria.   Neurological:  Negative for brief paralysis, difficulty with concentration, excessive daytime sleepiness, dizziness, focal weakness, headaches, light-headedness, seizures, vertigo and weakness.   Psychiatric/Behavioral:  Negative for altered mental status and depression.    Allergic/Immunologic: Negative for persistent infections.        Objective:BP (!) 111/56   Pulse (!) 56   Ht 4' 11" (1.499 m)   Wt 66.5 kg (146 lb 9.7 oz)   SpO2 (!) 90%   BMI 29.61 kg/m²             Physical Exam  Constitutional:       Appearance: She is well-developed. She is obese.   HENT:      Head: Normocephalic.      Right Ear: External ear normal.      Left Ear: External ear normal.      Nose: Nose normal.   Eyes:      General: No scleral icterus.     Conjunctiva/sclera: Conjunctivae normal.      Pupils: Pupils are equal, round, and reactive to light.   Neck:      Thyroid: No thyromegaly.      Vascular: No JVD.      Trachea: No tracheal deviation.   Cardiovascular:      Rate and Rhythm: Normal rate and regular rhythm.      Pulses: Intact distal pulses.           Carotid pulses are 1+ on the right side with bruit and 1+ on the left side with bruit.     Heart sounds: Murmur heard.      Harsh midsystolic murmur is present with a grade of 2/6 at the upper right sternal border and upper left sternal border radiating to the neck.      No friction rub. No gallop.      Comments: No edema  JVP normal  Pulmonary:      Effort: " Pulmonary effort is normal. No respiratory distress.      Breath sounds: Normal breath sounds. No wheezing or rales.   Chest:      Chest wall: No tenderness.   Abdominal:      General: Bowel sounds are normal. There is no distension.      Palpations: Abdomen is soft.      Tenderness: There is no abdominal tenderness. There is no guarding.   Musculoskeletal:         General: No tenderness. Normal range of motion.      Cervical back: Normal range of motion.   Lymphadenopathy:      Comments: Palpation of lymph nodes of neck and groin normal   Skin:     General: Skin is warm and dry.      Coloration: Skin is not pale.      Findings: No erythema or rash.      Comments: Palpation of skin normal   Neurological:      Mental Status: She is alert and oriented to person, place, and time.      Cranial Nerves: No cranial nerve deficit.      Motor: No abnormal muscle tone.      Coordination: Coordination normal.   Psychiatric:         Behavior: Behavior normal.         Thought Content: Thought content normal.         Judgment: Judgment normal.         Assessment:       1. Heart failure with reduced ejection fraction    2. Severe aortic stenosis    3. Atherosclerosis of aorta    4. Essential hypertension    5. Pure hypercholesterolemia    6. Atrial fibrillation, unspecified type    7. Paroxysmal atrial fibrillation    8. Primary adenocarcinoma of upper lobe of right lung    9. Type 2 diabetes mellitus without complication, without long-term current use of insulin    10. Long term (current) use of anticoagulants         Plan:       Stacia was seen today for follow-up.    Diagnoses and all orders for this visit:    Heart failure with reduced ejection fraction  -     Comprehensive Metabolic Panel; Future; Expected date: 02/07/2024    Severe aortic stenosis    Atherosclerosis of aorta    Essential hypertension    Pure hypercholesterolemia    Atrial fibrillation, unspecified type    Paroxysmal atrial fibrillation    Primary  adenocarcinoma of upper lobe of right lung    Type 2 diabetes mellitus without complication, without long-term current use of insulin  -     Comprehensive Metabolic Panel; Future; Expected date: 02/07/2024    Long term (current) use of anticoagulants  -     CBC Auto Differential; Future; Expected date: 02/07/2024

## 2023-11-17 ENCOUNTER — HOSPITAL ENCOUNTER (OUTPATIENT)
Dept: RADIOLOGY | Facility: HOSPITAL | Age: 88
Discharge: HOME | End: 2023-11-17
Attending: RADIOLOGY
Payer: MEDICARE

## 2023-11-17 ENCOUNTER — OFFICE VISIT (OUTPATIENT)
Dept: RADIATION ONCOLOGY | Facility: CLINIC | Age: 88
End: 2023-11-17
Payer: MEDICARE

## 2023-11-17 VITALS
BODY MASS INDEX: 31.37 KG/M2 | WEIGHT: 155.63 LBS | HEIGHT: 59 IN | HEART RATE: 58 BPM | DIASTOLIC BLOOD PRESSURE: 61 MMHG | OXYGEN SATURATION: 92 % | SYSTOLIC BLOOD PRESSURE: 124 MMHG | TEMPERATURE: 97 F | RESPIRATION RATE: 19 BRPM

## 2023-11-17 DIAGNOSIS — Z85.118 PERSONAL HISTORY OF LUNG CANCER: ICD-10-CM

## 2023-11-17 DIAGNOSIS — Z85.118 PERSONAL HISTORY OF LUNG CANCER: Primary | ICD-10-CM

## 2023-11-17 PROCEDURE — 1101F PT FALLS ASSESS-DOCD LE1/YR: CPT | Mod: CPTII,S$GLB,, | Performed by: RADIOLOGY

## 2023-11-17 PROCEDURE — 1159F PR MEDICATION LIST DOCUMENTED IN MEDICAL RECORD: ICD-10-PCS | Mod: CPTII,S$GLB,, | Performed by: RADIOLOGY

## 2023-11-17 PROCEDURE — 71250 CT THORAX DX C-: CPT | Mod: 26,,, | Performed by: RADIOLOGY

## 2023-11-17 PROCEDURE — 99999 PR PBB SHADOW E&M-EST. PATIENT-LVL III: ICD-10-PCS | Mod: PBBFAC,,, | Performed by: RADIOLOGY

## 2023-11-17 PROCEDURE — 1159F MED LIST DOCD IN RCRD: CPT | Mod: CPTII,S$GLB,, | Performed by: RADIOLOGY

## 2023-11-17 PROCEDURE — 3288F PR FALLS RISK ASSESSMENT DOCUMENTED: ICD-10-PCS | Mod: CPTII,S$GLB,, | Performed by: RADIOLOGY

## 2023-11-17 PROCEDURE — 3288F FALL RISK ASSESSMENT DOCD: CPT | Mod: CPTII,S$GLB,, | Performed by: RADIOLOGY

## 2023-11-17 PROCEDURE — 99213 PR OFFICE/OUTPT VISIT, EST, LEVL III, 20-29 MIN: ICD-10-PCS | Mod: S$GLB,,, | Performed by: RADIOLOGY

## 2023-11-17 PROCEDURE — 71250 CT CHEST WITHOUT CONTRAST: ICD-10-PCS | Mod: 26,,, | Performed by: RADIOLOGY

## 2023-11-17 PROCEDURE — 99999 PR PBB SHADOW E&M-EST. PATIENT-LVL III: CPT | Mod: PBBFAC,,, | Performed by: RADIOLOGY

## 2023-11-17 PROCEDURE — 1101F PR PT FALLS ASSESS DOC 0-1 FALLS W/OUT INJ PAST YR: ICD-10-PCS | Mod: CPTII,S$GLB,, | Performed by: RADIOLOGY

## 2023-11-17 PROCEDURE — 99213 OFFICE O/P EST LOW 20 MIN: CPT | Mod: S$GLB,,, | Performed by: RADIOLOGY

## 2023-11-17 PROCEDURE — 71250 CT THORAX DX C-: CPT | Mod: TC

## 2023-11-17 NOTE — PROGRESS NOTES
11/17/2023    Radiation Oncology Follow-Up Visit    Prior Radiation History:    Site  Technique  Energy  Dose/Fx (Gy)  #Fx  Total Dose (Gy)  Start Date  End Date  Elapsed Days    RUL Lung  SBRT  6X  12  4 / 4  48  8/16/2021 8/25/2021  9        Assessment   This is an 89 y.o. female with Stage IA3 (cT1c cN0 M0) RUL NSCLC (adeno) diagnosed on biopsy 6/29/21. She completed definitive SBRT 48 Gy in 4 fx on 8/25/21.     No late toxicity from treatment. CT Chest today 11/17/23 demonstrates post-radiation changes in RUL without evidence of new/recurrent disease by my review.        Plan   1) I will see her back in 6 months with re-staging CT Chest prior.          Chief Complaint: Follow up after lung SBRT      HPI: Has some dyspnea on exertion, stable. No chest pain, cough, or fevers. Doing well overall.     Past Medical History:   Diagnosis Date    Anticoagulant long-term use     Aortic valve stenosis     Arthritis     Cardiogenic shock 5/7/2021    CHF (congestive heart failure)     Diabetes mellitus     Hypercholesteremia     Hypertension     Pneumonia due to infectious organism 5/6/2021    Primary adenocarcinoma of upper lobe of right lung 7/19/2021    Thyroid disease        Past Surgical History:   Procedure Laterality Date    CATARACT EXTRACTION, BILATERAL      EYE SURGERY      FOOT FRACTURE SURGERY Right     HYSTERECTOMY         Social History     Tobacco Use    Smoking status: Never    Smokeless tobacco: Never   Substance Use Topics    Alcohol use: No    Drug use: No       Cancer-related family history is not on file.    Current Outpatient Medications on File Prior to Visit   Medication Sig Dispense Refill    amiodarone (PACERONE) 200 MG Tab Take 1 tablet by mouth once daily 90 tablet 3    apixaban (ELIQUIS) 5 mg Tab Take 1 tablet (5 mg total) by mouth 2 (two) times daily. 180 tablet 3    atorvastatin (LIPITOR) 40 MG tablet Take 1 tablet (40 mg total) by mouth once daily. 30 tablet 6    carvediloL (COREG) 6.25 MG  "tablet TAKE 1/2 (ONE-HALF) TABLET BY MOUTH TWICE DAILY WITH MEALS 90 tablet 3    citalopram (CELEXA) 20 MG tablet Take 20 mg by mouth once daily.      dapagliflozin (FARXIGA) 5 mg Tab tablet Take 1 tablet (5 mg total) by mouth once daily. 90 tablet 1    furosemide (LASIX) 40 MG tablet Take 1 tablet (40 mg total) by mouth 2 (two) times daily. 180 tablet 3    levothyroxine (SYNTHROID) 75 MCG tablet Take 75 mcg by mouth before breakfast.       meclizine (ANTIVERT) 25 mg tablet Take 25 mg by mouth once daily.       metformin (GLUCOPHAGE) 500 MG tablet Take 1,000 mg by mouth 2 (two) times daily with meals.       TRESIBA FLEXTOUCH U-100 100 unit/mL (3 mL) InPn Inject 18 Units into the skin every evening. 3 Syringe 3    valsartan (DIOVAN) 160 MG tablet Take 1/2 (one-half) tablet by mouth once daily 45 tablet 3     No current facility-administered medications on file prior to visit.       Review of patient's allergies indicates:  No Known Allergies    Vital Signs: /61 (BP Location: Left arm, Patient Position: Sitting)   Pulse (!) 58   Temp 97.4 °F (36.3 °C)   Resp 19   Ht 4' 11" (1.499 m)   Wt 70.6 kg (155 lb 10.3 oz)   SpO2 (!) 92%   BMI 31.44 kg/m²     ECOG Performance Status: 2 - Ambulates, capable of self care only    Physical Exam  Vitals reviewed.   Constitutional:       Appearance: Normal appearance.      Comments: Frail appearing   HENT:      Head: Normocephalic and atraumatic.   Eyes:      General: No scleral icterus.     Extraocular Movements: Extraocular movements intact.   Pulmonary:      Effort: No accessory muscle usage or respiratory distress.   Abdominal:      General: There is no distension.   Musculoskeletal:         General: Normal range of motion.      Cervical back: Normal range of motion and neck supple.   Lymphadenopathy:      Cervical: No cervical adenopathy.   Skin:     General: Skin is dry.      Coloration: Skin is not jaundiced.   Neurological:      Mental Status: She is alert and " oriented to person, place, and time.      Cranial Nerves: No cranial nerve deficit.   Psychiatric:         Mood and Affect: Mood and affect normal.         Judgment: Judgment normal.          Labs:    Imaging: I have personally reviewed the patient's available images and reports and summarized pertinent findings above in HPI.     Pathology: No new path

## 2024-01-01 ENCOUNTER — HOSPITAL ENCOUNTER (INPATIENT)
Facility: HOSPITAL | Age: 89
LOS: 1 days | DRG: 291 | End: 2024-11-27
Attending: EMERGENCY MEDICINE | Admitting: INTERNAL MEDICINE
Payer: MEDICARE

## 2024-01-01 ENCOUNTER — OFFICE VISIT (OUTPATIENT)
Dept: CARDIOLOGY | Facility: CLINIC | Age: 89
End: 2024-01-01
Payer: MEDICARE

## 2024-01-01 ENCOUNTER — DOCUMENTATION ONLY (OUTPATIENT)
Dept: CARDIOLOGY | Facility: CLINIC | Age: 89
End: 2024-01-01

## 2024-01-01 VITALS
HEART RATE: 63 BPM | SYSTOLIC BLOOD PRESSURE: 95 MMHG | OXYGEN SATURATION: 94 % | DIASTOLIC BLOOD PRESSURE: 52 MMHG | HEIGHT: 59 IN | WEIGHT: 140 LBS | BODY MASS INDEX: 28.22 KG/M2

## 2024-01-01 VITALS
TEMPERATURE: 98 F | BODY MASS INDEX: 28.63 KG/M2 | DIASTOLIC BLOOD PRESSURE: 56 MMHG | SYSTOLIC BLOOD PRESSURE: 85 MMHG | HEIGHT: 59 IN | WEIGHT: 142 LBS

## 2024-01-01 DIAGNOSIS — E11.9 TYPE 2 DIABETES MELLITUS WITHOUT COMPLICATION, WITHOUT LONG-TERM CURRENT USE OF INSULIN: Chronic | ICD-10-CM

## 2024-01-01 DIAGNOSIS — I35.0 SEVERE AORTIC STENOSIS: Primary | ICD-10-CM

## 2024-01-01 DIAGNOSIS — I50.43 ACUTE ON CHRONIC COMBINED SYSTOLIC AND DIASTOLIC CONGESTIVE HEART FAILURE: ICD-10-CM

## 2024-01-01 DIAGNOSIS — I50.9 ACUTE ON CHRONIC CONGESTIVE HEART FAILURE, UNSPECIFIED HEART FAILURE TYPE: ICD-10-CM

## 2024-01-01 DIAGNOSIS — R06.02 SOB (SHORTNESS OF BREATH): ICD-10-CM

## 2024-01-01 DIAGNOSIS — R57.0 CARDIOGENIC SHOCK: Primary | ICD-10-CM

## 2024-01-01 DIAGNOSIS — I35.0 SEVERE AORTIC STENOSIS: Chronic | ICD-10-CM

## 2024-01-01 DIAGNOSIS — I50.43 ACUTE ON CHRONIC COMBINED SYSTOLIC AND DIASTOLIC CONGESTIVE HEART FAILURE: Primary | ICD-10-CM

## 2024-01-01 DIAGNOSIS — R00.1 BRADYCARDIA: ICD-10-CM

## 2024-01-01 DIAGNOSIS — E78.00 PURE HYPERCHOLESTEROLEMIA: Chronic | ICD-10-CM

## 2024-01-01 DIAGNOSIS — I95.9 HYPOTENSION: ICD-10-CM

## 2024-01-01 LAB
ABO + RH BLD: NORMAL
ALBUMIN SERPL BCP-MCNC: 3.1 G/DL (ref 3.5–5.2)
ALBUMIN SERPL BCP-MCNC: 3.5 G/DL (ref 3.5–5.2)
ALLENS TEST: ABNORMAL
ALP SERPL-CCNC: 105 U/L (ref 40–150)
ALP SERPL-CCNC: 114 U/L (ref 40–150)
ALT SERPL W/O P-5'-P-CCNC: 43 U/L (ref 10–44)
ALT SERPL W/O P-5'-P-CCNC: 47 U/L (ref 10–44)
ANION GAP SERPL CALC-SCNC: 10 MMOL/L (ref 8–16)
ANION GAP SERPL CALC-SCNC: 10 MMOL/L (ref 8–16)
APTT PPP: 32.9 SEC (ref 21–32)
AST SERPL-CCNC: 44 U/L (ref 10–40)
AST SERPL-CCNC: 50 U/L (ref 10–40)
AV AREA BY CONTINUOUS VTI: 0.3 CM2
AV INDEX (PROSTH): 0.1
AV LVOT MEAN GRADIENT: 1 MMHG
AV LVOT PEAK GRADIENT: 1 MMHG
AV MEAN GRADIENT: 66.7 MMHG
AV PEAK GRADIENT: 96 MMHG
AV VALVE AREA BY VELOCITY RATIO: 0.4 CM²
AV VALVE AREA: 0.3 CM2
AV VELOCITY RATIO: 0.1
BACTERIA #/AREA URNS AUTO: ABNORMAL /HPF
BASOPHILS # BLD AUTO: 0.04 K/UL (ref 0–0.2)
BASOPHILS # BLD AUTO: 0.05 K/UL (ref 0–0.2)
BASOPHILS NFR BLD: 0.4 % (ref 0–1.9)
BASOPHILS NFR BLD: 0.5 % (ref 0–1.9)
BILIRUB SERPL-MCNC: 0.8 MG/DL (ref 0.1–1)
BILIRUB SERPL-MCNC: 0.9 MG/DL (ref 0.1–1)
BILIRUB UR QL STRIP: NEGATIVE
BLD GP AB SCN CELLS X3 SERPL QL: NORMAL
BNP SERPL-MCNC: 3162 PG/ML (ref 0–99)
BSA FOR ECHO PROCEDURE: 1.64 M2
BUN SERPL-MCNC: 56 MG/DL (ref 8–23)
BUN SERPL-MCNC: 64 MG/DL (ref 8–23)
CALCIUM SERPL-MCNC: 9.3 MG/DL (ref 8.7–10.5)
CALCIUM SERPL-MCNC: 9.3 MG/DL (ref 8.7–10.5)
CHLORIDE SERPL-SCNC: 104 MMOL/L (ref 95–110)
CHLORIDE SERPL-SCNC: 105 MMOL/L (ref 95–110)
CLARITY UR REFRACT.AUTO: ABNORMAL
CO2 SERPL-SCNC: 20 MMOL/L (ref 23–29)
CO2 SERPL-SCNC: 20 MMOL/L (ref 23–29)
COLOR UR AUTO: YELLOW
CREAT SERPL-MCNC: 2.6 MG/DL (ref 0.5–1.4)
CREAT SERPL-MCNC: 2.6 MG/DL (ref 0.5–1.4)
CV ECHO LV RWT: 0.38 CM
DELSYS: ABNORMAL
DELSYS: ABNORMAL
DIFFERENTIAL METHOD BLD: ABNORMAL
DIFFERENTIAL METHOD BLD: ABNORMAL
DOP CALC AO PEAK VEL: 4.9 M/S
DOP CALC AO VTI: 121.2 CM
DOP CALC LVOT AREA: 3.5 CM2
DOP CALC LVOT DIAMETER: 2.1 CM
DOP CALC LVOT PEAK VEL: 0.5 M/S
DOP CALC LVOT STROKE VOLUME: 41.5 CM3
DOP CALCLVOT PEAK VEL VTI: 12 CM
E WAVE DECELERATION TIME: 155.33 MS
E WAVE DECELERATION TIME: 159.52 MS
E/A RATIO: 5.53
E/E' RATIO: 19.53 M/S
ECHO EF ESTIMATED: 39 %
ECHO LV POSTERIOR WALL: 1 CM (ref 0.6–1.1)
EJECTION FRACTION: 18 %
EOSINOPHIL # BLD AUTO: 0 K/UL (ref 0–0.5)
EOSINOPHIL # BLD AUTO: 0 K/UL (ref 0–0.5)
EOSINOPHIL NFR BLD: 0.2 % (ref 0–8)
EOSINOPHIL NFR BLD: 0.4 % (ref 0–8)
ERYTHROCYTE [DISTWIDTH] IN BLOOD BY AUTOMATED COUNT: 17.8 % (ref 11.5–14.5)
ERYTHROCYTE [DISTWIDTH] IN BLOOD BY AUTOMATED COUNT: 18.3 % (ref 11.5–14.5)
EST. GFR  (NO RACE VARIABLE): 17 ML/MIN/1.73 M^2
EST. GFR  (NO RACE VARIABLE): 17 ML/MIN/1.73 M^2
FLOW: 15
FLOW: 4
FRACTIONAL SHORTENING: 19.2 % (ref 28–44)
GLUCOSE SERPL-MCNC: 119 MG/DL (ref 70–110)
GLUCOSE SERPL-MCNC: 136 MG/DL (ref 70–110)
GLUCOSE UR QL STRIP: ABNORMAL
HCO3 UR-SCNC: 19.2 MMOL/L (ref 24–28)
HCO3 UR-SCNC: 20.4 MMOL/L (ref 24–28)
HCT VFR BLD AUTO: 39 % (ref 37–48.5)
HCT VFR BLD AUTO: 41.5 % (ref 37–48.5)
HCV AB SERPL QL IA: NORMAL
HGB BLD-MCNC: 12.4 G/DL (ref 12–16)
HGB BLD-MCNC: 13.4 G/DL (ref 12–16)
HGB UR QL STRIP: NEGATIVE
HIV 1+2 AB+HIV1 P24 AG SERPL QL IA: NORMAL
HYALINE CASTS UR QL AUTO: 54 /LPF
IMM GRANULOCYTES # BLD AUTO: 0.05 K/UL (ref 0–0.04)
IMM GRANULOCYTES # BLD AUTO: 0.05 K/UL (ref 0–0.04)
IMM GRANULOCYTES NFR BLD AUTO: 0.5 % (ref 0–0.5)
IMM GRANULOCYTES NFR BLD AUTO: 0.5 % (ref 0–0.5)
INR PPP: 1.3 (ref 0.8–1.2)
INTERVENTRICULAR SEPTUM: 0.9 CM (ref 0.6–1.1)
IVC DIAMETER: 1.9 CM
KETONES UR QL STRIP: ABNORMAL
LA MAJOR: 6.5 CM
LA MINOR: 5.85 CM
LA WIDTH: 4.58 CM
LACTATE SERPL-SCNC: 2 MMOL/L (ref 0.5–2.2)
LACTATE SERPL-SCNC: 3.6 MMOL/L (ref 0.5–2.2)
LDH SERPL L TO P-CCNC: 2.95 MMOL/L (ref 0.5–2.2)
LDH SERPL L TO P-CCNC: 3.18 MMOL/L (ref 0.36–1.25)
LEFT ATRIUM SIZE: 3.58 CM
LEFT ATRIUM VOLUME INDEX: 54 ML/M2
LEFT ATRIUM VOLUME: 85.82 CM3
LEFT INTERNAL DIMENSION IN SYSTOLE: 4.2 CM (ref 2.1–4)
LEFT VENTRICLE DIASTOLIC VOLUME INDEX: 81.47 ML/M2
LEFT VENTRICLE DIASTOLIC VOLUME: 129.53 ML
LEFT VENTRICLE MASS INDEX: 114.1 G/M2
LEFT VENTRICLE SYSTOLIC VOLUME INDEX: 49.9 ML/M2
LEFT VENTRICLE SYSTOLIC VOLUME: 79.29 ML
LEFT VENTRICULAR INTERNAL DIMENSION IN DIASTOLE: 5.2 CM (ref 3.5–6)
LEFT VENTRICULAR MASS: 181.4 G
LEUKOCYTE ESTERASE UR QL STRIP: NEGATIVE
LV LATERAL E/E' RATIO: 15.09 M/S
LV SEPTAL E/E' RATIO: 27.67 M/S
LYMPHOCYTES # BLD AUTO: 1.2 K/UL (ref 1–4.8)
LYMPHOCYTES # BLD AUTO: 1.2 K/UL (ref 1–4.8)
LYMPHOCYTES NFR BLD: 12.4 % (ref 18–48)
LYMPHOCYTES NFR BLD: 13 % (ref 18–48)
MAGNESIUM SERPL-MCNC: 2.6 MG/DL (ref 1.6–2.6)
MAGNESIUM SERPL-MCNC: 2.7 MG/DL (ref 1.6–2.6)
MCH RBC QN AUTO: 31.4 PG (ref 27–31)
MCH RBC QN AUTO: 31.8 PG (ref 27–31)
MCHC RBC AUTO-ENTMCNC: 31.8 G/DL (ref 32–36)
MCHC RBC AUTO-ENTMCNC: 32.3 G/DL (ref 32–36)
MCV RBC AUTO: 99 FL (ref 82–98)
MCV RBC AUTO: 99 FL (ref 82–98)
MICROSCOPIC COMMENT: ABNORMAL
MODE: ABNORMAL
MODE: ABNORMAL
MONOCYTES # BLD AUTO: 1.1 K/UL (ref 0.3–1)
MONOCYTES # BLD AUTO: 1.3 K/UL (ref 0.3–1)
MONOCYTES NFR BLD: 11.7 % (ref 4–15)
MONOCYTES NFR BLD: 13.7 % (ref 4–15)
MV PEAK A VEL: 0.3 M/S
MV PEAK E VEL: 1.66 M/S
NEUTROPHILS # BLD AUTO: 6.7 K/UL (ref 1.8–7.7)
NEUTROPHILS # BLD AUTO: 7.2 K/UL (ref 1.8–7.7)
NEUTROPHILS NFR BLD: 72.2 % (ref 38–73)
NEUTROPHILS NFR BLD: 74.5 % (ref 38–73)
NITRITE UR QL STRIP: NEGATIVE
NRBC BLD-RTO: 0 /100 WBC
NRBC BLD-RTO: 0 /100 WBC
OHS CV RV/LV RATIO: 0.73 CM
OHS QRS DURATION: 150 MS
OHS QRS DURATION: 176 MS
OHS QRS DURATION: 184 MS
OHS QTC CALCULATION: 456 MS
OHS QTC CALCULATION: 563 MS
OHS QTC CALCULATION: 593 MS
PCO2 BLDA: 35.7 MMHG (ref 35–45)
PCO2 BLDA: 57.4 MMHG (ref 35–45)
PERICARDIUM ANTERIOR DIMENSION: 0.3 CM
PERICARDIUM POSTERIOR DIMENSION: 0.7 CM
PH SMN: 7.16 [PH] (ref 7.35–7.45)
PH SMN: 7.34 [PH] (ref 7.35–7.45)
PH UR STRIP: 5 [PH] (ref 5–8)
PHOSPHATE SERPL-MCNC: 5.4 MG/DL (ref 2.7–4.5)
PISA TR MAX VEL: 4.4 M/S
PLATELET # BLD AUTO: 213 K/UL (ref 150–450)
PLATELET # BLD AUTO: 237 K/UL (ref 150–450)
PMV BLD AUTO: 11.2 FL (ref 9.2–12.9)
PMV BLD AUTO: 11.5 FL (ref 9.2–12.9)
PO2 BLDA: 26 MMHG (ref 40–60)
PO2 BLDA: 51 MMHG (ref 80–100)
PO2 BLDA: 91 MMHG (ref 80–100)
POC BE: -7 MMOL/L
POC BE: -8 MMOL/L
POC SATURATED O2: 39 % (ref 95–100)
POC SATURATED O2: 74 % (ref 95–100)
POC SATURATED O2: 97 % (ref 95–100)
POC TCO2: 20 MMOL/L (ref 23–27)
POC TCO2: 22 MMOL/L (ref 23–27)
POCT GLUCOSE: 130 MG/DL (ref 70–110)
POCT GLUCOSE: 136 MG/DL (ref 70–110)
POCT GLUCOSE: 187 MG/DL (ref 70–110)
POTASSIUM SERPL-SCNC: 7.5 MMOL/L (ref 3.5–5.1)
POTASSIUM SERPL-SCNC: 7.5 MMOL/L (ref 3.5–5.1)
PROCALCITONIN SERPL IA-MCNC: 0.07 NG/ML
PROT SERPL-MCNC: 6.6 G/DL (ref 6–8.4)
PROT SERPL-MCNC: 7.4 G/DL (ref 6–8.4)
PROT UR QL STRIP: ABNORMAL
PROTHROMBIN TIME: 14 SEC (ref 9–12.5)
PROVIDER CREDENTIALS: ABNORMAL
PROVIDER CREDENTIALS: ABNORMAL
PROVIDER NOTIFIED: ABNORMAL
PROVIDER NOTIFIED: ABNORMAL
RA MAJOR: 5.95 CM
RA PRESSURE ESTIMATED: 8 MMHG
RA WIDTH: 4.62 CM
RBC # BLD AUTO: 3.95 M/UL (ref 4–5.4)
RBC # BLD AUTO: 4.21 M/UL (ref 4–5.4)
RBC #/AREA URNS AUTO: 3 /HPF (ref 0–4)
RIGHT VENTRICLE DIASTOLIC BASEL DIMENSION: 3.8 CM
RV TB RVSP: 12 MMHG
SAMPLE: ABNORMAL
SINUS: 2.66 CM
SITE: ABNORMAL
SODIUM SERPL-SCNC: 134 MMOL/L (ref 136–145)
SODIUM SERPL-SCNC: 135 MMOL/L (ref 136–145)
SP GR UR STRIP: 1.02 (ref 1–1.03)
SP02: 97
SPECIMEN OUTDATE: NORMAL
SQUAMOUS #/AREA URNS AUTO: 3 /HPF
STJ: 3.01 CM
TDI LATERAL: 0.11 M/S
TDI SEPTAL: 0.06 M/S
TDI: 0.09 M/S
TIME NOTIFIED: 1520
TIME NOTIFIED: 1657
TR MAX PG: 77 MMHG
TROPONIN I SERPL DL<=0.01 NG/ML-MCNC: 0.04 NG/ML (ref 0–0.03)
TV PEAK GRADIENT: 77 MMHG
TV REST PULMONARY ARTERY PRESSURE: 85 MMHG
URN SPEC COLLECT METH UR: ABNORMAL
VERBAL RESULT READBACK PERFORMED: YES
VERBAL RESULT READBACK PERFORMED: YES
WBC # BLD AUTO: 9.33 K/UL (ref 3.9–12.7)
WBC # BLD AUTO: 9.69 K/UL (ref 3.9–12.7)
WBC #/AREA URNS AUTO: 1 /HPF (ref 0–5)
Z-SCORE OF LEFT VENTRICULAR DIMENSION IN END DIASTOLE: 1.36
Z-SCORE OF LEFT VENTRICULAR DIMENSION IN END SYSTOLE: 3.16

## 2024-01-01 PROCEDURE — 87040 BLOOD CULTURE FOR BACTERIA: CPT

## 2024-01-01 PROCEDURE — 63600175 PHARM REV CODE 636 W HCPCS

## 2024-01-01 PROCEDURE — 99900035 HC TECH TIME PER 15 MIN (STAT)

## 2024-01-01 PROCEDURE — 82803 BLOOD GASES ANY COMBINATION: CPT

## 2024-01-01 PROCEDURE — 83880 ASSAY OF NATRIURETIC PEPTIDE: CPT

## 2024-01-01 PROCEDURE — 93005 ELECTROCARDIOGRAM TRACING: CPT

## 2024-01-01 PROCEDURE — 83735 ASSAY OF MAGNESIUM: CPT

## 2024-01-01 PROCEDURE — 99999 PR PBB SHADOW E&M-EST. PATIENT-LVL IV: CPT | Mod: PBBFAC,,, | Performed by: INTERNAL MEDICINE

## 2024-01-01 PROCEDURE — 85730 THROMBOPLASTIN TIME PARTIAL: CPT

## 2024-01-01 PROCEDURE — 20000000 HC ICU ROOM

## 2024-01-01 PROCEDURE — 87389 HIV-1 AG W/HIV-1&-2 AB AG IA: CPT | Performed by: PHYSICIAN ASSISTANT

## 2024-01-01 PROCEDURE — 36600 WITHDRAWAL OF ARTERIAL BLOOD: CPT

## 2024-01-01 PROCEDURE — 84100 ASSAY OF PHOSPHORUS: CPT

## 2024-01-01 PROCEDURE — 25000003 PHARM REV CODE 250

## 2024-01-01 PROCEDURE — 83735 ASSAY OF MAGNESIUM: CPT | Mod: 91 | Performed by: INTERNAL MEDICINE

## 2024-01-01 PROCEDURE — 25000003 PHARM REV CODE 250: Performed by: EMERGENCY MEDICINE

## 2024-01-01 PROCEDURE — 36555 INSERT NON-TUNNEL CV CATH: CPT

## 2024-01-01 PROCEDURE — 84145 PROCALCITONIN (PCT): CPT

## 2024-01-01 PROCEDURE — 25000003 PHARM REV CODE 250: Mod: JZ,JG

## 2024-01-01 PROCEDURE — 25000003 PHARM REV CODE 250: Performed by: INTERNAL MEDICINE

## 2024-01-01 PROCEDURE — 83605 ASSAY OF LACTIC ACID: CPT | Mod: 91 | Performed by: INTERNAL MEDICINE

## 2024-01-01 PROCEDURE — 81001 URINALYSIS AUTO W/SCOPE: CPT

## 2024-01-01 PROCEDURE — 83605 ASSAY OF LACTIC ACID: CPT

## 2024-01-01 PROCEDURE — 63600175 PHARM REV CODE 636 W HCPCS: Performed by: INTERNAL MEDICINE

## 2024-01-01 PROCEDURE — 93010 ELECTROCARDIOGRAM REPORT: CPT | Mod: ,,, | Performed by: INTERNAL MEDICINE

## 2024-01-01 PROCEDURE — 86901 BLOOD TYPING SEROLOGIC RH(D): CPT | Performed by: EMERGENCY MEDICINE

## 2024-01-01 PROCEDURE — 96374 THER/PROPH/DIAG INJ IV PUSH: CPT

## 2024-01-01 PROCEDURE — 93010 ELECTROCARDIOGRAM REPORT: CPT | Mod: 76,,, | Performed by: INTERNAL MEDICINE

## 2024-01-01 PROCEDURE — 84484 ASSAY OF TROPONIN QUANT: CPT

## 2024-01-01 PROCEDURE — 86803 HEPATITIS C AB TEST: CPT | Performed by: PHYSICIAN ASSISTANT

## 2024-01-01 PROCEDURE — 63600175 PHARM REV CODE 636 W HCPCS: Performed by: EMERGENCY MEDICINE

## 2024-01-01 PROCEDURE — 25000242 PHARM REV CODE 250 ALT 637 W/ HCPCS

## 2024-01-01 PROCEDURE — 37799 UNLISTED PX VASCULAR SURGERY: CPT

## 2024-01-01 PROCEDURE — 99291 CRITICAL CARE FIRST HOUR: CPT | Mod: GC,,, | Performed by: INTERNAL MEDICINE

## 2024-01-01 PROCEDURE — 85025 COMPLETE CBC W/AUTO DIFF WBC: CPT

## 2024-01-01 PROCEDURE — 85025 COMPLETE CBC W/AUTO DIFF WBC: CPT | Mod: 91

## 2024-01-01 PROCEDURE — 80053 COMPREHEN METABOLIC PANEL: CPT | Mod: 91 | Performed by: INTERNAL MEDICINE

## 2024-01-01 PROCEDURE — 99223 1ST HOSP IP/OBS HIGH 75: CPT | Mod: GC,,, | Performed by: INTERNAL MEDICINE

## 2024-01-01 PROCEDURE — 99291 CRITICAL CARE FIRST HOUR: CPT | Mod: 25

## 2024-01-01 PROCEDURE — 80053 COMPREHEN METABOLIC PANEL: CPT

## 2024-01-01 PROCEDURE — 85610 PROTHROMBIN TIME: CPT

## 2024-01-01 PROCEDURE — 94640 AIRWAY INHALATION TREATMENT: CPT

## 2024-01-01 RX ORDER — NOREPINEPHRINE BITARTRATE 0.02 MG/ML
0-1 INJECTION, SOLUTION INTRAVENOUS CONTINUOUS
Status: DISCONTINUED | OUTPATIENT
Start: 2024-01-01 | End: 2024-11-28 | Stop reason: HOSPADM

## 2024-01-01 RX ORDER — NOREPINEPHRINE BITARTRATE 0.02 MG/ML
INJECTION, SOLUTION INTRAVENOUS
Status: COMPLETED
Start: 2024-01-01 | End: 2024-01-01

## 2024-01-01 RX ORDER — DOBUTAMINE HYDROCHLORIDE 400 MG/100ML
5 INJECTION INTRAVENOUS CONTINUOUS
Status: DISCONTINUED | OUTPATIENT
Start: 2024-01-01 | End: 2024-01-01

## 2024-01-01 RX ORDER — NOREPINEPHRINE BITARTRATE 0.02 MG/ML
0-3 INJECTION, SOLUTION INTRAVENOUS CONTINUOUS
Status: DISCONTINUED | OUTPATIENT
Start: 2024-01-01 | End: 2024-01-01

## 2024-01-01 RX ORDER — HEPARIN SODIUM,PORCINE/D5W 25000/250
0-40 INTRAVENOUS SOLUTION INTRAVENOUS CONTINUOUS
Status: DISCONTINUED | OUTPATIENT
Start: 2024-01-01 | End: 2024-11-28 | Stop reason: HOSPADM

## 2024-01-01 RX ORDER — FUROSEMIDE 10 MG/ML
80 INJECTION INTRAMUSCULAR; INTRAVENOUS
Status: COMPLETED | OUTPATIENT
Start: 2024-01-01 | End: 2024-01-01

## 2024-01-01 RX ORDER — LIDOCAINE HYDROCHLORIDE 10 MG/ML
INJECTION, SOLUTION INFILTRATION; PERINEURAL
Status: COMPLETED
Start: 2024-01-01 | End: 2024-01-01

## 2024-01-01 RX ORDER — ALBUTEROL SULFATE 2.5 MG/.5ML
10 SOLUTION RESPIRATORY (INHALATION)
Status: COMPLETED | OUTPATIENT
Start: 2024-01-01 | End: 2024-01-01

## 2024-01-01 RX ORDER — ONDANSETRON HYDROCHLORIDE 2 MG/ML
INJECTION, SOLUTION INTRAVENOUS
Status: COMPLETED
Start: 2024-01-01 | End: 2024-01-01

## 2024-01-01 RX ORDER — DOPAMINE HYDROCHLORIDE 160 MG/100ML
0-20 INJECTION, SOLUTION INTRAVENOUS CONTINUOUS
Status: DISCONTINUED | OUTPATIENT
Start: 2024-01-01 | End: 2024-11-28 | Stop reason: HOSPADM

## 2024-01-01 RX ORDER — AMIODARONE HYDROCHLORIDE 200 MG/1
200 TABLET ORAL DAILY
Status: DISCONTINUED | OUTPATIENT
Start: 2024-11-28 | End: 2024-11-28 | Stop reason: HOSPADM

## 2024-01-01 RX ORDER — MORPHINE SULFATE 4 MG/ML
4 INJECTION, SOLUTION INTRAMUSCULAR; INTRAVENOUS
Status: COMPLETED | OUTPATIENT
Start: 2024-01-01 | End: 2024-01-01

## 2024-01-01 RX ORDER — CALCIUM GLUCONATE 20 MG/ML
1 INJECTION, SOLUTION INTRAVENOUS
Status: COMPLETED | OUTPATIENT
Start: 2024-01-01 | End: 2024-01-01

## 2024-01-01 RX ORDER — ONDANSETRON HYDROCHLORIDE 2 MG/ML
4 INJECTION, SOLUTION INTRAVENOUS
Status: COMPLETED | OUTPATIENT
Start: 2024-01-01 | End: 2024-01-01

## 2024-01-01 RX ADMIN — FUROSEMIDE 10 MG/HR: 10 INJECTION, SOLUTION INTRAMUSCULAR; INTRAVENOUS at 02:11

## 2024-01-01 RX ADMIN — ALBUTEROL SULFATE 10 MG: 2.5 SOLUTION RESPIRATORY (INHALATION) at 01:11

## 2024-01-01 RX ADMIN — DOBUTAMINE HYDROCHLORIDE 5 MCG/KG/MIN: 400 INJECTION INTRAVENOUS at 01:11

## 2024-01-01 RX ADMIN — NOREPINEPHRINE BITARTRATE 0.02 MCG/KG/MIN: 0.02 INJECTION, SOLUTION INTRAVENOUS at 04:11

## 2024-01-01 RX ADMIN — Medication 0.02 MCG/KG/MIN: at 04:11

## 2024-01-01 RX ADMIN — DEXTROSE MONOHYDRATE 0.05 MCG/KG/MIN: 12500 INJECTION, SOLUTION INTRAVENOUS at 12:11

## 2024-01-01 RX ADMIN — DEXTROSE MONOHYDRATE 250 ML: 100 INJECTION, SOLUTION INTRAVENOUS at 01:11

## 2024-01-01 RX ADMIN — DOPAMINE HYDROCHLORIDE IN DEXTROSE 2.5 MCG/KG/MIN: 1.6 INJECTION, SOLUTION INTRAVENOUS at 03:11

## 2024-01-01 RX ADMIN — CALCIUM GLUCONATE 1 G: 20 INJECTION, SOLUTION INTRAVENOUS at 01:11

## 2024-01-01 RX ADMIN — DEXTROSE MONOHYDRATE 0.05 MCG/KG/MIN: 12500 INJECTION, SOLUTION INTRAVENOUS at 02:11

## 2024-01-01 RX ADMIN — LIDOCAINE HYDROCHLORIDE: 10 INJECTION, SOLUTION INFILTRATION; PERINEURAL at 04:11

## 2024-01-01 RX ADMIN — INSULIN HUMAN 5 UNITS: 100 INJECTION, SOLUTION PARENTERAL at 01:11

## 2024-01-01 RX ADMIN — MORPHINE SULFATE 4 MG: 4 INJECTION INTRAVENOUS at 04:11

## 2024-01-01 RX ADMIN — ONDANSETRON 4 MG: 2 INJECTION INTRAMUSCULAR; INTRAVENOUS at 04:11

## 2024-01-01 RX ADMIN — FUROSEMIDE 80 MG: 10 INJECTION, SOLUTION INTRAVENOUS at 12:11

## 2024-01-01 RX ADMIN — ONDANSETRON HYDROCHLORIDE 4 MG: 2 INJECTION, SOLUTION INTRAVENOUS at 04:11

## 2024-02-06 ENCOUNTER — LAB VISIT (OUTPATIENT)
Dept: LAB | Facility: HOSPITAL | Age: 89
End: 2024-02-06
Attending: INTERNAL MEDICINE
Payer: COMMERCIAL

## 2024-02-06 ENCOUNTER — TELEPHONE (OUTPATIENT)
Dept: CARDIOLOGY | Facility: CLINIC | Age: 89
End: 2024-02-06

## 2024-02-06 ENCOUNTER — OFFICE VISIT (OUTPATIENT)
Dept: CARDIOLOGY | Facility: CLINIC | Age: 89
End: 2024-02-06
Payer: COMMERCIAL

## 2024-02-06 VITALS
HEIGHT: 59 IN | OXYGEN SATURATION: 94 % | WEIGHT: 152.31 LBS | HEART RATE: 60 BPM | BODY MASS INDEX: 30.71 KG/M2 | DIASTOLIC BLOOD PRESSURE: 56 MMHG | SYSTOLIC BLOOD PRESSURE: 106 MMHG

## 2024-02-06 DIAGNOSIS — E78.00 PURE HYPERCHOLESTEROLEMIA: Chronic | ICD-10-CM

## 2024-02-06 DIAGNOSIS — I35.0 SEVERE AORTIC STENOSIS: Chronic | ICD-10-CM

## 2024-02-06 DIAGNOSIS — I50.20 HEART FAILURE WITH REDUCED EJECTION FRACTION: Chronic | ICD-10-CM

## 2024-02-06 DIAGNOSIS — I70.0 ATHEROSCLEROSIS OF AORTA: Chronic | ICD-10-CM

## 2024-02-06 DIAGNOSIS — I50.23 ACUTE ON CHRONIC HFREF (HEART FAILURE WITH REDUCED EJECTION FRACTION): ICD-10-CM

## 2024-02-06 DIAGNOSIS — I10 ESSENTIAL HYPERTENSION: Chronic | ICD-10-CM

## 2024-02-06 DIAGNOSIS — I48.0 PAROXYSMAL ATRIAL FIBRILLATION: ICD-10-CM

## 2024-02-06 DIAGNOSIS — I35.0 SEVERE AORTIC STENOSIS: Primary | Chronic | ICD-10-CM

## 2024-02-06 LAB
ALBUMIN SERPL BCP-MCNC: 3.4 G/DL (ref 3.5–5.2)
ALP SERPL-CCNC: 78 U/L (ref 55–135)
ALT SERPL W/O P-5'-P-CCNC: 16 U/L (ref 10–44)
ANION GAP SERPL CALC-SCNC: 13 MMOL/L (ref 8–16)
AST SERPL-CCNC: 16 U/L (ref 10–40)
BASOPHILS # BLD AUTO: 0.06 K/UL (ref 0–0.2)
BASOPHILS NFR BLD: 0.6 % (ref 0–1.9)
BILIRUB SERPL-MCNC: 0.6 MG/DL (ref 0.1–1)
BUN SERPL-MCNC: 21 MG/DL (ref 8–23)
CALCIUM SERPL-MCNC: 9.2 MG/DL (ref 8.7–10.5)
CHLORIDE SERPL-SCNC: 104 MMOL/L (ref 95–110)
CO2 SERPL-SCNC: 25 MMOL/L (ref 23–29)
CREAT SERPL-MCNC: 0.8 MG/DL (ref 0.5–1.4)
DIFFERENTIAL METHOD BLD: ABNORMAL
EOSINOPHIL # BLD AUTO: 0.2 K/UL (ref 0–0.5)
EOSINOPHIL NFR BLD: 1.7 % (ref 0–8)
ERYTHROCYTE [DISTWIDTH] IN BLOOD BY AUTOMATED COUNT: 16.6 % (ref 11.5–14.5)
EST. GFR  (NO RACE VARIABLE): >60 ML/MIN/1.73 M^2
GLUCOSE SERPL-MCNC: 98 MG/DL (ref 70–110)
HCT VFR BLD AUTO: 43.8 % (ref 37–48.5)
HGB BLD-MCNC: 13.8 G/DL (ref 12–16)
IMM GRANULOCYTES # BLD AUTO: 0.03 K/UL (ref 0–0.04)
IMM GRANULOCYTES NFR BLD AUTO: 0.3 % (ref 0–0.5)
LYMPHOCYTES # BLD AUTO: 1.5 K/UL (ref 1–4.8)
LYMPHOCYTES NFR BLD: 15.4 % (ref 18–48)
MCH RBC QN AUTO: 30 PG (ref 27–31)
MCHC RBC AUTO-ENTMCNC: 31.5 G/DL (ref 32–36)
MCV RBC AUTO: 95 FL (ref 82–98)
MONOCYTES # BLD AUTO: 1.1 K/UL (ref 0.3–1)
MONOCYTES NFR BLD: 11.1 % (ref 4–15)
NEUTROPHILS # BLD AUTO: 6.7 K/UL (ref 1.8–7.7)
NEUTROPHILS NFR BLD: 70.9 % (ref 38–73)
NRBC BLD-RTO: 0 /100 WBC
PLATELET # BLD AUTO: 235 K/UL (ref 150–450)
PMV BLD AUTO: 11.1 FL (ref 9.2–12.9)
POTASSIUM SERPL-SCNC: 3.8 MMOL/L (ref 3.5–5.1)
PROT SERPL-MCNC: 6.7 G/DL (ref 6–8.4)
RBC # BLD AUTO: 4.6 M/UL (ref 4–5.4)
SODIUM SERPL-SCNC: 142 MMOL/L (ref 136–145)
WBC # BLD AUTO: 9.51 K/UL (ref 3.9–12.7)

## 2024-02-06 PROCEDURE — 80053 COMPREHEN METABOLIC PANEL: CPT | Performed by: INTERNAL MEDICINE

## 2024-02-06 PROCEDURE — 3288F FALL RISK ASSESSMENT DOCD: CPT | Mod: CPTII,S$GLB,, | Performed by: INTERNAL MEDICINE

## 2024-02-06 PROCEDURE — 1126F AMNT PAIN NOTED NONE PRSNT: CPT | Mod: CPTII,S$GLB,, | Performed by: INTERNAL MEDICINE

## 2024-02-06 PROCEDURE — 99214 OFFICE O/P EST MOD 30 MIN: CPT | Mod: S$GLB,,, | Performed by: INTERNAL MEDICINE

## 2024-02-06 PROCEDURE — 1101F PT FALLS ASSESS-DOCD LE1/YR: CPT | Mod: CPTII,S$GLB,, | Performed by: INTERNAL MEDICINE

## 2024-02-06 PROCEDURE — 99999 PR PBB SHADOW E&M-EST. PATIENT-LVL III: CPT | Mod: PBBFAC,,, | Performed by: INTERNAL MEDICINE

## 2024-02-06 PROCEDURE — 36415 COLL VENOUS BLD VENIPUNCTURE: CPT | Performed by: INTERNAL MEDICINE

## 2024-02-06 PROCEDURE — 85025 COMPLETE CBC W/AUTO DIFF WBC: CPT | Performed by: INTERNAL MEDICINE

## 2024-02-06 NOTE — PROGRESS NOTES
Subjective:    Patient ID:  Stacia Perry is a 89 y.o. female who presents for follow-up of aortic stenosis    HPI    The patient is a 89 year old female with severe aortic stenosis and in the pre-TAVAR evaluation was found to have a lung mass that on biopsy noted to be adenocarcinoma. She was evaluated by Oncology. She was deemed not a surgical candidate and stereotactic body radiotherapy (SBRT).She was seen by Dr Kaur with Radiation Oncology 11/1122 and reported  that at this point she has no evidence of disease and should be okay for any cardiac procedures .She was admitted 11/7/22 with exacerbation of CHF but declined TAVAR. Her family reports some increase in TORRES. She has no increase in orthopnea and no edema or PND  Lab Results   Component Value Date     11/09/2022    K 3.2 (L) 11/09/2022     11/09/2022    CO2 27 11/09/2022    BUN 16 11/09/2022    CREATININE 0.8 11/09/2022     (H) 11/09/2022    HGBA1C 7.6 (H) 11/07/2022    MG 2.0 11/09/2022    AST 37 11/09/2022    ALT 81 (H) 11/09/2022    ALBUMIN 2.7 (L) 11/09/2022    PROT 6.0 11/09/2022    BILITOT 0.6 11/09/2022    WBC 5.79 11/09/2022    HGB 12.3 11/09/2022    HCT 39.6 11/09/2022    MCV 95 11/09/2022     11/09/2022    INR 1.1 11/07/2022    TSH 1.488 11/08/2022         Lab Results   Component Value Date    CHOL 90 (L) 11/08/2022    HDL 24 (L) 11/08/2022    TRIG 157 (H) 11/08/2022       Lab Results   Component Value Date    LDLCALC 34.6 (L) 11/08/2022       Past Medical History:   Diagnosis Date    Anticoagulant long-term use     Aortic valve stenosis     Arthritis     Cardiogenic shock 5/7/2021    CHF (congestive heart failure)     Diabetes mellitus     Hypercholesteremia     Hypertension     Pneumonia due to infectious organism 5/6/2021    Primary adenocarcinoma of upper lobe of right lung 7/19/2021    Thyroid disease        Current Outpatient Medications:     amiodarone (PACERONE) 200 MG Tab, Take 1 tablet by mouth once daily,  Disp: 90 tablet, Rfl: 3    apixaban (ELIQUIS) 5 mg Tab, Take 1 tablet (5 mg total) by mouth 2 (two) times daily., Disp: 180 tablet, Rfl: 3    atorvastatin (LIPITOR) 40 MG tablet, Take 1 tablet (40 mg total) by mouth once daily., Disp: 30 tablet, Rfl: 6    carvediloL (COREG) 6.25 MG tablet, TAKE 1/2 (ONE-HALF) TABLET BY MOUTH TWICE DAILY WITH MEALS, Disp: 90 tablet, Rfl: 3    citalopram (CELEXA) 20 MG tablet, Take 20 mg by mouth once daily., Disp: , Rfl:     dapagliflozin (FARXIGA) 5 mg Tab tablet, Take 1 tablet (5 mg total) by mouth once daily., Disp: 90 tablet, Rfl: 1    furosemide (LASIX) 40 MG tablet, Take 1 tablet (40 mg total) by mouth 2 (two) times daily., Disp: 180 tablet, Rfl: 3    levothyroxine (SYNTHROID) 75 MCG tablet, Take 75 mcg by mouth before breakfast. , Disp: , Rfl:     meclizine (ANTIVERT) 25 mg tablet, Take 25 mg by mouth once daily. , Disp: , Rfl:     metformin (GLUCOPHAGE) 500 MG tablet, Take 1,000 mg by mouth 2 (two) times daily with meals. , Disp: , Rfl:     TRESIBA FLEXTOUCH U-100 100 unit/mL (3 mL) InPn, Inject 18 Units into the skin every evening., Disp: 3 Syringe, Rfl: 3    valsartan (DIOVAN) 160 MG tablet, Take 1/2 (one-half) tablet by mouth once daily, Disp: 45 tablet, Rfl: 3          Review of Systems   Constitutional: Negative for decreased appetite, diaphoresis, fever, malaise/fatigue, weight gain and weight loss.   HENT:  Negative for congestion, ear discharge, ear pain and nosebleeds.    Eyes:  Negative for blurred vision, double vision and visual disturbance.   Cardiovascular:  Positive for dyspnea on exertion. Negative for chest pain, claudication, cyanosis, irregular heartbeat, leg swelling, near-syncope, orthopnea, palpitations, paroxysmal nocturnal dyspnea and syncope.   Respiratory:  Negative for cough, hemoptysis, shortness of breath, sleep disturbances due to breathing, snoring, sputum production and wheezing.    Endocrine: Negative for polydipsia, polyphagia and polyuria.  "  Hematologic/Lymphatic: Negative for adenopathy and bleeding problem. Does not bruise/bleed easily.   Skin:  Negative for color change, nail changes, poor wound healing and rash.   Musculoskeletal:  Negative for muscle cramps and muscle weakness.   Gastrointestinal:  Negative for abdominal pain, anorexia, change in bowel habit, hematochezia, nausea and vomiting.   Genitourinary:  Negative for dysuria, frequency and hematuria.   Neurological:  Negative for brief paralysis, difficulty with concentration, excessive daytime sleepiness, dizziness, focal weakness, headaches, light-headedness, seizures, vertigo and weakness.   Psychiatric/Behavioral:  Negative for altered mental status and depression.    Allergic/Immunologic: Negative for persistent infections.        Objective:BP (!) 106/56   Pulse 60   Ht 4' 11" (1.499 m)   Wt 69.1 kg (152 lb 5.4 oz)   SpO2 (!) 94%   BMI 30.77 kg/m²             Physical Exam  Constitutional:       Appearance: She is well-developed. She is obese.   HENT:      Head: Normocephalic.      Right Ear: External ear normal.      Left Ear: External ear normal.      Nose: Nose normal.   Eyes:      General: No scleral icterus.     Conjunctiva/sclera: Conjunctivae normal.      Pupils: Pupils are equal, round, and reactive to light.   Neck:      Thyroid: No thyromegaly.      Vascular: No JVD.      Trachea: No tracheal deviation.   Cardiovascular:      Rate and Rhythm: Normal rate and regular rhythm.      Pulses: Intact distal pulses.      Heart sounds: Murmur heard.      Harsh midsystolic murmur is present with a grade of 3/6 at the upper right sternal border and upper left sternal border radiating to the neck.      No friction rub. No gallop.      Comments: No edema  JVP normal sitting  Pulmonary:      Effort: Pulmonary effort is normal. No respiratory distress.      Breath sounds: Normal breath sounds. No wheezing or rales.   Chest:      Chest wall: No tenderness.   Abdominal:      General: " Bowel sounds are normal. There is no distension.      Palpations: Abdomen is soft.      Tenderness: There is no abdominal tenderness. There is no guarding.   Musculoskeletal:         General: No tenderness. Normal range of motion.      Cervical back: Normal range of motion.   Lymphadenopathy:      Comments: Palpation of lymph nodes of neck and groin normal   Skin:     General: Skin is warm and dry.      Coloration: Skin is not pale.      Findings: No erythema or rash.      Comments: Palpation of skin normal   Neurological:      Mental Status: She is alert and oriented to person, place, and time.      Cranial Nerves: No cranial nerve deficit.      Motor: No abnormal muscle tone.      Coordination: Coordination normal.   Psychiatric:         Behavior: Behavior normal.         Thought Content: Thought content normal.         Judgment: Judgment normal.           Assessment:       1. Severe aortic stenosis    2. Heart failure with reduced ejection fraction    3. Essential hypertension    4. Pure hypercholesterolemia    5. Paroxysmal atrial fibrillation    6. Atherosclerosis of aorta    7. Acute on chronic HFrEF (heart failure with reduced ejection fraction)         Plan:       Stacia was seen today for follow-up.    Diagnoses and all orders for this visit:    Severe aortic stenosis  -     CBC Auto Differential; Future; Expected date: 02/06/2024    Heart failure with reduced ejection fraction  -     Comprehensive Metabolic Panel; Future; Expected date: 02/06/2024    Essential hypertension  -     Comprehensive Metabolic Panel; Future; Expected date: 02/06/2024  -     CBC Auto Differential; Future; Expected date: 02/06/2024    Pure hypercholesterolemia    Paroxysmal atrial fibrillation    Atherosclerosis of aorta    Acute on chronic HFrEF (heart failure with reduced ejection fraction)

## 2024-03-12 DIAGNOSIS — I50.20 HEART FAILURE WITH REDUCED EJECTION FRACTION: ICD-10-CM

## 2024-03-12 DIAGNOSIS — I48.91 ATRIAL FIBRILLATION, UNSPECIFIED TYPE: ICD-10-CM

## 2024-03-12 RX ORDER — VALSARTAN 160 MG/1
TABLET ORAL
Qty: 45 TABLET | Refills: 0 | Status: SHIPPED | OUTPATIENT
Start: 2024-03-12 | End: 2024-03-14

## 2024-03-12 RX ORDER — AMIODARONE HYDROCHLORIDE 200 MG/1
TABLET ORAL
Qty: 90 TABLET | Refills: 0 | Status: SHIPPED | OUTPATIENT
Start: 2024-03-12 | End: 2024-03-14

## 2024-03-14 DIAGNOSIS — I50.20 HEART FAILURE WITH REDUCED EJECTION FRACTION: ICD-10-CM

## 2024-03-14 DIAGNOSIS — I48.91 ATRIAL FIBRILLATION, UNSPECIFIED TYPE: ICD-10-CM

## 2024-03-14 RX ORDER — AMIODARONE HYDROCHLORIDE 200 MG/1
TABLET ORAL
Qty: 90 TABLET | Refills: 0 | Status: SHIPPED | OUTPATIENT
Start: 2024-03-14

## 2024-03-14 RX ORDER — VALSARTAN 160 MG/1
TABLET ORAL
Qty: 45 TABLET | Refills: 0 | Status: SHIPPED | OUTPATIENT
Start: 2024-03-14

## 2024-05-16 NOTE — PROGRESS NOTES
5/17/2024    Radiation Oncology Follow-Up Visit    Prior Radiation History:    Site  Technique  Energy  Dose/Fx (Gy)  #Fx  Total Dose (Gy)  Start Date  End Date  Elapsed Days    RUL Lung  SBRT  6X  12  4 / 4  48  8/16/2021 8/25/2021  9        Assessment   This is an 89 y.o. female with Stage IA3 (cT1c cN0 M0) RUL NSCLC (adeno) diagnosed on biopsy 6/29/21. She completed definitive SBRT 48 Gy in 4 fx on 8/25/21.     No late toxicity from treatment. CT Chest today 5/17/24 demonstrates overall stable size of RUL radiation fibrosis, though decrease in air bronchograms. I suspect this is evolving late fibrosis, but will plan for PET/CT in 3 months to assess for activity. That will put her 3 years out from treatment. If no concerning findings on that imaging, will plan to move to annual CT Chest.        Plan   1) I will see her back in 3 months with PET/CT prior for re-staging.          Chief Complaint: Follow up after lung SBRT      HPI: Has some dyspnea on exertion, stable. She denies chest pain, cough, or fevers. Doing well overall.     Past Medical History:   Diagnosis Date    Anticoagulant long-term use     Aortic valve stenosis     Arthritis     Cardiogenic shock 5/7/2021    CHF (congestive heart failure)     Diabetes mellitus     Hypercholesteremia     Hypertension     Pneumonia due to infectious organism 5/6/2021    Primary adenocarcinoma of upper lobe of right lung 7/19/2021    Thyroid disease        Past Surgical History:   Procedure Laterality Date    CATARACT EXTRACTION, BILATERAL      EYE SURGERY      FOOT FRACTURE SURGERY Right     HYSTERECTOMY         Social History     Tobacco Use    Smoking status: Never    Smokeless tobacco: Never   Substance Use Topics    Alcohol use: No    Drug use: No       Cancer-related family history is not on file.    Current Outpatient Medications on File Prior to Visit   Medication Sig Dispense Refill    metFORMIN (GLUCOPHAGE-XR) 500 MG ER 24hr tablet Take 1,000 mg by mouth 2  "(two) times daily.      amiodarone (PACERONE) 200 MG Tab Take 1 tablet by mouth once daily 90 tablet 0    atorvastatin (LIPITOR) 40 MG tablet Take 1 tablet (40 mg total) by mouth once daily. 30 tablet 6    carvediloL (COREG) 6.25 MG tablet TAKE 1/2 (ONE-HALF) TABLET BY MOUTH TWICE DAILY WITH MEALS 90 tablet 3    citalopram (CELEXA) 20 MG tablet Take 20 mg by mouth once daily.      dapagliflozin (FARXIGA) 5 mg Tab tablet Take 1 tablet (5 mg total) by mouth once daily. 90 tablet 1    furosemide (LASIX) 40 MG tablet Take 1 tablet (40 mg total) by mouth 2 (two) times daily. 180 tablet 3    levothyroxine (SYNTHROID) 75 MCG tablet Take 75 mcg by mouth before breakfast.       meclizine (ANTIVERT) 25 mg tablet Take 25 mg by mouth once daily.       metformin (GLUCOPHAGE) 500 MG tablet Take 1,000 mg by mouth 2 (two) times daily with meals.       TRESIBA FLEXTOUCH U-100 100 unit/mL (3 mL) InPn Inject 18 Units into the skin every evening. 3 Syringe 3    valsartan (DIOVAN) 160 MG tablet Take 1/2 (one-half) tablet by mouth once daily 45 tablet 0     No current facility-administered medications on file prior to visit.       Review of patient's allergies indicates:  No Known Allergies    Vital Signs: BP 97/62 (BP Location: Right arm, Patient Position: Sitting)   Pulse 62   Temp 97.1 °F (36.2 °C)   Resp 19   Ht 4' 11" (1.499 m)   Wt 70.1 kg (154 lb 8.7 oz)   SpO2 (!) 92%   BMI 31.21 kg/m²     ECOG Performance Status: 2 - Ambulates, capable of self care only    Physical Exam  Vitals reviewed.   Constitutional:       Appearance: Normal appearance.      Comments: Frail appearing   HENT:      Head: Normocephalic and atraumatic.   Eyes:      General: No scleral icterus.     Extraocular Movements: Extraocular movements intact.   Pulmonary:      Effort: No accessory muscle usage or respiratory distress.   Abdominal:      General: There is no distension.   Musculoskeletal:         General: Normal range of motion.      Cervical back: " Normal range of motion and neck supple.   Lymphadenopathy:      Cervical: No cervical adenopathy.   Skin:     General: Skin is dry.      Coloration: Skin is not jaundiced.   Neurological:      Mental Status: She is alert and oriented to person, place, and time.      Cranial Nerves: No cranial nerve deficit.   Psychiatric:         Mood and Affect: Mood and affect normal.         Judgment: Judgment normal.          Labs:    Imaging: I have personally reviewed the patient's available images and reports and summarized pertinent findings above in HPI.     Pathology: No new path

## 2024-05-17 ENCOUNTER — HOSPITAL ENCOUNTER (OUTPATIENT)
Dept: RADIOLOGY | Facility: HOSPITAL | Age: 89
Discharge: HOME | End: 2024-05-17
Attending: RADIOLOGY
Payer: MEDICARE

## 2024-05-17 ENCOUNTER — OFFICE VISIT (OUTPATIENT)
Dept: RADIATION ONCOLOGY | Facility: CLINIC | Age: 89
End: 2024-05-17
Payer: MEDICARE

## 2024-05-17 VITALS
DIASTOLIC BLOOD PRESSURE: 62 MMHG | SYSTOLIC BLOOD PRESSURE: 97 MMHG | RESPIRATION RATE: 19 BRPM | OXYGEN SATURATION: 92 % | BODY MASS INDEX: 31.16 KG/M2 | WEIGHT: 154.56 LBS | TEMPERATURE: 97 F | HEIGHT: 59 IN | HEART RATE: 62 BPM

## 2024-05-17 DIAGNOSIS — C34.11 PRIMARY ADENOCARCINOMA OF UPPER LOBE OF RIGHT LUNG: Primary | Chronic | ICD-10-CM

## 2024-05-17 DIAGNOSIS — C34.90 MALIGNANT NEOPLASM OF UNSPECIFIED PART OF UNSPECIFIED BRONCHUS OR LUNG: ICD-10-CM

## 2024-05-17 DIAGNOSIS — Z85.118 PERSONAL HISTORY OF LUNG CANCER: ICD-10-CM

## 2024-05-17 PROCEDURE — 1159F MED LIST DOCD IN RCRD: CPT | Mod: CPTII,S$GLB,, | Performed by: RADIOLOGY

## 2024-05-17 PROCEDURE — 71250 CT THORAX DX C-: CPT | Mod: 26,,, | Performed by: RADIOLOGY

## 2024-05-17 PROCEDURE — 1126F AMNT PAIN NOTED NONE PRSNT: CPT | Mod: CPTII,S$GLB,, | Performed by: RADIOLOGY

## 2024-05-17 PROCEDURE — 3288F FALL RISK ASSESSMENT DOCD: CPT | Mod: CPTII,S$GLB,, | Performed by: RADIOLOGY

## 2024-05-17 PROCEDURE — 99213 OFFICE O/P EST LOW 20 MIN: CPT | Mod: S$GLB,,, | Performed by: RADIOLOGY

## 2024-05-17 PROCEDURE — 1101F PT FALLS ASSESS-DOCD LE1/YR: CPT | Mod: CPTII,S$GLB,, | Performed by: RADIOLOGY

## 2024-05-17 PROCEDURE — 71250 CT THORAX DX C-: CPT | Mod: TC

## 2024-05-17 PROCEDURE — 99999 PR PBB SHADOW E&M-EST. PATIENT-LVL IV: CPT | Mod: PBBFAC,,, | Performed by: RADIOLOGY

## 2024-05-17 RX ORDER — METFORMIN HYDROCHLORIDE 500 MG/1
1000 TABLET, EXTENDED RELEASE ORAL 2 TIMES DAILY
COMMUNITY
Start: 2024-05-16

## 2024-06-11 DIAGNOSIS — I50.20 HEART FAILURE WITH REDUCED EJECTION FRACTION: ICD-10-CM

## 2024-06-11 DIAGNOSIS — I10 ESSENTIAL HYPERTENSION: ICD-10-CM

## 2024-06-11 RX ORDER — FUROSEMIDE 40 MG/1
40 TABLET ORAL 2 TIMES DAILY
Qty: 180 TABLET | Refills: 0 | Status: SHIPPED | OUTPATIENT
Start: 2024-06-11 | End: 2024-06-17

## 2024-06-17 DIAGNOSIS — I50.20 HEART FAILURE WITH REDUCED EJECTION FRACTION: ICD-10-CM

## 2024-06-17 DIAGNOSIS — I10 ESSENTIAL HYPERTENSION: ICD-10-CM

## 2024-06-17 RX ORDER — CARVEDILOL 6.25 MG/1
3.12 TABLET ORAL 2 TIMES DAILY WITH MEALS
Qty: 90 TABLET | Refills: 3 | Status: SHIPPED | OUTPATIENT
Start: 2024-06-17

## 2024-06-17 RX ORDER — FUROSEMIDE 40 MG/1
40 TABLET ORAL DAILY
Qty: 180 TABLET | Refills: 3 | Status: SHIPPED | OUTPATIENT
Start: 2024-06-17

## 2024-07-25 ENCOUNTER — OFFICE VISIT (OUTPATIENT)
Dept: CARDIOLOGY | Facility: CLINIC | Age: 89
End: 2024-07-25
Payer: MEDICARE

## 2024-07-25 ENCOUNTER — LAB VISIT (OUTPATIENT)
Dept: LAB | Facility: HOSPITAL | Age: 89
End: 2024-07-25
Attending: INTERNAL MEDICINE
Payer: MEDICARE

## 2024-07-25 VITALS
HEIGHT: 59 IN | DIASTOLIC BLOOD PRESSURE: 80 MMHG | WEIGHT: 149.5 LBS | HEART RATE: 50 BPM | OXYGEN SATURATION: 90 % | BODY MASS INDEX: 30.14 KG/M2 | SYSTOLIC BLOOD PRESSURE: 110 MMHG

## 2024-07-25 DIAGNOSIS — Z85.118 PERSONAL HISTORY OF LUNG CANCER: ICD-10-CM

## 2024-07-25 DIAGNOSIS — I50.20 HEART FAILURE WITH REDUCED EJECTION FRACTION: Chronic | ICD-10-CM

## 2024-07-25 DIAGNOSIS — I48.0 PAROXYSMAL ATRIAL FIBRILLATION: ICD-10-CM

## 2024-07-25 DIAGNOSIS — I35.0 SEVERE AORTIC STENOSIS: Chronic | ICD-10-CM

## 2024-07-25 DIAGNOSIS — E66.01 CLASS 2 SEVERE OBESITY DUE TO EXCESS CALORIES WITH SERIOUS COMORBIDITY IN ADULT, UNSPECIFIED BMI: ICD-10-CM

## 2024-07-25 DIAGNOSIS — I70.0 ATHEROSCLEROSIS OF AORTA: Primary | Chronic | ICD-10-CM

## 2024-07-25 DIAGNOSIS — E87.6 HYPOKALEMIA: ICD-10-CM

## 2024-07-25 DIAGNOSIS — Z79.01 LONG TERM (CURRENT) USE OF ANTICOAGULANTS: ICD-10-CM

## 2024-07-25 DIAGNOSIS — E11.9 TYPE 2 DIABETES MELLITUS WITHOUT COMPLICATION, WITHOUT LONG-TERM CURRENT USE OF INSULIN: Chronic | ICD-10-CM

## 2024-07-25 DIAGNOSIS — I10 ESSENTIAL HYPERTENSION: Chronic | ICD-10-CM

## 2024-07-25 DIAGNOSIS — E78.00 PURE HYPERCHOLESTEROLEMIA: Chronic | ICD-10-CM

## 2024-07-25 LAB
ALBUMIN SERPL BCP-MCNC: 2.9 G/DL (ref 3.5–5.2)
ALP SERPL-CCNC: 85 U/L (ref 55–135)
ALT SERPL W/O P-5'-P-CCNC: 26 U/L (ref 10–44)
ANION GAP SERPL CALC-SCNC: 9 MMOL/L (ref 8–16)
AST SERPL-CCNC: 24 U/L (ref 10–40)
BASOPHILS # BLD AUTO: 0.06 K/UL (ref 0–0.2)
BASOPHILS NFR BLD: 0.7 % (ref 0–1.9)
BILIRUB SERPL-MCNC: 1 MG/DL (ref 0.1–1)
BUN SERPL-MCNC: 33 MG/DL (ref 8–23)
CALCIUM SERPL-MCNC: 9.1 MG/DL (ref 8.7–10.5)
CHLORIDE SERPL-SCNC: 104 MMOL/L (ref 95–110)
CO2 SERPL-SCNC: 31 MMOL/L (ref 23–29)
CREAT SERPL-MCNC: 1.2 MG/DL (ref 0.5–1.4)
DIFFERENTIAL METHOD BLD: ABNORMAL
EOSINOPHIL # BLD AUTO: 0.1 K/UL (ref 0–0.5)
EOSINOPHIL NFR BLD: 1.6 % (ref 0–8)
ERYTHROCYTE [DISTWIDTH] IN BLOOD BY AUTOMATED COUNT: 18.9 % (ref 11.5–14.5)
EST. GFR  (NO RACE VARIABLE): 43.3 ML/MIN/1.73 M^2
GLUCOSE SERPL-MCNC: 64 MG/DL (ref 70–110)
HCT VFR BLD AUTO: 40.7 % (ref 37–48.5)
HGB BLD-MCNC: 12.8 G/DL (ref 12–16)
IMM GRANULOCYTES # BLD AUTO: 0.02 K/UL (ref 0–0.04)
IMM GRANULOCYTES NFR BLD AUTO: 0.2 % (ref 0–0.5)
LYMPHOCYTES # BLD AUTO: 1.4 K/UL (ref 1–4.8)
LYMPHOCYTES NFR BLD: 15.8 % (ref 18–48)
MCH RBC QN AUTO: 29.6 PG (ref 27–31)
MCHC RBC AUTO-ENTMCNC: 31.4 G/DL (ref 32–36)
MCV RBC AUTO: 94 FL (ref 82–98)
MONOCYTES # BLD AUTO: 1.2 K/UL (ref 0.3–1)
MONOCYTES NFR BLD: 14.2 % (ref 4–15)
NEUTROPHILS # BLD AUTO: 5.8 K/UL (ref 1.8–7.7)
NEUTROPHILS NFR BLD: 67.5 % (ref 38–73)
NRBC BLD-RTO: 0 /100 WBC
PLATELET # BLD AUTO: 236 K/UL (ref 150–450)
PMV BLD AUTO: 11 FL (ref 9.2–12.9)
POTASSIUM SERPL-SCNC: 3.6 MMOL/L (ref 3.5–5.1)
PROT SERPL-MCNC: 6.1 G/DL (ref 6–8.4)
RBC # BLD AUTO: 4.32 M/UL (ref 4–5.4)
SODIUM SERPL-SCNC: 144 MMOL/L (ref 136–145)
WBC # BLD AUTO: 8.55 K/UL (ref 3.9–12.7)

## 2024-07-25 PROCEDURE — 36415 COLL VENOUS BLD VENIPUNCTURE: CPT | Performed by: INTERNAL MEDICINE

## 2024-07-25 PROCEDURE — 99999 PR PBB SHADOW E&M-EST. PATIENT-LVL IV: CPT | Mod: PBBFAC,,, | Performed by: INTERNAL MEDICINE

## 2024-07-25 PROCEDURE — 80053 COMPREHEN METABOLIC PANEL: CPT | Performed by: INTERNAL MEDICINE

## 2024-07-25 PROCEDURE — 85025 COMPLETE CBC W/AUTO DIFF WBC: CPT | Performed by: INTERNAL MEDICINE

## 2024-07-25 RX ORDER — METOLAZONE 2.5 MG/1
2.5 TABLET ORAL DAILY
Qty: 30 TABLET | Refills: 11 | Status: SHIPPED | OUTPATIENT
Start: 2024-07-25 | End: 2025-07-25

## 2024-07-25 RX ORDER — POTASSIUM CHLORIDE 20 MEQ/1
20 TABLET, EXTENDED RELEASE ORAL 2 TIMES DAILY
Qty: 30 TABLET | Refills: 3 | Status: SHIPPED | OUTPATIENT
Start: 2024-07-25

## 2024-07-25 RX ORDER — FUROSEMIDE 80 MG/1
80 TABLET ORAL DAILY
Qty: 30 TABLET | Refills: 11 | Status: SHIPPED | OUTPATIENT
Start: 2024-07-25 | End: 2025-07-25

## 2024-07-25 RX ORDER — FUROSEMIDE 40 MG/1
40 TABLET ORAL 2 TIMES DAILY
Qty: 60 TABLET | Refills: 11 | Status: SHIPPED | OUTPATIENT
Start: 2024-07-25 | End: 2025-07-25

## 2024-07-25 RX ORDER — FUROSEMIDE 10 MG/ML
40 INJECTION INTRAMUSCULAR; INTRAVENOUS
Status: COMPLETED | OUTPATIENT
Start: 2024-07-25 | End: 2024-07-25

## 2024-07-25 RX ADMIN — FUROSEMIDE 40 MG: 10 INJECTION INTRAMUSCULAR; INTRAVENOUS at 09:07

## 2024-07-25 NOTE — PROGRESS NOTES
Subjective:    Patient ID:  Stacia Perry is a 89 y.o. female who presents for follow-up of aortic stenosis HFpEF    HPI  The patient is a 89 year old female with severe aortic stenosis and in the pre-TAVAR evaluation was found to have a lung mass that on biopsy noted to be adenocarcinoma. She was evaluated by Oncology. She was deemed not a surgical candidate and stereotactic body radiotherapy (SBRT).She was seen by Dr Kaur with Radiation Oncology 11/1122 and reported  that at this point she has no evidence of disease and should be okay for any cardiac procedures .She was admitted 11/7/22 with exacerbation of CHF but has repeatedly declined TAVAR .She is followed by Radiation Oncology and remains stable with yearly follow up. She has had increased SOB, TORRES and edema over last 2 months. She in on 40 mg twice  4 days a week and three 3 time a week       Summary 7/14/22  The left ventricle is normal in size with mild eccentric hypertrophy and low normal systolic function.  The estimated ejection fraction is 55%.  Grade III left ventricular diastolic dysfunction.  Normal right ventricular size with mildly reduced right ventricular systolic function.  Severe left atrial enlargement.  Mild right atrial enlargement.  There is severe aortic valve stenosis.  Aortic valve area is 0.55 cm2; peak velocity is 4.32 m/s; mean gradient is 53 mmHg.  Mild aortic regurgitation.  Moderate-to-severe mitral regurgitation.  Moderate tricuspid regurgitation.  Normal central venous pressure (3 mmHg).  The estimated PA systolic pressure is 51 mmHg.  There is mild-moderate pulmonary hypertension.     Lab Results   Component Value Date     02/06/2024    K 3.8 02/06/2024     02/06/2024    CO2 25 02/06/2024    BUN 21 02/06/2024    CREATININE 0.8 02/06/2024    GLU 98 02/06/2024    HGBA1C 7.6 (H) 11/07/2022    MG 2.0 11/09/2022    AST 16 02/06/2024    ALT 16 02/06/2024    ALBUMIN 3.4 (L) 02/06/2024    PROT 6.7 02/06/2024    BILITOT  0.6 02/06/2024    WBC 8.55 07/25/2024    HGB 12.8 07/25/2024    HCT 40.7 07/25/2024    MCV 94 07/25/2024     07/25/2024    INR 1.1 11/07/2022    TSH 1.488 11/08/2022         Lab Results   Component Value Date    CHOL 90 (L) 11/08/2022    HDL 24 (L) 11/08/2022    TRIG 157 (H) 11/08/2022       Lab Results   Component Value Date    LDLCALC 34.6 (L) 11/08/2022       Past Medical History:   Diagnosis Date    Anticoagulant long-term use     Aortic valve stenosis     Arthritis     Cardiogenic shock 5/7/2021    CHF (congestive heart failure)     Diabetes mellitus     Hypercholesteremia     Hypertension     Pneumonia due to infectious organism 5/6/2021    Primary adenocarcinoma of upper lobe of right lung 7/19/2021    Thyroid disease        Current Outpatient Medications:     amiodarone (PACERONE) 200 MG Tab, Take 1 tablet by mouth once daily, Disp: 90 tablet, Rfl: 0    atorvastatin (LIPITOR) 40 MG tablet, Take 1 tablet (40 mg total) by mouth once daily., Disp: 30 tablet, Rfl: 6    carvediloL (COREG) 6.25 MG tablet, Take 0.5 tablets (3.125 mg total) by mouth 2 (two) times daily with meals., Disp: 90 tablet, Rfl: 3    citalopram (CELEXA) 20 MG tablet, Take 20 mg by mouth once daily., Disp: , Rfl:     dapagliflozin (FARXIGA) 5 mg Tab tablet, Take 1 tablet (5 mg total) by mouth once daily., Disp: 90 tablet, Rfl: 1    furosemide (LASIX) 40 MG tablet, Take 1 tablet (40 mg total) by mouth once daily., Disp: 180 tablet, Rfl: 3    levothyroxine (SYNTHROID) 75 MCG tablet, Take 75 mcg by mouth before breakfast. , Disp: , Rfl:     meclizine (ANTIVERT) 25 mg tablet, Take 25 mg by mouth once daily. , Disp: , Rfl:     metformin (GLUCOPHAGE) 500 MG tablet, Take 1,000 mg by mouth 2 (two) times daily with meals. , Disp: , Rfl:     metFORMIN (GLUCOPHAGE-XR) 500 MG ER 24hr tablet, Take 1,000 mg by mouth 2 (two) times daily., Disp: , Rfl:     TRESIBA FLEXTOUCH U-100 100 unit/mL (3 mL) InPn, Inject 18 Units into the  "skin every evening., Disp: 3 Syringe, Rfl: 3    valsartan (DIOVAN) 160 MG tablet, Take 1/2 (one-half) tablet by mouth once daily, Disp: 45 tablet, Rfl: 0          Review of Systems   Constitutional: Positive for malaise/fatigue. Negative for decreased appetite, diaphoresis, fever, weight gain and weight loss.   HENT:  Negative for congestion, ear discharge, ear pain and nosebleeds.    Eyes:  Negative for blurred vision, double vision and visual disturbance.   Cardiovascular:  Positive for dyspnea on exertion and leg swelling. Negative for chest pain, claudication, cyanosis, irregular heartbeat, near-syncope, orthopnea, palpitations, paroxysmal nocturnal dyspnea and syncope.   Respiratory:  Positive for shortness of breath. Negative for cough, hemoptysis, sleep disturbances due to breathing, snoring, sputum production and wheezing.    Endocrine: Negative for polydipsia, polyphagia and polyuria.   Hematologic/Lymphatic: Negative for adenopathy and bleeding problem. Does not bruise/bleed easily.   Skin:  Negative for color change, nail changes, poor wound healing and rash.   Musculoskeletal:  Negative for muscle cramps and muscle weakness.   Gastrointestinal:  Negative for abdominal pain, anorexia, change in bowel habit, hematochezia, nausea and vomiting.   Genitourinary:  Negative for dysuria, frequency and hematuria.   Neurological:  Negative for brief paralysis, difficulty with concentration, excessive daytime sleepiness, dizziness, focal weakness, headaches, light-headedness, seizures, vertigo and weakness.   Psychiatric/Behavioral:  Negative for altered mental status and depression.    Allergic/Immunologic: Negative for persistent infections.        Objective:/80   Pulse (!) 50   Ht 4' 11" (1.499 m)   Wt 67.8 kg (149 lb 7.6 oz)   SpO2 (!) 90%   BMI 30.19 kg/m²             Physical Exam  Constitutional:       Appearance: Normal appearance. She is well-developed.   HENT:      Head: Normocephalic.      " Right Ear: External ear normal.      Left Ear: External ear normal.      Nose: Nose normal.   Eyes:      General: No scleral icterus.     Conjunctiva/sclera: Conjunctivae normal.      Pupils: Pupils are equal, round, and reactive to light.   Neck:      Thyroid: No thyromegaly.      Vascular: No JVD.      Trachea: No tracheal deviation.   Cardiovascular:      Rate and Rhythm: Normal rate and regular rhythm.      Pulses: Intact distal pulses.           Carotid pulses are 2+ on the right side and 2+ on the left side.     Heart sounds: Normal heart sounds. No murmur heard.     No friction rub. No gallop.      Comments: JVP increased sitting   Plus 2 leg edema and plus 1 left arm [sleeps on that side]  Pulmonary:      Effort: Pulmonary effort is normal. No respiratory distress.      Breath sounds: Normal breath sounds. No wheezing or rales.   Chest:      Chest wall: No tenderness.   Abdominal:      General: Bowel sounds are normal. There is no distension.      Palpations: Abdomen is soft.      Tenderness: There is no abdominal tenderness. There is no guarding.   Musculoskeletal:         General: No tenderness. Normal range of motion.      Cervical back: Normal range of motion.   Lymphadenopathy:      Comments: Palpation of lymph nodes of neck and groin normal   Skin:     General: Skin is warm and dry.      Coloration: Skin is not pale.      Findings: No erythema or rash.      Comments: Palpation of skin normal   Neurological:      Mental Status: She is alert and oriented to person, place, and time.      Cranial Nerves: No cranial nerve deficit.      Motor: No abnormal muscle tone.      Coordination: Coordination normal.   Psychiatric:         Behavior: Behavior normal.         Thought Content: Thought content normal.         Judgment: Judgment normal.         Assessment:       1. Atherosclerosis of aorta    2. Essential hypertension    3. Heart failure with reduced ejection fraction    4. Pure hypercholesterolemia    5.  Paroxysmal atrial fibrillation    6. Severe aortic stenosis    7. Personal history of lung cancer    8. Class 2 severe obesity due to excess calories with serious comorbidity in adult, unspecified BMI         Plan:       Stacia was seen today for atrial fibrillation.    Diagnoses and all orders for this visit:    Atherosclerosis of aorta    Essential hypertension    Heart failure with reduced ejection fraction    Pure hypercholesterolemia    Paroxysmal atrial fibrillation    Severe aortic stenosis    Personal history of lung cancer    Class 2 severe obesity due to excess calories with serious comorbidity in adult, unspecified BMI

## 2024-07-31 ENCOUNTER — TELEPHONE (OUTPATIENT)
Dept: CARDIOLOGY | Facility: CLINIC | Age: 89
End: 2024-07-31
Payer: MEDICARE

## 2024-08-15 ENCOUNTER — HOSPITAL ENCOUNTER (OUTPATIENT)
Dept: RADIOLOGY | Facility: HOSPITAL | Age: 89
Discharge: HOME | End: 2024-08-15
Attending: RADIOLOGY
Payer: MEDICARE

## 2024-08-15 ENCOUNTER — OFFICE VISIT (OUTPATIENT)
Dept: RADIATION ONCOLOGY | Facility: CLINIC | Age: 89
End: 2024-08-15
Payer: MEDICARE

## 2024-08-15 VITALS
DIASTOLIC BLOOD PRESSURE: 63 MMHG | BODY MASS INDEX: 29.03 KG/M2 | OXYGEN SATURATION: 91 % | WEIGHT: 143.75 LBS | TEMPERATURE: 98 F | SYSTOLIC BLOOD PRESSURE: 99 MMHG

## 2024-08-15 DIAGNOSIS — C34.11 PRIMARY ADENOCARCINOMA OF UPPER LOBE OF RIGHT LUNG: Chronic | ICD-10-CM

## 2024-08-15 DIAGNOSIS — Z85.118 PERSONAL HISTORY OF LUNG CANCER: Primary | ICD-10-CM

## 2024-08-15 LAB — POCT GLUCOSE: 75 MG/DL (ref 70–110)

## 2024-08-15 PROCEDURE — 78815 PET IMAGE W/CT SKULL-THIGH: CPT | Mod: TC

## 2024-08-15 PROCEDURE — 99999 PR PBB SHADOW E&M-EST. PATIENT-LVL III: CPT | Mod: PBBFAC,,, | Performed by: RADIOLOGY

## 2024-08-15 PROCEDURE — A9552 F18 FDG: HCPCS | Performed by: RADIOLOGY

## 2024-08-15 RX ORDER — FLUDEOXYGLUCOSE F18 500 MCI/ML
11.68 INJECTION INTRAVENOUS
Status: COMPLETED | OUTPATIENT
Start: 2024-08-15 | End: 2024-08-15

## 2024-08-15 RX ADMIN — FLUDEOXYGLUCOSE F-18 11.68 MILLICURIE: 500 INJECTION INTRAVENOUS at 09:08

## 2024-08-15 NOTE — PROGRESS NOTES
8/15/2024    Radiation Oncology Follow-Up Visit    Prior Radiation History:    Site  Technique  Energy  Dose/Fx (Gy)  #Fx  Total Dose (Gy)  Start Date  End Date  Elapsed Days    RUL Lung  SBRT  6X  12  4 / 4  48  8/16/2021 8/25/2021  9        Assessment   This is an 89 y.o. female with Stage IA3 (cT1c cN0 M0) RUL NSCLC (adeno) diagnosed on biopsy 6/29/21. She completed definitive SBRT 48 Gy in 4 fx on 8/25/21.     No late toxicity from treatment. PET/CT today 8/15/24 demonstrates radiation fibrosis in the RUL lung without evidence of significant FDG uptake; no evidence of disease.        Plan   1) I will see her back in 1 year with CT Chest  prior for re-staging.          Chief Complaint: Follow up after lung SBRT      HPI: She is feeling well, baseline dyspnea on exertion. She had a CHF exacerbation now improved w/ lasix. Denies chest pain.       Past Medical History:   Diagnosis Date    Anticoagulant long-term use     Aortic valve stenosis     Arthritis     Cardiogenic shock 5/7/2021    CHF (congestive heart failure)     Diabetes mellitus     Hypercholesteremia     Hypertension     Pneumonia due to infectious organism 5/6/2021    Primary adenocarcinoma of upper lobe of right lung 7/19/2021    Thyroid disease        Past Surgical History:   Procedure Laterality Date    CATARACT EXTRACTION, BILATERAL      EYE SURGERY      FOOT FRACTURE SURGERY Right     HYSTERECTOMY         Social History     Tobacco Use    Smoking status: Never    Smokeless tobacco: Never   Substance Use Topics    Alcohol use: No    Drug use: No       Cancer-related family history is not on file.    Current Outpatient Medications on File Prior to Visit   Medication Sig Dispense Refill    amiodarone (PACERONE) 200 MG Tab Take 1 tablet by mouth once daily 90 tablet 0    atorvastatin (LIPITOR) 40 MG tablet Take 1 tablet (40 mg total) by mouth once daily. 30 tablet 6    carvediloL (COREG) 6.25 MG tablet Take 0.5 tablets (3.125 mg total) by mouth 2  (two) times daily with meals. 90 tablet 3    citalopram (CELEXA) 20 MG tablet Take 20 mg by mouth once daily.      dapagliflozin (FARXIGA) 5 mg Tab tablet Take 1 tablet (5 mg total) by mouth once daily. 90 tablet 1    furosemide (LASIX) 40 MG tablet Take 1 tablet (40 mg total) by mouth 2 (two) times daily. 60 tablet 11    furosemide (LASIX) 80 MG tablet Take 1 tablet (80 mg total) by mouth once daily. 30 tablet 11    levothyroxine (SYNTHROID) 75 MCG tablet Take 75 mcg by mouth before breakfast.       meclizine (ANTIVERT) 25 mg tablet Take 25 mg by mouth once daily.       metformin (GLUCOPHAGE) 500 MG tablet Take 1,000 mg by mouth 2 (two) times daily with meals.       metFORMIN (GLUCOPHAGE-XR) 500 MG ER 24hr tablet Take 1,000 mg by mouth 2 (two) times daily.      metOLazone (ZAROXOLYN) 2.5 MG tablet Take 1 tablet (2.5 mg total) by mouth once daily. 30 tablet 11    potassium chloride SA (K-DUR,KLOR-CON) 20 MEQ tablet Take 1 tablet (20 mEq total) by mouth 2 (two) times daily. 30 tablet 3    TRESIBA FLEXTOUCH U-100 100 unit/mL (3 mL) InPn Inject 18 Units into the skin every evening. 3 Syringe 3    valsartan (DIOVAN) 160 MG tablet Take 1/2 (one-half) tablet by mouth once daily 45 tablet 0     No current facility-administered medications on file prior to visit.       Review of patient's allergies indicates:  No Known Allergies    Vital Signs: BP 99/63   Temp 97.8 °F (36.6 °C)   Wt 65.2 kg (143 lb 11.8 oz)   SpO2 (!) 91%   BMI 29.03 kg/m²     ECOG Performance Status: 2 - Ambulates, capable of self care only    Physical Exam  Constitutional:       Appearance: Normal appearance.   HENT:      Head: Normocephalic and atraumatic.   Eyes:      General: No scleral icterus.     Extraocular Movements: Extraocular movements intact.   Pulmonary:      Effort: No accessory muscle usage or respiratory distress.   Musculoskeletal:      Cervical back: Normal range of motion.   Neurological:      Mental Status: She is alert and  oriented to person, place, and time.      Comments: +In clinic supplied wheelchair   Psychiatric:         Mood and Affect: Mood and affect normal.         Judgment: Judgment normal.          Labs:    Imaging: I have personally reviewed the patient's available images and reports and summarized pertinent findings above in HPI.     Pathology: No new path

## 2024-08-22 ENCOUNTER — OFFICE VISIT (OUTPATIENT)
Dept: CARDIOLOGY | Facility: CLINIC | Age: 89
End: 2024-08-22
Payer: MEDICARE

## 2024-08-22 ENCOUNTER — LAB VISIT (OUTPATIENT)
Dept: LAB | Facility: HOSPITAL | Age: 89
End: 2024-08-22
Attending: INTERNAL MEDICINE
Payer: MEDICARE

## 2024-08-22 VITALS
WEIGHT: 143.5 LBS | BODY MASS INDEX: 28.93 KG/M2 | OXYGEN SATURATION: 93 % | DIASTOLIC BLOOD PRESSURE: 58 MMHG | HEART RATE: 63 BPM | SYSTOLIC BLOOD PRESSURE: 100 MMHG | HEIGHT: 59 IN

## 2024-08-22 DIAGNOSIS — I10 ESSENTIAL HYPERTENSION: Chronic | ICD-10-CM

## 2024-08-22 DIAGNOSIS — I50.20 HEART FAILURE WITH REDUCED EJECTION FRACTION: Chronic | ICD-10-CM

## 2024-08-22 DIAGNOSIS — I48.0 PAROXYSMAL ATRIAL FIBRILLATION: ICD-10-CM

## 2024-08-22 DIAGNOSIS — E11.9 TYPE 2 DIABETES MELLITUS WITHOUT COMPLICATION, WITHOUT LONG-TERM CURRENT USE OF INSULIN: Chronic | ICD-10-CM

## 2024-08-22 DIAGNOSIS — I50.20 HEART FAILURE WITH REDUCED EJECTION FRACTION: Primary | Chronic | ICD-10-CM

## 2024-08-22 DIAGNOSIS — I70.0 ATHEROSCLEROSIS OF AORTA: Chronic | ICD-10-CM

## 2024-08-22 DIAGNOSIS — Z85.118 PERSONAL HISTORY OF LUNG CANCER: ICD-10-CM

## 2024-08-22 DIAGNOSIS — E78.00 PURE HYPERCHOLESTEROLEMIA: Chronic | ICD-10-CM

## 2024-08-22 DIAGNOSIS — I35.0 SEVERE AORTIC STENOSIS: Chronic | ICD-10-CM

## 2024-08-22 LAB
ANION GAP SERPL CALC-SCNC: 12 MMOL/L (ref 8–16)
BUN SERPL-MCNC: 67 MG/DL (ref 8–23)
CALCIUM SERPL-MCNC: 9.9 MG/DL (ref 8.7–10.5)
CHLORIDE SERPL-SCNC: 101 MMOL/L (ref 95–110)
CO2 SERPL-SCNC: 27 MMOL/L (ref 23–29)
CREAT SERPL-MCNC: 1.8 MG/DL (ref 0.5–1.4)
EST. GFR  (NO RACE VARIABLE): 26.4 ML/MIN/1.73 M^2
GLUCOSE SERPL-MCNC: 123 MG/DL (ref 70–110)
POTASSIUM SERPL-SCNC: 4.4 MMOL/L (ref 3.5–5.1)
SODIUM SERPL-SCNC: 140 MMOL/L (ref 136–145)

## 2024-08-22 PROCEDURE — 99999 PR PBB SHADOW E&M-EST. PATIENT-LVL IV: CPT | Mod: PBBFAC,,, | Performed by: INTERNAL MEDICINE

## 2024-08-22 PROCEDURE — 36415 COLL VENOUS BLD VENIPUNCTURE: CPT | Performed by: INTERNAL MEDICINE

## 2024-08-22 PROCEDURE — 80048 BASIC METABOLIC PNL TOTAL CA: CPT | Performed by: INTERNAL MEDICINE

## 2024-08-22 PROCEDURE — 3288F FALL RISK ASSESSMENT DOCD: CPT | Mod: CPTII,S$GLB,, | Performed by: INTERNAL MEDICINE

## 2024-08-22 PROCEDURE — 99214 OFFICE O/P EST MOD 30 MIN: CPT | Mod: S$GLB,,, | Performed by: INTERNAL MEDICINE

## 2024-08-22 PROCEDURE — 1126F AMNT PAIN NOTED NONE PRSNT: CPT | Mod: CPTII,S$GLB,, | Performed by: INTERNAL MEDICINE

## 2024-08-22 PROCEDURE — 1101F PT FALLS ASSESS-DOCD LE1/YR: CPT | Mod: CPTII,S$GLB,, | Performed by: INTERNAL MEDICINE

## 2024-08-22 PROCEDURE — 1159F MED LIST DOCD IN RCRD: CPT | Mod: CPTII,S$GLB,, | Performed by: INTERNAL MEDICINE

## 2024-08-22 NOTE — PROGRESS NOTES
Subjective:    Patient ID:  Stacia Perry is a 89 y.o. female who presents for follow-up of Atrial Fibrillation      HPI  The patient is a 89 year old female on her last visit 7/25/24 was noted to have 2 months of increasing SOB and edema.She has severe aortic stenosis and paroxsymal atrial fibrillation. She has repeatedly decline a TAVAR. She has adenocarcinoma of the lung and is stable post radiation Tx. . Her SOB has improved ands she is down 6#.. AM lab pending  Lab Results   Component Value Date     07/25/2024    K 3.6 07/25/2024     07/25/2024    CO2 31 (H) 07/25/2024    BUN 33 (H) 07/25/2024    CREATININE 1.2 07/25/2024    GLU 64 (L) 07/25/2024    HGBA1C 7.6 (H) 11/07/2022    MG 2.0 11/09/2022    AST 24 07/25/2024    ALT 26 07/25/2024    ALBUMIN 2.9 (L) 07/25/2024    PROT 6.1 07/25/2024    BILITOT 1.0 07/25/2024    WBC 8.55 07/25/2024    HGB 12.8 07/25/2024    HCT 40.7 07/25/2024    MCV 94 07/25/2024     07/25/2024    INR 1.1 11/07/2022    TSH 1.488 11/08/2022         Lab Results   Component Value Date    CHOL 90 (L) 11/08/2022    HDL 24 (L) 11/08/2022    TRIG 157 (H) 11/08/2022       Lab Results   Component Value Date    LDLCALC 34.6 (L) 11/08/2022       Past Medical History:   Diagnosis Date    Anticoagulant long-term use     Aortic valve stenosis     Arthritis     Cardiogenic shock 5/7/2021    CHF (congestive heart failure)     Diabetes mellitus     Hypercholesteremia     Hypertension     Pneumonia due to infectious organism 5/6/2021    Primary adenocarcinoma of upper lobe of right lung 7/19/2021    Thyroid disease        Current Outpatient Medications:     amiodarone (PACERONE) 200 MG Tab, Take 1 tablet by mouth once daily, Disp: 90 tablet, Rfl: 0    atorvastatin (LIPITOR) 40 MG tablet, Take 1 tablet (40 mg total) by mouth once daily., Disp: 30 tablet, Rfl: 6    carvediloL (COREG) 6.25 MG tablet, Take 0.5 tablets (3.125 mg total) by mouth 2 (two) times daily with meals.,  Disp: 90 tablet, Rfl: 3    citalopram (CELEXA) 20 MG tablet, Take 20 mg by mouth once daily., Disp: , Rfl:     dapagliflozin (FARXIGA) 5 mg Tab tablet, Take 1 tablet (5 mg total) by mouth once daily., Disp: 90 tablet, Rfl: 1    furosemide (LASIX) 80 MG tablet, Take 1 tablet (80 mg total) by mouth once daily., Disp: 30 tablet, Rfl: 11    levothyroxine (SYNTHROID) 75 MCG tablet, Take 75 mcg by mouth before breakfast. , Disp: , Rfl:     meclizine (ANTIVERT) 25 mg tablet, Take 25 mg by mouth once daily. , Disp: , Rfl:     metformin (GLUCOPHAGE) 500 MG tablet, Take 1,000 mg by mouth 2 (two) times daily with meals. , Disp: , Rfl:     metOLazone (ZAROXOLYN) 2.5 MG tablet, Take 1 tablet (2.5 mg total) by mouth once daily., Disp: 30 tablet, Rfl: 11    potassium chloride SA (K-DUR,KLOR-CON) 20 MEQ tablet, Take 1 tablet (20 mEq total) by mouth 2 (two) times daily., Disp: 30 tablet, Rfl: 3    TRESIBA FLEXTOUCH U-100 100 unit/mL (3 mL) InPn, Inject 18 Units into the skin every evening., Disp: 3 Syringe, Rfl: 3    valsartan (DIOVAN) 160 MG tablet, Take 1/2 (one-half) tablet by mouth once daily, Disp: 45 tablet, Rfl: 0    furosemide (LASIX) 40 MG tablet, Take 1 tablet (40 mg total) by mouth 2 (two) times daily. (Patient not taking: Reported on 8/22/2024), Disp: 60 tablet, Rfl: 11    metFORMIN (GLUCOPHAGE-XR) 500 MG ER 24hr tablet, Take 1,000 mg by mouth 2 (two) times daily. (Patient not taking: Reported on 8/22/2024), Disp: , Rfl:           Review of Systems   Constitutional: Positive for weight loss. Negative for decreased appetite, diaphoresis, fever and malaise/fatigue.   HENT:  Negative for congestion, ear discharge, ear pain and nosebleeds.    Eyes:  Negative for blurred vision, double vision and visual disturbance.   Cardiovascular:  Positive for dyspnea on exertion (improving). Negative for chest pain, claudication, cyanosis, irregular heartbeat, leg swelling, near-syncope, orthopnea, palpitations, paroxysmal  "nocturnal dyspnea and syncope.   Respiratory:  Positive for shortness of breath (improving). Negative for cough, hemoptysis, sleep disturbances due to breathing, snoring, sputum production and wheezing.    Endocrine: Negative for polydipsia, polyphagia and polyuria.   Hematologic/Lymphatic: Negative for adenopathy and bleeding problem. Does not bruise/bleed easily.   Skin:  Negative for color change, nail changes, poor wound healing and rash.   Musculoskeletal:  Negative for muscle cramps and muscle weakness.   Gastrointestinal:  Negative for abdominal pain, anorexia, change in bowel habit, hematochezia, nausea and vomiting.   Genitourinary:  Negative for dysuria, frequency and hematuria.   Neurological:  Negative for brief paralysis, difficulty with concentration, excessive daytime sleepiness, dizziness, focal weakness, headaches, light-headedness, seizures, vertigo and weakness.   Psychiatric/Behavioral:  Negative for altered mental status and depression.    Allergic/Immunologic: Negative for persistent infections.        Objective:BP (!) 100/58   Pulse 63   Ht 4' 11" (1.499 m)   Wt 65.1 kg (143 lb 8.3 oz)   SpO2 (!) 93%   BMI 28.99 kg/m²             Physical Exam  Constitutional:       Appearance: She is well-developed.   HENT:      Head: Normocephalic.      Right Ear: External ear normal.      Left Ear: External ear normal.      Nose: Nose normal.   Eyes:      General: No scleral icterus.     Conjunctiva/sclera: Conjunctivae normal.      Pupils: Pupils are equal, round, and reactive to light.   Neck:      Thyroid: No thyromegaly.      Vascular: No JVD.      Trachea: No tracheal deviation.   Cardiovascular:      Rate and Rhythm: Normal rate and regular rhythm.      Pulses: Intact distal pulses.           Carotid pulses are 2+ on the right side and 2+ on the left side.     Heart sounds: Murmur heard.      Harsh midsystolic murmur is present with a grade of 2/6 at the upper right sternal border radiating to " the neck.      No friction rub. No gallop.      Comments: No edema  JVP low on sitting  Pulmonary:      Effort: Pulmonary effort is normal. No respiratory distress.      Breath sounds: Normal breath sounds. No wheezing or rales.          Comments: rales  Chest:      Chest wall: No tenderness.   Abdominal:      General: Bowel sounds are normal. There is no distension.      Palpations: Abdomen is soft.      Tenderness: There is no abdominal tenderness. There is no guarding.   Musculoskeletal:         General: No tenderness. Normal range of motion.      Cervical back: Normal range of motion.   Lymphadenopathy:      Comments: Palpation of lymph nodes of neck and groin normal   Skin:     General: Skin is warm and dry.      Coloration: Skin is not pale.      Findings: No erythema or rash.      Comments: Palpation of skin normal   Neurological:      Mental Status: She is oriented to person, place, and time.      Cranial Nerves: No cranial nerve deficit.      Motor: No abnormal muscle tone.      Coordination: Coordination normal.   Psychiatric:         Behavior: Behavior normal.         Thought Content: Thought content normal.         Judgment: Judgment normal.         Assessment:       1. Heart failure with reduced ejection fraction    2. Atherosclerosis of aorta    3. Essential hypertension    4. Pure hypercholesterolemia    5. Paroxysmal atrial fibrillation    6. Severe aortic stenosis    7. Personal history of lung cancer    8. Type 2 diabetes mellitus without complication, without long-term current use of insulin         Plan:       Stacia was seen today for atrial fibrillation.    Diagnoses and all orders for this visit:    Heart failure with reduced ejection fraction    Atherosclerosis of aorta    Essential hypertension    Pure hypercholesterolemia    Paroxysmal atrial fibrillation    Severe aortic stenosis    Personal history of lung cancer    Type 2 diabetes mellitus without complication, without long-term current  use of insulin

## 2024-09-04 DIAGNOSIS — I48.91 ATRIAL FIBRILLATION, UNSPECIFIED TYPE: ICD-10-CM

## 2024-09-04 DIAGNOSIS — I50.20 HEART FAILURE WITH REDUCED EJECTION FRACTION: ICD-10-CM

## 2024-09-05 RX ORDER — VALSARTAN 160 MG/1
TABLET ORAL
Qty: 45 TABLET | Refills: 3 | Status: SHIPPED | OUTPATIENT
Start: 2024-09-05

## 2024-09-05 RX ORDER — AMIODARONE HYDROCHLORIDE 200 MG/1
TABLET ORAL
Qty: 90 TABLET | Refills: 3 | Status: SHIPPED | OUTPATIENT
Start: 2024-09-05

## 2024-09-26 ENCOUNTER — TELEPHONE (OUTPATIENT)
Dept: CARDIOLOGY | Facility: CLINIC | Age: 89
End: 2024-09-26
Payer: MEDICARE

## 2024-09-26 NOTE — TELEPHONE ENCOUNTER
----- Message from Karen Jaffe RN sent at 9/26/2024 10:30 AM CDT -----  Regarding: FW: Concerns/Edema    ----- Message -----  From: Lucy Pelaez  Sent: 9/26/2024  10:24 AM CDT  To: Karen Jaffe RN  Subject: Concerns/Edema                                   [place] [phone] pt daughter says she has edema in both legs with SOB on exertion. She wants to know if the doctor can put her back on Zaroxolyn 2.5 mg? She was taking this med along with Lasix when she would have problems with swelling and Dr. Shabazz stopped it. She would like a call from the nurse.    LOV 8/22/24 Dr. Shabazz    Thanks

## 2024-09-26 NOTE — TELEPHONE ENCOUNTER
As dr Shabazz is retired, pt was scheduled w. Ms Buckley for f/u and daughter agreed to date/time of appointment(s). Pt would like to speak to Janie and message was sent to her.

## 2024-09-27 ENCOUNTER — OFFICE VISIT (OUTPATIENT)
Dept: CARDIOLOGY | Facility: CLINIC | Age: 89
End: 2024-09-27
Payer: MEDICARE

## 2024-09-27 ENCOUNTER — HOSPITAL ENCOUNTER (INPATIENT)
Facility: HOSPITAL | Age: 89
LOS: 4 days | Discharge: HOME | DRG: 291 | End: 2024-10-02
Attending: EMERGENCY MEDICINE | Admitting: HOSPITALIST
Payer: MEDICARE

## 2024-09-27 VITALS
WEIGHT: 152.56 LBS | SYSTOLIC BLOOD PRESSURE: 106 MMHG | HEART RATE: 56 BPM | HEIGHT: 59 IN | BODY MASS INDEX: 30.76 KG/M2 | OXYGEN SATURATION: 96 % | DIASTOLIC BLOOD PRESSURE: 55 MMHG

## 2024-09-27 DIAGNOSIS — N17.9 AKI (ACUTE KIDNEY INJURY): ICD-10-CM

## 2024-09-27 DIAGNOSIS — I50.9 CHF EXACERBATION: ICD-10-CM

## 2024-09-27 DIAGNOSIS — I50.41 ACUTE COMBINED SYSTOLIC AND DIASTOLIC CONGESTIVE HEART FAILURE: Primary | ICD-10-CM

## 2024-09-27 DIAGNOSIS — I10 ESSENTIAL HYPERTENSION: ICD-10-CM

## 2024-09-27 DIAGNOSIS — I50.20 HEART FAILURE WITH REDUCED EJECTION FRACTION: Chronic | ICD-10-CM

## 2024-09-27 DIAGNOSIS — I50.43 ACUTE ON CHRONIC COMBINED SYSTOLIC AND DIASTOLIC CONGESTIVE HEART FAILURE: ICD-10-CM

## 2024-09-27 DIAGNOSIS — R06.02 SOB (SHORTNESS OF BREATH): ICD-10-CM

## 2024-09-27 DIAGNOSIS — R06.02 SHORTNESS OF BREATH: ICD-10-CM

## 2024-09-27 DIAGNOSIS — E87.6 HYPOKALEMIA: ICD-10-CM

## 2024-09-27 DIAGNOSIS — Z51.5 PALLIATIVE CARE ENCOUNTER: ICD-10-CM

## 2024-09-27 DIAGNOSIS — J96.01 ACUTE HYPOXEMIC RESPIRATORY FAILURE: ICD-10-CM

## 2024-09-27 DIAGNOSIS — Z71.89 ADVANCE CARE PLANNING: ICD-10-CM

## 2024-09-27 DIAGNOSIS — R07.9 CHEST PAIN: ICD-10-CM

## 2024-09-27 DIAGNOSIS — I50.9 ACUTE ON CHRONIC CONGESTIVE HEART FAILURE, UNSPECIFIED HEART FAILURE TYPE: Primary | ICD-10-CM

## 2024-09-27 DIAGNOSIS — R06.09 DOE (DYSPNEA ON EXERTION): ICD-10-CM

## 2024-09-27 DIAGNOSIS — R06.00 DYSPNEA, UNSPECIFIED TYPE: ICD-10-CM

## 2024-09-27 DIAGNOSIS — I35.0 SEVERE AORTIC STENOSIS: Chronic | ICD-10-CM

## 2024-09-27 PROBLEM — I50.33 ACUTE ON CHRONIC DIASTOLIC CONGESTIVE HEART FAILURE: Status: ACTIVE | Noted: 2024-09-27

## 2024-09-27 PROBLEM — E87.5 HYPERKALEMIA: Status: ACTIVE | Noted: 2024-09-27

## 2024-09-27 LAB
ALBUMIN SERPL BCP-MCNC: 3.3 G/DL (ref 3.5–5.2)
ALP SERPL-CCNC: 77 U/L (ref 55–135)
ALT SERPL W/O P-5'-P-CCNC: 25 U/L (ref 10–44)
ANION GAP SERPL CALC-SCNC: 11 MMOL/L (ref 8–16)
ASCENDING AORTA: 3.42 CM
AST SERPL-CCNC: 31 U/L (ref 10–40)
AV AREA BY CONTINUOUS VTI: 0.3 CM2
AV INDEX (PROSTH): 0.1
AV LVOT MEAN GRADIENT: 1 MMHG
AV LVOT PEAK GRADIENT: 1 MMHG
AV MEAN GRADIENT: 60 MMHG
AV PEAK GRADIENT: 86 MMHG
AV VALVE AREA BY VELOCITY RATIO: 0.33 CM²
AV VALVE AREA: 0.31 CM2
AV VELOCITY RATIO: 0.11
BASOPHILS # BLD AUTO: 0.04 K/UL (ref 0–0.2)
BASOPHILS NFR BLD: 0.5 % (ref 0–1.9)
BILIRUB SERPL-MCNC: 0.7 MG/DL (ref 0.1–1)
BILIRUB UR QL STRIP: NEGATIVE
BNP SERPL-MCNC: 2388 PG/ML (ref 0–99)
BSA FOR ECHO PROCEDURE: 1.64 M2
BUN SERPL-MCNC: 31 MG/DL (ref 8–23)
CALCIUM SERPL-MCNC: 9.4 MG/DL (ref 8.7–10.5)
CHLORIDE SERPL-SCNC: 107 MMOL/L (ref 95–110)
CLARITY UR REFRACT.AUTO: CLEAR
CO2 SERPL-SCNC: 25 MMOL/L (ref 23–29)
COLOR UR AUTO: COLORLESS
CREAT SERPL-MCNC: 1.3 MG/DL (ref 0.5–1.4)
CV ECHO LV RWT: 0.43 CM
DIFFERENTIAL METHOD BLD: ABNORMAL
DOP CALC AO PEAK VEL: 4.63 M/S
DOP CALC AO VTI: 161.07 CM
DOP CALC LVOT AREA: 3.1 CM2
DOP CALC LVOT DIAMETER: 2 CM
DOP CALC LVOT PEAK VEL: 0.49 M/S
DOP CALC LVOT STROKE VOLUME: 50.11 CM3
DOP CALCLVOT PEAK VEL VTI: 15.96 CM
E WAVE DECELERATION TIME: 257.44 MS
E WAVE DECELERATION TIME: 269.53 MS
E/A RATIO: 2.45
E/E' RATIO: 23 M/S
ECHO EF ESTIMATED: 29 %
ECHO LV POSTERIOR WALL: 1.09 CM (ref 0.6–1.1)
EJECTION FRACTION: 23 %
EOSINOPHIL # BLD AUTO: 0.1 K/UL (ref 0–0.5)
EOSINOPHIL NFR BLD: 1.1 % (ref 0–8)
ERYTHROCYTE [DISTWIDTH] IN BLOOD BY AUTOMATED COUNT: 20.2 % (ref 11.5–14.5)
EST. GFR  (NO RACE VARIABLE): 39.1 ML/MIN/1.73 M^2
ESTIMATED AVG GLUCOSE: 131 MG/DL (ref 68–131)
FRACTIONAL SHORTENING: 14 % (ref 28–44)
GLUCOSE SERPL-MCNC: 77 MG/DL (ref 70–110)
GLUCOSE UR QL STRIP: ABNORMAL
HBA1C MFR BLD: 6.2 % (ref 4–5.6)
HCT VFR BLD AUTO: 40.3 % (ref 37–48.5)
HGB BLD-MCNC: 12.9 G/DL (ref 12–16)
HGB UR QL STRIP: NEGATIVE
IMM GRANULOCYTES # BLD AUTO: 0.02 K/UL (ref 0–0.04)
IMM GRANULOCYTES NFR BLD AUTO: 0.2 % (ref 0–0.5)
INTERVENTRICULAR SEPTUM: 1.22 CM (ref 0.6–1.1)
KETONES UR QL STRIP: NEGATIVE
LA MAJOR: 6.81 CM
LA MINOR: 6.82 CM
LA WIDTH: 3.69 CM
LEFT ATRIUM SIZE: 3.89 CM
LEFT ATRIUM VOLUME INDEX: 52.3 ML/M2
LEFT ATRIUM VOLUME: 83.15 CM3
LEFT INTERNAL DIMENSION IN SYSTOLE: 4.4 CM (ref 2.1–4)
LEFT VENTRICLE DIASTOLIC VOLUME INDEX: 77.7 ML/M2
LEFT VENTRICLE DIASTOLIC VOLUME: 123.55 ML
LEFT VENTRICLE MASS INDEX: 144 G/M2
LEFT VENTRICLE SYSTOLIC VOLUME INDEX: 55.1 ML/M2
LEFT VENTRICLE SYSTOLIC VOLUME: 87.64 ML
LEFT VENTRICULAR INTERNAL DIMENSION IN DIASTOLE: 5.1 CM (ref 3.5–6)
LEFT VENTRICULAR MASS: 228.75 G
LEUKOCYTE ESTERASE UR QL STRIP: NEGATIVE
LV LATERAL E/E' RATIO: 19.17 M/S
LV SEPTAL E/E' RATIO: 28.75 M/S
LYMPHOCYTES # BLD AUTO: 1.5 K/UL (ref 1–4.8)
LYMPHOCYTES NFR BLD: 17.1 % (ref 18–48)
MCH RBC QN AUTO: 30.9 PG (ref 27–31)
MCHC RBC AUTO-ENTMCNC: 32 G/DL (ref 32–36)
MCV RBC AUTO: 97 FL (ref 82–98)
MONOCYTES # BLD AUTO: 1 K/UL (ref 0.3–1)
MONOCYTES NFR BLD: 11.8 % (ref 4–15)
MV PEAK A VEL: 0.47 M/S
MV PEAK E VEL: 1.15 M/S
NEUTROPHILS # BLD AUTO: 5.9 K/UL (ref 1.8–7.7)
NEUTROPHILS NFR BLD: 69.3 % (ref 38–73)
NITRITE UR QL STRIP: NEGATIVE
NRBC BLD-RTO: 0 /100 WBC
OHS QRS DURATION: 88 MS
OHS QRS DURATION: 90 MS
OHS QTC CALCULATION: 473 MS
OHS QTC CALCULATION: 478 MS
PH UR STRIP: 8 [PH] (ref 5–8)
PISA TR MAX VEL: 3.41 M/S
PLATELET # BLD AUTO: 208 K/UL (ref 150–450)
PMV BLD AUTO: 12 FL (ref 9.2–12.9)
POCT GLUCOSE: 100 MG/DL (ref 70–110)
POCT GLUCOSE: 101 MG/DL (ref 70–110)
POCT GLUCOSE: 182 MG/DL (ref 70–110)
POTASSIUM SERPL-SCNC: 5.4 MMOL/L (ref 3.5–5.1)
PROT SERPL-MCNC: 6.9 G/DL (ref 6–8.4)
PROT UR QL STRIP: NEGATIVE
RA MAJOR: 4.56 CM
RA PRESSURE ESTIMATED: 8 MMHG
RBC # BLD AUTO: 4.17 M/UL (ref 4–5.4)
RIGHT VENTRICLE DIASTOLIC BASEL DIMENSION: 3.7 CM
RV TB RVSP: 11 MMHG
SINUS: 3.26 CM
SODIUM SERPL-SCNC: 143 MMOL/L (ref 136–145)
SP GR UR STRIP: 1.01 (ref 1–1.03)
STJ: 2.84 CM
TDI LATERAL: 0.06 M/S
TDI SEPTAL: 0.04 M/S
TDI: 0.05 M/S
TR MAX PG: 47 MMHG
TRICUSPID ANNULAR PLANE SYSTOLIC EXCURSION: 1.9 CM
TROPONIN I SERPL DL<=0.01 NG/ML-MCNC: 0.02 NG/ML (ref 0–0.03)
TV PEAK GRADIENT: 47 MMHG
TV REST PULMONARY ARTERY PRESSURE: 55 MMHG
URN SPEC COLLECT METH UR: ABNORMAL
WBC # BLD AUTO: 8.55 K/UL (ref 3.9–12.7)
Z-SCORE OF LEFT VENTRICULAR DIMENSION IN END DIASTOLE: 1.17
Z-SCORE OF LEFT VENTRICULAR DIMENSION IN END SYSTOLE: 3.53

## 2024-09-27 PROCEDURE — 93005 ELECTROCARDIOGRAM TRACING: CPT

## 2024-09-27 PROCEDURE — 93010 ELECTROCARDIOGRAM REPORT: CPT | Mod: 76,,, | Performed by: INTERNAL MEDICINE

## 2024-09-27 PROCEDURE — G0378 HOSPITAL OBSERVATION PER HR: HCPCS

## 2024-09-27 PROCEDURE — 25000003 PHARM REV CODE 250: Performed by: PHYSICIAN ASSISTANT

## 2024-09-27 PROCEDURE — 99999 PR PBB SHADOW E&M-EST. PATIENT-LVL III: CPT | Mod: PBBFAC,,, | Performed by: PHYSICIAN ASSISTANT

## 2024-09-27 PROCEDURE — 96372 THER/PROPH/DIAG INJ SC/IM: CPT | Performed by: PHYSICIAN ASSISTANT

## 2024-09-27 PROCEDURE — 83036 HEMOGLOBIN GLYCOSYLATED A1C: CPT | Performed by: EMERGENCY MEDICINE

## 2024-09-27 PROCEDURE — 63600175 PHARM REV CODE 636 W HCPCS: Performed by: EMERGENCY MEDICINE

## 2024-09-27 PROCEDURE — 85025 COMPLETE CBC W/AUTO DIFF WBC: CPT | Performed by: EMERGENCY MEDICINE

## 2024-09-27 PROCEDURE — 93010 ELECTROCARDIOGRAM REPORT: CPT | Mod: ,,, | Performed by: INTERNAL MEDICINE

## 2024-09-27 PROCEDURE — 81003 URINALYSIS AUTO W/O SCOPE: CPT | Performed by: PHYSICIAN ASSISTANT

## 2024-09-27 PROCEDURE — 25500020 PHARM REV CODE 255: Performed by: HOSPITALIST

## 2024-09-27 PROCEDURE — 84484 ASSAY OF TROPONIN QUANT: CPT | Performed by: EMERGENCY MEDICINE

## 2024-09-27 PROCEDURE — 80053 COMPREHEN METABOLIC PANEL: CPT | Performed by: EMERGENCY MEDICINE

## 2024-09-27 PROCEDURE — 96374 THER/PROPH/DIAG INJ IV PUSH: CPT

## 2024-09-27 PROCEDURE — 83880 ASSAY OF NATRIURETIC PEPTIDE: CPT | Performed by: EMERGENCY MEDICINE

## 2024-09-27 PROCEDURE — 99285 EMERGENCY DEPT VISIT HI MDM: CPT | Mod: 25

## 2024-09-27 PROCEDURE — 63600175 PHARM REV CODE 636 W HCPCS: Performed by: PHYSICIAN ASSISTANT

## 2024-09-27 RX ORDER — NALOXONE HCL 0.4 MG/ML
0.02 VIAL (ML) INJECTION
Status: DISCONTINUED | OUTPATIENT
Start: 2024-09-27 | End: 2024-10-02 | Stop reason: HOSPADM

## 2024-09-27 RX ORDER — LEVOTHYROXINE SODIUM 75 UG/1
75 TABLET ORAL
Status: DISCONTINUED | OUTPATIENT
Start: 2024-09-28 | End: 2024-10-02 | Stop reason: HOSPADM

## 2024-09-27 RX ORDER — ACETAMINOPHEN 325 MG/1
650 TABLET ORAL EVERY 4 HOURS PRN
Status: DISCONTINUED | OUTPATIENT
Start: 2024-09-27 | End: 2024-10-02 | Stop reason: HOSPADM

## 2024-09-27 RX ORDER — AMIODARONE HYDROCHLORIDE 200 MG/1
200 TABLET ORAL DAILY
Status: DISCONTINUED | OUTPATIENT
Start: 2024-09-28 | End: 2024-10-02 | Stop reason: HOSPADM

## 2024-09-27 RX ORDER — ATORVASTATIN CALCIUM 40 MG/1
40 TABLET, FILM COATED ORAL DAILY
Status: DISCONTINUED | OUTPATIENT
Start: 2024-09-27 | End: 2024-09-27

## 2024-09-27 RX ORDER — INSULIN GLARGINE 100 [IU]/ML
10 INJECTION, SOLUTION SUBCUTANEOUS NIGHTLY
Status: DISCONTINUED | OUTPATIENT
Start: 2024-09-27 | End: 2024-10-02 | Stop reason: HOSPADM

## 2024-09-27 RX ORDER — FUROSEMIDE 10 MG/ML
80 INJECTION INTRAMUSCULAR; INTRAVENOUS 2 TIMES DAILY
Status: DISCONTINUED | OUTPATIENT
Start: 2024-09-27 | End: 2024-09-27

## 2024-09-27 RX ORDER — GLUCAGON 1 MG
1 KIT INJECTION
Status: DISCONTINUED | OUTPATIENT
Start: 2024-09-27 | End: 2024-10-02 | Stop reason: HOSPADM

## 2024-09-27 RX ORDER — IBUPROFEN 200 MG
16 TABLET ORAL
Status: DISCONTINUED | OUTPATIENT
Start: 2024-09-27 | End: 2024-10-02 | Stop reason: HOSPADM

## 2024-09-27 RX ORDER — SODIUM CHLORIDE 0.9 % (FLUSH) 0.9 %
10 SYRINGE (ML) INJECTION
Status: DISCONTINUED | OUTPATIENT
Start: 2024-09-27 | End: 2024-10-02 | Stop reason: HOSPADM

## 2024-09-27 RX ORDER — IBUPROFEN 200 MG
24 TABLET ORAL
Status: DISCONTINUED | OUTPATIENT
Start: 2024-09-27 | End: 2024-10-02 | Stop reason: HOSPADM

## 2024-09-27 RX ORDER — IPRATROPIUM BROMIDE AND ALBUTEROL SULFATE 2.5; .5 MG/3ML; MG/3ML
3 SOLUTION RESPIRATORY (INHALATION) EVERY 4 HOURS PRN
Status: DISCONTINUED | OUTPATIENT
Start: 2024-09-27 | End: 2024-10-02 | Stop reason: HOSPADM

## 2024-09-27 RX ORDER — CARVEDILOL 3.12 MG/1
3.12 TABLET ORAL 2 TIMES DAILY WITH MEALS
Status: DISCONTINUED | OUTPATIENT
Start: 2024-09-27 | End: 2024-09-27

## 2024-09-27 RX ORDER — CITALOPRAM 20 MG/1
20 TABLET, FILM COATED ORAL DAILY
Status: DISCONTINUED | OUTPATIENT
Start: 2024-09-27 | End: 2024-10-02 | Stop reason: HOSPADM

## 2024-09-27 RX ORDER — FUROSEMIDE 10 MG/ML
80 INJECTION INTRAMUSCULAR; INTRAVENOUS EVERY 12 HOURS
Status: DISCONTINUED | OUTPATIENT
Start: 2024-09-27 | End: 2024-09-27

## 2024-09-27 RX ORDER — BISACODYL 10 MG/1
10 SUPPOSITORY RECTAL DAILY PRN
Status: DISCONTINUED | OUTPATIENT
Start: 2024-09-27 | End: 2024-10-02 | Stop reason: HOSPADM

## 2024-09-27 RX ORDER — TALC
6 POWDER (GRAM) TOPICAL NIGHTLY PRN
Status: DISCONTINUED | OUTPATIENT
Start: 2024-09-27 | End: 2024-10-02 | Stop reason: HOSPADM

## 2024-09-27 RX ORDER — POLYETHYLENE GLYCOL 3350 17 G/17G
17 POWDER, FOR SOLUTION ORAL DAILY PRN
Status: DISCONTINUED | OUTPATIENT
Start: 2024-09-27 | End: 2024-10-02 | Stop reason: HOSPADM

## 2024-09-27 RX ORDER — ENOXAPARIN SODIUM 100 MG/ML
40 INJECTION SUBCUTANEOUS EVERY 24 HOURS
Status: DISCONTINUED | OUTPATIENT
Start: 2024-09-27 | End: 2024-09-27

## 2024-09-27 RX ORDER — ATORVASTATIN CALCIUM 40 MG/1
40 TABLET, FILM COATED ORAL NIGHTLY
Status: DISCONTINUED | OUTPATIENT
Start: 2024-09-27 | End: 2024-10-02 | Stop reason: HOSPADM

## 2024-09-27 RX ORDER — FUROSEMIDE 10 MG/ML
80 INJECTION INTRAMUSCULAR; INTRAVENOUS
Status: COMPLETED | OUTPATIENT
Start: 2024-09-27 | End: 2024-09-27

## 2024-09-27 RX ORDER — MECLIZINE HCL 12.5 MG 12.5 MG/1
25 TABLET ORAL DAILY
Status: DISCONTINUED | OUTPATIENT
Start: 2024-09-28 | End: 2024-10-02 | Stop reason: HOSPADM

## 2024-09-27 RX ORDER — ONDANSETRON HYDROCHLORIDE 2 MG/ML
4 INJECTION, SOLUTION INTRAVENOUS EVERY 8 HOURS PRN
Status: DISCONTINUED | OUTPATIENT
Start: 2024-09-27 | End: 2024-10-02 | Stop reason: HOSPADM

## 2024-09-27 RX ORDER — FUROSEMIDE 10 MG/ML
40 INJECTION INTRAMUSCULAR; INTRAVENOUS EVERY 12 HOURS
Status: DISCONTINUED | OUTPATIENT
Start: 2024-09-27 | End: 2024-09-27

## 2024-09-27 RX ORDER — FUROSEMIDE 10 MG/ML
40 INJECTION INTRAMUSCULAR; INTRAVENOUS 2 TIMES DAILY
Status: DISCONTINUED | OUTPATIENT
Start: 2024-09-27 | End: 2024-09-28

## 2024-09-27 RX ORDER — PROCHLORPERAZINE EDISYLATE 5 MG/ML
5 INJECTION INTRAMUSCULAR; INTRAVENOUS EVERY 6 HOURS PRN
Status: DISCONTINUED | OUTPATIENT
Start: 2024-09-27 | End: 2024-10-02 | Stop reason: HOSPADM

## 2024-09-27 RX ORDER — INSULIN ASPART 100 [IU]/ML
0-5 INJECTION, SOLUTION INTRAVENOUS; SUBCUTANEOUS
Status: DISCONTINUED | OUTPATIENT
Start: 2024-09-27 | End: 2024-10-02 | Stop reason: HOSPADM

## 2024-09-27 RX ORDER — KETOROLAC TROMETHAMINE 30 MG/ML
15 INJECTION, SOLUTION INTRAMUSCULAR; INTRAVENOUS EVERY 8 HOURS PRN
Status: ACTIVE | OUTPATIENT
Start: 2024-09-27 | End: 2024-09-30

## 2024-09-27 RX ORDER — VALSARTAN 40 MG/1
80 TABLET ORAL DAILY
Status: DISCONTINUED | OUTPATIENT
Start: 2024-09-28 | End: 2024-09-27

## 2024-09-27 RX ORDER — SODIUM CHLORIDE 0.9 % (FLUSH) 0.9 %
10 SYRINGE (ML) INJECTION EVERY 8 HOURS PRN
Status: DISCONTINUED | OUTPATIENT
Start: 2024-09-27 | End: 2024-09-27

## 2024-09-27 RX ADMIN — PERFLUTREN 1.5 ML: 6.52 INJECTION, SUSPENSION INTRAVENOUS at 04:09

## 2024-09-27 RX ADMIN — FUROSEMIDE 80 MG: 10 INJECTION, SOLUTION INTRAVENOUS at 12:09

## 2024-09-27 RX ADMIN — ATORVASTATIN CALCIUM 40 MG: 40 TABLET, FILM COATED ORAL at 09:09

## 2024-09-27 RX ADMIN — APIXABAN 5 MG: 5 TABLET, FILM COATED ORAL at 09:09

## 2024-09-27 RX ADMIN — INSULIN GLARGINE 10 UNITS: 100 INJECTION, SOLUTION SUBCUTANEOUS at 10:09

## 2024-09-27 NOTE — ED TRIAGE NOTES
Stacia Perry, a 89 y.o. female presents to the ED w/ complaint of Bilateral lower leg swelling and SOB for the last few weeks. Sent from clinic today for IV lasix.     Triage note:  Chief Complaint   Patient presents with    Leg Swelling    Shortness of Breath     Patients complains of bilateral leg swelling and shortness of breath x several days= worsens with exertion     Review of patient's allergies indicates:  No Known Allergies  Past Medical History:   Diagnosis Date    Anticoagulant long-term use     Aortic valve stenosis     Arthritis     Cardiogenic shock 5/7/2021    CHF (congestive heart failure)     Diabetes mellitus     Hypercholesteremia     Hypertension     Pneumonia due to infectious organism 5/6/2021    Primary adenocarcinoma of upper lobe of right lung 7/19/2021    Thyroid disease

## 2024-09-27 NOTE — PROGRESS NOTES
Cardiology Clinic Note  Reason for Visit: Shortness of breath and leg swelling     HPI:     PMHx:  Severe AS  Paroxysmal afib  H/o lung adenocarcinoma   HTN  HLD      Stacia Perry is a 89 y.o. F, who presents accompanied by her two daughters today. She has had worsened shortness of breath and leg swelling for the past week. Her weight is up 9 lbs in the past month. She is currently taking furosemide 80 mg qAM and 40 mg qPM. Last renal function check was 8/22, and her Cr had increased to 1.8, therefore metolazone was discontinued. On exam she had prominent JVD, crackles bilat lungs and 3+ edema to her knees. She is in acute CHF, and I have recommended that she present to the ED for diuresis and monitoring.     ROS:    Pertinent ROS included in HPI and below.  PMH:     Past Medical History:   Diagnosis Date    Anticoagulant long-term use     Aortic valve stenosis     Arthritis     Cardiogenic shock 5/7/2021    CHF (congestive heart failure)     Diabetes mellitus     Hypercholesteremia     Hypertension     Pneumonia due to infectious organism 5/6/2021    Primary adenocarcinoma of upper lobe of right lung 7/19/2021    Thyroid disease      Past Surgical History:   Procedure Laterality Date    CATARACT EXTRACTION, BILATERAL      EYE SURGERY      FOOT FRACTURE SURGERY Right     HYSTERECTOMY       Allergies:   Review of patient's allergies indicates:  No Known Allergies  Medications:     No current facility-administered medications on file prior to visit.     Current Outpatient Medications on File Prior to Visit   Medication Sig Dispense Refill    amiodarone (PACERONE) 200 MG Tab Take 1 tablet by mouth once daily 90 tablet 3    atorvastatin (LIPITOR) 40 MG tablet Take 1 tablet (40 mg total) by mouth once daily. 30 tablet 6    carvediloL (COREG) 6.25 MG tablet Take 0.5 tablets (3.125 mg total) by mouth 2 (two) times daily with meals. 90 tablet 3    citalopram (CELEXA) 20 MG tablet Take 20 mg by mouth once daily.       dapagliflozin (FARXIGA) 5 mg Tab tablet Take 1 tablet (5 mg total) by mouth once daily. 90 tablet 1    furosemide (LASIX) 80 MG tablet Take 1 tablet (80 mg total) by mouth once daily. 30 tablet 11    levothyroxine (SYNTHROID) 75 MCG tablet Take 75 mcg by mouth before breakfast.       meclizine (ANTIVERT) 25 mg tablet Take 25 mg by mouth once daily.       metformin (GLUCOPHAGE) 500 MG tablet Take 1,000 mg by mouth 2 (two) times daily with meals.       potassium chloride SA (K-DUR,KLOR-CON) 20 MEQ tablet Take 1 tablet (20 mEq total) by mouth 2 (two) times daily. 30 tablet 3    TRESIBA FLEXTOUCH U-100 100 unit/mL (3 mL) InPn Inject 18 Units into the skin every evening. 3 Syringe 3    valsartan (DIOVAN) 160 MG tablet Take 1/2 (one-half) tablet by mouth once daily 45 tablet 3    furosemide (LASIX) 40 MG tablet Take 1 tablet (40 mg total) by mouth 2 (two) times daily. (Patient not taking: Reported on 8/22/2024) 60 tablet 11    metFORMIN (GLUCOPHAGE-XR) 500 MG ER 24hr tablet Take 1,000 mg by mouth 2 (two) times daily. (Patient not taking: Reported on 8/22/2024)       Social History:     Social History     Tobacco Use    Smoking status: Never    Smokeless tobacco: Never   Substance Use Topics    Alcohol use: No     Family History:     Family History   Problem Relation Name Age of Onset    Diabetes Mother      Heart disease Father      Diabetes Father      Diabetes Sister 1     Diabetes Brother 1     No Known Problems Maternal Aunt      No Known Problems Maternal Uncle      No Known Problems Paternal Aunt      No Known Problems Paternal Uncle      No Known Problems Maternal Grandmother      No Known Problems Maternal Grandfather      No Known Problems Paternal Grandmother      No Known Problems Paternal Grandfather      Heart attack Brother 2     Diabetes Brother 2     Stroke Son      Diabetes Daughter      Anemia Neg Hx      Arrhythmia Neg Hx      Asthma Neg Hx      Clotting disorder Neg Hx      Fainting Neg Hx      Heart  "failure Neg Hx      Hyperlipidemia Neg Hx      Hypertension Neg Hx      Atrial Septal Defect Neg Hx       Physical Exam:   BP (!) 106/55   Pulse (!) 56   Ht 4' 11" (1.499 m)   Wt 69.2 kg (152 lb 8.9 oz)   SpO2 96%   BMI 30.81 kg/m²      Physical Exam  Vitals and nursing note reviewed.   Constitutional:       Appearance: Normal appearance.   HENT:      Head: Normocephalic and atraumatic.   Neck:      Vascular: Normal carotid pulses. JVD present. No carotid bruit.   Cardiovascular:      Rate and Rhythm: Normal rate and regular rhythm.      Chest Wall: PMI is not displaced.      Pulses:           Radial pulses are 2+ on the right side and 2+ on the left side.      Heart sounds: Murmur heard.      Harsh midsystolic murmur is present with a grade of 3/6 at the upper right sternal border radiating to the neck.   Pulmonary:      Effort: Pulmonary effort is normal.      Breath sounds: Examination of the right-upper field reveals rales. Examination of the left-upper field reveals rales. Examination of the right-middle field reveals rales. Examination of the right-lower field reveals rales. Examination of the left-lower field reveals rales. Decreased breath sounds and rales present.   Abdominal:      General: Bowel sounds are normal. There is no abdominal bruit.      Palpations: Abdomen is soft. There is no pulsatile mass.      Tenderness: There is no abdominal tenderness.   Musculoskeletal:      Right lower leg: 3+ Pitting Edema present.      Left lower leg: 3+ Pitting Edema present.   Feet:      Right foot:      Skin integrity: Skin integrity normal.      Left foot:      Skin integrity: Skin integrity normal.   Skin:     Capillary Refill: Capillary refill takes less than 2 seconds.   Neurological:      General: No focal deficit present.      Mental Status: She is alert.   Psychiatric:         Mood and Affect: Mood and affect normal.         Speech: Speech normal.         Behavior: Behavior is cooperative.         " Thought Content: Thought content normal.          Labs:     Blood Tests:  Lab Results   Component Value Date    BNP 2,388 (H) 09/27/2024     09/27/2024    K 5.4 (H) 09/27/2024     09/27/2024    CO2 25 09/27/2024    BUN 31 (H) 09/27/2024    CREATININE 1.3 09/27/2024    GLU 77 09/27/2024    HGBA1C 7.6 (H) 11/07/2022    MG 2.0 11/09/2022    AST 31 09/27/2024    ALT 25 09/27/2024    ALBUMIN 3.3 (L) 09/27/2024    PROT 6.9 09/27/2024    BILITOT 0.7 09/27/2024    WBC 8.55 09/27/2024    HGB 12.9 09/27/2024    HCT 40.3 09/27/2024    MCV 97 09/27/2024     09/27/2024    INR 1.1 11/07/2022    TSH 1.488 11/08/2022       Lab Results   Component Value Date    CHOL 90 (L) 11/08/2022    HDL 24 (L) 11/08/2022    TRIG 157 (H) 11/08/2022       Lab Results   Component Value Date    LDLCALC 34.6 (L) 11/08/2022         Imaging:     Echocardiogram  TTE 7/14/2022  The left ventricle is normal in size with mild eccentric hypertrophy and low normal systolic function.  The estimated ejection fraction is 55%.  Grade III left ventricular diastolic dysfunction.  Normal right ventricular size with mildly reduced right ventricular systolic function.  Severe left atrial enlargement.  Mild right atrial enlargement.  There is severe aortic valve stenosis.  Aortic valve area is 0.55 cm2; peak velocity is 4.32 m/s; mean gradient is 53 mmHg.  Mild aortic regurgitation.  Moderate-to-severe mitral regurgitation.  Moderate tricuspid regurgitation.  Normal central venous pressure (3 mmHg).  The estimated PA systolic pressure is 51 mmHg.  There is mild-moderate pulmonary hypertension.    Assessment:     1. Acute combined systolic and diastolic congestive heart failure    2. BIJAL (acute kidney injury)    3. Severe aortic stenosis    4. TORRES (dyspnea on exertion)        Plan:     Acute combined systolic and diastolic congestive heart failure    BIJAL (acute kidney injury)    Severe aortic stenosis    TORRES (dyspnea on exertion)    Recommended to  present to the ED. Patient transported to ED. Briefly discussed palliative care, patient's daughter does not want referral at this time. Plan for follow up quickly following discharge for management of diuretics.       Signed:  Shellie Buckley PA-C  Cardiology     9/27/2024 9:35 AM    Follow-up:   No future appointments.

## 2024-09-27 NOTE — ASSESSMENT & PLAN NOTE
- known, chronic  - patient was to have a TAVR in 2021, but patient refused  - repeat echo pending  - will start on lasix 40mg IV BID to avoid hypotension given stenosis

## 2024-09-27 NOTE — NURSING
Nurses Note -- 4 Eyes      9/27/2024   6:25 PM      Skin assessed during: Admit      [] No Altered Skin Integrity Present    []Prevention Measures Documented      [x] Yes- Altered Skin Integrity Present or Discovered   [] LDA Added if Not in Epic (Describe Wound)   [x] New Altered Skin Integrity was Present on Admit and Documented in LDA   [x] Wound Image Taken    Wound Care Consulted? Yes    Attending Nurse:  Connie Davila RN/Staff Member:   Cristina

## 2024-09-27 NOTE — H&P
[facility] - Emergency Dept  Hospital Medicine  History & Physical    Patient Name: Stacia Perry  MRN: [id number]  Patient Class: OP- Observation  Admission Date: 9/27/2024  Attending Physician: Keila Phan MD   Primary Care Provider: Maureen Parnell MD         Patient information was obtained from patient, relative(s), and ER records.     Subjective:     Principal Problem:Acute on chronic diastolic congestive heart failure    Chief Complaint:   Chief Complaint   Patient presents with    Leg Swelling    Shortness of Breath     Patients complains of bilateral leg swelling and shortness of breath x several days= worsens with exertion        HPI: Stacia Perry is a 89 y.o. female with PMHx significant for DM II, HTN, HLD, pAfib on eliquis, aortic stenosis, and HFpEF admitted to hospital medicine for acute CHF exacerbation. Patient was seen in cardiology clinic this morning where she was noted to have significant BLE, JVD, and hypoxemia which prompted her visit to the ED. Endorses worsening SOB, leg swelling, and a ~9 lb weight gain over the past month. Reports compliance with lasix (80mg PO in the morning) and daughter reports giving patient an extra 40mg PO in the evenings for the past few days. Denies fever/chills, diaphoresis, lightheadedness, HA, CP, cough, congestion, abdominal pain, n/v/d, urinary symptoms, changes in BMs, numbness/tingling, weakness. Of note, patient was to have a TAVR in 2021 for aortic stenosis but she refused.    In the ED, SpO2 <94% on RA, improved with supplemental oxygent. Remaining vitals stable. BNP 2388. Troponin negative. K 5.4. Cr 1.3 (BL ~1.0). CXR consistent with pulmonary vascular congestion. EKG with sinus lorena and known 1st degree heart block. Given lasix 80mg IV x1.     Past Medical History:   Diagnosis Date    Anticoagulant long-term use     Aortic valve stenosis     Arthritis     Cardiogenic shock 5/7/2021    CHF (congestive heart failure)     Diabetes mellitus      Hypercholesteremia     Hypertension     Pneumonia due to infectious organism 5/6/2021    Primary adenocarcinoma of upper lobe of right lung 7/19/2021    Thyroid disease        Past Surgical History:   Procedure Laterality Date    CATARACT EXTRACTION, BILATERAL      EYE SURGERY      FOOT FRACTURE SURGERY Right     HYSTERECTOMY         Review of patient's allergies indicates:  No Known Allergies    No current facility-administered medications on file prior to encounter.     Current Outpatient Medications on File Prior to Encounter   Medication Sig    amiodarone (PACERONE) 200 MG Tab Take 1 tablet by mouth once daily    atorvastatin (LIPITOR) 40 MG tablet Take 1 tablet (40 mg total) by mouth once daily.    carvediloL (COREG) 6.25 MG tablet Take 0.5 tablets (3.125 mg total) by mouth 2 (two) times daily with meals.    citalopram (CELEXA) 20 MG tablet Take 20 mg by mouth once daily.    dapagliflozin (FARXIGA) 5 mg Tab tablet Take 1 tablet (5 mg total) by mouth once daily.    furosemide (LASIX) 40 MG tablet Take 1 tablet (40 mg total) by mouth 2 (two) times daily. (Patient not taking: Reported on 8/22/2024)    furosemide (LASIX) 80 MG tablet Take 1 tablet (80 mg total) by mouth once daily.    levothyroxine (SYNTHROID) 75 MCG tablet Take 75 mcg by mouth before breakfast.     meclizine (ANTIVERT) 25 mg tablet Take 25 mg by mouth once daily.     metformin (GLUCOPHAGE) 500 MG tablet Take 1,000 mg by mouth 2 (two) times daily with meals.     metFORMIN (GLUCOPHAGE-XR) 500 MG ER 24hr tablet Take 1,000 mg by mouth 2 (two) times daily. (Patient not taking: Reported on 8/22/2024)    potassium chloride SA (K-DUR,KLOR-CON) 20 MEQ tablet Take 1 tablet (20 mEq total) by mouth 2 (two) times daily.    TRESIBA FLEXTOUCH U-100 100 unit/mL (3 mL) InPn Inject 18 Units into the skin every evening.    valsartan (DIOVAN) 160 MG tablet Take 1/2 (one-half) tablet by mouth once daily     Family History       Problem Relation (Age of Onset)     Diabetes Mother, Father, Sister, Brother, Brother, Daughter    Heart attack Brother    Heart disease Father    No Known Problems Maternal Aunt, Maternal Uncle, Paternal Aunt, Paternal Uncle, Maternal Grandmother, Maternal Grandfather, Paternal Grandmother, Paternal Grandfather    Stroke Son          Tobacco Use    Smoking status: Never    Smokeless tobacco: Never   Substance and Sexual Activity    Alcohol use: No    Drug use: No    Sexual activity: Not on file     Review of Systems   Constitutional:  Positive for unexpected weight change. Negative for activity change, chills and fever.   HENT:  Negative for trouble swallowing.    Eyes:  Negative for photophobia and visual disturbance.   Respiratory:  Positive for shortness of breath. Negative for cough and chest tightness.    Cardiovascular:  Positive for leg swelling. Negative for chest pain and palpitations.   Gastrointestinal:  Negative for abdominal pain, constipation, diarrhea, nausea and vomiting.   Genitourinary:  Negative for dysuria, frequency and hematuria.   Musculoskeletal:  Negative for back pain, gait problem and neck pain.   Skin:  Negative for rash and wound.   Neurological:  Negative for dizziness, syncope, speech difficulty and light-headedness.   Psychiatric/Behavioral:  Negative for agitation and confusion. The patient is not nervous/anxious.      Objective:     Vital Signs (Most Recent):  Temp: 97.4 °F (36.3 °C) (09/27/24 1014)  Pulse: (!) 58 (09/27/24 1222)  Resp: 18 (09/27/24 1222)  BP: 108/65 (09/27/24 1222)  SpO2: 95 % (09/27/24 1222) Vital Signs (24h Range):  Temp:  [97.4 °F (36.3 °C)] 97.4 °F (36.3 °C)  Pulse:  [56-58] 58  Resp:  [16-18] 18  SpO2:  [90 %-96 %] 95 %  BP: (106-108)/(52-65) 108/65     Weight: 64.4 kg (142 lb)  Body mass index is 28.68 kg/m².     Physical Exam  Vitals and nursing note reviewed.   Constitutional:       General: She is not in acute distress.     Appearance: Normal appearance. She is not ill-appearing.   HENT:       Head: Normocephalic and atraumatic.      Right Ear: External ear normal.      Left Ear: External ear normal.   Eyes:      Extraocular Movements: Extraocular movements intact.      Conjunctiva/sclera: Conjunctivae normal.      Pupils: Pupils are equal, round, and reactive to light.   Neck:      Vascular: JVD present.   Cardiovascular:      Rate and Rhythm: Normal rate and regular rhythm.      Pulses: Normal pulses.      Heart sounds: Normal heart sounds. No murmur heard.  Pulmonary:      Effort: Pulmonary effort is normal. No respiratory distress.      Comments: Unable to do pulmonary exam 2/2 patient getting Echo. Per cardiology  note, rales noted to BLL  Abdominal:      General: Abdomen is flat. Bowel sounds are normal.      Palpations: Abdomen is soft.      Tenderness: There is no abdominal tenderness.   Musculoskeletal:         General: Normal range of motion.      Cervical back: Normal range of motion and neck supple. No tenderness.      Right lower leg: Edema (2+ pitting) present.      Left lower leg: Edema (2+ pitting) present.   Skin:     General: Skin is warm and dry.      Capillary Refill: Capillary refill takes less than 2 seconds.      Coloration: Skin is not jaundiced.   Neurological:      General: No focal deficit present.      Mental Status: She is alert and oriented to person, place, and time. Mental status is at baseline.   Psychiatric:         Mood and Affect: Mood normal.         Behavior: Behavior normal.              CRANIAL NERVES     CN III, IV, VI   Pupils are equal, round, and reactive to light.       Significant Labs: All pertinent labs within the past 24 hours have been reviewed.  BMP:   Recent Labs   Lab 09/27/24  1155   GLU 77      K 5.4*      CO2 25   BUN 31*   CREATININE 1.3   CALCIUM 9.4     CBC:   Recent Labs   Lab 09/27/24  1155   WBC 8.55   HGB 12.9   HCT 40.3        CMP:   Recent Labs   Lab 09/27/24  1155      K 5.4*      CO2 25   GLU 77   BUN 31*    CREATININE 1.3   CALCIUM 9.4   PROT 6.9   ALBUMIN 3.3*   BILITOT 0.7   ALKPHOS 77   AST 31   ALT 25   ANIONGAP 11     Cardiac Markers:   Recent Labs   Lab 09/27/24  1155   BNP 2,388*     Troponin:   Recent Labs   Lab 09/27/24  1155   TROPONINI 0.016       Significant Imaging: I have reviewed all pertinent imaging results/findings within the past 24 hours.    Imaging Results              X-Ray Chest AP Portable (Final result)  Result time 09/27/24 11:52:38      Final result by Rene Greenwood MD (09/27/24 11:52:38)                   Impression:      Findings which could represent pulmonary vascular congestion.  Recommend clinical correlation      Electronically signed by: Rene Greenwood MD  Date:    09/27/2024  Time:    11:52               Narrative:    EXAMINATION:  XR CHEST AP PORTABLE    CLINICAL HISTORY:  sob;    TECHNIQUE:  Single views of the chest were performed.    COMPARISON:  Chest radiograph dated November 7, 2022    FINDINGS:  The trachea and cardiomediastinal silhouette remains stable.  There is persistent, but improved perihilar and interstitial markings which could represent pulmonary vascular congestion.  No definitive evidence for discrete consolidation or pneumothorax. Osseous structures demonstrate no evidence for acute fractures or dislocations.                                      Assessment/Plan:     * Acute on chronic diastolic congestive heart failure  Acute Respiratory Failure  Patient is identified as having Diastolic (HFpEF) heart failure that is Acute on chronic. CHF is currently uncontrolled due to Continued edema of extremities, Weight gain of 9 pounds, and JVD, Rales/crackles on pulmonary exam, and Pulmonary edema/pleural effusion on CXR. Latest ECHO performed and demonstrates- Results for orders placed during the hospital encounter of 07/14/22    Echo Saline Bubble? No    Interpretation Summary  · The left ventricle is normal in size with mild eccentric hypertrophy and low normal  systolic function.  · The estimated ejection fraction is 55%.  · Grade III left ventricular diastolic dysfunction.  · Normal right ventricular size with mildly reduced right ventricular systolic function.  · Severe left atrial enlargement.  · Mild right atrial enlargement.  · There is severe aortic valve stenosis.  · Aortic valve area is 0.55 cm2; peak velocity is 4.32 m/s; mean gradient is 53 mmHg.  · Mild aortic regurgitation.  · Moderate-to-severe mitral regurgitation.  · Moderate tricuspid regurgitation.  · Normal central venous pressure (3 mmHg).  · The estimated PA systolic pressure is 51 mmHg.  · There is mild-moderate pulmonary hypertension.  . Continue Beta Blocker, ACE/ARB, and Furosemide and monitor clinical status closely.   Monitor on telemetry.   Patient is on CHF pathway.    Monitor strict Is&Os and daily weights.    Place on fluid restriction of 1.5 L.   Cardiology has not been consulted.   Continue to stress to patient importance of self efficacy and  on diet for CHF.   Last BNP reviewed- and noted below   Recent Labs   Lab 09/27/24  1155   BNP 2,388*       Hyperkalemia  Hyperkalemia is likely due to BIJAL.The patients most recent potassium results are listed below.  Recent Labs     09/27/24  1155   K 5.4*     Plan  - Monitor for arrhythmias with EKG and/or continuous telemetry.   - Treat the hyperkalemia with Furosemide.   - Monitor potassium: Daily  - The patient's hyperkalemia is stable            Paroxysmal atrial fibrillation  Patient with Paroxysmal (<7 days) atrial fibrillation which is controlled currently with Beta Blocker, Calcium Channel Blocker, and Amiodarone. Patient is currently in sinus rhythm.[id number] Score: 4. Anticoagulation not indicated due to fall risk .    Obesity  Body mass index is 28.68 kg/m². Morbid obesity complicates all aspects of disease management from diagnostic modalities to treatment. Weight loss encouraged and health benefits explained to  "patient.         Severe aortic stenosis  - known, chronic  - patient was to have a TAVR in 2021, but patient refused  - repeat echo pending  - will start on lasix 40mg IV BID to avoid hypotension given stenosis    HLD (hyperlipidemia)  - chronic  - continue statin      Type 2 diabetes mellitus without complication, without long-term current use of insulin  Patient's FSGs are controlled on current medication regimen.  Last A1c reviewed-   Lab Results   Component Value Date    HGBA1C 7.6 (H) 11/07/2022     Most recent fingerstick glucose reviewed- No results for input(s): "POCTGLUCOSE" in the last 24 hours.  Current correctional scale  Low  Maintain anti-hyperglycemic dose as follows-   Antihyperglycemics (From admission, onward)      Start     Stop Route Frequency Ordered    09/27/24 2100  insulin glargine U-100 (Lantus) pen 10 Units         -- SubQ Nightly 09/27/24 1334    09/27/24 1434  insulin aspart U-100 pen 0-5 Units         -- SubQ Before meals & nightly PRN 09/27/24 1334          Hold Oral hypoglycemics while patient is in the hospital.    Essential hypertension  Chronic, controlled. Latest blood pressure and vitals reviewed-     Temp:  [97.4 °F (36.3 °C)]   Pulse:  [56-58]   Resp:  [16-18]   BP: (106-108)/(52-65)   SpO2:  [90 %-96 %] .   Home meds for hypertension were reviewed and noted below.   Hypertension Medications               carvediloL (COREG) 6.25 MG tablet Take 0.5 tablets (3.125 mg total) by mouth 2 (two) times daily with meals.    furosemide (LASIX) 80 MG tablet Take 1 tablet (80 mg total) by mouth once daily.    valsartan (DIOVAN) 160 MG tablet Take 1/2 (one-half) tablet by mouth once daily    furosemide (LASIX) 40 MG tablet Take 1 tablet (40 mg total) by mouth 2 (two) times daily.            While in the hospital, will manage blood pressure as follows; Continue home antihypertensive regimen    Will utilize p.r.n. blood pressure medication only if patient's blood pressure greater than 180/110 " and she develops symptoms such as worsening chest pain or shortness of breath.      VTE Risk Mitigation (From admission, onward)           Ordered     apixaban tablet 5 mg  2 times daily         09/27/24 1421     IP VTE HIGH RISK PATIENT  Once         09/27/24 1331     Place sequential compression device  Until discontinued         09/27/24 1331                         On 09/27/2024, patient should be placed in hospital observation services under my care in collaboration with Keila Phan MD.           Shelley Lovelace PA-C  Department of Hospital Medicine  [facility] - Emergency Dept

## 2024-09-27 NOTE — ASSESSMENT & PLAN NOTE
Patient with Paroxysmal (<7 days) atrial fibrillation which is controlled currently with Beta Blocker, Calcium Channel Blocker, and Amiodarone. Patient is currently in sinus rhythm.[id number] Score: 4. Anticoagulation not indicated due to fall risk .

## 2024-09-27 NOTE — HPI
Stacia Perry is a 89 y.o. female with PMHx significant for DM II, HTN, HLD, pAfib on eliquis, aortic stenosis, and HFpEF admitted to hospital medicine for acute CHF exacerbation. Patient was seen in cardiology clinic this morning where she was noted to have significant BLE, JVD, and hypoxemia which prompted her visit to the ED. Endorses worsening SOB, leg swelling, and a ~9 lb weight gain over the past month. Reports compliance with lasix (80mg PO in the morning) and daughter reports giving patient an extra 40mg PO in the evenings for the past few days. Denies fever/chills, diaphoresis, lightheadedness, HA, CP, cough, congestion, abdominal pain, n/v/d, urinary symptoms, changes in BMs, numbness/tingling, weakness. Of note, patient was to have a TAVR in 2021 for aortic stenosis but she refused.    In the ED, SpO2 <94% on RA, improved with supplemental oxygent. Remaining vitals stable. BNP 2388. Troponin negative. K 5.4. Cr 1.3 (BL ~1.0). CXR consistent with pulmonary vascular congestion. EKG with sinus lorena and known 1st degree heart block. Given lasix 80mg IV x1.

## 2024-09-27 NOTE — ASSESSMENT & PLAN NOTE
Hyperkalemia is likely due to BIJAL.The patients most recent potassium results are listed below.  Recent Labs     09/27/24  1155   K 5.4*     Plan  - Monitor for arrhythmias with EKG and/or continuous telemetry.   - Treat the hyperkalemia with Furosemide.   - Monitor potassium: Daily  - The patient's hyperkalemia is stable

## 2024-09-27 NOTE — ED PROVIDER NOTES
Chief complaint:  Leg Swelling and Shortness of Breath (Patients complains of bilateral leg swelling and shortness of breath x several days= worsens with exertion)      HPI:  Stacia Perry is a 89 y.o. female presenting with acute onset of increased bilateral lower extremity edema and shortness of breath that has been going on for the last 2 weeks and gradually getting worse.  She was seen earlier today by her cardiologist and was instructed to come to the emergency department for admission for diuresis.  She denies any chest pain.  No fevers or chills.    ROS: As per HPI and below:  Constitutional:  No fevers, no chills  Cardiac: no chest pain  Respiratory:  shortness of breath  Abdominal: no abdominal pain, no nausea, no vomiting  Neuro: no focal numbness, no focal weakness        Review of patient's allergies indicates:  No Known Allergies    No current facility-administered medications on file prior to encounter.     Current Outpatient Medications on File Prior to Encounter   Medication Sig Dispense Refill    amiodarone (PACERONE) 200 MG Tab Take 1 tablet by mouth once daily 90 tablet 3    atorvastatin (LIPITOR) 40 MG tablet Take 1 tablet (40 mg total) by mouth once daily. 30 tablet 6    carvediloL (COREG) 6.25 MG tablet Take 0.5 tablets (3.125 mg total) by mouth 2 (two) times daily with meals. 90 tablet 3    citalopram (CELEXA) 20 MG tablet Take 20 mg by mouth once daily.      dapagliflozin (FARXIGA) 5 mg Tab tablet Take 1 tablet (5 mg total) by mouth once daily. 90 tablet 1    furosemide (LASIX) 40 MG tablet Take 1 tablet (40 mg total) by mouth 2 (two) times daily. (Patient not taking: Reported on 8/22/2024) 60 tablet 11    furosemide (LASIX) 80 MG tablet Take 1 tablet (80 mg total) by mouth once daily. 30 tablet 11    levothyroxine (SYNTHROID) 75 MCG tablet Take 75 mcg by mouth before breakfast.       meclizine (ANTIVERT) 25 mg tablet Take 25 mg by mouth once daily.       metformin (GLUCOPHAGE) 500 MG  tablet Take 1,000 mg by mouth 2 (two) times daily with meals.       metFORMIN (GLUCOPHAGE-XR) 500 MG ER 24hr tablet Take 1,000 mg by mouth 2 (two) times daily. (Patient not taking: Reported on 8/22/2024)      potassium chloride SA (K-DUR,KLOR-CON) 20 MEQ tablet Take 1 tablet (20 mEq total) by mouth 2 (two) times daily. 30 tablet 3    TRESIBA FLEXTOUCH U-100 100 unit/mL (3 mL) InPn Inject 18 Units into the skin every evening. 3 Syringe 3    valsartan (DIOVAN) 160 MG tablet Take 1/2 (one-half) tablet by mouth once daily 45 tablet 3       PMH:  As per HPI and below:  Past Medical History:   Diagnosis Date    Anticoagulant long-term use     Aortic valve stenosis     Arthritis     Cardiogenic shock 5/7/2021    CHF (congestive heart failure)     Diabetes mellitus     Hypercholesteremia     Hypertension     Pneumonia due to infectious organism 5/6/2021    Primary adenocarcinoma of upper lobe of right lung 7/19/2021    Thyroid disease      Past Surgical History:   Procedure Laterality Date    CATARACT EXTRACTION, BILATERAL      EYE SURGERY      FOOT FRACTURE SURGERY Right     HYSTERECTOMY         Social History     Socioeconomic History    Marital status:    Tobacco Use    Smoking status: Never    Smokeless tobacco: Never   Substance and Sexual Activity    Alcohol use: No    Drug use: No     Social Determinants of Health     Financial Resource Strain: Low Risk  (11/8/2022)    Overall Financial Resource Strain (CARDIA)     Difficulty of Paying Living Expenses: Not hard at all   Food Insecurity: No Food Insecurity (11/8/2022)    Hunger Vital Sign     Worried About Running Out of Food in the Last Year: Never true     Ran Out of Food in the Last Year: Never true   Transportation Needs: No Transportation Needs (11/8/2022)    PRAPARE - Transportation     Lack of Transportation (Medical): No     Lack of Transportation (Non-Medical): No   Physical Activity: Inactive (11/8/2022)    Exercise Vital Sign     Days of Exercise per  Week: 0 days     Minutes of Exercise per Session: 0 min   Housing Stability: Low Risk  (11/8/2022)    Housing Stability Vital Sign     Unable to Pay for Housing in the Last Year: No     Number of Places Lived in the Last Year: 1     Unstable Housing in the Last Year: No       Family History   Problem Relation Name Age of Onset    Diabetes Mother      Heart disease Father      Diabetes Father      Diabetes Sister 1     Diabetes Brother 1     No Known Problems Maternal Aunt      No Known Problems Maternal Uncle      No Known Problems Paternal Aunt      No Known Problems Paternal Uncle      No Known Problems Maternal Grandmother      No Known Problems Maternal Grandfather      No Known Problems Paternal Grandmother      No Known Problems Paternal Grandfather      Heart attack Brother 2     Diabetes Brother 2     Stroke Son      Diabetes Daughter      Anemia Neg Hx      Arrhythmia Neg Hx      Asthma Neg Hx      Clotting disorder Neg Hx      Fainting Neg Hx      Heart failure Neg Hx      Hyperlipidemia Neg Hx      Hypertension Neg Hx      Atrial Septal Defect Neg Hx         Physical Exam:    Vitals:    09/27/24 1222   BP: 108/65   Pulse: (!) 58   Resp: 18   Temp:      Constitutional: Well-nourished, well-developed, in no acute distress, not cachectic  Eyes: PERRLA, EOMI, normal conjunctiva, normal sclera  ENT: Moist Mucous membranes  Respiratory: Clear to auscultation bilaterally, no wheezes, no crackles, no rhonchi  Cardiovascular: Regular rate and rhythm, no murmurs, no rubs, no gallops  Abdominal: Soft, nontender, nondistended, no guarding, no rebound  Musculoskeletal: Normal range of motion, no obvious deformity, normal capillary refill, head atraumatic, neck supple, no meningismus, 3+ bilateral lower extremity edema  Skin: no rash, no ecchymosis, no errythema, no discharge  Neurologic: Cranial nerves II through XII intact, no motor deficits, no sensory deficits, no cerebellar deficits  Psychological: Alert, oriented  x3, normal affect, normal mood    Orders Placed This Encounter   Procedures    X-Ray Chest AP Portable    HIV 1/2 Ag/Ab (4th Gen)    Hepatitis C Antibody    CBC auto differential    Comprehensive metabolic panel    Brain natriuretic peptide    Troponin I    Basic metabolic panel    Hemoglobin A1c    Magnesium    Urinalysis    Diet diabetic Cardiac (Low Na/Chol); 2000 Calorie; Fluid - 1500mL    Confirm Patient is not Eligible for Congestive Heart Failure Pathway    Intake and output    Notify physician     Cardiac Monitoring - Adult    Vital signs    Fluid restriction    Height and weight On admission. Standing Weight Method required.    Daily weights On presentation to floor. Standing Weight Method required.    Strict intake and output 1.5 Liters maximum per 24 hours.    Strict intake and output 1.5 Liters maximum per 24 hours.    Notify Physician    Notify Physician - Potential Need of Opioid Reversal    General Heart Failure Pathway Patient    Place sequential compression device    Weigh patient    Bladder scan    Recheck Blood Glucose:    If any glucose result is less than 50 or greater than 400:    If 2nd result is less than 50 or greater than 400:    If glucose greater than 400 mg/dL treat per correction scale.  If glucose remains elevated above 400 mg/dL at next scheduled check, notify provider    Do not admin Aspart correction between scheduled prandial Aspart    Full code    Inpatient consult to Social Work/Case Management    Inpatient consult to Registered Dietitian/Nutritionist    Pulse Oximetry Q4H    Inhalation Treatment Q4H PRN    Oxygen PRN    POCT glucose    EKG 12-lead    EKG 12-lead    EKG 12-lead    Echo    Insert Saline lock IV    Saline lock IV    Possible Hospitalization    Place in Observation    Fall precautions       Medications   sodium chloride 0.9% flush 10 mL (has no administration in time range)   melatonin tablet 6 mg (has no administration in time range)   atorvastatin tablet 40 mg  (has no administration in time range)   amiodarone tablet 200 mg (has no administration in time range)   carvediloL tablet 3.125 mg (has no administration in time range)   citalopram tablet 20 mg (has no administration in time range)   levothyroxine tablet 75 mcg (has no administration in time range)   valsartan tablet 80 mg (has no administration in time range)   sodium chloride 0.9% flush 10 mL (has no administration in time range)   enoxaparin injection 40 mg (has no administration in time range)   albuterol-ipratropium 2.5 mg-0.5 mg/3 mL nebulizer solution 3 mL (has no administration in time range)   polyethylene glycol packet 17 g (has no administration in time range)   bisacodyL suppository 10 mg (has no administration in time range)   acetaminophen tablet 650 mg (has no administration in time range)   naloxone 0.4 mg/mL injection 0.02 mg (has no administration in time range)   glucose chewable tablet 16 g (has no administration in time range)   glucose chewable tablet 24 g (has no administration in time range)   glucagon (human recombinant) injection 1 mg (has no administration in time range)   ketorolac injection 15 mg (has no administration in time range)   ondansetron injection 4 mg (has no administration in time range)   prochlorperazine injection Soln 5 mg (has no administration in time range)   dextrose 10% bolus 125 mL 125 mL (has no administration in time range)   dextrose 10% bolus 250 mL 250 mL (has no administration in time range)   insulin glargine U-100 (Lantus) pen 10 Units (has no administration in time range)   insulin aspart U-100 pen 0-5 Units (has no administration in time range)   furosemide injection 80 mg (has no administration in time range)   furosemide injection 80 mg (80 mg Intravenous Given 9/27/24 1206)         Labs Reviewed   CBC W/ AUTO DIFFERENTIAL - Abnormal       Result Value    WBC 8.55      RBC 4.17      Hemoglobin 12.9      Hematocrit 40.3      MCV 97      MCH 30.9       MCHC 32.0      RDW 20.2 (*)     Platelets 208      MPV 12.0      Immature Granulocytes 0.2      Gran # (ANC) 5.9      Immature Grans (Abs) 0.02      Lymph # 1.5      Mono # 1.0      Eos # 0.1      Baso # 0.04      nRBC 0      Gran % 69.3      Lymph % 17.1 (*)     Mono % 11.8      Eosinophil % 1.1      Basophil % 0.5      Differential Method Automated     COMPREHENSIVE METABOLIC PANEL - Abnormal    Sodium 143      Potassium 5.4 (*)     Chloride 107      CO2 25      Glucose 77      BUN 31 (*)     Creatinine 1.3      Calcium 9.4      Total Protein 6.9      Albumin 3.3 (*)     Total Bilirubin 0.7      Alkaline Phosphatase 77      AST 31      ALT 25      eGFR 39.1 (*)     Anion Gap 11     B-TYPE NATRIURETIC PEPTIDE - Abnormal    BNP 2,388 (*)    TROPONIN I    Troponin I 0.016     HIV 1 / 2 ANTIBODY   HEPATITIS C ANTIBODY   HEMOGLOBIN A1C   URINALYSIS       X-Ray Chest AP Portable   Final Result      Findings which could represent pulmonary vascular congestion.  Recommend clinical correlation         Electronically signed by: Rene Greenwood MD   Date:    09/27/2024   Time:    11:52          Medical Decision Making  Differential diagnosis includes edema, life-threatening ACS, CHF, pneumonia     Patient presents with lower extremity edema and shortness of breath likely secondary to fluid overload.  She was seen by Cardiology earlier today and instructed to come into the emergency department for further workup and admission for diuresis.  Will perform cardiac workup and start Lasix and ultimately admit.    Patient's cardiac labs show an elevated BNP and chest x-ray shows CHF.  Will admit for diuresis    Amount and/or Complexity of Data Reviewed  Labs: ordered.  Radiology: ordered and independent interpretation performed.     Details: Chest x-ray: CHF  ECG/medicine tests: ordered and independent interpretation performed.     Details: EKG: Sinus bradycardia, left axis deviation, no ST elevation or depression  Discussion of  management or test interpretation with external provider(s): Internal Medicine, will admit    Risk  OTC drugs.  Prescription drug management.  Decision regarding hospitalization.      Procedures          ASSESSMENT  1. Acute on chronic congestive heart failure, unspecified heart failure type    2. Shortness of breath    3. SOB (shortness of breath)    4. CHF exacerbation    5. Chest pain          Disposition:  Admit to internal medicine    New Prescriptions    No medications on file     Modified Medications    No medications on file     Discontinued Medications    No medications on file          Sergio Dickerson III, MD  09/27/24 9047

## 2024-09-27 NOTE — SUBJECTIVE & OBJECTIVE
Past Medical History:   Diagnosis Date    Anticoagulant long-term use     Aortic valve stenosis     Arthritis     Cardiogenic shock 5/7/2021    CHF (congestive heart failure)     Diabetes mellitus     Hypercholesteremia     Hypertension     Pneumonia due to infectious organism 5/6/2021    Primary adenocarcinoma of upper lobe of right lung 7/19/2021    Thyroid disease        Past Surgical History:   Procedure Laterality Date    CATARACT EXTRACTION, BILATERAL      EYE SURGERY      FOOT FRACTURE SURGERY Right     HYSTERECTOMY         Review of patient's allergies indicates:  No Known Allergies    No current facility-administered medications on file prior to encounter.     Current Outpatient Medications on File Prior to Encounter   Medication Sig    amiodarone (PACERONE) 200 MG Tab Take 1 tablet by mouth once daily    atorvastatin (LIPITOR) 40 MG tablet Take 1 tablet (40 mg total) by mouth once daily.    carvediloL (COREG) 6.25 MG tablet Take 0.5 tablets (3.125 mg total) by mouth 2 (two) times daily with meals.    citalopram (CELEXA) 20 MG tablet Take 20 mg by mouth once daily.    dapagliflozin (FARXIGA) 5 mg Tab tablet Take 1 tablet (5 mg total) by mouth once daily.    furosemide (LASIX) 40 MG tablet Take 1 tablet (40 mg total) by mouth 2 (two) times daily. (Patient not taking: Reported on 8/22/2024)    furosemide (LASIX) 80 MG tablet Take 1 tablet (80 mg total) by mouth once daily.    levothyroxine (SYNTHROID) 75 MCG tablet Take 75 mcg by mouth before breakfast.     meclizine (ANTIVERT) 25 mg tablet Take 25 mg by mouth once daily.     metformin (GLUCOPHAGE) 500 MG tablet Take 1,000 mg by mouth 2 (two) times daily with meals.     metFORMIN (GLUCOPHAGE-XR) 500 MG ER 24hr tablet Take 1,000 mg by mouth 2 (two) times daily. (Patient not taking: Reported on 8/22/2024)    potassium chloride SA (K-DUR,KLOR-CON) 20 MEQ tablet Take 1 tablet (20 mEq total) by mouth 2 (two) times daily.    TRESIBA FLEXTOUCH U-100 100 unit/mL (3  mL) InPn Inject 18 Units into the skin every evening.    valsartan (DIOVAN) 160 MG tablet Take 1/2 (one-half) tablet by mouth once daily     Family History       Problem Relation (Age of Onset)    Diabetes Mother, Father, Sister, Brother, Brother, Daughter    Heart attack Brother    Heart disease Father    No Known Problems Maternal Aunt, Maternal Uncle, Paternal Aunt, Paternal Uncle, Maternal Grandmother, Maternal Grandfather, Paternal Grandmother, Paternal Grandfather    Stroke Son          Tobacco Use    Smoking status: Never    Smokeless tobacco: Never   Substance and Sexual Activity    Alcohol use: No    Drug use: No    Sexual activity: Not on file     Review of Systems   Constitutional:  Positive for unexpected weight change. Negative for activity change, chills and fever.   HENT:  Negative for trouble swallowing.    Eyes:  Negative for photophobia and visual disturbance.   Respiratory:  Positive for shortness of breath. Negative for cough and chest tightness.    Cardiovascular:  Positive for leg swelling. Negative for chest pain and palpitations.   Gastrointestinal:  Negative for abdominal pain, constipation, diarrhea, nausea and vomiting.   Genitourinary:  Negative for dysuria, frequency and hematuria.   Musculoskeletal:  Negative for back pain, gait problem and neck pain.   Skin:  Negative for rash and wound.   Neurological:  Negative for dizziness, syncope, speech difficulty and light-headedness.   Psychiatric/Behavioral:  Negative for agitation and confusion. The patient is not nervous/anxious.      Objective:     Vital Signs (Most Recent):  Temp: 97.4 °F (36.3 °C) (09/27/24 1014)  Pulse: (!) 58 (09/27/24 1222)  Resp: 18 (09/27/24 1222)  BP: 108/65 (09/27/24 1222)  SpO2: 95 % (09/27/24 1222) Vital Signs (24h Range):  Temp:  [97.4 °F (36.3 °C)] 97.4 °F (36.3 °C)  Pulse:  [56-58] 58  Resp:  [16-18] 18  SpO2:  [90 %-96 %] 95 %  BP: (106-108)/(52-65) 108/65     Weight: 64.4 kg (142 lb)  Body mass index is  28.68 kg/m².     Physical Exam  Vitals and nursing note reviewed.   Constitutional:       General: She is not in acute distress.     Appearance: Normal appearance. She is not ill-appearing.   HENT:      Head: Normocephalic and atraumatic.      Right Ear: External ear normal.      Left Ear: External ear normal.   Eyes:      Extraocular Movements: Extraocular movements intact.      Conjunctiva/sclera: Conjunctivae normal.      Pupils: Pupils are equal, round, and reactive to light.   Neck:      Vascular: JVD present.   Cardiovascular:      Rate and Rhythm: Normal rate and regular rhythm.      Pulses: Normal pulses.      Heart sounds: Normal heart sounds. No murmur heard.  Pulmonary:      Effort: Pulmonary effort is normal. No respiratory distress.      Comments: Unable to do pulmonary exam 2/2 patient getting Echo. Per cardiology  note, rales noted to BLL  Abdominal:      General: Abdomen is flat. Bowel sounds are normal.      Palpations: Abdomen is soft.      Tenderness: There is no abdominal tenderness.   Musculoskeletal:         General: Normal range of motion.      Cervical back: Normal range of motion and neck supple. No tenderness.      Right lower leg: Edema (2+ pitting) present.      Left lower leg: Edema (2+ pitting) present.   Skin:     General: Skin is warm and dry.      Capillary Refill: Capillary refill takes less than 2 seconds.      Coloration: Skin is not jaundiced.   Neurological:      General: No focal deficit present.      Mental Status: She is alert and oriented to person, place, and time. Mental status is at baseline.   Psychiatric:         Mood and Affect: Mood normal.         Behavior: Behavior normal.              CRANIAL NERVES     CN III, IV, VI   Pupils are equal, round, and reactive to light.       Significant Labs: All pertinent labs within the past 24 hours have been reviewed.  BMP:   Recent Labs   Lab 09/27/24  1155   GLU 77      K 5.4*      CO2 25   BUN 31*   CREATININE 1.3    CALCIUM 9.4     CBC:   Recent Labs   Lab 09/27/24  1155   WBC 8.55   HGB 12.9   HCT 40.3        CMP:   Recent Labs   Lab 09/27/24  1155      K 5.4*      CO2 25   GLU 77   BUN 31*   CREATININE 1.3   CALCIUM 9.4   PROT 6.9   ALBUMIN 3.3*   BILITOT 0.7   ALKPHOS 77   AST 31   ALT 25   ANIONGAP 11     Cardiac Markers:   Recent Labs   Lab 09/27/24  1155   BNP 2,388*     Troponin:   Recent Labs   Lab 09/27/24  1155   TROPONINI 0.016       Significant Imaging: I have reviewed all pertinent imaging results/findings within the past 24 hours.    Imaging Results              X-Ray Chest AP Portable (Final result)  Result time 09/27/24 11:52:38      Final result by Rene Greenwood MD (09/27/24 11:52:38)                   Impression:      Findings which could represent pulmonary vascular congestion.  Recommend clinical correlation      Electronically signed by: Rene Greenwood MD  Date:    09/27/2024  Time:    11:52               Narrative:    EXAMINATION:  XR CHEST AP PORTABLE    CLINICAL HISTORY:  sob;    TECHNIQUE:  Single views of the chest were performed.    COMPARISON:  Chest radiograph dated November 7, 2022    FINDINGS:  The trachea and cardiomediastinal silhouette remains stable.  There is persistent, but improved perihilar and interstitial markings which could represent pulmonary vascular congestion.  No definitive evidence for discrete consolidation or pneumothorax. Osseous structures demonstrate no evidence for acute fractures or dislocations.

## 2024-09-27 NOTE — ASSESSMENT & PLAN NOTE
Acute Respiratory Failure  Patient is identified as having Diastolic (HFpEF) heart failure that is Acute on chronic. CHF is currently uncontrolled due to Continued edema of extremities, Weight gain of 9 pounds, and JVD, Rales/crackles on pulmonary exam, and Pulmonary edema/pleural effusion on CXR. Latest ECHO performed and demonstrates- Results for orders placed during the hospital encounter of 07/14/22    Echo Saline Bubble? No    Interpretation Summary  · The left ventricle is normal in size with mild eccentric hypertrophy and low normal systolic function.  · The estimated ejection fraction is 55%.  · Grade III left ventricular diastolic dysfunction.  · Normal right ventricular size with mildly reduced right ventricular systolic function.  · Severe left atrial enlargement.  · Mild right atrial enlargement.  · There is severe aortic valve stenosis.  · Aortic valve area is 0.55 cm2; peak velocity is 4.32 m/s; mean gradient is 53 mmHg.  · Mild aortic regurgitation.  · Moderate-to-severe mitral regurgitation.  · Moderate tricuspid regurgitation.  · Normal central venous pressure (3 mmHg).  · The estimated PA systolic pressure is 51 mmHg.  · There is mild-moderate pulmonary hypertension.  . Continue Beta Blocker, ACE/ARB, and Furosemide and monitor clinical status closely.   Monitor on telemetry.   Patient is on CHF pathway.    Monitor strict Is&Os and daily weights.    Place on fluid restriction of 1.5 L.   Cardiology has not been consulted.   Continue to stress to patient importance of self efficacy and  on diet for CHF.   Last BNP reviewed- and noted below   Recent Labs   Lab 09/27/24  1155   BNP 2,388*

## 2024-09-27 NOTE — PLAN OF CARE
Problem: Adult Inpatient Plan of Care  Goal: Plan of Care Review  Outcome: Progressing  Goal: Patient-Specific Goal (Individualized)  Outcome: Progressing  Goal: Absence of Hospital-Acquired Illness or Injury  Outcome: Progressing  Goal: Optimal Comfort and Wellbeing  Outcome: Progressing  Goal: Readiness for Transition of Care  Outcome: Progressing     Problem: Diabetes Comorbidity  Goal: Blood Glucose Level Within Targeted Range  Outcome: Progressing     Problem: Wound  Goal: Optimal Coping  Outcome: Progressing  Goal: Optimal Functional Ability  Outcome: Progressing  Goal: Absence of Infection Signs and Symptoms  Outcome: Progressing  Goal: Improved Oral Intake  Outcome: Progressing  Goal: Optimal Pain Control and Function  Outcome: Progressing  Goal: Skin Health and Integrity  Outcome: Progressing  Goal: Optimal Wound Healing  Outcome: Progressing     Problem: Skin Injury Risk Increased  Goal: Skin Health and Integrity  Outcome: Progressing     Pt progressing towards goals. No distress notice. No falls or injuries during shift. Bed in lowest position, call light within reach, belonging at bedside. Safety precaution maintain.Plan of Care reviewed. Pt verbalized understanding.

## 2024-09-27 NOTE — NURSING
Pt is AAOx2 disorientated to place date and time  Pt seem drowsy states she feels very tired,family at bedside. Pt has +1 edema to lower extremities has skin teas to bilateral lower legs. No distress noted. Held lasix and bp medication due to low BP will informed provider.  Call light in reach. Bed in low locked position.   Side rails x2. Belongings at bedside.   Pt free of fall and injuries Questions and concerns voiced and answered.    Plan of care continues.

## 2024-09-27 NOTE — ASSESSMENT & PLAN NOTE
Chronic, controlled. Latest blood pressure and vitals reviewed-     Temp:  [97.4 °F (36.3 °C)]   Pulse:  [56-58]   Resp:  [16-18]   BP: (106-108)/(52-65)   SpO2:  [90 %-96 %] .   Home meds for hypertension were reviewed and noted below.   Hypertension Medications               carvediloL (COREG) 6.25 MG tablet Take 0.5 tablets (3.125 mg total) by mouth 2 (two) times daily with meals.    furosemide (LASIX) 80 MG tablet Take 1 tablet (80 mg total) by mouth once daily.    valsartan (DIOVAN) 160 MG tablet Take 1/2 (one-half) tablet by mouth once daily    furosemide (LASIX) 40 MG tablet Take 1 tablet (40 mg total) by mouth 2 (two) times daily.            While in the hospital, will manage blood pressure as follows; Continue home antihypertensive regimen    Will utilize p.r.n. blood pressure medication only if patient's blood pressure greater than 180/110 and she develops symptoms such as worsening chest pain or shortness of breath.

## 2024-09-27 NOTE — ASSESSMENT & PLAN NOTE
"Patient's FSGs are controlled on current medication regimen.  Last A1c reviewed-   Lab Results   Component Value Date    HGBA1C 7.6 (H) 11/07/2022     Most recent fingerstick glucose reviewed- No results for input(s): "POCTGLUCOSE" in the last 24 hours.  Current correctional scale  Low  Maintain anti-hyperglycemic dose as follows-   Antihyperglycemics (From admission, onward)      Start     Stop Route Frequency Ordered    09/27/24 2100  insulin glargine U-100 (Lantus) pen 10 Units         -- SubQ Nightly 09/27/24 1334    09/27/24 1434  insulin aspart U-100 pen 0-5 Units         -- SubQ Before meals & nightly PRN 09/27/24 1334          Hold Oral hypoglycemics while patient is in the hospital.  "

## 2024-09-27 NOTE — ASSESSMENT & PLAN NOTE
Body mass index is 28.68 kg/m². Morbid obesity complicates all aspects of disease management from diagnostic modalities to treatment. Weight loss encouraged and health benefits explained to patient.

## 2024-09-28 PROBLEM — I50.43 ACUTE ON CHRONIC COMBINED SYSTOLIC AND DIASTOLIC CONGESTIVE HEART FAILURE: Status: ACTIVE | Noted: 2024-09-27

## 2024-09-28 LAB
ANION GAP SERPL CALC-SCNC: 8 MMOL/L (ref 8–16)
BASOPHILS # BLD AUTO: 0.06 K/UL (ref 0–0.2)
BASOPHILS NFR BLD: 0.7 % (ref 0–1.9)
BUN SERPL-MCNC: 25 MG/DL (ref 8–23)
CALCIUM SERPL-MCNC: 8.9 MG/DL (ref 8.7–10.5)
CHLORIDE SERPL-SCNC: 102 MMOL/L (ref 95–110)
CO2 SERPL-SCNC: 29 MMOL/L (ref 23–29)
CREAT SERPL-MCNC: 1.2 MG/DL (ref 0.5–1.4)
DIFFERENTIAL METHOD BLD: ABNORMAL
EOSINOPHIL # BLD AUTO: 0.1 K/UL (ref 0–0.5)
EOSINOPHIL NFR BLD: 1.3 % (ref 0–8)
ERYTHROCYTE [DISTWIDTH] IN BLOOD BY AUTOMATED COUNT: 19.7 % (ref 11.5–14.5)
EST. GFR  (NO RACE VARIABLE): 43 ML/MIN/1.73 M^2
GLUCOSE SERPL-MCNC: 153 MG/DL (ref 70–110)
HCT VFR BLD AUTO: 41.3 % (ref 37–48.5)
HGB BLD-MCNC: 12.7 G/DL (ref 12–16)
IMM GRANULOCYTES # BLD AUTO: 0.03 K/UL (ref 0–0.04)
IMM GRANULOCYTES NFR BLD AUTO: 0.3 % (ref 0–0.5)
LYMPHOCYTES # BLD AUTO: 1.2 K/UL (ref 1–4.8)
LYMPHOCYTES NFR BLD: 13.6 % (ref 18–48)
MAGNESIUM SERPL-MCNC: 2.1 MG/DL (ref 1.6–2.6)
MCH RBC QN AUTO: 31 PG (ref 27–31)
MCHC RBC AUTO-ENTMCNC: 30.8 G/DL (ref 32–36)
MCV RBC AUTO: 101 FL (ref 82–98)
MONOCYTES # BLD AUTO: 1.1 K/UL (ref 0.3–1)
MONOCYTES NFR BLD: 12.6 % (ref 4–15)
NEUTROPHILS # BLD AUTO: 6.5 K/UL (ref 1.8–7.7)
NEUTROPHILS NFR BLD: 71.5 % (ref 38–73)
NRBC BLD-RTO: 0 /100 WBC
PLATELET # BLD AUTO: 198 K/UL (ref 150–450)
PMV BLD AUTO: 11.7 FL (ref 9.2–12.9)
POCT GLUCOSE: 161 MG/DL (ref 70–110)
POCT GLUCOSE: 172 MG/DL (ref 70–110)
POCT GLUCOSE: 188 MG/DL (ref 70–110)
POTASSIUM SERPL-SCNC: 4.2 MMOL/L (ref 3.5–5.1)
RBC # BLD AUTO: 4.1 M/UL (ref 4–5.4)
SODIUM SERPL-SCNC: 139 MMOL/L (ref 136–145)
WBC # BLD AUTO: 9.04 K/UL (ref 3.9–12.7)

## 2024-09-28 PROCEDURE — 83735 ASSAY OF MAGNESIUM: CPT | Performed by: PHYSICIAN ASSISTANT

## 2024-09-28 PROCEDURE — 63600175 PHARM REV CODE 636 W HCPCS: Performed by: HOSPITALIST

## 2024-09-28 PROCEDURE — 21400001 HC TELEMETRY ROOM

## 2024-09-28 PROCEDURE — 25000003 PHARM REV CODE 250: Performed by: PHYSICIAN ASSISTANT

## 2024-09-28 PROCEDURE — 80048 BASIC METABOLIC PNL TOTAL CA: CPT | Performed by: PHYSICIAN ASSISTANT

## 2024-09-28 PROCEDURE — 36415 COLL VENOUS BLD VENIPUNCTURE: CPT | Performed by: PHYSICIAN ASSISTANT

## 2024-09-28 PROCEDURE — 85025 COMPLETE CBC W/AUTO DIFF WBC: CPT | Performed by: PHYSICIAN ASSISTANT

## 2024-09-28 RX ADMIN — MECLIZINE 25 MG: 12.5 TABLET ORAL at 10:09

## 2024-09-28 RX ADMIN — INSULIN GLARGINE 10 UNITS: 100 INJECTION, SOLUTION SUBCUTANEOUS at 09:09

## 2024-09-28 RX ADMIN — APIXABAN 5 MG: 5 TABLET, FILM COATED ORAL at 09:09

## 2024-09-28 RX ADMIN — CITALOPRAM HYDROBROMIDE 20 MG: 20 TABLET ORAL at 09:09

## 2024-09-28 RX ADMIN — LEVOTHYROXINE SODIUM 75 MCG: 75 TABLET ORAL at 05:09

## 2024-09-28 RX ADMIN — FUROSEMIDE 40 MG: 10 INJECTION, SOLUTION INTRAVENOUS at 10:09

## 2024-09-28 RX ADMIN — APIXABAN 5 MG: 5 TABLET, FILM COATED ORAL at 10:09

## 2024-09-28 RX ADMIN — AMIODARONE HYDROCHLORIDE 200 MG: 200 TABLET ORAL at 10:09

## 2024-09-28 RX ADMIN — ATORVASTATIN CALCIUM 40 MG: 40 TABLET, FILM COATED ORAL at 09:09

## 2024-09-28 NOTE — ASSESSMENT & PLAN NOTE
Chronic, controlled. Latest blood pressure and vitals reviewed-     Temp:  [97.5 °F (36.4 °C)-98.1 °F (36.7 °C)]   Pulse:  [51-78]   Resp:  [11-18]   BP: ()/(51-70)   SpO2:  [95 %-98 %] .   Home meds for hypertension were reviewed and noted below.   Hypertension Medications               carvediloL (COREG) 6.25 MG tablet Take 0.5 tablets (3.125 mg total) by mouth 2 (two) times daily with meals.    furosemide (LASIX) 80 MG tablet Take 1 tablet (80 mg total) by mouth once daily.    valsartan (DIOVAN) 160 MG tablet Take 1/2 (one-half) tablet by mouth once daily    furosemide (LASIX) 40 MG tablet Take 1 tablet (40 mg total) by mouth 2 (two) times daily.            While in the hospital, will manage blood pressure as follows; Continue home antihypertensive regimen> discontinued home meds for now given hypotension with IV lasix, Restart when appropriate    Will utilize p.r.n. blood pressure medication only if patient's blood pressure greater than 180/110 and she develops symptoms such as worsening chest pain or shortness of breath.

## 2024-09-28 NOTE — ASSESSMENT & PLAN NOTE
Hyperkalemia is likely due to BIJAL.The patients most recent potassium results are listed below.  Recent Labs     09/27/24  1155 09/28/24  0842   K 5.4* 4.2       Plan  - Monitor for arrhythmias with EKG and/or continuous telemetry.   - Treat the hyperkalemia with Furosemide.   - Monitor potassium: Daily  - The patient's hyperkalemia is stable  - RESOLVED

## 2024-09-28 NOTE — SUBJECTIVE & OBJECTIVE
Interval History: VSS. NAEO. Patient resting comfortably in bed. Reports improvement in SOB and BLE swelling. Will attempt to wean patient off supplemental oxygen today. Good urine output. Will hold off on additional IV lasix and transition to PO. Plan for possible discharge home tomorrow.     Review of Systems   Constitutional:  Negative for chills and fever.   Respiratory:  Positive for shortness of breath (improved). Negative for chest tightness.    Cardiovascular:  Positive for leg swelling (improved). Negative for chest pain.   Gastrointestinal:  Negative for abdominal pain and nausea.   Neurological:  Negative for dizziness and weakness.     Objective:     Vital Signs (Most Recent):  Temp: 97.6 °F (36.4 °C) (09/28/24 1149)  Pulse: 62 (09/28/24 1149)  Resp: 18 (09/28/24 1149)  BP: (!) 93/51 (09/28/24 1149)  SpO2: 98 % (09/28/24 1149) Vital Signs (24h Range):  Temp:  [97.5 °F (36.4 °C)-98.1 °F (36.7 °C)] 97.6 °F (36.4 °C)  Pulse:  [51-78] 62  Resp:  [11-18] 18  SpO2:  [95 %-98 %] 98 %  BP: ()/(51-70) 93/51     Weight: 64.4 kg (142 lb)  Body mass index is 25.15 kg/m².    Intake/Output Summary (Last 24 hours) at 9/28/2024 1220  Last data filed at 9/28/2024 1148  Gross per 24 hour   Intake 240 ml   Output 2800 ml   Net -2560 ml         Physical Exam  Vitals and nursing note reviewed.   Constitutional:       General: She is not in acute distress.     Appearance: Normal appearance. She is not ill-appearing.   HENT:      Head: Normocephalic and atraumatic.      Right Ear: External ear normal.      Left Ear: External ear normal.   Eyes:      Extraocular Movements: Extraocular movements intact.      Conjunctiva/sclera: Conjunctivae normal.      Pupils: Pupils are equal, round, and reactive to light.   Neck:      Vascular: No JVD.   Cardiovascular:      Rate and Rhythm: Normal rate and regular rhythm.      Pulses: Normal pulses.      Heart sounds: Normal heart sounds. No murmur heard.  Pulmonary:      Effort:  Pulmonary effort is normal. No respiratory distress.      Breath sounds: Rales (faint) present.   Abdominal:      General: Abdomen is flat. Bowel sounds are normal.      Palpations: Abdomen is soft.      Tenderness: There is no abdominal tenderness.   Musculoskeletal:         General: Normal range of motion.      Cervical back: Normal range of motion and neck supple. No tenderness.      Right lower leg: Edema (1+ pitting) present.      Left lower leg: Edema (1+ pitting) present.   Skin:     General: Skin is warm and dry.      Capillary Refill: Capillary refill takes less than 2 seconds.      Coloration: Skin is not jaundiced.   Neurological:      General: No focal deficit present.      Mental Status: She is alert and oriented to person, place, and time. Mental status is at baseline.   Psychiatric:         Mood and Affect: Mood normal.         Behavior: Behavior normal.             Significant Labs: All pertinent labs within the past 24 hours have been reviewed.  BMP:   Recent Labs   Lab 09/28/24  0842   *      K 4.2      CO2 29   BUN 25*   CREATININE 1.2   CALCIUM 8.9   MG 2.1     CBC:   Recent Labs   Lab 09/27/24  1155 09/28/24  0842   WBC 8.55 9.04   HGB 12.9 12.7   HCT 40.3 41.3    198     CMP:   Recent Labs   Lab 09/27/24  1155 09/28/24  0842    139   K 5.4* 4.2    102   CO2 25 29   GLU 77 153*   BUN 31* 25*   CREATININE 1.3 1.2   CALCIUM 9.4 8.9   PROT 6.9  --    ALBUMIN 3.3*  --    BILITOT 0.7  --    ALKPHOS 77  --    AST 31  --    ALT 25  --    ANIONGAP 11 8       Significant Imaging: I have reviewed all pertinent imaging results/findings within the past 24 hours.

## 2024-09-28 NOTE — RESPIRATORY THERAPY
"RAPID RESPONSE RESPIRATORY THERAPY PROACTIVE NOTE           Time of visit:      Code Status: Full Code   : 1934  Bed: 732/732 A:   MRN: [id number]  Time spent at the bedside: < 15 min    SITUATION    Evaluated patient for: Respiratory    BACKGROUND    Why is the patient in the hospital?: Acute on chronic combined systolic and diastolic congestive heart failure    Patient has a past medical history of Anticoagulant long-term use, Aortic valve stenosis, Arthritis, Cardiogenic shock, CHF (congestive heart failure), Diabetes mellitus, Hypercholesteremia, Hypertension, Pneumonia due to infectious organism, Primary adenocarcinoma of upper lobe of right lung, and Thyroid disease.    24 Hours Vitals Range:  Temp:  [97 °F (36.1 °C)-98.3 °F (36.8 °C)]   Pulse:  [53-78]   Resp:  [17-22]   BP: ()/(50-80)   SpO2:  [90 %-98 %]     Labs:    Recent Labs     24  1155 24  0842    139   K 5.4* 4.2    102   CO2 25 29   BUN 31* 25*   CREATININE 1.3 1.2   GLU 77 153*   MG  --  2.1        No results for input(s): "PH", "PCO2", "PO2", "HCO3", "POCSATURATED", "BE" in the last 72 hours.    ASSESSMENT/INTERVENTIONS  Patient in bed with family at the bedside. Called to bedside to assess patient for SOB. Patient on 2L via NC and SpO2 97%. Patient in no acute distress. Patient states she feels better.     Last VS   Temp: 98.3 °F (36.8 °C) (1744)  Pulse: 78 (1744)  Resp: 18 (1744)  BP: 102/53 (1744)  SpO2: 97 % (1744)    O2 Device/Concentration: NC 2L  NIPPV: No Surgical airway: No  ETCO2 monitored: N/a  Ambu at bedside: Yes    Active Orders   Respiratory Care    Inhalation Treatment Q4H PRN     Frequency: Q4H PRN     Number of Occurrences: Until Specified    Oxygen PRN     Frequency: PRN     Number of Occurrences: Until Specified     Order Comments: Face mask       Order Questions:      Device type: Low flow      Device: Nasal Cannula (1- 5 Liters)      LPM: 2      Titrate O2 " per Oxygen Titration Protocol: Yes      To maintain SpO2 goal of: >= 90%      Notify MD of: Inability to achieve desired SpO2; Sudden change in patient status and requires 20% increase in FiO2; Patient requires >60% FiO2    Pulse Oximetry Q4H     Frequency: Q4H     Number of Occurrences: Until Specified       RECOMMENDATIONS    We recommend: RRT Recs: Continue POC per primary team.    FOLLOW-UP    Please call back the Rapid Response RT, Bowen Rajput, RRT at x [phone] for any questions or concerns.

## 2024-09-28 NOTE — ASSESSMENT & PLAN NOTE
- known, chronic  - patient was to have a TAVR in 2021, but patient refused  - repeat echo below  - will start on lasix 40mg IV BID to avoid hypotension given stenosis    Results for orders placed during the hospital encounter of 09/27/24    Echo    Interpretation Summary    Left Ventricle: The left ventricle is normal in size. Moderately increased ventricular mass. Mildly increased wall thickness. Mild septal thickening. There is moderate concentric hypertrophy. Global hypokinesis present. There is severely reduced systolic function with a visually estimated ejection fraction of 20 - 25%. Ejection fraction by visual approximation is 23% with marked worsening compared with over 2 years ago.    Right Ventricle: Normal right ventricular cavity size. Wall thickness is normal. Systolic function is normal.    Left Atrium: Left atrium is severely dilated.    Aortic Valve: There is severe aortic valve sclerosis. Severely restricted motion. There is severe ( critical) stenosis. Aortic valve area by VTI is 0.31 cm2. Aortic valve peak velocity is 4.63 m/s. Mean gradient is 60 mmHg. The dimensionless index is 0.10. There is trace aortic regurgitation.    Mitral Valve: There is mild bileaflet sclerosis. There is mild mitral annular calcification present. There is moderate to moderate-severe regurgitation.    Tricuspid Valve: There is moderate regurgitation.    Pulmonary Artery: There is moderate pulmonary hypertension. The estimated pulmonary artery systolic pressure is 55 mmHg.    IVC/SVC: Intermediate venous pressure at 8 mmHg.    Pericardium: There is a  trivial-small posterior effusion at the base and trivial under the RA.

## 2024-09-28 NOTE — CONSULTS
Food & Nutrition  Education     Diet Education: Fluid and Salt restriction   Time Spent: 10 minutes   Learners: Patient and caregivers     Handouts provided: Low Sodium Nutrition Therapy, Fluid-Restricted Nutrition Therapy      Comments: Discussed importance of a low sodium diet. Reviewed high sodium foods that should be avoided. Food labels, salt free seasonings, and recommended sodium intake reviewed. Encouraged healthy, fresh foods that are low in sodium that are good for consumption. Fluid intake and conversions discussed. Foods considered fluids were reviewed and encouraged to monitor. General healthy diet encouraged. All questions/concerns were addressed.     Follow-Up: Yes     Please Re-consult as needed.   Thanks!    Manda Srivastava, MS, RD, LDN

## 2024-09-28 NOTE — PLAN OF CARE
Problem: Adult Inpatient Plan of Care  Goal: Plan of Care Review  Outcome: Progressing  Goal: Patient-Specific Goal (Individualized)  Outcome: Progressing  Goal: Absence of Hospital-Acquired Illness or Injury  Outcome: Progressing  Goal: Optimal Comfort and Wellbeing  Outcome: Progressing  Goal: Readiness for Transition of Care  Outcome: Progressing     Problem: Diabetes Comorbidity  Goal: Blood Glucose Level Within Targeted Range  Outcome: Progressing     Problem: Wound  Goal: Optimal Coping  Outcome: Progressing  Goal: Optimal Functional Ability  Outcome: Progressing  Goal: Absence of Infection Signs and Symptoms  Outcome: Progressing  Goal: Improved Oral Intake  Outcome: Progressing  Goal: Optimal Pain Control and Function  Outcome: Progressing  Goal: Skin Health and Integrity  Outcome: Progressing  Goal: Optimal Wound Healing  Outcome: Progressing     Problem: Wound  Goal: Optimal Coping  Outcome: Progressing  Goal: Optimal Functional Ability  Outcome: Progressing  Goal: Absence of Infection Signs and Symptoms  Outcome: Progressing  Goal: Improved Oral Intake  Outcome: Progressing  Goal: Optimal Pain Control and Function  Outcome: Progressing  Goal: Skin Health and Integrity  Outcome: Progressing  Goal: Optimal Wound Healing  Outcome: Progressing     Problem: Skin Injury Risk Increased  Goal: Skin Health and Integrity  Outcome: Progressing     Problem: Fall Injury Risk  Goal: Absence of Fall and Fall-Related Injury  Outcome: Progressing     Problem: Heart Failure  Goal: Optimal Cardiac Output  Outcome: Progressing  Goal: Fluid and Electrolyte Balance  Outcome: Progressing  Goal: Effective Oxygenation and Ventilation  Outcome: Progressing

## 2024-09-28 NOTE — PROGRESS NOTES
[facility] [address] [place] [address] Medicine  Progress Note    Patient Name: Stacia Perry  MRN: [id number]  Patient Class: IP- Inpatient   Admission Date: 9/27/2024  Length of Stay: 0 days  Attending Physician: Keila Phan MD  Primary Care Provider: Maureen Parnell MD        Subjective:     Principal Problem:Acute on chronic combined systolic and diastolic congestive heart failure        HPI:  Stacia Perry is a 89 y.o. female with PMHx significant for DM II, HTN, HLD, pAfib on eliquis, aortic stenosis, and HFpEF admitted to hospital medicine for acute CHF exacerbation. Patient was seen in cardiology clinic this morning where she was noted to have significant BLE, JVD, and hypoxemia which prompted her visit to the ED. Endorses worsening SOB, leg swelling, and a ~9 lb weight gain over the past month. Reports compliance with lasix (80mg PO in the morning) and daughter reports giving patient an extra 40mg PO in the evenings for the past few days. Denies fever/chills, diaphoresis, lightheadedness, HA, CP, cough, congestion, abdominal pain, n/v/d, urinary symptoms, changes in BMs, numbness/tingling, weakness. Of note, patient was to have a TAVR in 2021 for aortic stenosis but she refused.    In the ED, SpO2 <94% on RA, improved with supplemental oxygent. Remaining vitals stable. BNP 2388. Troponin negative. K 5.4. Cr 1.3 (BL ~1.0). CXR consistent with pulmonary vascular congestion. EKG with sinus lorena and known 1st degree heart block. Given lasix 80mg IV x1.     Overview/Hospital Course:  Patient admitted to hospital medicine for CHF exacerbation, started on IV lasix with good urine output. Patient with episode of asymptomatic hypotension s/p Lasix 80 IV. Lasix dose decreased to 40 with improvement. Echo with EF of 20-25%, severe known aortic stenosis, and elevated venous pressure. Plan to transition to oral Lasix and discharge home with family when stable.     Interval History: VSS. NAEO. Patient resting  comfortably in bed. Reports improvement in SOB and BLE swelling. Will attempt to wean patient off supplemental oxygen today. Good urine output. Will hold off on additional IV lasix and transition to PO. Plan for possible discharge home tomorrow.     Review of Systems   Constitutional:  Negative for chills and fever.   Respiratory:  Positive for shortness of breath (improved). Negative for chest tightness.    Cardiovascular:  Positive for leg swelling (improved). Negative for chest pain.   Gastrointestinal:  Negative for abdominal pain and nausea.   Neurological:  Negative for dizziness and weakness.     Objective:     Vital Signs (Most Recent):  Temp: 97.6 °F (36.4 °C) (09/28/24 1149)  Pulse: 62 (09/28/24 1149)  Resp: 18 (09/28/24 1149)  BP: (!) 93/51 (09/28/24 1149)  SpO2: 98 % (09/28/24 1149) Vital Signs (24h Range):  Temp:  [97.5 °F (36.4 °C)-98.1 °F (36.7 °C)] 97.6 °F (36.4 °C)  Pulse:  [51-78] 62  Resp:  [11-18] 18  SpO2:  [95 %-98 %] 98 %  BP: ()/(51-70) 93/51     Weight: 64.4 kg (142 lb)  Body mass index is 25.15 kg/m².    Intake/Output Summary (Last 24 hours) at 9/28/2024 1220  Last data filed at 9/28/2024 1148  Gross per 24 hour   Intake 240 ml   Output 2800 ml   Net -2560 ml         Physical Exam  Vitals and nursing note reviewed.   Constitutional:       General: She is not in acute distress.     Appearance: Normal appearance. She is not ill-appearing.   HENT:      Head: Normocephalic and atraumatic.      Right Ear: External ear normal.      Left Ear: External ear normal.   Eyes:      Extraocular Movements: Extraocular movements intact.      Conjunctiva/sclera: Conjunctivae normal.      Pupils: Pupils are equal, round, and reactive to light.   Neck:      Vascular: No JVD.   Cardiovascular:      Rate and Rhythm: Normal rate and regular rhythm.      Pulses: Normal pulses.      Heart sounds: Normal heart sounds. No murmur heard.  Pulmonary:      Effort: Pulmonary effort is normal. No respiratory  distress.      Breath sounds: Rales (faint) present.   Abdominal:      General: Abdomen is flat. Bowel sounds are normal.      Palpations: Abdomen is soft.      Tenderness: There is no abdominal tenderness.   Musculoskeletal:         General: Normal range of motion.      Cervical back: Normal range of motion and neck supple. No tenderness.      Right lower leg: Edema (1+ pitting) present.      Left lower leg: Edema (1+ pitting) present.   Skin:     General: Skin is warm and dry.      Capillary Refill: Capillary refill takes less than 2 seconds.      Coloration: Skin is not jaundiced.   Neurological:      General: No focal deficit present.      Mental Status: She is alert and oriented to person, place, and time. Mental status is at baseline.   Psychiatric:         Mood and Affect: Mood normal.         Behavior: Behavior normal.             Significant Labs: All pertinent labs within the past 24 hours have been reviewed.  BMP:   Recent Labs   Lab 09/28/24  0842   *      K 4.2      CO2 29   BUN 25*   CREATININE 1.2   CALCIUM 8.9   MG 2.1     CBC:   Recent Labs   Lab 09/27/24  1155 09/28/24  0842   WBC 8.55 9.04   HGB 12.9 12.7   HCT 40.3 41.3    198     CMP:   Recent Labs   Lab 09/27/24  1155 09/28/24  0842    139   K 5.4* 4.2    102   CO2 25 29   GLU 77 153*   BUN 31* 25*   CREATININE 1.3 1.2   CALCIUM 9.4 8.9   PROT 6.9  --    ALBUMIN 3.3*  --    BILITOT 0.7  --    ALKPHOS 77  --    AST 31  --    ALT 25  --    ANIONGAP 11 8       Significant Imaging: I have reviewed all pertinent imaging results/findings within the past 24 hours.    Assessment/Plan:      * Acute on chronic combined systolic and diastolic congestive heart failure  Acute Respiratory Failure  Patient is identified as having Diastolic (HFpEF) heart failure that is Acute on chronic. CHF is currently uncontrolled due to Continued edema of extremities, Weight gain of 9 pounds, and JVD, Rales/crackles on pulmonary exam,  and Pulmonary edema/pleural effusion on CXR. Latest ECHO performed and demonstrates- Results for orders placed during the hospital encounter of 07/14/22    Echo Saline Bubble? No    Interpretation Summary  · The left ventricle is normal in size with mild eccentric hypertrophy and low normal systolic function.  · The estimated ejection fraction is 55%.  · Grade III left ventricular diastolic dysfunction.  · Normal right ventricular size with mildly reduced right ventricular systolic function.  · Severe left atrial enlargement.  · Mild right atrial enlargement.  · There is severe aortic valve stenosis.  · Aortic valve area is 0.55 cm2; peak velocity is 4.32 m/s; mean gradient is 53 mmHg.  · Mild aortic regurgitation.  · Moderate-to-severe mitral regurgitation.  · Moderate tricuspid regurgitation.  · Normal central venous pressure (3 mmHg).  · The estimated PA systolic pressure is 51 mmHg.  · There is mild-moderate pulmonary hypertension.  . Continue Beta Blocker, ACE/ARB, and Furosemide and monitor clinical status closely.   Monitor on telemetry.   Patient is on CHF pathway.    Monitor strict Is&Os and daily weights.    Place on fluid restriction of 1.5 L.   Cardiology has not been consulted.   Continue to stress to patient importance of self efficacy and  on diet for CHF.   Last BNP reviewed- and noted below   Recent Labs   Lab 09/27/24  1155   BNP 2,388*         Hyperkalemia  Hyperkalemia is likely due to BIJAL.The patients most recent potassium results are listed below.  Recent Labs     09/27/24  1155 09/28/24  0842   K 5.4* 4.2       Plan  - Monitor for arrhythmias with EKG and/or continuous telemetry.   - Treat the hyperkalemia with Furosemide.   - Monitor potassium: Daily  - The patient's hyperkalemia is stable  - RESOLVED            Paroxysmal atrial fibrillation  Patient with Paroxysmal (<7 days) atrial fibrillation which is controlled currently with Beta Blocker, Calcium Channel Blocker, and Amiodarone.  Patient is currently in sinus rhythm.[id number] Score: 4. Anticoagulation not indicated due to fall risk .    Obesity  Body mass index is 28.68 kg/m². Morbid obesity complicates all aspects of disease management from diagnostic modalities to treatment. Weight loss encouraged and health benefits explained to patient.         Severe aortic stenosis  - known, chronic  - patient was to have a TAVR in 2021, but patient refused  - repeat echo below  - will start on lasix 40mg IV BID to avoid hypotension given stenosis    Results for orders placed during the hospital encounter of 09/27/24    Echo    Interpretation Summary    Left Ventricle: The left ventricle is normal in size. Moderately increased ventricular mass. Mildly increased wall thickness. Mild septal thickening. There is moderate concentric hypertrophy. Global hypokinesis present. There is severely reduced systolic function with a visually estimated ejection fraction of 20 - 25%. Ejection fraction by visual approximation is 23% with marked worsening compared with over 2 years ago.    Right Ventricle: Normal right ventricular cavity size. Wall thickness is normal. Systolic function is normal.    Left Atrium: Left atrium is severely dilated.    Aortic Valve: There is severe aortic valve sclerosis. Severely restricted motion. There is severe ( critical) stenosis. Aortic valve area by VTI is 0.31 cm2. Aortic valve peak velocity is 4.63 m/s. Mean gradient is 60 mmHg. The dimensionless index is 0.10. There is trace aortic regurgitation.    Mitral Valve: There is mild bileaflet sclerosis. There is mild mitral annular calcification present. There is moderate to moderate-severe regurgitation.    Tricuspid Valve: There is moderate regurgitation.    Pulmonary Artery: There is moderate pulmonary hypertension. The estimated pulmonary artery systolic pressure is 55 mmHg.    IVC/SVC: Intermediate venous pressure at 8 mmHg.    Pericardium: There is a  trivial-small posterior  "effusion at the base and trivial under the RA.      HLD (hyperlipidemia)  - chronic  - continue statin      Type 2 diabetes mellitus without complication, without long-term current use of insulin  Patient's FSGs are controlled on current medication regimen.  Last A1c reviewed-   Lab Results   Component Value Date    HGBA1C 7.6 (H) 11/07/2022     Most recent fingerstick glucose reviewed- No results for input(s): "POCTGLUCOSE" in the last 24 hours.  Current correctional scale  Low  Maintain anti-hyperglycemic dose as follows-   Antihyperglycemics (From admission, onward)      Start     Stop Route Frequency Ordered    09/27/24 2100  insulin glargine U-100 (Lantus) pen 10 Units         -- SubQ Nightly 09/27/24 1334    09/27/24 1434  insulin aspart U-100 pen 0-5 Units         -- SubQ Before meals & nightly PRN 09/27/24 1334          Hold Oral hypoglycemics while patient is in the hospital.    Essential hypertension  Chronic, controlled. Latest blood pressure and vitals reviewed-     Temp:  [97.5 °F (36.4 °C)-98.1 °F (36.7 °C)]   Pulse:  [51-78]   Resp:  [11-18]   BP: ()/(51-70)   SpO2:  [95 %-98 %] .   Home meds for hypertension were reviewed and noted below.   Hypertension Medications               carvediloL (COREG) 6.25 MG tablet Take 0.5 tablets (3.125 mg total) by mouth 2 (two) times daily with meals.    furosemide (LASIX) 80 MG tablet Take 1 tablet (80 mg total) by mouth once daily.    valsartan (DIOVAN) 160 MG tablet Take 1/2 (one-half) tablet by mouth once daily    furosemide (LASIX) 40 MG tablet Take 1 tablet (40 mg total) by mouth 2 (two) times daily.            While in the hospital, will manage blood pressure as follows; Continue home antihypertensive regimen> discontinued home meds for now given hypotension with IV lasix, Restart when appropriate    Will utilize p.r.n. blood pressure medication only if patient's blood pressure greater than 180/110 and she develops symptoms such as worsening chest pain " or shortness of breath.      VTE Risk Mitigation (From admission, onward)           Ordered     apixaban tablet 5 mg  2 times daily         09/27/24 1421     IP VTE HIGH RISK PATIENT  Once         09/27/24 1331     Place sequential compression device  Until discontinued         09/27/24 1331                    Discharge Planning   MICHAEL: 9/29/2024     Code Status: Full Code   Is the patient medically ready for discharge?:     Reason for patient still in hospital (select all that apply): Patient trending condition and Treatment  Discharge Plan A: Home with family                  Shelley Lovelace PA-C  Department of Hospital Medicine   [facility]

## 2024-09-28 NOTE — PLAN OF CARE
[facility]  Initial Discharge Assessment       Primary Care Provider: Maureen Parnell MD    Admission Diagnosis: Shortness of breath [R06.02]  SOB (shortness of breath) [R06.02]  CHF exacerbation [I50.9]  Chest pain [R07.9]  Acute on chronic congestive heart failure, unspecified heart failure type [I50.9]    Admission Date: 9/27/2024  Expected Discharge Date: 9/29/2024    Transition of Care Barriers: (P) None    Payor: [employer] [facility] / Plan: [employer] CHO[employer] / Product Type: Medicare Advantage /     Extended Emergency Contact Information  Primary Emergency Contact: Kesha Marino  Mobile Phone: [phone]  Relation: Daughter  Secondary Emergency Contact: YiselchepeAlecia  Mobile Phone: [phone]  Relation: Daughter  Preferred language: English   needed? No    Discharge Plan A: (P) Home with family  Discharge Plan B: (P) Home      [facility] [address]  [place] [address]  [address]  [facility] [place] [address]  Phone: [phone] Fax: [phone]      Initial Assessment (most recent)       Adult Discharge Assessment - 09/28/24 1017          Discharge Assessment    Assessment Type Discharge Planning Assessment (P)      Confirmed/corrected address, phone number and insurance Yes (P)      Confirmed Demographics Correct on Facesheet (P)      Source of Information patient;family (P)      Communicated MICHAEL with patient/caregiver Date not available/Unable to determine (P)      Reason For Admission Acute on chronic diastolic congestive heart failure (P)      People in Home alone (P)      Do you expect to return to your current living situation? Yes (P)      Do you have help at home or someone to help you manage your care at home? Yes (P)      Who are your caregiver(s) and their phone number(s)? Daughter: Kesha [phone] (P)      Prior to hospitilization cognitive status: Alert/Oriented (P)      Current cognitive status:  Alert/Oriented (P)      Equipment Currently Used at Home none (P)      Patient currently being followed by outpatient case management? No (P)      Do you currently have service(s) that help you manage your care at home? No (P)      Do you take prescription medications? Yes (P)      Do you have prescription coverage? Yes (P)      Coverage PHN (P)      Do you have any problems affording any of your prescribed medications? No (P)      Is the patient taking medications as prescribed? yes (P)      Who is going to help you get home at discharge? Daughter: Kesha [phone] (P)      How do you get to doctors appointments? family or friend will provide (P)      Are you on dialysis? No (P)      Do you take coumadin? No (P)      Discharge Plan A Home with family (P)      Discharge Plan B Home (P)      DME Needed Upon Discharge  none (P)      Discharge Plan discussed with: Patient;Adult children (P)      Transition of Care Barriers None (P)                    SW met with the patient and daughter at the bedside and discussed the discharge plan.  Patient alert and lying in bed.  Patient verified her name , , PCP, Insurance and Pharmacy . Stated she lives with daughter and has 3 steps to point of entry . Prior to admission patient was independent with all ADLS and wasn't receiving any HH services. She is not on coumadin.     Dialysis: N/A  DME's include:none reported.  She takes  medication as prescribed and has no problems getting  medication. Daughter will provide transportation at [place].      RONEN Beach  Department of Case Management   [facility] [address] [place] [address]  Phone : [phone]      Discharge Plan A and Plan B have been determined by review of patient's clinical status, future medical and therapeutic needs, and coverage/benefits for post-acute care in coordination with multidisciplinary team members.

## 2024-09-28 NOTE — CARE UPDATE
RAPID RESPONSE NURSE PROACTIVE ROUNDING NOTE       Time of Visit: 1755    Admit Date: 2024  LOS: 0  Code Status: Full Code   Date of Visit: 2024  : 1934  Age: 89 y.o.  Sex: female  Race: White  Bed: 732/732 A:   MRN: [id number]  Was the patient discharged from an ICU this admission? No   Was the patient discharged from a PACU within last 24 hours? No   Did the patient receive conscious sedation/general anesthesia in last 24 hours? No  Was the patient in the ED within the past 24 hours? No  Was the patient on NIPPV within the past 24 hours? No   Attending Physician: Keila Phan MD  Primary Service: [facility]   Time spent at the bedside: < 15 min    SITUATION    Notified by bedside RN via phone call.  Reason for alert: SOB  Called to evaluate the patient for Respiratory.    BACKGROUND     Why is the patient in the hospital?: Acute on chronic combined systolic and diastolic congestive heart failure    Patient has a past medical history of Anticoagulant long-term use, Aortic valve stenosis, Arthritis, Cardiogenic shock, CHF (congestive heart failure), Diabetes mellitus, Hypercholesteremia, Hypertension, Pneumonia due to infectious organism, Primary adenocarcinoma of upper lobe of right lung, and Thyroid disease.    Last Vitals:  Temp: 98.3 °F (36.8 °C) (1744)  Pulse: 78 (1744)  Resp: 18 (1744)  BP: 102/53 (1744)  SpO2: 97 % (1744)    24 Hours Vitals Range:  Temp:  [97 °F (36.1 °C)-98.3 °F (36.8 °C)]   Pulse:  [53-78]   Resp:  [17-22]   BP: ()/(50-80)   SpO2:  [90 %-98 %]     Labs:  Recent Labs     24  1155 24  0842   WBC 8.55 9.04   HGB 12.9 12.7   HCT 40.3 41.3    198       Recent Labs     24  1155 24  0842    139   K 5.4* 4.2    102   CO2 25 29   BUN 31* 25*   CREATININE 1.3 1.2   GLU 77 153*   MG  --  2.1        ASSESSMENT     Physical Exam  Vitals and nursing note reviewed.   Constitutional:       Appearance: She  is ill-appearing.      Interventions: Nasal cannula in place.   Cardiovascular:      Rate and Rhythm: Normal rate and regular rhythm.   Pulmonary:      Effort: Pulmonary effort is normal.      Breath sounds: Decreased breath sounds present.   Neurological:      Mental Status: She is alert. Mental status is at baseline.      GCS: GCS eye subscore is 4. GCS verbal subscore is 5. GCS motor subscore is 6.   Psychiatric:         Attention and Perception: Attention normal.         Mood and Affect: Mood normal.         Speech: Speech normal.         Behavior: Behavior normal.         Cognition and Memory: Cognition normal.         Judgment: Judgment normal.     Patient in bed with family at the bedside. Called to bedside to assess patient for SOB. Patient on 2L via NC and SpO2 97%. Patient in no acute distress. Patient states she feels better.     INTERVENTIONS    The patient was seen for Respiratory problem. Staff concerns included new onset of difficulty breathing. The following interventions were performed: supplemental oxygen.    RECOMMENDATIONS    -Maintain SpO2> 92%  -maintain IV Access  -lights on during the day and off at night to help prevent delirium     PROVIDER ESCALATION    Yes/No  No        Disposition: Remain in room 732.    FOLLOW-UP    Bedside Naresh COOK  updated on plan of care. Instructed to call the Rapid Response Nurse, Salvador Miller RN at [phone] for additional questions or concerns.

## 2024-09-28 NOTE — PLAN OF CARE
Problem: Adult Inpatient Plan of Care  Goal: Plan of Care Review  Outcome: Progressing     Problem: Diabetes Comorbidity  Goal: Blood Glucose Level Within Targeted Range  Outcome: Progressing     Problem: Wound  Goal: Optimal Functional Ability  Outcome: Progressing     Problem: Fall Injury Risk  Goal: Absence of Fall and Fall-Related Injury  Outcome: Progressing     Problem: Heart Failure  Goal: Effective Oxygenation and Ventilation  Outcome: Progressing

## 2024-09-28 NOTE — HOSPITAL COURSE
Patient admitted to hospital medicine for CHF exacerbation, started on IV lasix with good urine output. Patient with episode of asymptomatic hypotension s/p Lasix 80 IV. Lasix dosing decreased. Echo with EF of 20-25%, severe known aortic stenosis, and elevated venous pressure. Cardiology following. Palliative care consulted,  patient and family declined outpatient follow up. Transitioned to home PO lasix with good urine output. 6 minute walk test completed with qualification of home oxygen. Patient stable for discharge home with family & heart failure clinic follow up. Declined HH. Discussed care plan with patient and family, verbalized understanding. All questions answered.

## 2024-09-28 NOTE — PROGRESS NOTES
Called rapid response , report given patient is SOSOB after position change off oxygen sats 84 to 86 after 1.5 l of oxygen increased 94 remains SOB breatless , repositioned place on 2l 96 decreased breathe sounds more on the left , improving slowly but resp remains now shallow 18 to 20 per minute on 2.5 liters , improvement noted will monitor /78 Map98 resp 18 presently shallow even and unlabored HR 88. HOB elevated sitting position . SOB with minimal exertion 18 to 20. Asked family to keep movement to minimal until oxygen stabilize in body.

## 2024-09-29 LAB
ANION GAP SERPL CALC-SCNC: 7 MMOL/L (ref 8–16)
BASOPHILS # BLD AUTO: 0.05 K/UL (ref 0–0.2)
BASOPHILS NFR BLD: 0.6 % (ref 0–1.9)
BUN SERPL-MCNC: 25 MG/DL (ref 8–23)
CALCIUM SERPL-MCNC: 8.9 MG/DL (ref 8.7–10.5)
CHLORIDE SERPL-SCNC: 105 MMOL/L (ref 95–110)
CO2 SERPL-SCNC: 27 MMOL/L (ref 23–29)
CREAT SERPL-MCNC: 1 MG/DL (ref 0.5–1.4)
DIFFERENTIAL METHOD BLD: ABNORMAL
EOSINOPHIL # BLD AUTO: 0.1 K/UL (ref 0–0.5)
EOSINOPHIL NFR BLD: 1 % (ref 0–8)
ERYTHROCYTE [DISTWIDTH] IN BLOOD BY AUTOMATED COUNT: 19.2 % (ref 11.5–14.5)
EST. GFR  (NO RACE VARIABLE): 53.5 ML/MIN/1.73 M^2
GLUCOSE SERPL-MCNC: 117 MG/DL (ref 70–110)
HCT VFR BLD AUTO: 40.2 % (ref 37–48.5)
HGB BLD-MCNC: 12.6 G/DL (ref 12–16)
IMM GRANULOCYTES # BLD AUTO: 0.03 K/UL (ref 0–0.04)
IMM GRANULOCYTES NFR BLD AUTO: 0.4 % (ref 0–0.5)
LYMPHOCYTES # BLD AUTO: 1.1 K/UL (ref 1–4.8)
LYMPHOCYTES NFR BLD: 13.6 % (ref 18–48)
MAGNESIUM SERPL-MCNC: 2.2 MG/DL (ref 1.6–2.6)
MCH RBC QN AUTO: 31 PG (ref 27–31)
MCHC RBC AUTO-ENTMCNC: 31.3 G/DL (ref 32–36)
MCV RBC AUTO: 99 FL (ref 82–98)
MONOCYTES # BLD AUTO: 1.1 K/UL (ref 0.3–1)
MONOCYTES NFR BLD: 14.1 % (ref 4–15)
NEUTROPHILS # BLD AUTO: 5.6 K/UL (ref 1.8–7.7)
NEUTROPHILS NFR BLD: 70.3 % (ref 38–73)
NRBC BLD-RTO: 0 /100 WBC
PLATELET # BLD AUTO: 188 K/UL (ref 150–450)
PMV BLD AUTO: 11.1 FL (ref 9.2–12.9)
POCT GLUCOSE: 123 MG/DL (ref 70–110)
POCT GLUCOSE: 147 MG/DL (ref 70–110)
POCT GLUCOSE: 162 MG/DL (ref 70–110)
POCT GLUCOSE: 201 MG/DL (ref 70–110)
POTASSIUM SERPL-SCNC: 3.9 MMOL/L (ref 3.5–5.1)
RBC # BLD AUTO: 4.06 M/UL (ref 4–5.4)
SODIUM SERPL-SCNC: 139 MMOL/L (ref 136–145)
WBC # BLD AUTO: 7.92 K/UL (ref 3.9–12.7)

## 2024-09-29 PROCEDURE — 25000003 PHARM REV CODE 250: Performed by: PHYSICIAN ASSISTANT

## 2024-09-29 PROCEDURE — 80048 BASIC METABOLIC PNL TOTAL CA: CPT | Performed by: PHYSICIAN ASSISTANT

## 2024-09-29 PROCEDURE — 63600175 PHARM REV CODE 636 W HCPCS: Performed by: HOSPITALIST

## 2024-09-29 PROCEDURE — 83735 ASSAY OF MAGNESIUM: CPT | Performed by: PHYSICIAN ASSISTANT

## 2024-09-29 PROCEDURE — 85025 COMPLETE CBC W/AUTO DIFF WBC: CPT | Performed by: PHYSICIAN ASSISTANT

## 2024-09-29 PROCEDURE — 20600001 HC STEP DOWN PRIVATE ROOM

## 2024-09-29 PROCEDURE — 36415 COLL VENOUS BLD VENIPUNCTURE: CPT | Performed by: PHYSICIAN ASSISTANT

## 2024-09-29 RX ORDER — FUROSEMIDE 80 MG/1
80 TABLET ORAL DAILY
Status: DISCONTINUED | OUTPATIENT
Start: 2024-09-29 | End: 2024-09-29

## 2024-09-29 RX ORDER — FUROSEMIDE 10 MG/ML
40 INJECTION INTRAMUSCULAR; INTRAVENOUS ONCE
Status: COMPLETED | OUTPATIENT
Start: 2024-09-29 | End: 2024-09-29

## 2024-09-29 RX ORDER — FUROSEMIDE 10 MG/ML
20 INJECTION INTRAMUSCULAR; INTRAVENOUS ONCE
Status: DISCONTINUED | OUTPATIENT
Start: 2024-09-29 | End: 2024-09-29

## 2024-09-29 RX ADMIN — APIXABAN 5 MG: 5 TABLET, FILM COATED ORAL at 08:09

## 2024-09-29 RX ADMIN — CITALOPRAM HYDROBROMIDE 20 MG: 20 TABLET ORAL at 08:09

## 2024-09-29 RX ADMIN — FUROSEMIDE 40 MG: 10 INJECTION, SOLUTION INTRAVENOUS at 05:09

## 2024-09-29 RX ADMIN — ATORVASTATIN CALCIUM 40 MG: 40 TABLET, FILM COATED ORAL at 09:09

## 2024-09-29 RX ADMIN — INSULIN GLARGINE 10 UNITS: 100 INJECTION, SOLUTION SUBCUTANEOUS at 09:09

## 2024-09-29 RX ADMIN — FUROSEMIDE 80 MG: 80 TABLET ORAL at 08:09

## 2024-09-29 RX ADMIN — AMIODARONE HYDROCHLORIDE 200 MG: 200 TABLET ORAL at 08:09

## 2024-09-29 RX ADMIN — MECLIZINE 25 MG: 12.5 TABLET ORAL at 08:09

## 2024-09-29 RX ADMIN — LEVOTHYROXINE SODIUM 75 MCG: 75 TABLET ORAL at 05:09

## 2024-09-29 RX ADMIN — APIXABAN 5 MG: 5 TABLET, FILM COATED ORAL at 09:09

## 2024-09-29 NOTE — ASSESSMENT & PLAN NOTE
Patient with Paroxysmal (<7 days) atrial fibrillation which is controlled currently with Amiodarone. Patient is currently in sinus rhythm.[id number] Score: 4. . Anticoagulation indicated. Anticoagulation done with Apixaban 5 mg BID .

## 2024-09-29 NOTE — PLAN OF CARE
Problem: Adult Inpatient Plan of Care  Goal: Plan of Care Review  Outcome: Progressing  Goal: Patient-Specific Goal (Individualized)  Outcome: Progressing  Goal: Absence of Hospital-Acquired Illness or Injury  Outcome: Progressing  Goal: Optimal Comfort and Wellbeing  Outcome: Progressing  Goal: Readiness for Transition of Care  Outcome: Progressing     Problem: Diabetes Comorbidity  Goal: Blood Glucose Level Within Targeted Range  Outcome: Progressing     Problem: Wound  Goal: Optimal Coping  Outcome: Progressing  Goal: Optimal Functional Ability  Outcome: Progressing  Goal: Absence of Infection Signs and Symptoms  Outcome: Progressing  Goal: Improved Oral Intake  Outcome: Progressing  Goal: Optimal Pain Control and Function  Outcome: Progressing  Goal: Skin Health and Integrity  Outcome: Progressing  Goal: Optimal Wound Healing  Outcome: Progressing     Problem: Skin Injury Risk Increased  Goal: Skin Health and Integrity  Outcome: Progressing     Problem: Fall Injury Risk  Goal: Absence of Fall and Fall-Related Injury  Outcome: Progressing     Problem: Heart Failure  Goal: Optimal Cardiac Output  Outcome: Progressing  Goal: Fluid and Electrolyte Balance  Outcome: Progressing  Goal: Effective Oxygenation and Ventilation  Outcome: Progressing      [facility] Call Center    Phone Message    May a detailed message be left on voicemail: yes     Reason for Call: Medication Refill Request    Has the patient contacted the pharmacy for the refill? Yes   Name of medication being requested: Lancets One Touch  Provider who prescribed the medication: Eloisa  Pharmacy: [facility] #[id number] - [place], [place] - [address]  Date medication is needed: asap - pt stated he was given the wrong RX. He stated he doesn't uses the mini lancets any longer. Please review      Action Taken: Message routed to:  [facility] ([facility] Endo    Travel Screening: Not Applicable

## 2024-09-29 NOTE — PROGRESS NOTES
09/29/24 1601   Vital Signs   BP (!) 99/54   MAP (mmHg) 72   BP Location Right arm   BP Method Automatic   Patient Position Lying   Orthostatic VS No     Notified MD of BP. MD okay with giving ordered lasix push.

## 2024-09-29 NOTE — PROGRESS NOTES
[facility] [address] [place] [address] Medicine  Progress Note    Patient Name: Stacia Perry  MRN: [id number]  Patient Class: IP- Inpatient   Admission Date: 9/27/2024  Length of Stay: 1 days  Attending Physician: Keila Phan MD  Primary Care Provider: Maureen Parnell MD        Subjective:     Principal Problem:Acute on chronic combined systolic and diastolic congestive heart failure        HPI:  Stacia Perry is a 89 y.o. female with PMHx significant for DM II, HTN, HLD, pAfib on eliquis, aortic stenosis, and HFpEF admitted to hospital medicine for acute CHF exacerbation. Patient was seen in cardiology clinic this morning where she was noted to have significant BLE, JVD, and hypoxemia which prompted her visit to the ED. Endorses worsening SOB, leg swelling, and a ~9 lb weight gain over the past month. Reports compliance with lasix (80mg PO in the morning) and daughter reports giving patient an extra 40mg PO in the evenings for the past few days. Denies fever/chills, diaphoresis, lightheadedness, HA, CP, cough, congestion, abdominal pain, n/v/d, urinary symptoms, changes in BMs, numbness/tingling, weakness. Of note, patient was to have a TAVR in 2021 for aortic stenosis but she refused.    In the ED, SpO2 <94% on RA, improved with supplemental oxygent. Remaining vitals stable. BNP 2388. Troponin negative. K 5.4. Cr 1.3 (BL ~1.0). CXR consistent with pulmonary vascular congestion. EKG with sinus lorena and known 1st degree heart block. Given lasix 80mg IV x1.     Overview/Hospital Course:  Patient admitted to hospital medicine for CHF exacerbation, started on IV lasix with good urine output. Patient with episode of asymptomatic hypotension s/p Lasix 80 IV. Lasix dose decreased. Echo with EF of 20-25%, severe known aortic stenosis, and elevated venous pressure. Cardiology consulted, appreciate recs. Plan to transition to oral Lasix and discharge home with family when stable.     Interval History: BP soft. Patient  had rapid response yesterday afternoon/evening for increased work of breathing. Supplemental oxygen increased to 2.5L with improvement. Given PO lasix this morning. Repeat CXR with continue edema. Bedside echo by Keila Phan MD with evidence of volume overload. Attempted to give Lasix 20mg IV but BP too soft. Cardiology consulted, appreciate recs.     Review of Systems   Constitutional:  Negative for chills and fever.   Respiratory:  Positive for shortness of breath. Negative for chest tightness.    Cardiovascular:  Negative for chest pain and leg swelling.   Gastrointestinal:  Negative for abdominal pain and nausea.   Neurological:  Negative for dizziness and weakness.     Objective:     Vital Signs (Most Recent):  Temp: 97.8 °F (36.6 °C) (09/29/24 1117)  Pulse: 67 (09/29/24 1117)  Resp: 19 (09/29/24 1117)  BP: (!) 94/54 (09/29/24 1117)  SpO2: (!) 94 % (09/29/24 1117) Vital Signs (24h Range):  Temp:  [97 °F (36.1 °C)-98.6 °F (37 °C)] 97.8 °F (36.6 °C)  Pulse:  [61-78] 67  Resp:  [18-22] 19  SpO2:  [90 %-97 %] 94 %  BP: ()/(53-80) 94/54     Weight: 63.6 kg (140 lb 3.4 oz)  Body mass index is 24.84 kg/m².    Intake/Output Summary (Last 24 hours) at 9/29/2024 1341  Last data filed at 9/29/2024 0442  Gross per 24 hour   Intake 340 ml   Output 1300 ml   Net -960 ml         Physical Exam  Vitals and nursing note reviewed.   Constitutional:       General: She is not in acute distress.     Appearance: Normal appearance. She is not ill-appearing.   HENT:      Head: Normocephalic and atraumatic.      Right Ear: External ear normal.      Left Ear: External ear normal.   Eyes:      Extraocular Movements: Extraocular movements intact.      Conjunctiva/sclera: Conjunctivae normal.      Pupils: Pupils are equal, round, and reactive to light.   Neck:      Vascular: No JVD.   Cardiovascular:      Rate and Rhythm: Normal rate and regular rhythm.      Pulses: Normal pulses.      Heart sounds: Normal heart sounds. No murmur  heard.  Pulmonary:      Effort: No respiratory distress.      Breath sounds: Rales (faint) present.      Comments: Patient with increased work of breathing during encounter  Abdominal:      General: Abdomen is flat. Bowel sounds are normal. There is distension.      Palpations: Abdomen is soft.      Tenderness: There is no abdominal tenderness.   Musculoskeletal:         General: Normal range of motion.      Cervical back: Normal range of motion and neck supple. No tenderness.      Right lower leg: No edema.      Left lower leg: No edema.   Skin:     General: Skin is warm and dry.      Capillary Refill: Capillary refill takes less than 2 seconds.      Coloration: Skin is not jaundiced.   Neurological:      General: No focal deficit present.      Mental Status: She is alert and oriented to person, place, and time. Mental status is at baseline.   Psychiatric:         Mood and Affect: Mood normal.         Behavior: Behavior normal.             Significant Labs: All pertinent labs within the past 24 hours have been reviewed.  BMP:   Recent Labs   Lab 09/29/24  0247   *      K 3.9      CO2 27   BUN 25*   CREATININE 1.0   CALCIUM 8.9   MG 2.2     CBC:   Recent Labs   Lab 09/28/24  0842 09/29/24  0247   WBC 9.04 7.92   HGB 12.7 12.6   HCT 41.3 40.2    188     CMP:   Recent Labs   Lab 09/28/24  0842 09/29/24  0247    139   K 4.2 3.9    105   CO2 29 27   * 117*   BUN 25* 25*   CREATININE 1.2 1.0   CALCIUM 8.9 8.9   ANIONGAP 8 7*       Significant Imaging: I have reviewed all pertinent imaging results/findings within the past 24 hours.    Assessment/Plan:      * Acute on chronic combined systolic and diastolic congestive heart failure  Acute Respiratory Failure  Patient is identified as having Diastolic (HFpEF) heart failure that is Acute on chronic. CHF is currently uncontrolled due to Continued edema of extremities, Weight gain of 9 pounds, and JVD, Rales/crackles on pulmonary  exam, and Pulmonary edema/pleural effusion on CXR. Latest ECHO performed and demonstrates- Results for orders placed during the hospital encounter of 07/14/22    Echo Saline Bubble? No    Interpretation Summary  · The left ventricle is normal in size with mild eccentric hypertrophy and low normal systolic function.  · The estimated ejection fraction is 55%.  · Grade III left ventricular diastolic dysfunction.  · Normal right ventricular size with mildly reduced right ventricular systolic function.  · Severe left atrial enlargement.  · Mild right atrial enlargement.  · There is severe aortic valve stenosis.  · Aortic valve area is 0.55 cm2; peak velocity is 4.32 m/s; mean gradient is 53 mmHg.  · Mild aortic regurgitation.  · Moderate-to-severe mitral regurgitation.  · Moderate tricuspid regurgitation.  · Normal central venous pressure (3 mmHg).  · The estimated PA systolic pressure is 51 mmHg.  · There is mild-moderate pulmonary hypertension.  . Continue Beta Blocker, ACE/ARB, and Furosemide and monitor clinical status closely.   Monitor on telemetry.   Patient is on CHF pathway.    Monitor strict Is&Os and daily weights.    Place on fluid restriction of 1.5 L.   Cardiology has been consulted given difficulty diuresing with the aortic stenosis, appreciate recs  Continue to stress to patient importance of self efficacy and  on diet for CHF.   Last BNP reviewed- and noted below   Recent Labs   Lab 09/27/24  1155   BNP 2,388*         Hyperkalemia  Hyperkalemia is likely due to BIJAL.The patients most recent potassium results are listed below.  Recent Labs     09/27/24  1155 09/28/24  0842   K 5.4* 4.2       Plan  - Monitor for arrhythmias with EKG and/or continuous telemetry.   - Treat the hyperkalemia with Furosemide.   - Monitor potassium: Daily  - The patient's hyperkalemia is stable  - RESOLVED            Paroxysmal atrial fibrillation  Patient with Paroxysmal (<7 days) atrial fibrillation which is controlled  currently with Beta Blocker, Calcium Channel Blocker, and Amiodarone. Patient is currently in sinus rhythm.[id number] Score: 4. Anticoagulation not indicated due to fall risk .    Obesity  Body mass index is 28.68 kg/m². Morbid obesity complicates all aspects of disease management from diagnostic modalities to treatment. Weight loss encouraged and health benefits explained to patient.         Severe aortic stenosis  - known, chronic  - patient was to have a TAVR in 2021, but patient refused  - repeat echo below  - will start on lasix 40mg IV BID to avoid hypotension given stenosis    Results for orders placed during the hospital encounter of 09/27/24    Echo    Interpretation Summary    Left Ventricle: The left ventricle is normal in size. Moderately increased ventricular mass. Mildly increased wall thickness. Mild septal thickening. There is moderate concentric hypertrophy. Global hypokinesis present. There is severely reduced systolic function with a visually estimated ejection fraction of 20 - 25%. Ejection fraction by visual approximation is 23% with marked worsening compared with over 2 years ago.    Right Ventricle: Normal right ventricular cavity size. Wall thickness is normal. Systolic function is normal.    Left Atrium: Left atrium is severely dilated.    Aortic Valve: There is severe aortic valve sclerosis. Severely restricted motion. There is severe ( critical) stenosis. Aortic valve area by VTI is 0.31 cm2. Aortic valve peak velocity is 4.63 m/s. Mean gradient is 60 mmHg. The dimensionless index is 0.10. There is trace aortic regurgitation.    Mitral Valve: There is mild bileaflet sclerosis. There is mild mitral annular calcification present. There is moderate to moderate-severe regurgitation.    Tricuspid Valve: There is moderate regurgitation.    Pulmonary Artery: There is moderate pulmonary hypertension. The estimated pulmonary artery systolic pressure is 55 mmHg.    IVC/SVC: Intermediate venous  "pressure at 8 mmHg.    Pericardium: There is a  trivial-small posterior effusion at the base and trivial under the RA.      HLD (hyperlipidemia)  - chronic  - continue statin      Type 2 diabetes mellitus without complication, without long-term current use of insulin  Patient's FSGs are controlled on current medication regimen.  Last A1c reviewed-   Lab Results   Component Value Date    HGBA1C 7.6 (H) 11/07/2022     Most recent fingerstick glucose reviewed- No results for input(s): "POCTGLUCOSE" in the last 24 hours.  Current correctional scale  Low  Maintain anti-hyperglycemic dose as follows-   Antihyperglycemics (From admission, onward)      Start     Stop Route Frequency Ordered    09/27/24 2100  insulin glargine U-100 (Lantus) pen 10 Units         -- SubQ Nightly 09/27/24 1334    09/27/24 1434  insulin aspart U-100 pen 0-5 Units         -- SubQ Before meals & nightly PRN 09/27/24 1334          Hold Oral hypoglycemics while patient is in the hospital.    Essential hypertension  Chronic, controlled. Latest blood pressure and vitals reviewed-     Temp:  [97.5 °F (36.4 °C)-98.1 °F (36.7 °C)]   Pulse:  [51-78]   Resp:  [11-18]   BP: ()/(51-70)   SpO2:  [95 %-98 %] .   Home meds for hypertension were reviewed and noted below.   Hypertension Medications               carvediloL (COREG) 6.25 MG tablet Take 0.5 tablets (3.125 mg total) by mouth 2 (two) times daily with meals.    furosemide (LASIX) 80 MG tablet Take 1 tablet (80 mg total) by mouth once daily.    valsartan (DIOVAN) 160 MG tablet Take 1/2 (one-half) tablet by mouth once daily    furosemide (LASIX) 40 MG tablet Take 1 tablet (40 mg total) by mouth 2 (two) times daily.            While in the hospital, will manage blood pressure as follows; Continue home antihypertensive regimen> discontinued home meds for now given hypotension with IV lasix, Restart when appropriate    Will utilize p.r.n. blood pressure medication only if patient's blood pressure " greater than 180/110 and she develops symptoms such as worsening chest pain or shortness of breath.      VTE Risk Mitigation (From admission, onward)           Ordered     apixaban tablet 5 mg  2 times daily         09/27/24 1421     IP VTE HIGH RISK PATIENT  Once         09/27/24 1331     Place sequential compression device  Until discontinued         09/27/24 1331                    Discharge Planning   MICHAEL: 10/1/2024     Code Status: Partial Code   Is the patient medically ready for discharge?:     Reason for patient still in hospital (select all that apply): Patient trending condition, Treatment, and Consult recommendations  Discharge Plan A: Home with family                  Shelley Lovelace PA-C  Department of Hospital Medicine   [facility]

## 2024-09-29 NOTE — NURSING
Pt is  asleep easily to be aroused ,no distress noted. O2 intact  Call light in reach. Bed in low locked position.   Side rails x2. Belongings at bedside.   Pt free of fall and injuries Questions and concerns voiced and answered.    Plan of care continues.

## 2024-09-29 NOTE — ASSESSMENT & PLAN NOTE
Acute Respiratory Failure  Patient is identified as having Diastolic (HFpEF) heart failure that is Acute on chronic. CHF is currently uncontrolled due to Continued edema of extremities, Weight gain of 9 pounds, and JVD, Rales/crackles on pulmonary exam, and Pulmonary edema/pleural effusion on CXR. Latest ECHO performed and demonstrates- Results for orders placed during the hospital encounter of 07/14/22    Echo Saline Bubble? No    Interpretation Summary  · The left ventricle is normal in size with mild eccentric hypertrophy and low normal systolic function.  · The estimated ejection fraction is 55%.  · Grade III left ventricular diastolic dysfunction.  · Normal right ventricular size with mildly reduced right ventricular systolic function.  · Severe left atrial enlargement.  · Mild right atrial enlargement.  · There is severe aortic valve stenosis.  · Aortic valve area is 0.55 cm2; peak velocity is 4.32 m/s; mean gradient is 53 mmHg.  · Mild aortic regurgitation.  · Moderate-to-severe mitral regurgitation.  · Moderate tricuspid regurgitation.  · Normal central venous pressure (3 mmHg).  · The estimated PA systolic pressure is 51 mmHg.  · There is mild-moderate pulmonary hypertension.  . Continue Beta Blocker, ACE/ARB, and Furosemide and monitor clinical status closely.   Monitor on telemetry.   Patient is on CHF pathway.    Monitor strict Is&Os and daily weights.    Place on fluid restriction of 1.5 L.   Cardiology has been consulted given difficulty diuresing with the aortic stenosis, appreciate recs  Continue to stress to patient importance of self efficacy and  on diet for CHF.   Last BNP reviewed- and noted below   Recent Labs   Lab 09/27/24  1155   BNP 2,388*

## 2024-09-29 NOTE — NURSING
Reported off to Connie ELIZALDE RN   Pt stable no complaints of any lasix was held,MD aware do to low BP

## 2024-09-29 NOTE — CONSULTS
[facility]  Cardiology  Consult Note    Patient Name: Stacia Perry  MRN: [id number]  Admission Date: 9/27/2024  Hospital Length of Stay: 1 days  Code Status: Partial Code   Attending Provider: Keila Phan MD   Consulting Provider: Seymour Darling MD  Primary Care Physician: Maureen Parnell MD  Principal Problem:Acute on chronic combined systolic and diastolic congestive heart failure    Patient information was obtained from patient, relative(s), and ER records.     Inpatient consult to Cardiology  Consult performed by: Seymour Lindquist MD  Consult ordered by: Shelley Lovelace PA-C        Subjective:     Chief Complaint:  SOB and lower extremity edema     HPI:   Mrs. Perry is a 89-year-old-woman with relevant history of severe aortic stenosis, HFpEF (LVEF 55% 7/21/2022) now with newly diagnosed HFrEF (LVEF 20-25% 9/27/2024), PAF on amiodarone and eliquis 5 mg BID, HTN, HLD and lung adenocarcinoma s/p stereotactic body radiotherapy (2022) who presented to the ED after she was sent from her cardiology clinic due to volume overload. Patient was seen by Shellie Buckley PA-C and found her to be with acute decompensated heart failure with worsening SOB, lower extremity edema and bilateral crackles. Patient was admitted to the hospital on 9/27 for further treatment of ADHF with diuresis.     Of note patient has been following with Dr. Shabazz for her CVD problems. She has refused TAVR.  started her on Metolazone 2.5 mg back on 7/25 (on top of her 40 mg BID 4 times a week) due to worsening SOB and lower extremity edema. She followed up with Dr. Shabazz on 8/22 and showed improvement of her symptoms and lost 6 lb. BIJAL was seen with Cr of 1.8, hence Metolazone was stopped at this time. but Lasix was increased to 80 mg daily in the morning and 40 mg as needed in the afternoon. Patient refers that around 2 weeks after changes were made she started feeling more SOB and had worsening  lower extremity edema.     In the ED patient was found to be volume overloaded with bilateral lower extremity edema, bilateral crackles, CXR with pulmonary edema, BNP 2,388 (from 593 a year ago). Patient was given 80 mg of IV lasix with a total urine output of 1,850 (negative net 1,6) on the first day. On the 28th she was given 40 mg of IV lasix with a total urine output of 2,250 (negative net 1,3) and today (9/29) she was switched to Lasix 80 mg PO. Total output today has been around 450 cc.     Patient was more SOB in the afternoon and supplemental O2 was increased. Primary team consulted cardiology for recommendations in diuresis.    Past Medical History:   Diagnosis Date    Anticoagulant long-term use     Aortic valve stenosis     Arthritis     Cardiogenic shock 5/7/2021    CHF (congestive heart failure)     Diabetes mellitus     Hypercholesteremia     Hypertension     Pneumonia due to infectious organism 5/6/2021    Primary adenocarcinoma of upper lobe of right lung 7/19/2021    Thyroid disease        Past Surgical History:   Procedure Laterality Date    CATARACT EXTRACTION, BILATERAL      EYE SURGERY      FOOT FRACTURE SURGERY Right     HYSTERECTOMY         Review of patient's allergies indicates:  No Known Allergies    No current facility-administered medications on file prior to encounter.     Current Outpatient Medications on File Prior to Encounter   Medication Sig    amiodarone (PACERONE) 200 MG Tab Take 1 tablet by mouth once daily    apixaban (ELIQUIS) 5 mg Tab Take 5 mg by mouth 2 (two) times daily.    atorvastatin (LIPITOR) 40 MG tablet Take 1 tablet (40 mg total) by mouth once daily.    carvediloL (COREG) 6.25 MG tablet Take 0.5 tablets (3.125 mg total) by mouth 2 (two) times daily with meals.    citalopram (CELEXA) 20 MG tablet Take 20 mg by mouth once daily.    dapagliflozin (FARXIGA) 5 mg Tab tablet Take 1 tablet (5 mg total) by mouth once daily.    furosemide (LASIX) 40 MG tablet Take 1 tablet  (40 mg total) by mouth 2 (two) times daily. (Patient not taking: Reported on 8/22/2024)    furosemide (LASIX) 80 MG tablet Take 1 tablet (80 mg total) by mouth once daily.    levothyroxine (SYNTHROID) 75 MCG tablet Take 75 mcg by mouth before breakfast.     meclizine (ANTIVERT) 25 mg tablet Take 25 mg by mouth once daily.     metformin (GLUCOPHAGE) 500 MG tablet Take 1,000 mg by mouth 2 (two) times daily with meals.     metFORMIN (GLUCOPHAGE-XR) 500 MG ER 24hr tablet Take 1,000 mg by mouth 2 (two) times daily. (Patient not taking: Reported on 8/22/2024)    potassium chloride SA (K-DUR,KLOR-CON) 20 MEQ tablet Take 1 tablet (20 mEq total) by mouth 2 (two) times daily.    TRESIBA FLEXTOUCH U-100 100 unit/mL (3 mL) InPn Inject 18 Units into the skin every evening.    valsartan (DIOVAN) 160 MG tablet Take 1/2 (one-half) tablet by mouth once daily     Family History       Problem Relation (Age of Onset)    Diabetes Mother, Father, Sister, Brother, Brother, Daughter    Heart attack Brother    Heart disease Father    No Known Problems Maternal Aunt, Maternal Uncle, Paternal Aunt, Paternal Uncle, Maternal Grandmother, Maternal Grandfather, Paternal Grandmother, Paternal Grandfather    Stroke Son          Tobacco Use    Smoking status: Never    Smokeless tobacco: Never   Substance and Sexual Activity    Alcohol use: No    Drug use: No    Sexual activity: Not on file     Review of Systems   Constitutional: Negative.   HENT: Negative.     Eyes: Negative.    Cardiovascular:  Positive for dyspnea on exertion, leg swelling and orthopnea.   Respiratory:  Positive for snoring.    Endocrine: Negative.    Skin: Negative.    Musculoskeletal:  Positive for back pain and muscle weakness.   Gastrointestinal: Negative.    Genitourinary: Negative.    Neurological: Negative.    Psychiatric/Behavioral: Negative.       Objective:     Vital Signs (Most Recent):  Temp: 98.4 °F (36.9 °C) (09/29/24 1347)  Pulse: 67 (09/29/24 1347)  Resp: 18  (24 1347)  BP: 101/68 (24 1347)  SpO2: (!) 94 % (24 1347) Vital Signs (24h Range):  Temp:  [97 °F (36.1 °C)-98.6 °F (37 °C)] 98.4 °F (36.9 °C)  Pulse:  [61-78] 67  Resp:  [18-22] 18  SpO2:  [90 %-97 %] 94 %  BP: ()/(53-80) 101/68     Weight: 63.6 kg (140 lb 3.4 oz)  Body mass index is 24.84 kg/m².    SpO2: (!) 94 %         Intake/Output Summary (Last 24 hours) at 2024 1507  Last data filed at 2024 0442  Gross per 24 hour   Intake 340 ml   Output 1300 ml   Net -960 ml       Lines/Drains/Airways       Peripheral Intravenous Line  Duration                  Peripheral IV - Single Lumen 24 1156 20 G Distal;Posterior;Right Forearm 2 days                     Physical Exam  Constitutional:       Appearance: Normal appearance. She is obese.   HENT:      Head: Normocephalic.      Mouth/Throat:      Mouth: Mucous membranes are moist.   Eyes:      Pupils: Pupils are equal, round, and reactive to light.   Cardiovascular:      Rate and Rhythm: Normal rate and regular rhythm.      Pulses: Normal pulses.           Radial pulses are 2+ on the right side and 2+ on the left side.        Dorsalis pedis pulses are 2+ on the right side and 2+ on the left side.      Heart sounds: Murmur heard.      Harsh midsystolic murmur is present at the upper right sternal border radiating to the neck.      No friction rub. No gallop.      Comments: JVD  Pulmonary:      Effort: Pulmonary effort is normal.      Breath sounds: Examination of the right-middle field reveals wheezing and rales. Examination of the left-middle field reveals wheezing and rales. Examination of the right-lower field reveals rales. Examination of the left-lower field reveals rales. Wheezing and rales present.   Abdominal:      General: Abdomen is flat.   Musculoskeletal:         General: Normal range of motion.      Cervical back: Neck supple.      Right lower le+ Edema present.      Left lower le+ Edema present.   Skin:      "General: Skin is warm.      Capillary Refill: Capillary refill takes less than 2 seconds.   Neurological:      General: No focal deficit present.      Mental Status: She is alert.   Psychiatric:         Mood and Affect: Mood normal.          Significant Labs:   Recent Labs   Lab 09/27/24  1155 09/28/24  0842 09/29/24  0247   WBC 8.55 9.04 7.92   HGB 12.9 12.7 12.6   HCT 40.3 41.3 40.2    198 188       Recent Labs   Lab 09/27/24  1155 09/28/24  0842 09/29/24  0247    139 139   K 5.4* 4.2 3.9    102 105   CO2 25 29 27   BUN 31* 25* 25*   CREATININE 1.3 1.2 1.0   CALCIUM 9.4 8.9 8.9       Recent Labs   Lab 09/27/24  1155   ALKPHOS 77   BILITOT 0.7   PROT 6.9   ALT 25   AST 31       No results for input(s): "CHOL", "TRIG", "HDL", "LDL" in the last 168 hours.  Lab Results   Component Value Date    CHOL 90 (L) 11/08/2022    HDL 24 (L) 11/08/2022    LDLCALC 34.6 (L) 11/08/2022    TRIG 157 (H) 11/08/2022       Recent Labs   Lab 09/27/24  1155   TROPONINI 0.016       Lab Results   Component Value Date    HGBA1C 6.2 (H) 09/27/2024       Lab Results   Component Value Date    BNP 2,388 (H) 09/27/2024     (H) 12/19/2022    BNP 1,014 (H) 11/07/2022              Significant Imaging:    EXAMINATION:  XR CHEST AP PORTABLE     CLINICAL HISTORY:  Hypoxia;     TECHNIQUE:  Single frontal view of the chest was performed.     COMPARISON:  09/27/2024     FINDINGS:  Cardiomediastinal silhouetteremains enlarged with aortic atherosclerosis.     Perihilar vascular congestion and interstitial edema pattern persists.  New areas of lenticular in linear opacities suggestive of areas of subsegmental atelectasis in the right lung.  Small bilateral pleural effusions are suspected.     Impression:     CHF and pulmonary edema, with increasing right perihilar areas of subsegmental atelectasis.    EKG 9/27/2024:   Sinus rhythm, First degree AV block, prolonged QT    TTE 9/27/2024:    Left Ventricle: The left ventricle is normal " in size. Moderately increased ventricular mass. Mildly increased wall thickness. Mild septal thickening. There is moderate concentric hypertrophy. Global hypokinesis present. There is severely reduced systolic function with a visually estimated ejection fraction of 20 - 25%. Ejection fraction by visual approximation is 23% with marked worsening compared with over 2 years ago.    Right Ventricle: Normal right ventricular cavity size. Wall thickness is normal. Systolic function is normal.    Left Atrium: Left atrium is severely dilated.    Aortic Valve: There is severe aortic valve sclerosis. Severely restricted motion. There is severe ( critical) stenosis. Aortic valve area by VTI is 0.31 cm2. Aortic valve peak velocity is 4.63 m/s. Mean gradient is 60 mmHg. The dimensionless index is 0.10. There is trace aortic regurgitation.    Mitral Valve: There is mild bileaflet sclerosis. There is mild mitral annular calcification present. There is moderate to moderate-severe regurgitation.    Tricuspid Valve: There is moderate regurgitation.    Pulmonary Artery: There is moderate pulmonary hypertension. The estimated pulmonary artery systolic pressure is 55 mmHg.    IVC/SVC: Intermediate venous pressure at 8 mmHg.    Pericardium: There is a  trivial-small posterior effusion at the base and trivial under the RA.    TTE 6/21/2022:  The left ventricle is normal in size with mild eccentric hypertrophy and low normal systolic function.  The estimated ejection fraction is 55%.  Grade III left ventricular diastolic dysfunction.  Normal right ventricular size with mildly reduced right ventricular systolic function.  Severe left atrial enlargement.  Mild right atrial enlargement.  There is severe aortic valve stenosis.  Aortic valve area is 0.55 cm2; peak velocity is 4.32 m/s; mean gradient is 53 mmHg.  Mild aortic regurgitation.  Moderate-to-severe mitral regurgitation.  Moderate tricuspid regurgitation.  Normal central venous  pressure (3 mmHg).  The estimated PA systolic pressure is 51 mmHg.  There is mild-moderate pulmonary hypertension.     Assessment and Plan:     * Acute on chronic combined systolic and diastolic congestive heart failure  Patient is identified as having Combined Systolic and Diastolic heart failure that is Acute on chronic. CHF is currently uncontrolled due to Continued edema of extremities and JVD.   Patient has severe aortic stenosis diagnosed more than 3 years ago. She has refused TAVR. The acute decompensation is likely in the setting of severe aortic stenosis.    Latest ECHO performed and demonstrates- Results for orders placed during the hospital encounter of 09/27/24    Echo    Interpretation Summary    Left Ventricle: The left ventricle is normal in size. Moderately increased ventricular mass. Mildly increased wall thickness. Mild septal thickening. There is moderate concentric hypertrophy. Global hypokinesis present. There is severely reduced systolic function with a visually estimated ejection fraction of 20 - 25%. Ejection fraction by visual approximation is 23% with marked worsening compared with over 2 years ago.    Right Ventricle: Normal right ventricular cavity size. Wall thickness is normal. Systolic function is normal.    Left Atrium: Left atrium is severely dilated.    Aortic Valve: There is severe aortic valve sclerosis. Severely restricted motion. There is severe ( critical) stenosis. Aortic valve area by VTI is 0.31 cm2. Aortic valve peak velocity is 4.63 m/s. Mean gradient is 60 mmHg. The dimensionless index is 0.10. There is trace aortic regurgitation.    Mitral Valve: There is mild bileaflet sclerosis. There is mild mitral annular calcification present. There is moderate to moderate-severe regurgitation.    Tricuspid Valve: There is moderate regurgitation.    Pulmonary Artery: There is moderate pulmonary hypertension. The estimated pulmonary artery systolic pressure is 55 mmHg.    IVC/SVC:  Intermediate venous pressure at 8 mmHg.    Pericardium: There is a  trivial-small posterior effusion at the base and trivial under the RA.  . Continue Furosemide and monitor clinical status closely. Monitor on telemetry. Monitor strict Is&Os and daily weights.  Place on fluid restriction of 1.5 L. Continue to stress to patient importance of self efficacy and  on diet for CHF. Last BNP reviewed- and noted below   Recent Labs   Lab 09/27/24  1155   BNP 2,388*       Plan:   - Stop Lasix PO, switch to lasix IV 40 mg and assess response. Goal of urine output is 100-120 cc/hr  - Patient with newly reduced EF 20-25 %. She will need to start GDMT once her blood pressure tolerates it (GDMT with Entresto, BB; either metoprolol or carvedilol, SGLT2 inhibitor)   - Strict Is and Os  - BMP; keep K > 4 and Mg > 2  - Consider palliative care consult         Paroxysmal atrial fibrillation  Patient with Paroxysmal (<7 days) atrial fibrillation which is controlled currently with Amiodarone. Patient is currently in sinus rhythm.[id number] Score: 4. . Anticoagulation indicated. Anticoagulation done with Apixaban 5 mg BID .        VTE Risk Mitigation (From admission, onward)           Ordered     apixaban tablet 5 mg  2 times daily         09/27/24 1421     IP VTE HIGH RISK PATIENT  Once         09/27/24 1331     Place sequential compression device  Until discontinued         09/27/24 1331                    Thank you for your consult. I will follow-up with patient. Please contact us if you have any additional questions.    Seymour Darling MD  Cardiology   [facility]

## 2024-09-29 NOTE — ACP (ADVANCE CARE PLANNING)
Advance Care Planning     Date: 09/29/2024    Code Status  In light of the patients advanced and life limiting illness,I engaged the the patient and family in a voluntary conversation about the patient's preferences for care  at the very end of life. The patient wishes to have a natural, peaceful death.  Along those lines, the patient does not wish to have CPR or other invasive treatments performed when her heart and/or breathing stops. I communicated to the patient and family that a partial code status indicating she does not wish to have chest compression or intubation would be placed in her medical record to reflect this preference.    A total of 10 min was spent on advance care planning, goals of care discussion, emotional support, formulating and communicating prognosis and exploring burden/benefit of various approaches of treatment. This discussion occurred on a fully voluntary basis with the verbal consent of the patient and/or family.

## 2024-09-29 NOTE — HPI
Mrs. Perry is a 89-year-old-woman with relevant history of severe aortic stenosis, HFpEF (LVEF 55% 7/21/2022) now with newly diagnosed HFrEF (LVEF 20-25% 9/27/2024), PAF on amiodarone and eliquis 5 mg BID, HTN, HLD and lung adenocarcinoma s/p stereotactic body radiotherapy (2022) who presented to the ED after she was sent from her cardiology clinic due to volume overload. Patient was seen by Shellie Buckley PA-C and found her to be with acute decompensated heart failure with worsening SOB, lower extremity edema and bilateral crackles. Patient was admitted to the hospital on 9/27 for further treatment of ADHF with diuresis.     Of note patient has been following with Dr. Shabazz for her CVD problems. She has refused TAVR.  started her on Metolazone 2.5 mg back on 7/25 (on top of her 40 mg BID 4 times a week) due to worsening SOB and lower extremity edema. She followed up with Dr. Shabazz on 8/22 and showed improvement of her symptoms and lost 6 lb. BIJAL was seen with Cr of 1.8, hence Metolazone was stopped at this time. but Lasix was increased to 80 mg daily in the morning and 40 mg as needed in the afternoon. Patient refers that around 2 weeks after changes were made she started feeling more SOB and had worsening lower extremity edema.     In the ED patient was found to be volume overloaded with bilateral lower extremity edema, bilateral crackles, CXR with pulmonary edema, BNP 2,388 (from 593 a year ago). Patient was given 80 mg of IV lasix with a total urine output of 1,850 (negative net 1,6) on the first day. On the 28th she was given 40 mg of IV lasix with a total urine output of 2,250 (negative net 1,3) and today (9/29) she was switched to Lasix 80 mg PO. Total output today has been around 450 cc.     Patient was more SOB in the afternoon and supplemental O2 was increased. Primary team consulted cardiology for recommendations in diuresis.

## 2024-09-29 NOTE — PLAN OF CARE
Plan of care reviewed with patient and family. Patient is AOX4 and Pt BP was low per MD still give lasix that was ordered. Patient remained free of falls and trauma, fall precautions are in place.. Patient has no complaints of pain. Patient has no questions at this time. Wheels are locked and the bed is in lowest position. The call bell is within reach. Telemetry is on. Will continue to follow care.     Problem: Adult Inpatient Plan of Care  Goal: Plan of Care Review  Outcome: Not Progressing  Goal: Patient-Specific Goal (Individualized)  Outcome: Not Progressing  Goal: Absence of Hospital-Acquired Illness or Injury  Outcome: Progressing  Goal: Optimal Comfort and Wellbeing  Outcome: Progressing  Goal: Readiness for Transition of Care  Outcome: Progressing

## 2024-09-29 NOTE — NURSING
Pt AAOX4 stable no complaints tolerated medication well, O2 intact pt has some wheezing, tolerated meds well no complaints of any family at bedside

## 2024-09-29 NOTE — NURSING
Pt arrived to the floor. Showing no signs of distress. On 2L NC. Pt have no complaints of pain. Family is at the bedside. Will continue to follow care.

## 2024-09-29 NOTE — SUBJECTIVE & OBJECTIVE
Interval History: BP soft. Patient had rapid response yesterday afternoon/evening for increased work of breathing. Supplemental oxygen increased to 2.5L with improvement. Given PO lasix this morning. Repeat CXR with continue edema. Bedside echo by Keila Phan MD with evidence of volume overload. Attempted to give Lasix 20mg IV but BP too soft. Cardiology consulted, appreciate recs.     Review of Systems   Constitutional:  Negative for chills and fever.   Respiratory:  Positive for shortness of breath. Negative for chest tightness.    Cardiovascular:  Negative for chest pain and leg swelling.   Gastrointestinal:  Negative for abdominal pain and nausea.   Neurological:  Negative for dizziness and weakness.     Objective:     Vital Signs (Most Recent):  Temp: 97.8 °F (36.6 °C) (09/29/24 1117)  Pulse: 67 (09/29/24 1117)  Resp: 19 (09/29/24 1117)  BP: (!) 94/54 (09/29/24 1117)  SpO2: (!) 94 % (09/29/24 1117) Vital Signs (24h Range):  Temp:  [97 °F (36.1 °C)-98.6 °F (37 °C)] 97.8 °F (36.6 °C)  Pulse:  [61-78] 67  Resp:  [18-22] 19  SpO2:  [90 %-97 %] 94 %  BP: ()/(53-80) 94/54     Weight: 63.6 kg (140 lb 3.4 oz)  Body mass index is 24.84 kg/m².    Intake/Output Summary (Last 24 hours) at 9/29/2024 1341  Last data filed at 9/29/2024 0442  Gross per 24 hour   Intake 340 ml   Output 1300 ml   Net -960 ml         Physical Exam  Vitals and nursing note reviewed.   Constitutional:       General: She is not in acute distress.     Appearance: Normal appearance. She is not ill-appearing.   HENT:      Head: Normocephalic and atraumatic.      Right Ear: External ear normal.      Left Ear: External ear normal.   Eyes:      Extraocular Movements: Extraocular movements intact.      Conjunctiva/sclera: Conjunctivae normal.      Pupils: Pupils are equal, round, and reactive to light.   Neck:      Vascular: No JVD.   Cardiovascular:      Rate and Rhythm: Normal rate and regular rhythm.      Pulses: Normal pulses.      Heart  sounds: Normal heart sounds. No murmur heard.  Pulmonary:      Effort: No respiratory distress.      Breath sounds: Rales (faint) present.      Comments: Patient with increased work of breathing during encounter  Abdominal:      General: Abdomen is flat. Bowel sounds are normal. There is distension.      Palpations: Abdomen is soft.      Tenderness: There is no abdominal tenderness.   Musculoskeletal:         General: Normal range of motion.      Cervical back: Normal range of motion and neck supple. No tenderness.      Right lower leg: No edema.      Left lower leg: No edema.   Skin:     General: Skin is warm and dry.      Capillary Refill: Capillary refill takes less than 2 seconds.      Coloration: Skin is not jaundiced.   Neurological:      General: No focal deficit present.      Mental Status: She is alert and oriented to person, place, and time. Mental status is at baseline.   Psychiatric:         Mood and Affect: Mood normal.         Behavior: Behavior normal.             Significant Labs: All pertinent labs within the past 24 hours have been reviewed.  BMP:   Recent Labs   Lab 09/29/24 0247   *      K 3.9      CO2 27   BUN 25*   CREATININE 1.0   CALCIUM 8.9   MG 2.2     CBC:   Recent Labs   Lab 09/28/24  0842 09/29/24  0247   WBC 9.04 7.92   HGB 12.7 12.6   HCT 41.3 40.2    188     CMP:   Recent Labs   Lab 09/28/24  0842 09/29/24  0247    139   K 4.2 3.9    105   CO2 29 27   * 117*   BUN 25* 25*   CREATININE 1.2 1.0   CALCIUM 8.9 8.9   ANIONGAP 8 7*       Significant Imaging: I have reviewed all pertinent imaging results/findings within the past 24 hours.

## 2024-09-29 NOTE — ASSESSMENT & PLAN NOTE
Patient is identified as having Combined Systolic and Diastolic heart failure that is Acute on chronic. CHF is currently uncontrolled due to Continued edema of extremities and JVD.   Patient has severe aortic stenosis diagnosed more than 3 years ago. She has refused TAVR. The acute decompensation is likely in the setting of severe aortic stenosis.    Latest ECHO performed and demonstrates- Results for orders placed during the hospital encounter of 09/27/24    Echo    Interpretation Summary    Left Ventricle: The left ventricle is normal in size. Moderately increased ventricular mass. Mildly increased wall thickness. Mild septal thickening. There is moderate concentric hypertrophy. Global hypokinesis present. There is severely reduced systolic function with a visually estimated ejection fraction of 20 - 25%. Ejection fraction by visual approximation is 23% with marked worsening compared with over 2 years ago.    Right Ventricle: Normal right ventricular cavity size. Wall thickness is normal. Systolic function is normal.    Left Atrium: Left atrium is severely dilated.    Aortic Valve: There is severe aortic valve sclerosis. Severely restricted motion. There is severe ( critical) stenosis. Aortic valve area by VTI is 0.31 cm2. Aortic valve peak velocity is 4.63 m/s. Mean gradient is 60 mmHg. The dimensionless index is 0.10. There is trace aortic regurgitation.    Mitral Valve: There is mild bileaflet sclerosis. There is mild mitral annular calcification present. There is moderate to moderate-severe regurgitation.    Tricuspid Valve: There is moderate regurgitation.    Pulmonary Artery: There is moderate pulmonary hypertension. The estimated pulmonary artery systolic pressure is 55 mmHg.    IVC/SVC: Intermediate venous pressure at 8 mmHg.    Pericardium: There is a  trivial-small posterior effusion at the base and trivial under the RA.  . Continue Furosemide and monitor clinical status closely. Monitor on telemetry.  Monitor strict Is&Os and daily weights.  Place on fluid restriction of 1.5 L. Continue to stress to patient importance of self efficacy and  on diet for CHF. Last BNP reviewed- and noted below   Recent Labs   Lab 09/27/24  1155   BNP 2,388*       Plan:   - Stop Lasix PO, switch to lasix IV 40 mg and assess response. Goal of urine output is 100-120 cc/hr  - Patient with newly reduced EF 20-25 %. She will need to start GDMT once her blood pressure tolerates it (GDMT with Entresto, BB; either metoprolol or carvedilol, SGLT2 inhibitor)   - Strict Is and Os  - BMP; keep K > 4 and Mg > 2  - Consider palliative care consult

## 2024-09-29 NOTE — SUBJECTIVE & OBJECTIVE
Past Medical History:   Diagnosis Date    Anticoagulant long-term use     Aortic valve stenosis     Arthritis     Cardiogenic shock 5/7/2021    CHF (congestive heart failure)     Diabetes mellitus     Hypercholesteremia     Hypertension     Pneumonia due to infectious organism 5/6/2021    Primary adenocarcinoma of upper lobe of right lung 7/19/2021    Thyroid disease        Past Surgical History:   Procedure Laterality Date    CATARACT EXTRACTION, BILATERAL      EYE SURGERY      FOOT FRACTURE SURGERY Right     HYSTERECTOMY         Review of patient's allergies indicates:  No Known Allergies    No current facility-administered medications on file prior to encounter.     Current Outpatient Medications on File Prior to Encounter   Medication Sig    amiodarone (PACERONE) 200 MG Tab Take 1 tablet by mouth once daily    apixaban (ELIQUIS) 5 mg Tab Take 5 mg by mouth 2 (two) times daily.    atorvastatin (LIPITOR) 40 MG tablet Take 1 tablet (40 mg total) by mouth once daily.    carvediloL (COREG) 6.25 MG tablet Take 0.5 tablets (3.125 mg total) by mouth 2 (two) times daily with meals.    citalopram (CELEXA) 20 MG tablet Take 20 mg by mouth once daily.    dapagliflozin (FARXIGA) 5 mg Tab tablet Take 1 tablet (5 mg total) by mouth once daily.    furosemide (LASIX) 40 MG tablet Take 1 tablet (40 mg total) by mouth 2 (two) times daily. (Patient not taking: Reported on 8/22/2024)    furosemide (LASIX) 80 MG tablet Take 1 tablet (80 mg total) by mouth once daily.    levothyroxine (SYNTHROID) 75 MCG tablet Take 75 mcg by mouth before breakfast.     meclizine (ANTIVERT) 25 mg tablet Take 25 mg by mouth once daily.     metformin (GLUCOPHAGE) 500 MG tablet Take 1,000 mg by mouth 2 (two) times daily with meals.     metFORMIN (GLUCOPHAGE-XR) 500 MG ER 24hr tablet Take 1,000 mg by mouth 2 (two) times daily. (Patient not taking: Reported on 8/22/2024)    potassium chloride SA (K-DUR,KLOR-CON) 20 MEQ tablet Take 1 tablet (20 mEq total) by  mouth 2 (two) times daily.    TRESIBA FLEXTOUCH U-100 100 unit/mL (3 mL) InPn Inject 18 Units into the skin every evening.    valsartan (DIOVAN) 160 MG tablet Take 1/2 (one-half) tablet by mouth once daily     Family History       Problem Relation (Age of Onset)    Diabetes Mother, Father, Sister, Brother, Brother, Daughter    Heart attack Brother    Heart disease Father    No Known Problems Maternal Aunt, Maternal Uncle, Paternal Aunt, Paternal Uncle, Maternal Grandmother, Maternal Grandfather, Paternal Grandmother, Paternal Grandfather    Stroke Son          Tobacco Use    Smoking status: Never    Smokeless tobacco: Never   Substance and Sexual Activity    Alcohol use: No    Drug use: No    Sexual activity: Not on file     Review of Systems   Constitutional: Negative.   HENT: Negative.     Eyes: Negative.    Cardiovascular:  Positive for dyspnea on exertion, leg swelling and orthopnea.   Respiratory:  Positive for snoring.    Endocrine: Negative.    Skin: Negative.    Musculoskeletal:  Positive for back pain and muscle weakness.   Gastrointestinal: Negative.    Genitourinary: Negative.    Neurological: Negative.    Psychiatric/Behavioral: Negative.       Objective:     Vital Signs (Most Recent):  Temp: 98.4 °F (36.9 °C) (09/29/24 1347)  Pulse: 67 (09/29/24 1347)  Resp: 18 (09/29/24 1347)  BP: 101/68 (09/29/24 1347)  SpO2: (!) 94 % (09/29/24 1347) Vital Signs (24h Range):  Temp:  [97 °F (36.1 °C)-98.6 °F (37 °C)] 98.4 °F (36.9 °C)  Pulse:  [61-78] 67  Resp:  [18-22] 18  SpO2:  [90 %-97 %] 94 %  BP: ()/(53-80) 101/68     Weight: 63.6 kg (140 lb 3.4 oz)  Body mass index is 24.84 kg/m².    SpO2: (!) 94 %         Intake/Output Summary (Last 24 hours) at 9/29/2024 1507  Last data filed at 9/29/2024 0442  Gross per 24 hour   Intake 340 ml   Output 1300 ml   Net -960 ml       Lines/Drains/Airways       Peripheral Intravenous Line  Duration                  Peripheral IV - Single Lumen 09/27/24 1156 20 G  Distal;Posterior;Right Forearm 2 days                     Physical Exam  Constitutional:       Appearance: Normal appearance. She is obese.   HENT:      Head: Normocephalic.      Mouth/Throat:      Mouth: Mucous membranes are moist.   Eyes:      Pupils: Pupils are equal, round, and reactive to light.   Cardiovascular:      Rate and Rhythm: Normal rate and regular rhythm.      Pulses: Normal pulses.           Radial pulses are 2+ on the right side and 2+ on the left side.        Dorsalis pedis pulses are 2+ on the right side and 2+ on the left side.      Heart sounds: Murmur heard.      Harsh midsystolic murmur is present at the upper right sternal border radiating to the neck.      No friction rub. No gallop.      Comments: JVD  Pulmonary:      Effort: Pulmonary effort is normal.      Breath sounds: Examination of the right-middle field reveals wheezing and rales. Examination of the left-middle field reveals wheezing and rales. Examination of the right-lower field reveals rales. Examination of the left-lower field reveals rales. Wheezing and rales present.   Abdominal:      General: Abdomen is flat.   Musculoskeletal:         General: Normal range of motion.      Cervical back: Neck supple.      Right lower le+ Edema present.      Left lower le+ Edema present.   Skin:     General: Skin is warm.      Capillary Refill: Capillary refill takes less than 2 seconds.   Neurological:      General: No focal deficit present.      Mental Status: She is alert.   Psychiatric:         Mood and Affect: Mood normal.          Significant Labs:   Recent Labs   Lab 24  1155 24  0842 24  0247   WBC 8.55 9.04 7.92   HGB 12.9 12.7 12.6   HCT 40.3 41.3 40.2    198 188       Recent Labs   Lab 24  1155 24  0842 24  0247    139 139   K 5.4* 4.2 3.9    102 105   CO2 25 29 27   BUN 31* 25* 25*   CREATININE 1.3 1.2 1.0   CALCIUM 9.4 8.9 8.9       Recent Labs   Lab 24  1155  "  ALKPHOS 77   BILITOT 0.7   PROT 6.9   ALT 25   AST 31       No results for input(s): "CHOL", "TRIG", "HDL", "LDL" in the last 168 hours.  Lab Results   Component Value Date    CHOL 90 (L) 11/08/2022    HDL 24 (L) 11/08/2022    LDLCALC 34.6 (L) 11/08/2022    TRIG 157 (H) 11/08/2022       Recent Labs   Lab 09/27/24  1155   TROPONINI 0.016       Lab Results   Component Value Date    HGBA1C 6.2 (H) 09/27/2024       Lab Results   Component Value Date    BNP 2,388 (H) 09/27/2024     (H) 12/19/2022    BNP 1,014 (H) 11/07/2022              Significant Imaging:    EXAMINATION:  XR CHEST AP PORTABLE     CLINICAL HISTORY:  Hypoxia;     TECHNIQUE:  Single frontal view of the chest was performed.     COMPARISON:  09/27/2024     FINDINGS:  Cardiomediastinal silhouetteremains enlarged with aortic atherosclerosis.     Perihilar vascular congestion and interstitial edema pattern persists.  New areas of lenticular in linear opacities suggestive of areas of subsegmental atelectasis in the right lung.  Small bilateral pleural effusions are suspected.     Impression:     CHF and pulmonary edema, with increasing right perihilar areas of subsegmental atelectasis.    EKG 9/27/2024:   Sinus rhythm, First degree AV block, prolonged QT    TTE 9/27/2024:    Left Ventricle: The left ventricle is normal in size. Moderately increased ventricular mass. Mildly increased wall thickness. Mild septal thickening. There is moderate concentric hypertrophy. Global hypokinesis present. There is severely reduced systolic function with a visually estimated ejection fraction of 20 - 25%. Ejection fraction by visual approximation is 23% with marked worsening compared with over 2 years ago.    Right Ventricle: Normal right ventricular cavity size. Wall thickness is normal. Systolic function is normal.    Left Atrium: Left atrium is severely dilated.    Aortic Valve: There is severe aortic valve sclerosis. Severely restricted motion. There is severe ( " critical) stenosis. Aortic valve area by VTI is 0.31 cm2. Aortic valve peak velocity is 4.63 m/s. Mean gradient is 60 mmHg. The dimensionless index is 0.10. There is trace aortic regurgitation.    Mitral Valve: There is mild bileaflet sclerosis. There is mild mitral annular calcification present. There is moderate to moderate-severe regurgitation.    Tricuspid Valve: There is moderate regurgitation.    Pulmonary Artery: There is moderate pulmonary hypertension. The estimated pulmonary artery systolic pressure is 55 mmHg.    IVC/SVC: Intermediate venous pressure at 8 mmHg.    Pericardium: There is a  trivial-small posterior effusion at the base and trivial under the RA.    TTE 6/21/2022:  The left ventricle is normal in size with mild eccentric hypertrophy and low normal systolic function.  The estimated ejection fraction is 55%.  Grade III left ventricular diastolic dysfunction.  Normal right ventricular size with mildly reduced right ventricular systolic function.  Severe left atrial enlargement.  Mild right atrial enlargement.  There is severe aortic valve stenosis.  Aortic valve area is 0.55 cm2; peak velocity is 4.32 m/s; mean gradient is 53 mmHg.  Mild aortic regurgitation.  Moderate-to-severe mitral regurgitation.  Moderate tricuspid regurgitation.  Normal central venous pressure (3 mmHg).  The estimated PA systolic pressure is 51 mmHg.  There is mild-moderate pulmonary hypertension.

## 2024-09-30 ENCOUNTER — EPISODE CHANGES (OUTPATIENT)
Dept: CARDIOLOGY | Facility: CLINIC | Age: 89
End: 2024-09-30

## 2024-09-30 DIAGNOSIS — R06.02 SOB (SHORTNESS OF BREATH): Primary | ICD-10-CM

## 2024-09-30 PROBLEM — Z51.5 PALLIATIVE CARE ENCOUNTER: Status: ACTIVE | Noted: 2024-09-30

## 2024-09-30 PROBLEM — Z71.89 ADVANCE CARE PLANNING: Status: ACTIVE | Noted: 2024-09-30

## 2024-09-30 PROBLEM — R06.00 DYSPNEA: Status: ACTIVE | Noted: 2024-09-30

## 2024-09-30 LAB
ANION GAP SERPL CALC-SCNC: 9 MMOL/L (ref 8–16)
BASOPHILS # BLD AUTO: 0.04 K/UL (ref 0–0.2)
BASOPHILS NFR BLD: 0.5 % (ref 0–1.9)
BUN SERPL-MCNC: 21 MG/DL (ref 8–23)
CALCIUM SERPL-MCNC: 8.7 MG/DL (ref 8.7–10.5)
CHLORIDE SERPL-SCNC: 103 MMOL/L (ref 95–110)
CO2 SERPL-SCNC: 29 MMOL/L (ref 23–29)
CREAT SERPL-MCNC: 1 MG/DL (ref 0.5–1.4)
DIFFERENTIAL METHOD BLD: ABNORMAL
EOSINOPHIL # BLD AUTO: 0.1 K/UL (ref 0–0.5)
EOSINOPHIL NFR BLD: 1.6 % (ref 0–8)
ERYTHROCYTE [DISTWIDTH] IN BLOOD BY AUTOMATED COUNT: 19.4 % (ref 11.5–14.5)
EST. GFR  (NO RACE VARIABLE): 53.5 ML/MIN/1.73 M^2
GLUCOSE SERPL-MCNC: 119 MG/DL (ref 70–110)
HCT VFR BLD AUTO: 39.2 % (ref 37–48.5)
HGB BLD-MCNC: 12 G/DL (ref 12–16)
IMM GRANULOCYTES # BLD AUTO: 0.02 K/UL (ref 0–0.04)
IMM GRANULOCYTES NFR BLD AUTO: 0.3 % (ref 0–0.5)
LYMPHOCYTES # BLD AUTO: 1.1 K/UL (ref 1–4.8)
LYMPHOCYTES NFR BLD: 14.7 % (ref 18–48)
MAGNESIUM SERPL-MCNC: 2 MG/DL (ref 1.6–2.6)
MCH RBC QN AUTO: 30.2 PG (ref 27–31)
MCHC RBC AUTO-ENTMCNC: 30.6 G/DL (ref 32–36)
MCV RBC AUTO: 99 FL (ref 82–98)
MONOCYTES # BLD AUTO: 1.1 K/UL (ref 0.3–1)
MONOCYTES NFR BLD: 15.5 % (ref 4–15)
NEUTROPHILS # BLD AUTO: 5 K/UL (ref 1.8–7.7)
NEUTROPHILS NFR BLD: 67.4 % (ref 38–73)
NRBC BLD-RTO: 0 /100 WBC
PLATELET # BLD AUTO: 203 K/UL (ref 150–450)
PMV BLD AUTO: 11.5 FL (ref 9.2–12.9)
POCT GLUCOSE: 122 MG/DL (ref 70–110)
POCT GLUCOSE: 200 MG/DL (ref 70–110)
POCT GLUCOSE: 290 MG/DL (ref 70–110)
POTASSIUM SERPL-SCNC: 4 MMOL/L (ref 3.5–5.1)
RBC # BLD AUTO: 3.97 M/UL (ref 4–5.4)
SODIUM SERPL-SCNC: 141 MMOL/L (ref 136–145)
WBC # BLD AUTO: 7.37 K/UL (ref 3.9–12.7)

## 2024-09-30 PROCEDURE — 20600001 HC STEP DOWN PRIVATE ROOM

## 2024-09-30 PROCEDURE — 94761 N-INVAS EAR/PLS OXIMETRY MLT: CPT

## 2024-09-30 PROCEDURE — 63600175 PHARM REV CODE 636 W HCPCS: Performed by: PHYSICIAN ASSISTANT

## 2024-09-30 PROCEDURE — 99900035 HC TECH TIME PER 15 MIN (STAT)

## 2024-09-30 PROCEDURE — 27000221 HC OXYGEN, UP TO 24 HOURS

## 2024-09-30 PROCEDURE — 36415 COLL VENOUS BLD VENIPUNCTURE: CPT | Performed by: PHYSICIAN ASSISTANT

## 2024-09-30 PROCEDURE — 25000003 PHARM REV CODE 250: Performed by: PHYSICIAN ASSISTANT

## 2024-09-30 PROCEDURE — 80048 BASIC METABOLIC PNL TOTAL CA: CPT | Performed by: PHYSICIAN ASSISTANT

## 2024-09-30 PROCEDURE — 83735 ASSAY OF MAGNESIUM: CPT | Performed by: PHYSICIAN ASSISTANT

## 2024-09-30 PROCEDURE — 85025 COMPLETE CBC W/AUTO DIFF WBC: CPT | Performed by: PHYSICIAN ASSISTANT

## 2024-09-30 PROCEDURE — 99223 1ST HOSP IP/OBS HIGH 75: CPT | Mod: ,,, | Performed by: CLINICAL NURSE SPECIALIST

## 2024-09-30 RX ORDER — FUROSEMIDE 10 MG/ML
40 INJECTION INTRAMUSCULAR; INTRAVENOUS ONCE
Status: COMPLETED | OUTPATIENT
Start: 2024-09-30 | End: 2024-09-30

## 2024-09-30 RX ADMIN — MECLIZINE 25 MG: 12.5 TABLET ORAL at 08:09

## 2024-09-30 RX ADMIN — APIXABAN 5 MG: 5 TABLET, FILM COATED ORAL at 08:09

## 2024-09-30 RX ADMIN — LEVOTHYROXINE SODIUM 75 MCG: 75 TABLET ORAL at 06:09

## 2024-09-30 RX ADMIN — CITALOPRAM HYDROBROMIDE 20 MG: 20 TABLET ORAL at 08:09

## 2024-09-30 RX ADMIN — INSULIN GLARGINE 10 UNITS: 100 INJECTION, SOLUTION SUBCUTANEOUS at 08:09

## 2024-09-30 RX ADMIN — FUROSEMIDE 40 MG: 10 INJECTION, SOLUTION INTRAVENOUS at 02:09

## 2024-09-30 RX ADMIN — ATORVASTATIN CALCIUM 40 MG: 40 TABLET, FILM COATED ORAL at 08:09

## 2024-09-30 RX ADMIN — AMIODARONE HYDROCHLORIDE 200 MG: 200 TABLET ORAL at 08:09

## 2024-09-30 RX ADMIN — INSULIN ASPART 1 UNITS: 100 INJECTION, SOLUTION INTRAVENOUS; SUBCUTANEOUS at 08:09

## 2024-09-30 NOTE — PROGRESS NOTES
[facility] - Cardiology [facility] Medicine  Progress Note    Patient Name: Stacia Perry  MRN: [id number]  Patient Class: IP- Inpatient   Admission Date: 9/27/2024  Length of Stay: 2 days  Attending Physician: Keila Phan MD  Primary Care Provider: Maureen Parnell MD        Subjective:     Principal Problem:Acute on chronic combined systolic and diastolic congestive heart failure        HPI:  Stacia Perry is a 89 y.o. female with PMHx significant for DM II, HTN, HLD, pAfib on eliquis, aortic stenosis, and HFpEF admitted to hospital medicine for acute CHF exacerbation. Patient was seen in cardiology clinic this morning where she was noted to have significant BLE, JVD, and hypoxemia which prompted her visit to the ED. Endorses worsening SOB, leg swelling, and a ~9 lb weight gain over the past month. Reports compliance with lasix (80mg PO in the morning) and daughter reports giving patient an extra 40mg PO in the evenings for the past few days. Denies fever/chills, diaphoresis, lightheadedness, HA, CP, cough, congestion, abdominal pain, n/v/d, urinary symptoms, changes in BMs, numbness/tingling, weakness. Of note, patient was to have a TAVR in 2021 for aortic stenosis but she refused.    In the ED, SpO2 <94% on RA, improved with supplemental oxygent. Remaining vitals stable. BNP 2388. Troponin negative. K 5.4. Cr 1.3 (BL ~1.0). CXR consistent with pulmonary vascular congestion. EKG with sinus lorena and known 1st degree heart block. Given lasix 80mg IV x1.     Overview/Hospital Course:  Patient admitted to hospital medicine for CHF exacerbation, started on IV lasix with good urine output. Patient with episode of asymptomatic hypotension s/p Lasix 80 IV. Lasix dosing decreased. Echo with EF of 20-25%, severe known aortic stenosis, and elevated venous pressure. Cardiology consulted, appreciate recs. Palliative care consulted, appreciate recs.     Interval History: BP soft, but stable. Given 1 dose of Lasix  40mg IV yesterday with ~1.4 L output. Will discuss with cardiology regarding dosing today. Palliative care consulted today, appreciate recs.     Review of Systems   Constitutional:  Negative for chills and fever.   Respiratory:  Positive for shortness of breath. Negative for chest tightness.    Cardiovascular:  Negative for chest pain and leg swelling.   Gastrointestinal:  Negative for abdominal pain and nausea.   Neurological:  Negative for dizziness and weakness.     Objective:     Vital Signs (Most Recent):  Temp: 98.2 °F (36.8 °C) (09/30/24 1153)  Pulse: 71 (09/30/24 1153)  Resp: 17 (09/30/24 1153)  BP: (!) 101/58 (09/30/24 1153)  SpO2: 97 % (09/30/24 1153) Vital Signs (24h Range):  Temp:  [98 °F (36.7 °C)-98.4 °F (36.9 °C)] 98.2 °F (36.8 °C)  Pulse:  [62-74] 71  Resp:  [16-20] 17  SpO2:  [89 %-97 %] 97 %  BP: ()/(50-71) 101/58     Weight: 63.6 kg (140 lb 3.4 oz)  Body mass index is 24.84 kg/m².    Intake/Output Summary (Last 24 hours) at 9/30/2024 1247  Last data filed at 9/30/2024 1223  Gross per 24 hour   Intake 1302 ml   Output 2400 ml   Net -1098 ml         Physical Exam  Vitals and nursing note reviewed.   Constitutional:       General: She is not in acute distress.     Appearance: Normal appearance. She is not ill-appearing.   HENT:      Head: Normocephalic and atraumatic.      Right Ear: External ear normal.      Left Ear: External ear normal.   Eyes:      Extraocular Movements: Extraocular movements intact.      Conjunctiva/sclera: Conjunctivae normal.      Pupils: Pupils are equal, round, and reactive to light.   Neck:      Vascular: No JVD.   Cardiovascular:      Rate and Rhythm: Normal rate and regular rhythm.      Pulses: Normal pulses.      Heart sounds: Normal heart sounds. No murmur heard.  Pulmonary:      Effort: No respiratory distress.      Breath sounds: Rales (faint) present.      Comments: On 2L NC  Abdominal:      General: Abdomen is flat. Bowel sounds are normal. There is distension  (improved).      Palpations: Abdomen is soft.      Tenderness: There is no abdominal tenderness.   Musculoskeletal:         General: Normal range of motion.      Cervical back: Normal range of motion and neck supple. No tenderness.      Right lower leg: No edema.      Left lower leg: No edema.   Skin:     General: Skin is warm and dry.      Capillary Refill: Capillary refill takes less than 2 seconds.      Coloration: Skin is not jaundiced.   Neurological:      General: No focal deficit present.      Mental Status: She is alert and oriented to person, place, and time. Mental status is at baseline.   Psychiatric:         Mood and Affect: Mood normal.         Behavior: Behavior normal.             Significant Labs: All pertinent labs within the past 24 hours have been reviewed.  BMP:   Recent Labs   Lab 09/30/24  0637   *      K 4.0      CO2 29   BUN 21   CREATININE 1.0   CALCIUM 8.7   MG 2.0     CBC:   Recent Labs   Lab 09/29/24  0247 09/30/24  0637   WBC 7.92 7.37   HGB 12.6 12.0   HCT 40.2 39.2    203     CMP:   Recent Labs   Lab 09/29/24  0247 09/30/24  0637    141   K 3.9 4.0    103   CO2 27 29   * 119*   BUN 25* 21   CREATININE 1.0 1.0   CALCIUM 8.9 8.7   ANIONGAP 7* 9       Significant Imaging: I have reviewed all pertinent imaging results/findings within the past 24 hours.    Assessment/Plan:      * Acute on chronic combined systolic and diastolic congestive heart failure  Acute Respiratory Failure  Patient is identified as having Diastolic (HFpEF) heart failure that is Acute on chronic. CHF is currently uncontrolled due to Continued edema of extremities, Weight gain of 9 pounds, and JVD, Rales/crackles on pulmonary exam, and Pulmonary edema/pleural effusion on CXR. Latest ECHO performed and demonstrates- Results for orders placed during the hospital encounter of 07/14/22    Echo Saline Bubble? No    Interpretation Summary  · The left ventricle is normal in size  with mild eccentric hypertrophy and low normal systolic function.  · The estimated ejection fraction is 55%.  · Grade III left ventricular diastolic dysfunction.  · Normal right ventricular size with mildly reduced right ventricular systolic function.  · Severe left atrial enlargement.  · Mild right atrial enlargement.  · There is severe aortic valve stenosis.  · Aortic valve area is 0.55 cm2; peak velocity is 4.32 m/s; mean gradient is 53 mmHg.  · Mild aortic regurgitation.  · Moderate-to-severe mitral regurgitation.  · Moderate tricuspid regurgitation.  · Normal central venous pressure (3 mmHg).  · The estimated PA systolic pressure is 51 mmHg.  · There is mild-moderate pulmonary hypertension.  . Continue Beta Blocker, ACE/ARB, and Furosemide and monitor clinical status closely. Holding BB and ACE/ARB in setting of hypotension  Monitor on telemetry.   Patient is on CHF pathway.    Monitor strict Is&Os and daily weights.    Place on fluid restriction of 1.5 L.   Cardiology has been consulted given difficulty diuresing with the aortic stenosis, appreciate recs. Given Lasix 40mg IV x 1 yesterday with good urine output and stable BP. Will give additional dose today.   Continue to stress to patient importance of self efficacy and  on diet for CHF.   Last BNP reviewed- and noted below   Recent Labs   Lab 09/27/24  1155   BNP 2,388*         Hyperkalemia  Hyperkalemia is likely due to BIJAL.The patients most recent potassium results are listed below.  Recent Labs     09/28/24  0842 09/29/24  0247 09/30/24  0637   K 4.2 3.9 4.0       Plan  - Monitor for arrhythmias with EKG and/or continuous telemetry.   - Treat the hyperkalemia with Furosemide.   - Monitor potassium: Daily  - The patient's hyperkalemia is stable  - RESOLVED            Paroxysmal atrial fibrillation  Patient with Paroxysmal (<7 days) atrial fibrillation which is controlled currently with Beta Blocker, Calcium Channel Blocker, and Amiodarone. Patient  is currently in sinus rhythm.[id number] Score: 4. Anticoagulation not indicated due to fall risk .    Obesity  Body mass index is 28.68 kg/m². Morbid obesity complicates all aspects of disease management from diagnostic modalities to treatment. Weight loss encouraged and health benefits explained to patient.         Severe aortic stenosis  - known, chronic  - patient was to have a TAVR in 2021, but patient refused  - repeat echo below  - Palliative care consulted given severe disease, appreciate recs    Results for orders placed during the hospital encounter of 09/27/24    Echo    Interpretation Summary    Left Ventricle: The left ventricle is normal in size. Moderately increased ventricular mass. Mildly increased wall thickness. Mild septal thickening. There is moderate concentric hypertrophy. Global hypokinesis present. There is severely reduced systolic function with a visually estimated ejection fraction of 20 - 25%. Ejection fraction by visual approximation is 23% with marked worsening compared with over 2 years ago.    Right Ventricle: Normal right ventricular cavity size. Wall thickness is normal. Systolic function is normal.    Left Atrium: Left atrium is severely dilated.    Aortic Valve: There is severe aortic valve sclerosis. Severely restricted motion. There is severe ( critical) stenosis. Aortic valve area by VTI is 0.31 cm2. Aortic valve peak velocity is 4.63 m/s. Mean gradient is 60 mmHg. The dimensionless index is 0.10. There is trace aortic regurgitation.    Mitral Valve: There is mild bileaflet sclerosis. There is mild mitral annular calcification present. There is moderate to moderate-severe regurgitation.    Tricuspid Valve: There is moderate regurgitation.    Pulmonary Artery: There is moderate pulmonary hypertension. The estimated pulmonary artery systolic pressure is 55 mmHg.    IVC/SVC: Intermediate venous pressure at 8 mmHg.    Pericardium: There is a  trivial-small posterior effusion at the  "base and trivial under the RA.      HLD (hyperlipidemia)  - chronic  - continue statin      Type 2 diabetes mellitus without complication, without long-term current use of insulin  Patient's FSGs are controlled on current medication regimen.  Last A1c reviewed-   Lab Results   Component Value Date    HGBA1C 7.6 (H) 11/07/2022     Most recent fingerstick glucose reviewed- No results for input(s): "POCTGLUCOSE" in the last 24 hours.  Current correctional scale  Low  Maintain anti-hyperglycemic dose as follows-   Antihyperglycemics (From admission, onward)      Start     Stop Route Frequency Ordered    09/27/24 2100  insulin glargine U-100 (Lantus) pen 10 Units         -- SubQ Nightly 09/27/24 1334    09/27/24 1434  insulin aspart U-100 pen 0-5 Units         -- SubQ Before meals & nightly PRN 09/27/24 1334          Hold Oral hypoglycemics while patient is in the hospital.    Essential hypertension  Chronic, controlled. Latest blood pressure and vitals reviewed-     Temp:  [97.5 °F (36.4 °C)-98.1 °F (36.7 °C)]   Pulse:  [51-78]   Resp:  [11-18]   BP: ()/(51-70)   SpO2:  [95 %-98 %] .   Home meds for hypertension were reviewed and noted below.   Hypertension Medications               carvediloL (COREG) 6.25 MG tablet Take 0.5 tablets (3.125 mg total) by mouth 2 (two) times daily with meals.    furosemide (LASIX) 80 MG tablet Take 1 tablet (80 mg total) by mouth once daily.    valsartan (DIOVAN) 160 MG tablet Take 1/2 (one-half) tablet by mouth once daily    furosemide (LASIX) 40 MG tablet Take 1 tablet (40 mg total) by mouth 2 (two) times daily.            While in the hospital, will manage blood pressure as follows; Continue home antihypertensive regimen> discontinued home meds for now given hypotension with IV lasix, Restart when appropriate    Will utilize p.r.n. blood pressure medication only if patient's blood pressure greater than 180/110 and she develops symptoms such as worsening chest pain or shortness of " breath.      VTE Risk Mitigation (From admission, onward)           Ordered     apixaban tablet 5 mg  2 times daily         09/27/24 1421     IP VTE HIGH RISK PATIENT  Once         09/27/24 1331     Place sequential compression device  Until discontinued         09/27/24 1331                    Discharge Planning   MICHAEL: 10/3/2024     Code Status: Partial Code   Is the patient medically ready for discharge?:     Reason for patient still in hospital (select all that apply): Patient trending condition, Treatment, and Consult recommendations  Discharge Plan A: Home with family                  Shelley Lovelace PA-C  Department of Hospital Medicine   [facility] - Cardiology Stepdown

## 2024-09-30 NOTE — SUBJECTIVE & OBJECTIVE
Interval History: BP soft, but stable. Given 1 dose of Lasix 40mg IV yesterday with ~1.4 L output. Will discuss with cardiology regarding dosing today. Palliative care consulted today, appreciate recs.     Review of Systems   Constitutional:  Negative for chills and fever.   Respiratory:  Positive for shortness of breath. Negative for chest tightness.    Cardiovascular:  Negative for chest pain and leg swelling.   Gastrointestinal:  Negative for abdominal pain and nausea.   Neurological:  Negative for dizziness and weakness.     Objective:     Vital Signs (Most Recent):  Temp: 98.2 °F (36.8 °C) (09/30/24 1153)  Pulse: 71 (09/30/24 1153)  Resp: 17 (09/30/24 1153)  BP: (!) 101/58 (09/30/24 1153)  SpO2: 97 % (09/30/24 1153) Vital Signs (24h Range):  Temp:  [98 °F (36.7 °C)-98.4 °F (36.9 °C)] 98.2 °F (36.8 °C)  Pulse:  [62-74] 71  Resp:  [16-20] 17  SpO2:  [89 %-97 %] 97 %  BP: ()/(50-71) 101/58     Weight: 63.6 kg (140 lb 3.4 oz)  Body mass index is 24.84 kg/m².    Intake/Output Summary (Last 24 hours) at 9/30/2024 1247  Last data filed at 9/30/2024 1223  Gross per 24 hour   Intake 1302 ml   Output 2400 ml   Net -1098 ml         Physical Exam  Vitals and nursing note reviewed.   Constitutional:       General: She is not in acute distress.     Appearance: Normal appearance. She is not ill-appearing.   HENT:      Head: Normocephalic and atraumatic.      Right Ear: External ear normal.      Left Ear: External ear normal.   Eyes:      Extraocular Movements: Extraocular movements intact.      Conjunctiva/sclera: Conjunctivae normal.      Pupils: Pupils are equal, round, and reactive to light.   Neck:      Vascular: No JVD.   Cardiovascular:      Rate and Rhythm: Normal rate and regular rhythm.      Pulses: Normal pulses.      Heart sounds: Normal heart sounds. No murmur heard.  Pulmonary:      Effort: No respiratory distress.      Breath sounds: Rales (faint) present.      Comments: On 2L NC  Abdominal:      General:  Abdomen is flat. Bowel sounds are normal. There is distension (improved).      Palpations: Abdomen is soft.      Tenderness: There is no abdominal tenderness.   Musculoskeletal:         General: Normal range of motion.      Cervical back: Normal range of motion and neck supple. No tenderness.      Right lower leg: No edema.      Left lower leg: No edema.   Skin:     General: Skin is warm and dry.      Capillary Refill: Capillary refill takes less than 2 seconds.      Coloration: Skin is not jaundiced.   Neurological:      General: No focal deficit present.      Mental Status: She is alert and oriented to person, place, and time. Mental status is at baseline.   Psychiatric:         Mood and Affect: Mood normal.         Behavior: Behavior normal.             Significant Labs: All pertinent labs within the past 24 hours have been reviewed.  BMP:   Recent Labs   Lab 09/30/24  0637   *      K 4.0      CO2 29   BUN 21   CREATININE 1.0   CALCIUM 8.7   MG 2.0     CBC:   Recent Labs   Lab 09/29/24  0247 09/30/24  0637   WBC 7.92 7.37   HGB 12.6 12.0   HCT 40.2 39.2    203     CMP:   Recent Labs   Lab 09/29/24  0247 09/30/24  0637    141   K 3.9 4.0    103   CO2 27 29   * 119*   BUN 25* 21   CREATININE 1.0 1.0   CALCIUM 8.9 8.7   ANIONGAP 7* 9       Significant Imaging: I have reviewed all pertinent imaging results/findings within the past 24 hours.

## 2024-09-30 NOTE — ASSESSMENT & PLAN NOTE
Patient is identified as having Combined Systolic and Diastolic heart failure that is Acute on chronic. CHF is currently uncontrolled due to Continued edema of extremities and JVD.   Patient has severe aortic stenosis diagnosed more than 3 years ago. She has refused TAVR. The acute decompensation is likely in the setting of severe aortic stenosis.    Latest ECHO performed and demonstrates- Results for orders placed during the hospital encounter of 09/27/24    Echo    Interpretation Summary    Left Ventricle: The left ventricle is normal in size. Moderately increased ventricular mass. Mildly increased wall thickness. Mild septal thickening. There is moderate concentric hypertrophy. Global hypokinesis present. There is severely reduced systolic function with a visually estimated ejection fraction of 20 - 25%. Ejection fraction by visual approximation is 23% with marked worsening compared with over 2 years ago.    Right Ventricle: Normal right ventricular cavity size. Wall thickness is normal. Systolic function is normal.    Left Atrium: Left atrium is severely dilated.    Aortic Valve: There is severe aortic valve sclerosis. Severely restricted motion. There is severe ( critical) stenosis. Aortic valve area by VTI is 0.31 cm2. Aortic valve peak velocity is 4.63 m/s. Mean gradient is 60 mmHg. The dimensionless index is 0.10. There is trace aortic regurgitation.    Mitral Valve: There is mild bileaflet sclerosis. There is mild mitral annular calcification present. There is moderate to moderate-severe regurgitation.    Tricuspid Valve: There is moderate regurgitation.    Pulmonary Artery: There is moderate pulmonary hypertension. The estimated pulmonary artery systolic pressure is 55 mmHg.    IVC/SVC: Intermediate venous pressure at 8 mmHg.    Pericardium: There is a  trivial-small posterior effusion at the base and trivial under the RA.  Recent Labs   Lab 09/27/24  1155   BNP 2,388*         Plan:   - Continue lasix IV  40 mg  - 1-2L output goal   - strict I/O; daily weights   - Patient with newly reduced EF 20-25 %.    - start SGLTi   - daily BMP; keep K > 4 and Mg > 2  - Consider palliative care consult

## 2024-09-30 NOTE — HOSPITAL COURSE
Stacia Perry is a 89 year old female with PMHx of severe aortic stenosis, HFpEF (LVEF 55% 7/21/2022) now with newly diagnosed HFrEF (LVEF 20-25% 9/27/2024), PAF on amiodarone and eliquis 5 mg BID, HTN, HLD and lung adenocarcinoma s/p stereotactic body radiotherapy (2022) presented to [facility] [place] acute decompensated heart failure with worsening SOB, lower extremity edema and bilateral crackles. She was initially given IV Lasix 80mg in the ED but transitioned to 40mg which she has good response in urine output. Her symptoms has improved with diuresis. Cardiology was consulted for management of her diuretic management. In addition, patient was seen by palliative medicine and family decieded to remain partial code. Symptoms continued to improve and patient was transitioned to PO Lasix 80mg daily with instructions to adjust medications with daily weights and fluid retention. She will be discharged home and be referred to heart failure transitional care clinic.

## 2024-09-30 NOTE — ASSESSMENT & PLAN NOTE
Acute Respiratory Failure  Patient is identified as having Diastolic (HFpEF) heart failure that is Acute on chronic. CHF is currently uncontrolled due to Continued edema of extremities, Weight gain of 9 pounds, and JVD, Rales/crackles on pulmonary exam, and Pulmonary edema/pleural effusion on CXR. Latest ECHO performed and demonstrates- Results for orders placed during the hospital encounter of 07/14/22    Echo Saline Bubble? No    Interpretation Summary  · The left ventricle is normal in size with mild eccentric hypertrophy and low normal systolic function.  · The estimated ejection fraction is 55%.  · Grade III left ventricular diastolic dysfunction.  · Normal right ventricular size with mildly reduced right ventricular systolic function.  · Severe left atrial enlargement.  · Mild right atrial enlargement.  · There is severe aortic valve stenosis.  · Aortic valve area is 0.55 cm2; peak velocity is 4.32 m/s; mean gradient is 53 mmHg.  · Mild aortic regurgitation.  · Moderate-to-severe mitral regurgitation.  · Moderate tricuspid regurgitation.  · Normal central venous pressure (3 mmHg).  · The estimated PA systolic pressure is 51 mmHg.  · There is mild-moderate pulmonary hypertension.  . Continue Beta Blocker, ACE/ARB, and Furosemide and monitor clinical status closely. Holding BB and ACE/ARB in setting of hypotension  Monitor on telemetry.   Patient is on CHF pathway.    Monitor strict Is&Os and daily weights.    Place on fluid restriction of 1.5 L.   Cardiology has been consulted given difficulty diuresing with the aortic stenosis, appreciate recs. Given Lasix 40mg IV x 1 yesterday with good urine output and stable BP. Will give additional dose today.   Continue to stress to patient importance of self efficacy and  on diet for CHF.   Last BNP reviewed- and noted below   Recent Labs   Lab 09/27/24  1155   BNP 2,388*

## 2024-09-30 NOTE — ASSESSMENT & PLAN NOTE
- known, chronic  - patient was to have a TAVR in 2021, but patient refused  - repeat echo below  - Palliative care consulted given severe disease, appreciate recs    Results for orders placed during the hospital encounter of 09/27/24    Echo    Interpretation Summary    Left Ventricle: The left ventricle is normal in size. Moderately increased ventricular mass. Mildly increased wall thickness. Mild septal thickening. There is moderate concentric hypertrophy. Global hypokinesis present. There is severely reduced systolic function with a visually estimated ejection fraction of 20 - 25%. Ejection fraction by visual approximation is 23% with marked worsening compared with over 2 years ago.    Right Ventricle: Normal right ventricular cavity size. Wall thickness is normal. Systolic function is normal.    Left Atrium: Left atrium is severely dilated.    Aortic Valve: There is severe aortic valve sclerosis. Severely restricted motion. There is severe ( critical) stenosis. Aortic valve area by VTI is 0.31 cm2. Aortic valve peak velocity is 4.63 m/s. Mean gradient is 60 mmHg. The dimensionless index is 0.10. There is trace aortic regurgitation.    Mitral Valve: There is mild bileaflet sclerosis. There is mild mitral annular calcification present. There is moderate to moderate-severe regurgitation.    Tricuspid Valve: There is moderate regurgitation.    Pulmonary Artery: There is moderate pulmonary hypertension. The estimated pulmonary artery systolic pressure is 55 mmHg.    IVC/SVC: Intermediate venous pressure at 8 mmHg.    Pericardium: There is a  trivial-small posterior effusion at the base and trivial under the RA.

## 2024-09-30 NOTE — HPI
Pt is a 89 y.o. female with PMHx significant for DM II, HTN, HLD, pAfib on eliquis, aortic stenosis, and HFpEF admitted to hospital medicine for acute CHF exacerbation. Patient was seen in cardiology clinic this morning where she was noted to have significant BLE, JVD, and hypoxemia which prompted her visit to the ED. Endorses worsening SOB, leg swelling, and a ~9 lb weight gain over the past month. Reports compliance with lasix (80mg PO in the morning) and daughter reports giving patient an extra 40mg PO in the evenings for the past few days. Denies fever/chills, diaphoresis, lightheadedness, HA, CP, cough, congestion, abdominal pain, n/v/d, urinary symptoms, changes in BMs, numbness/tingling, weakness. Of note, patient was to have a TAVR in 2021 for aortic stenosis but she refused.     In the ED, SpO2 <94% on RA, improved with supplemental oxygent. Remaining vitals stable. BNP 2388. Troponin negative. K 5.4. Cr 1.3 (BL ~1.0). CXR consistent with pulmonary vascular congestion. EKG with sinus lorena and known 1st degree heart block. Given lasix 80mg IV x1.

## 2024-09-30 NOTE — PLAN OF CARE
Alert and oriented, denies pain, no s/s of distress, comfort measures provided, plan of care explained to patient and family, VS WNL, will continue to monitor.    Problem: Adult Inpatient Plan of Care  Goal: Plan of Care Review  Outcome: Progressing  Goal: Absence of Hospital-Acquired Illness or Injury  Outcome: Progressing  Goal: Optimal Comfort and Wellbeing  Outcome: Progressing  Goal: Readiness for Transition of Care  Outcome: Progressing     Problem: Diabetes Comorbidity  Goal: Blood Glucose Level Within Targeted Range  Outcome: Progressing     Problem: Wound  Goal: Skin Health and Integrity  Outcome: Progressing     Problem: Fall Injury Risk  Goal: Absence of Fall and Fall-Related Injury  Outcome: Progressing     Problem: Heart Failure  Goal: Fluid and Electrolyte Balance  Outcome: Progressing

## 2024-09-30 NOTE — PROGRESS NOTES
[facility]  Cardiology  Progress Note    Patient Name: Stacia Perry  MRN: [id number]  Admission Date: 9/27/2024  Hospital Length of Stay: 2 days  Code Status: Partial Code   Attending Physician: Keila Phan MD   Primary Care Physician: Maureen Parnell MD  Expected Discharge Date: 10/3/2024  Principal Problem:Acute on chronic combined systolic and diastolic congestive heart failure    Subjective:     Hospital Course:   Stacia Perry is a 89 year old female with PMHx of severe aortic stenosis, HFpEF (LVEF 55% 7/21/2022) now with newly diagnosed HFrEF (LVEF 20-25% 9/27/2024), PAF on amiodarone and eliquis 5 mg BID, HTN, HLD and lung adenocarcinoma s/p stereotactic body radiotherapy (2022) [place] [facility] [place] acute decompensated heart failure with worsening SOB, lower extremity edema and bilateral crackles. She was initially given IV Lasix 80mg in the ED but transitioned to 40mg which she has good response in urine output. Her symptoms has improved with diuresis. Cardiology was consulted for management of her diuretic management. SGLTi added to her medication therapy.     Interval History: NAEON. AF. 100 systolics. Sating well on 2.5L Currently on IV Lasix 40mg with 1.4L output. Net total 4.4 throughout admission. She reports significant breathing improvement after diuresis. Otherwise she has no other complaints    Accompanied by daughter (nurse) and family     Review of Systems   Constitutional: Negative.   HENT: Negative.     Eyes: Negative.    Cardiovascular:  Positive for dyspnea on exertion, leg swelling and orthopnea. Negative for chest pain, irregular heartbeat, near-syncope, palpitations and paroxysmal nocturnal dyspnea.   Respiratory:  Positive for shortness of breath.    Endocrine: Negative.    Skin: Negative.    Musculoskeletal:  Negative for muscle weakness.   Gastrointestinal: Negative.    Genitourinary: Negative.    Neurological: Negative.    Psychiatric/Behavioral: Negative.        Objective:     Vital Signs (Most Recent):  Temp: 98.2 °F (36.8 °C) (09/30/24 1153)  Pulse: 71 (09/30/24 1153)  Resp: 17 (09/30/24 1153)  BP: (!) 101/58 (09/30/24 1153)  SpO2: 97 % (09/30/24 1153) Vital Signs (24h Range):  Temp:  [98 °F (36.7 °C)-98.4 °F (36.9 °C)] 98.2 °F (36.8 °C)  Pulse:  [62-74] 71  Resp:  [16-20] 17  SpO2:  [89 %-97 %] 97 %  BP: ()/(50-71) 101/58     Weight: 63.6 kg (140 lb 3.4 oz)  Body mass index is 24.84 kg/m².     SpO2: 97 %         Intake/Output Summary (Last 24 hours) at 9/30/2024 1251  Last data filed at 9/30/2024 1223  Gross per 24 hour   Intake 1302 ml   Output 2400 ml   Net -1098 ml       Lines/Drains/Airways       None                      Physical Exam  Constitutional:       General: She is not in acute distress.     Appearance: She is not ill-appearing.   Cardiovascular:      Rate and Rhythm: Normal rate and regular rhythm.      Heart sounds: Murmur (AS and MR) heard.   Pulmonary:      Effort: Pulmonary effort is normal. No respiratory distress.      Breath sounds: Normal breath sounds. No stridor. No wheezing, rhonchi or rales.      Comments: Resting comfortably on 2L NC  Chest:      Chest wall: No tenderness.   Abdominal:      General: There is distension.      Palpations: Abdomen is soft. There is no mass.      Tenderness: There is no abdominal tenderness. There is no guarding or rebound.      Hernia: No hernia is present.   Musculoskeletal:         General: Swelling present.      Right lower leg: Edema present.      Left lower leg: Edema present.   Skin:     General: Skin is warm and dry.   Neurological:      Mental Status: She is alert.            Significant Labs: CMP   Recent Labs   Lab 09/29/24  0247 09/30/24  0637    141   K 3.9 4.0    103   CO2 27 29   * 119*   BUN 25* 21   CREATININE 1.0 1.0   CALCIUM 8.9 8.7   ANIONGAP 7* 9       Significant Imaging:       Echo    Result Date: 9/27/2024    Left Ventricle: The left ventricle is normal in size.  Moderately   increased ventricular mass. Mildly increased wall thickness. Mild septal   thickening. There is moderate concentric hypertrophy. Global hypokinesis   present. There is severely reduced systolic function with a visually   estimated ejection fraction of 20 - 25%. Ejection fraction by visual   approximation is 23% with marked worsening compared with over 2 years ago.    Right Ventricle: Normal right ventricular cavity size. Wall thickness   is normal. Systolic function is normal.    Left Atrium: Left atrium is severely dilated.    Aortic Valve: There is severe aortic valve sclerosis. Severely   restricted motion. There is severe ( critical) stenosis. Aortic valve area   by VTI is 0.31 cm2. Aortic valve peak velocity is 4.63 m/s. Mean gradient   is 60 mmHg. The dimensionless index is 0.10. There is trace aortic   regurgitation.    Mitral Valve: There is mild bileaflet sclerosis. There is mild mitral   annular calcification present. There is moderate to moderate-severe   regurgitation.    Tricuspid Valve: There is moderate regurgitation.    Pulmonary Artery: There is moderate pulmonary hypertension. The   estimated pulmonary artery systolic pressure is 55 mmHg.    IVC/SVC: Intermediate venous pressure at 8 mmHg.    Pericardium: There is a  trivial-small posterior effusion at the base   and trivial under the RA.        Assessment and Plan:       * Acute on chronic combined systolic and diastolic congestive heart failure  Patient is identified as having Combined Systolic and Diastolic heart failure that is Acute on chronic. CHF is currently uncontrolled due to Continued edema of extremities and JVD.   Patient has severe aortic stenosis diagnosed more than 3 years ago. She has refused TAVR. The acute decompensation is likely in the setting of severe aortic stenosis.    Latest ECHO performed and demonstrates- Results for orders placed during the hospital encounter of 09/27/24    Echo    Interpretation Summary     Left Ventricle: The left ventricle is normal in size. Moderately increased ventricular mass. Mildly increased wall thickness. Mild septal thickening. There is moderate concentric hypertrophy. Global hypokinesis present. There is severely reduced systolic function with a visually estimated ejection fraction of 20 - 25%. Ejection fraction by visual approximation is 23% with marked worsening compared with over 2 years ago.    Right Ventricle: Normal right ventricular cavity size. Wall thickness is normal. Systolic function is normal.    Left Atrium: Left atrium is severely dilated.    Aortic Valve: There is severe aortic valve sclerosis. Severely restricted motion. There is severe ( critical) stenosis. Aortic valve area by VTI is 0.31 cm2. Aortic valve peak velocity is 4.63 m/s. Mean gradient is 60 mmHg. The dimensionless index is 0.10. There is trace aortic regurgitation.    Mitral Valve: There is mild bileaflet sclerosis. There is mild mitral annular calcification present. There is moderate to moderate-severe regurgitation.    Tricuspid Valve: There is moderate regurgitation.    Pulmonary Artery: There is moderate pulmonary hypertension. The estimated pulmonary artery systolic pressure is 55 mmHg.    IVC/SVC: Intermediate venous pressure at 8 mmHg.    Pericardium: There is a  trivial-small posterior effusion at the base and trivial under the RA.  Recent Labs   Lab 09/27/24  1155   BNP 2,388*         Plan:   - Continue lasix IV 40 mg  - 1-2L output goal   - strict I/O; daily weights   - Patient with newly reduced EF 20-25 %.    - start SGLTi   - daily BMP; keep K > 4 and Mg > 2  - Consider palliative care consult     Paroxysmal atrial fibrillation  Patient with Paroxysmal (<7 days) atrial fibrillation which is controlled currently with Amiodarone. Patient is currently in sinus rhythm.[id number] Score: 4. . Anticoagulation indicated. Anticoagulation done with Apixaban 5 mg BID .        VTE Risk Mitigation (From  admission, onward)           Ordered     apixaban tablet 5 mg  2 times daily         09/27/24 1421     IP VTE HIGH RISK PATIENT  Once         09/27/24 1331     Place sequential compression device  Until discontinued         09/27/24 1331                    Charly Cantu MD  Cardiology  [facility]

## 2024-09-30 NOTE — CONSULTS
[facility]  Palliative Medicine  Consult Note    Patient Name: Stacia Perry  MRN: [id number]  Admission Date: 9/27/2024  Hospital Length of Stay: 2 days  Code Status: Partial Code   Attending Provider: Keila Phan MD  Consulting Provider: LUCAI Nguyen  Primary Care Physician: Maureen Parnell MD  Principal Problem:Acute on chronic combined systolic and diastolic congestive heart failure    Patient information was obtained from patient, relative(s), and primary team.      Inpatient consult to Palliative Care  Consult performed by: Blanquita Bautista, CNS  Consult ordered by: Shelley Lovelace PA-C        Assessment/Plan:     Palliative Care  Palliative care encounter  Impression: Pt is a 89 y/o female withPMHx significant for DM II, HTN, HLD, pAfib on eliquis, aortic stenosis, and HFpEF admitted to hospital medicine for acute CHF exacerbation. Patient was seen in cardiology clinic on admit day where she was noted to have significant BLE, JVD, and hypoxemia which prompted her visit to the ED. Pt is AAOx3. Pt in on O2 per NC.     Reason for consult: ACP, Communicated with PA    Goals of care/ACP:   Met with pt and pt's daughter, Kesha. Pt/daughter aware of severity of cardiac issues. Per Kesha, she and her sister care for pt. The role of pal care explained to pt and daughter who verbalized understanding. Goal is to continue medical management at this time. Explained pt could f/u in Pal care clinic. Per daughter, she will discuss with her sister.     MPOA: Pt's daughters are NOK  Code: Partial    Symptom management:  Dyspnea   Pt on O2 per NC.   Pt appears to have some SOB while in bed.     Recs:   Continue O2 per NC.   Consider Roxanol 2.5 mg q 4 hrs prn SOB.     Debility r/t to chronic illness.   Recs:   Would have PT/OT evaluate and treat.     Wound to knee area:   Wound care seeing. See note.     Plan:   Pal care would be happy to f/u pt in clinic.   Pt/Family to discuss.              Thank you for your consult. I will follow-up with patient. Please contact us if you have any additional questions.    Subjective:     HPI:   Pt is a 89 y.o. female with PMHx significant for DM II, HTN, HLD, pAfib on eliquis, aortic stenosis, and HFpEF admitted to hospital medicine for acute CHF exacerbation. Patient was seen in cardiology clinic this morning where she was noted to have significant BLE, JVD, and hypoxemia which prompted her visit to the ED. Endorses worsening SOB, leg swelling, and a ~9 lb weight gain over the past month. Reports compliance with lasix (80mg PO in the morning) and daughter reports giving patient an extra 40mg PO in the evenings for the past few days. Denies fever/chills, diaphoresis, lightheadedness, HA, CP, cough, congestion, abdominal pain, n/v/d, urinary symptoms, changes in BMs, numbness/tingling, weakness. Of note, patient was to have a TAVR in 2021 for aortic stenosis but she refused.     In the ED, SpO2 <94% on RA, improved with supplemental oxygent. Remaining vitals stable. BNP 2388. Troponin negative. K 5.4. Cr 1.3 (BL ~1.0). CXR consistent with pulmonary vascular congestion. EKG with sinus lorena and known 1st degree heart block. Given lasix 80mg IV x1.     Hospital Course:  No notes on file    Interval History: Pt has CHF and Severe AS    Past Medical History:   Diagnosis Date    Anticoagulant long-term use     Aortic valve stenosis     Arthritis     Cardiogenic shock 5/7/2021    CHF (congestive heart failure)     Diabetes mellitus     Hypercholesteremia     Hypertension     Pneumonia due to infectious organism 5/6/2021    Primary adenocarcinoma of upper lobe of right lung 7/19/2021    Thyroid disease        Past Surgical History:   Procedure Laterality Date    CATARACT EXTRACTION, BILATERAL      EYE SURGERY      FOOT FRACTURE SURGERY Right     HYSTERECTOMY         Review of patient's allergies indicates:  No Known Allergies    Medications:  Continuous  Infusions:  Scheduled Meds:   amiodarone  200 mg Oral Daily    apixaban  5 mg Oral BID    atorvastatin  40 mg Oral QHS    citalopram  20 mg Oral Daily    insulin glargine U-100  10 Units Subcutaneous QHS    levothyroxine  75 mcg Oral Before breakfast    meclizine  25 mg Oral Daily     PRN Meds:  Current Facility-Administered Medications:     acetaminophen, 650 mg, Oral, Q4H PRN    albuterol-ipratropium, 3 mL, Nebulization, Q4H PRN    bisacodyL, 10 mg, Rectal, Daily PRN    dextrose 10%, 12.5 g, Intravenous, PRN    dextrose 10%, 25 g, Intravenous, PRN    glucagon (human recombinant), 1 mg, Intramuscular, PRN    glucose, 16 g, Oral, PRN    glucose, 24 g, Oral, PRN    insulin aspart U-100, 0-5 Units, Subcutaneous, QID (AC + HS) PRN    ketorolac, 15 mg, Intravenous, Q8H PRN    melatonin, 6 mg, Oral, Nightly PRN    naloxone, 0.02 mg, Intravenous, PRN    ondansetron, 4 mg, Intravenous, Q8H PRN    polyethylene glycol, 17 g, Oral, Daily PRN    prochlorperazine, 5 mg, Intravenous, Q6H PRN    sodium chloride 0.9%, 10 mL, Intravenous, PRN    sodium chloride 0.9%, 10 mL, Intravenous, PRN    Family History       Problem Relation (Age of Onset)    Diabetes Mother, Father, Sister, Brother, Brother, Daughter    Heart attack Brother    Heart disease Father    No Known Problems Maternal Aunt, Maternal Uncle, Paternal Aunt, Paternal Uncle, Maternal Grandmother, Maternal Grandfather, Paternal Grandmother, Paternal Grandfather    Stroke Son          Tobacco Use    Smoking status: Never    Smokeless tobacco: Never   Substance and Sexual Activity    Alcohol use: No    Drug use: No    Sexual activity: Not on file       Review of Systems   Constitutional:  Positive for activity change.   Respiratory:  Positive for shortness of breath.    Skin:  Positive for pallor.   Neurological:  Positive for weakness.     Objective:     Vital Signs (Most Recent):  Temp: 98.2 °F (36.8 °C) (09/30/24 1153)  Pulse: 71 (09/30/24 1153)  Resp: 17 (09/30/24  1153)  BP: (!) 101/58 (09/30/24 1153)  SpO2: 97 % (09/30/24 1153) Vital Signs (24h Range):  Temp:  [98 °F (36.7 °C)-98.4 °F (36.9 °C)] 98.2 °F (36.8 °C)  Pulse:  [62-74] 71  Resp:  [16-20] 17  SpO2:  [89 %-97 %] 97 %  BP: ()/(50-71) 101/58     Weight: 63.6 kg (140 lb 3.4 oz)  Body mass index is 24.84 kg/m².       Physical Exam  Constitutional:       Interventions: Nasal cannula in place.   Musculoskeletal:      Comments: Normal ROM   Skin:     General: Skin is warm and dry.   Neurological:      Mental Status: She is alert and oriented to person, place, and time.            Review of Symptoms      Symptom Assessment (ESAS 0-10 Scale)  Pain:  0  Dyspnea:  0  Anxiety:  0  Nausea:  0  Depression:  0  Anorexia:  0  Fatigue:  0  Insomnia:  0  Restlessness:  0  Agitation:  0         Performance Status:  40    Living Arrangements:  Lives with family    Psychosocial/Cultural:   See Palliative Psychosocial Note: Yes  **Primary  to Follow**  Palliative Care  Consult: Yes        Advance Care Planning  Advance Directives:   Living Will: No    Do Not Resuscitate Status: No    Medical Power of : No    Agent's Name:  Pt's daughter's are NOK    Decision Making:  Patient answered questions and Family answered questions  Goals of Care: Continuing medical management.          Significant Labs: All pertinent labs within the past 24 hours have been reviewed.  CBC:   Recent Labs   Lab 09/30/24  0637   WBC 7.37   HGB 12.0   HCT 39.2   MCV 99*        BMP:  Recent Labs   Lab 09/30/24  0637   *      K 4.0      CO2 29   BUN 21   CREATININE 1.0   CALCIUM 8.7   MG 2.0     LFT:  Lab Results   Component Value Date    AST 31 09/27/2024    ALKPHOS 77 09/27/2024    BILITOT 0.7 09/27/2024     Albumin:   Albumin   Date Value Ref Range Status   09/27/2024 3.3 (L) 3.5 - 5.2 g/dL Final     Protein:   Total Protein   Date Value Ref Range Status   09/27/2024 6.9 6.0 - 8.4 g/dL Final      Comment:     *Result may be interfered by visible hemolysis     Lactic acid:   Lab Results   Component Value Date    LACTATE 2.0 11/07/2022    LACTATE 1.2 05/10/2021       Significant Imaging: I have reviewed all pertinent imaging results/findings within the past 24 hours.      I spent a total of 75 minutes on the day of the visit. This includes face to face time in discussion of goals of care, symptom assessment, coordination of care and emotional support.  This also includes non-face to face time preparing to see the patient (eg, review of tests/imaging), obtaining and/or reviewing separately obtained history, documenting clinical information in the electronic or other health record, independently interpreting results and communicating results to the patient/family/caregiver, or care coordinator.    Blanquita Bautista, CNS  Palliative Medicine  [facility] - [facility]

## 2024-09-30 NOTE — PLAN OF CARE
Advance Care Planning   [facility]  Palliative Care   Psychosocial Assessment    Patient Name: Stacia Perry  MRN: [id number]  Admission Date: 9/27/2024  Hospital Length of Stay: 2 days  Code Status: Partial Code   Attending Provider: Keila Phan MD  Palliative Care Provider:   Primary Care Physician: Maureen Parnell MD  Principal Problem:Acute on chronic combined systolic and diastolic congestive heart failure    Reason for Referral: assistance with clarification of goals of care  Consult Order Date:   Primary CM/SW:    Present during Interview: patient, relative(s), and past medical records.      Primary Language:English   Needed: no      Past Medical Situation:   PMH:   Past Medical History:   Diagnosis Date    Anticoagulant long-term use     Aortic valve stenosis     Arthritis     Cardiogenic shock 5/7/2021    CHF (congestive heart failure)     Diabetes mellitus     Hypercholesteremia     Hypertension     Palliative care encounter 9/30/2024    Pneumonia due to infectious organism 5/6/2021    Primary adenocarcinoma of upper lobe of right lung 7/19/2021    Thyroid disease      Mental Health/Substance Use History:  Risk of Abuse, neglect or exploitation:   Current or Previous Trauma and/or evidence of PTSD:  Non-traditional Health practices:     Understanding of diagnosis and prognosis:  Experience/Comfort level with health care system:    Patients Mental Status:     Socio-Economic Factors/Resources:  Address: [address] [place] [address]  Phone Number: [phone] (home)     Household composition: pt goes back and forth between two dtrs homes.   Children: 2 dtrs, Alecia and Kesha    Patient/Family perceptions about Caregiving Needs; availability and capacity: family is aware of increase cging needs. State that they are able to care for pt at home without issue.    Family Structure, Dynamics/Relationships: Pt has a good relationship with family      Patterns/Styles of Communication and Decision-making in the Family: pt is decisional, with input from dtrs.     Patient/Family Strengths/Resilience: pt has supportive family  Patient/Family Coping: appropriate     Activities of Daily Living: pt needs assistance   Support Systems-Family & Community (Home Health, HME etc): TBD      Transportation:  yes    Work/Education History: retired  Self-Care Activities/Hobbies: not discussed today     History: no    Financial Resources:Medicare      Advanced Care Planning & Legal Concerns:   Advanced Directives/Living Will: no  [place]POLST: no   Planning:  no    Medical Power of : no     Oral/Written Declaration: no  Witnesses:   Surrogate Decision Maker:     Emergency Contacts:    Spirituality, Culture & Coping Mechanisms:  F- [place] and Belief: Restorationism     I - Importance:     C - Community/Culture Values:     A - Address in Care:       Goals/Hopes/Expectations: to go home   Fears/Anxiety/Concerns: continued fluid overload         Preferences about EOL Environment: (own bed, family nearby, pets, music, etc): TBD      Complicated Bereavement Risk Assessment Tool (CBRAT)  Reference:  [facility] Clinical Practice Group (May 2016). Bereavement Risk Screening and Management Guidelines.  Retrieved from: http://www.grpcc.com.au/wp-content/uploads//ICQWF-Meuqfinrejf-Dbjqowwgz-and-Management-Guideline-2016.pdf      Bereaved Client Characteristics   Under 18      no  Was a Twin   no  Young Spouse   no  Elderly Spouse    no  Isolated    no  Lacks Meaningful Social Support   no  Dissatisfied with help available during illness   no  New to Financial [place] no  New to Decision-Making   no    Illness  Inherited Disorder   no  Stigmatized Disease in the family/community   no  Lengthy/Burdensome   yes     Bereaved Client's History of Loss   Cumulative Multiple Losses   no  Previous Mental Health Illnesses    no  Current Mental Health Illness   no  Other Significant Health Issues   no   Migrant/Refugee   no Will the Death be:  Sudden or Unexpected   no  Traumatic Circumstances Associated with Death   no  Significant Cultural/Social Burdens as a result of Death   no   Relationship with   Profound Lifelong Partner   no  Highly Dependent    no  Antagonistic   no  Ambivalent   no  Deeply Connected   yes  Culturally Defined   yes   Risk Factors Scores  0-2  Low  3-5  Moderate  5+  High  All persons scoring moderate to high presume to be at risk**    (** It is acknowledged that protective factors and resilience may outweigh apparent risk factors.      Total Risk Factors Score:   3, moderate     Advance Care Planning     Date: 2024  Patient did not wish or was not able to name a surrogate decision maker or provide an Advance Care Plan.        Discharge Planning Needs/Plan of Care:     JUAN MANUELW, along with OTTONIEL GAMINO, met with pt and pt's dtr Kesha at bedside. Introduced palliative medicine. Explained its roll in inpt/outpt care. Pt/dtr state that they will discuss amongst themselves and pt's other dtr, and decide whether or not that's something they wish to pursue. At this time, they are not interested in clinic follow up. Pt denies pain. States that the o2 via nc is helping with her breathing. No questions or concerns at this time.     Janneth Evans, DIANN-BACS, ACHP-SW  Department of Palliative Medicine                    Protopic Counseling: Patient may experience a mild burning sensation during topical application. Protopic is not approved in children less than 2 years of age. There have been case reports of hematologic and skin malignancies in patients using topical calcineurin inhibitors although causality is questionable.

## 2024-09-30 NOTE — SUBJECTIVE & OBJECTIVE
Interval History: Pt has CHF and Severe AS    Past Medical History:   Diagnosis Date    Anticoagulant long-term use     Aortic valve stenosis     Arthritis     Cardiogenic shock 5/7/2021    CHF (congestive heart failure)     Diabetes mellitus     Hypercholesteremia     Hypertension     Pneumonia due to infectious organism 5/6/2021    Primary adenocarcinoma of upper lobe of right lung 7/19/2021    Thyroid disease        Past Surgical History:   Procedure Laterality Date    CATARACT EXTRACTION, BILATERAL      EYE SURGERY      FOOT FRACTURE SURGERY Right     HYSTERECTOMY         Review of patient's allergies indicates:  No Known Allergies    Medications:  Continuous Infusions:  Scheduled Meds:   amiodarone  200 mg Oral Daily    apixaban  5 mg Oral BID    atorvastatin  40 mg Oral QHS    citalopram  20 mg Oral Daily    insulin glargine U-100  10 Units Subcutaneous QHS    levothyroxine  75 mcg Oral Before breakfast    meclizine  25 mg Oral Daily     PRN Meds:  Current Facility-Administered Medications:     acetaminophen, 650 mg, Oral, Q4H PRN    albuterol-ipratropium, 3 mL, Nebulization, Q4H PRN    bisacodyL, 10 mg, Rectal, Daily PRN    dextrose 10%, 12.5 g, Intravenous, PRN    dextrose 10%, 25 g, Intravenous, PRN    glucagon (human recombinant), 1 mg, Intramuscular, PRN    glucose, 16 g, Oral, PRN    glucose, 24 g, Oral, PRN    insulin aspart U-100, 0-5 Units, Subcutaneous, QID (AC + HS) PRN    ketorolac, 15 mg, Intravenous, Q8H PRN    melatonin, 6 mg, Oral, Nightly PRN    naloxone, 0.02 mg, Intravenous, PRN    ondansetron, 4 mg, Intravenous, Q8H PRN    polyethylene glycol, 17 g, Oral, Daily PRN    prochlorperazine, 5 mg, Intravenous, Q6H PRN    sodium chloride 0.9%, 10 mL, Intravenous, PRN    sodium chloride 0.9%, 10 mL, Intravenous, PRN    Family History       Problem Relation (Age of Onset)    Diabetes Mother, Father, Sister, Brother, Brother, Daughter    Heart attack Brother    Heart disease Father    No Known  Problems Maternal Aunt, Maternal Uncle, Paternal Aunt, Paternal Uncle, Maternal Grandmother, Maternal Grandfather, Paternal Grandmother, Paternal Grandfather    Stroke Son          Tobacco Use    Smoking status: Never    Smokeless tobacco: Never   Substance and Sexual Activity    Alcohol use: No    Drug use: No    Sexual activity: Not on file       Review of Systems   Constitutional:  Positive for activity change.   Respiratory:  Positive for shortness of breath.    Skin:  Positive for pallor.   Neurological:  Positive for weakness.     Objective:     Vital Signs (Most Recent):  Temp: 98.2 °F (36.8 °C) (09/30/24 1153)  Pulse: 71 (09/30/24 1153)  Resp: 17 (09/30/24 1153)  BP: (!) 101/58 (09/30/24 1153)  SpO2: 97 % (09/30/24 1153) Vital Signs (24h Range):  Temp:  [98 °F (36.7 °C)-98.4 °F (36.9 °C)] 98.2 °F (36.8 °C)  Pulse:  [62-74] 71  Resp:  [16-20] 17  SpO2:  [89 %-97 %] 97 %  BP: ()/(50-71) 101/58     Weight: 63.6 kg (140 lb 3.4 oz)  Body mass index is 24.84 kg/m².       Physical Exam  Constitutional:       Interventions: Nasal cannula in place.   Musculoskeletal:      Comments: Normal ROM   Skin:     General: Skin is warm and dry.   Neurological:      Mental Status: She is alert and oriented to person, place, and time.            Review of Symptoms      Symptom Assessment (ESAS 0-10 Scale)  Pain:  0  Dyspnea:  0  Anxiety:  0  Nausea:  0  Depression:  0  Anorexia:  0  Fatigue:  0  Insomnia:  0  Restlessness:  0  Agitation:  0         Performance Status:  40    Living Arrangements:  Lives with family    Psychosocial/Cultural:   See Palliative Psychosocial Note: Yes  **Primary  to Follow**  Palliative Care  Consult: Yes        Advance Care Planning   Advance Directives:   Living Will: No    Do Not Resuscitate Status: No    Medical Power of : No    Agent's Name:  Pt's daughter's are NOK    Decision Making:  Patient answered questions and Family answered questions  Goals of  Care: Continuing medical management.          Significant Labs: All pertinent labs within the past 24 hours have been reviewed.  CBC:   Recent Labs   Lab 09/30/24  0637   WBC 7.37   HGB 12.0   HCT 39.2   MCV 99*        BMP:  Recent Labs   Lab 09/30/24  0637   *      K 4.0      CO2 29   BUN 21   CREATININE 1.0   CALCIUM 8.7   MG 2.0     LFT:  Lab Results   Component Value Date    AST 31 09/27/2024    ALKPHOS 77 09/27/2024    BILITOT 0.7 09/27/2024     Albumin:   Albumin   Date Value Ref Range Status   09/27/2024 3.3 (L) 3.5 - 5.2 g/dL Final     Protein:   Total Protein   Date Value Ref Range Status   09/27/2024 6.9 6.0 - 8.4 g/dL Final     Comment:     *Result may be interfered by visible hemolysis     Lactic acid:   Lab Results   Component Value Date    LACTATE 2.0 11/07/2022    LACTATE 1.2 05/10/2021       Significant Imaging: I have reviewed all pertinent imaging results/findings within the past 24 hours.

## 2024-09-30 NOTE — CHAPLAIN
09/30/24 1100   Clinical Encounter Type   Visit Type Initial Visit   Visit Category Palliative Care   Visited With Patient and family together   Number of Family Visited 3   Length of Visit 10 Minutes   Patient Spiritual Encounters   Care Provided Compassionate presence;Explored pt/family concerns   Patient Coping Accepting;Active jerry;Family/ friends resources   Comments - Patient see note   Family Spiritual Encounters   Care Provided Reflective listening;Compassionate presence;Explored pt/family concerns   Family Coping Accepting;Open/discussion;Active jerry;Family/ friends resources   Comments - Family see note     Chp met with pt and family while making general rounds on CSU today.  Chp introduced self and explained presence and availability for spiritual support while in hospital.  Pt and family voiced thanks for chp visit, but no needs at this time.  They are aware that they may call any time with needs, should they arise.

## 2024-09-30 NOTE — ASSESSMENT & PLAN NOTE
Hyperkalemia is likely due to BIJAL.The patients most recent potassium results are listed below.  Recent Labs     09/28/24  0842 09/29/24  0247 09/30/24  0637   K 4.2 3.9 4.0       Plan  - Monitor for arrhythmias with EKG and/or continuous telemetry.   - Treat the hyperkalemia with Furosemide.   - Monitor potassium: Daily  - The patient's hyperkalemia is stable  - RESOLVED

## 2024-09-30 NOTE — ASSESSMENT & PLAN NOTE
Impression: Pt is a 89 y/o female withPMHx significant for DM II, HTN, HLD, pAfib on eliquis, aortic stenosis, and HFpEF admitted to hospital medicine for acute CHF exacerbation. Patient was seen in cardiology clinic on admit day where she was noted to have significant BLE, JVD, and hypoxemia which prompted her visit to the ED. Pt is AAOx3. Pt in on O2 per NC.     Reason for consult: ACP, Communicated with PA    Goals of care/ACP:   Met with pt and pt's daughter, Kesha. Pt/daughter aware of severity of cardiac issues. Per Kesha, she and her sister care for pt. The role of pal care explained to pt and daughter who verbalized understanding. Goal is to continue medical management at this time. Explained pt could f/u in Pal care clinic. Per daughter, she will discuss with her sister.     MPOA: Pt's daughters are NOK  Code: Partial    Symptom management:  Dyspnea   Pt on O2 per NC.   Pt appears to have some SOB while in bed.     Recs:   Continue O2 per NC.   Consider Roxanol 2.5 mg q 4 hrs prn SOB.     Debility r/t to chronic illness.   Recs:   Would have PT/OT evaluate and treat.     Wound to knee area:   Wound care seeing. See note.     Plan:   Pal care would be happy to f/u pt in clinic.   Pt/Family to discuss.

## 2024-09-30 NOTE — CONSULTS
[facility] - Cardiology Stepdown  Wound Care    Patient Name:  Stacia Perry   MRN:  [id number]  Date: 9/30/2024  Diagnosis: Acute on chronic combined systolic and diastolic congestive heart failure    History:     Past Medical History:   Diagnosis Date    Anticoagulant long-term use     Aortic valve stenosis     Arthritis     Cardiogenic shock 5/7/2021    CHF (congestive heart failure)     Diabetes mellitus     Hypercholesteremia     Hypertension     Pneumonia due to infectious organism 5/6/2021    Primary adenocarcinoma of upper lobe of right lung 7/19/2021    Thyroid disease        Social History     Socioeconomic History    Marital status:    Tobacco Use    Smoking status: Never    Smokeless tobacco: Never   Substance and Sexual Activity    Alcohol use: No    Drug use: No     Social Drivers of Health     Financial Resource Strain: Low Risk  (11/8/2022)    Overall Financial Resource Strain (CARDIA)     Difficulty of Paying Living Expenses: Not hard at all   Food Insecurity: No Food Insecurity (11/8/2022)    Hunger Vital Sign     Worried About Running Out of Food in the Last Year: Never true     Ran Out of Food in the Last Year: Never true   Transportation Needs: No Transportation Needs (11/8/2022)    PRAPARE - Transportation     Lack of Transportation (Medical): No     Lack of Transportation (Non-Medical): No   Physical Activity: Inactive (11/8/2022)    Exercise Vital Sign     Days of Exercise per Week: 0 days     Minutes of Exercise per Session: 0 min   Housing Stability: Low Risk  (11/8/2022)    Housing Stability Vital Sign     Unable to Pay for Housing in the Last Year: No     Number of Places Lived in the Last Year: 1     Unstable Housing in the Last Year: No       Precautions:     Allergies as of 09/27/2024    (No Known Allergies)       WO Assessment Details/Treatment     Pt seen for wound care consult for BLE.  Chart reviewed for this encounter.  See flowsheets for findings.    Pt found sitting up in  chair w/ family at bedside, agreeable to care at this time. RLE intact w/ scattered bruising up anterior calf, x1 small area of partial thickness skin loss to L anterior calf. Per family at bedside, pt injured her legs while attempting to get into the bath tub at home. Cleansed w/ NS, foam dressing applied. WC will sign off.    RECOMMENDATIONS:  Q5 days - skin tears - cleanse gently w/ NS, pat dry and apply Mepilex foam dressing.     09/30/24 1020   WOCN Assessment   WOCN Total Time (mins) 15   Visit Date 09/30/24   Visit Time 1020   Consult Type New   WOCN Speciality Wound   Intervention assessed;changed;applied;chart review;coordination of care;orders   Teaching on-going        Wound 09/27/24 1226 Contusion Left Leg #2   Date First Assessed/Time First Assessed: 09/27/24 1226   Primary Wound Type: Contusion  Side: Left  Location: Leg  Wound Number: #2   Wound Image    Dressing Appearance Moist drainage   Drainage Amount Scant   Drainage Characteristics/Odor Serosanguineous   Appearance Red;Moist   Tissue loss description Partial thickness   Periwound Area Intact;Ecchymotic   Wound Edges Defined;Open   Care Cleansed with:;Sterile normal saline   Dressing Applied;Foam   Dressing Change Due 10/04/24

## 2024-09-30 NOTE — SUBJECTIVE & OBJECTIVE
Interval History: NAEON. AF. 100 systolics. Sating well on 2.5L Currently on IV Lasix 40mg with 1.4L output. Net total 4.4 throughout admission. She reports significant breathing improvement after diuresis. Otherwise she has no other complaints    Accompanied by daughter (nurse) and family     Review of Systems   Constitutional: Negative.   HENT: Negative.     Eyes: Negative.    Cardiovascular:  Positive for dyspnea on exertion, leg swelling and orthopnea. Negative for chest pain, irregular heartbeat, near-syncope, palpitations and paroxysmal nocturnal dyspnea.   Respiratory:  Positive for shortness of breath.    Endocrine: Negative.    Skin: Negative.    Musculoskeletal:  Negative for muscle weakness.   Gastrointestinal: Negative.    Genitourinary: Negative.    Neurological: Negative.    Psychiatric/Behavioral: Negative.       Objective:     Vital Signs (Most Recent):  Temp: 98.2 °F (36.8 °C) (09/30/24 1153)  Pulse: 71 (09/30/24 1153)  Resp: 17 (09/30/24 1153)  BP: (!) 101/58 (09/30/24 1153)  SpO2: 97 % (09/30/24 1153) Vital Signs (24h Range):  Temp:  [98 °F (36.7 °C)-98.4 °F (36.9 °C)] 98.2 °F (36.8 °C)  Pulse:  [62-74] 71  Resp:  [16-20] 17  SpO2:  [89 %-97 %] 97 %  BP: ()/(50-71) 101/58     Weight: 63.6 kg (140 lb 3.4 oz)  Body mass index is 24.84 kg/m².     SpO2: 97 %         Intake/Output Summary (Last 24 hours) at 9/30/2024 1251  Last data filed at 9/30/2024 1223  Gross per 24 hour   Intake 1302 ml   Output 2400 ml   Net -1098 ml       Lines/Drains/Airways       None                      Physical Exam  Constitutional:       General: She is not in acute distress.     Appearance: She is not ill-appearing.   Cardiovascular:      Rate and Rhythm: Normal rate and regular rhythm.      Heart sounds: Murmur (AS and MR) heard.   Pulmonary:      Effort: Pulmonary effort is normal. No respiratory distress.      Breath sounds: Normal breath sounds. No stridor. No wheezing, rhonchi or rales.      Comments: Resting  comfortably on 2L NC  Chest:      Chest wall: No tenderness.   Abdominal:      General: There is distension.      Palpations: Abdomen is soft. There is no mass.      Tenderness: There is no abdominal tenderness. There is no guarding or rebound.      Hernia: No hernia is present.   Musculoskeletal:         General: Swelling present.      Right lower leg: Edema present.      Left lower leg: Edema present.   Skin:     General: Skin is warm and dry.   Neurological:      Mental Status: She is alert.            Significant Labs: CMP   Recent Labs   Lab 09/29/24  0247 09/30/24  0637    141   K 3.9 4.0    103   CO2 27 29   * 119*   BUN 25* 21   CREATININE 1.0 1.0   CALCIUM 8.9 8.7   ANIONGAP 7* 9       Significant Imaging:       Echo    Result Date: 9/27/2024    Left Ventricle: The left ventricle is normal in size. Moderately   increased ventricular mass. Mildly increased wall thickness. Mild septal   thickening. There is moderate concentric hypertrophy. Global hypokinesis   present. There is severely reduced systolic function with a visually   estimated ejection fraction of 20 - 25%. Ejection fraction by visual   approximation is 23% with marked worsening compared with over 2 years ago.    Right Ventricle: Normal right ventricular cavity size. Wall thickness   is normal. Systolic function is normal.    Left Atrium: Left atrium is severely dilated.    Aortic Valve: There is severe aortic valve sclerosis. Severely   restricted motion. There is severe ( critical) stenosis. Aortic valve area   by VTI is 0.31 cm2. Aortic valve peak velocity is 4.63 m/s. Mean gradient   is 60 mmHg. The dimensionless index is 0.10. There is trace aortic   regurgitation.    Mitral Valve: There is mild bileaflet sclerosis. There is mild mitral   annular calcification present. There is moderate to moderate-severe   regurgitation.    Tricuspid Valve: There is moderate regurgitation.    Pulmonary Artery: There is moderate pulmonary  hypertension. The   estimated pulmonary artery systolic pressure is 55 mmHg.    IVC/SVC: Intermediate venous pressure at 8 mmHg.    Pericardium: There is a  trivial-small posterior effusion at the base   and trivial under the RA.

## 2024-09-30 NOTE — PROGRESS NOTES
"Heart Failure [facility]) nurse navigator notified of HFT[facility] candidate in need of education and introduction to 4-6 week program.      PT aao x 3 while sitting in a chair with her 2 daughters at bedside. Introduced self to pt as HFT[facility] nurse navigator.     Patient given "Heart Failure Transitional Care Clinic Pamphlet".  "Home Care Guide" which includes "Daily weight and symptom tracker" will be give at the first [facility] appt.  Encouraged pt and caregiver to review information.      Reviewed the following key points of HFT[facility] program with pt and her 2 Daughters( Kesha):              1.) Take your medications as directed. Call Ms Vera if any health Care Professional changes your Heart/Fluid medications.               2.) Weight yourself daily. Daily Dry Weights. Upon waking ,empty your bladder, weigh with as little clothes as possible before eating/drinking. Record weight in Symptom Tracker and compare these weights for fluid gain. Weight gain overnight of 3-4 lbs is Fluid, also a gain of 5 lbs in 3 days is Fluid as well. Call US!              3.) Follow low salt and limited fluid diet. Salt/Sodium Restriction 8286-1060 mg, see page 4. Sodium makes you hold onto Fluid. High Sodium Foods;Deli Meat/Cheese, Sausages/Hot Dogs, Fast Food/Restaurant Food, Anything in a box, bottle or can. Cook with Fresh or Frozen ingredients and use seasonings that are labeled NO SALT. Check Portion Sizes, Salt is reported for 1 portion. A can may contain 2-3 portions. Fluid Restriction 1.5 -2 Liters/Day, see page 6, measure all of your Fluid, the milk in your cereal, broth in your soup and ice cream because anything that melts at room temp is a liquid.              4.) Stop smoking and start exercising. Brisk walking is good, don't walk like you are going to the [employer] and DON"T WALK IN THE HEAT! Start low and increase as tolerated. Remember if you don't use it you lose it.              5.) Go to your appointments and " call your team. Have your weight and BP/P ready when we call to do the Phone Check ins and call us if you have fluid gain or questions       Pt reminded to follow Symptom tracker and to call at the onset of symptoms according to tracker.     Reviewed plan for follow up once discharged to include phone calls, in person and virtual visits to assist pt optimizing their heart failure medication regimen and encouraging healthy lifestyle modifications.  Reminded pt that program will assist them over the next 4-6 weeks and then patient will be transferred to long term care provider .  Reminded pt how to contact HFT[facility] navigator via phone and or via [employer].     Pt instructed appointment will be printed on hospital discharge paperwork.     Pt also reminded RN will call 48-72 hours after discharge to check on them.     PT can verbalize read back of some of the  information given.  Encouraged pt and family to read over information often and contact team with any questions or concerns.     Kesha can't give me a Day or Time for the H[facility] appt so I will get that when I make the 48-72 Hour call.

## 2024-10-01 LAB
ANION GAP SERPL CALC-SCNC: 7 MMOL/L (ref 8–16)
BUN SERPL-MCNC: 21 MG/DL (ref 8–23)
CALCIUM SERPL-MCNC: 9.5 MG/DL (ref 8.7–10.5)
CHLORIDE SERPL-SCNC: 104 MMOL/L (ref 95–110)
CO2 SERPL-SCNC: 32 MMOL/L (ref 23–29)
CREAT SERPL-MCNC: 0.9 MG/DL (ref 0.5–1.4)
EST. GFR  (NO RACE VARIABLE): >60 ML/MIN/1.73 M^2
GLUCOSE SERPL-MCNC: 146 MG/DL (ref 70–110)
MAGNESIUM SERPL-MCNC: 2.1 MG/DL (ref 1.6–2.6)
POCT GLUCOSE: 134 MG/DL (ref 70–110)
POCT GLUCOSE: 149 MG/DL (ref 70–110)
POCT GLUCOSE: 187 MG/DL (ref 70–110)
POCT GLUCOSE: 197 MG/DL (ref 70–110)
POTASSIUM SERPL-SCNC: 4 MMOL/L (ref 3.5–5.1)
SODIUM SERPL-SCNC: 143 MMOL/L (ref 136–145)

## 2024-10-01 PROCEDURE — 36415 COLL VENOUS BLD VENIPUNCTURE: CPT | Performed by: PHYSICIAN ASSISTANT

## 2024-10-01 PROCEDURE — 97161 PT EVAL LOW COMPLEX 20 MIN: CPT

## 2024-10-01 PROCEDURE — 97165 OT EVAL LOW COMPLEX 30 MIN: CPT

## 2024-10-01 PROCEDURE — 99232 SBSQ HOSP IP/OBS MODERATE 35: CPT | Mod: ,,, | Performed by: INTERNAL MEDICINE

## 2024-10-01 PROCEDURE — 25000003 PHARM REV CODE 250: Performed by: PHYSICIAN ASSISTANT

## 2024-10-01 PROCEDURE — 83735 ASSAY OF MAGNESIUM: CPT | Performed by: HOSPITALIST

## 2024-10-01 PROCEDURE — 97116 GAIT TRAINING THERAPY: CPT

## 2024-10-01 PROCEDURE — 20600001 HC STEP DOWN PRIVATE ROOM

## 2024-10-01 PROCEDURE — 80048 BASIC METABOLIC PNL TOTAL CA: CPT | Performed by: PHYSICIAN ASSISTANT

## 2024-10-01 PROCEDURE — 97530 THERAPEUTIC ACTIVITIES: CPT

## 2024-10-01 RX ORDER — FUROSEMIDE 80 MG/1
80 TABLET ORAL DAILY
Status: DISCONTINUED | OUTPATIENT
Start: 2024-10-01 | End: 2024-10-02 | Stop reason: HOSPADM

## 2024-10-01 RX ADMIN — INSULIN GLARGINE 10 UNITS: 100 INJECTION, SOLUTION SUBCUTANEOUS at 10:10

## 2024-10-01 RX ADMIN — CITALOPRAM HYDROBROMIDE 20 MG: 20 TABLET ORAL at 08:10

## 2024-10-01 RX ADMIN — AMIODARONE HYDROCHLORIDE 200 MG: 200 TABLET ORAL at 08:10

## 2024-10-01 RX ADMIN — APIXABAN 5 MG: 5 TABLET, FILM COATED ORAL at 08:10

## 2024-10-01 RX ADMIN — ATORVASTATIN CALCIUM 40 MG: 40 TABLET, FILM COATED ORAL at 10:10

## 2024-10-01 RX ADMIN — MECLIZINE 25 MG: 12.5 TABLET ORAL at 08:10

## 2024-10-01 RX ADMIN — APIXABAN 5 MG: 5 TABLET, FILM COATED ORAL at 10:10

## 2024-10-01 RX ADMIN — FUROSEMIDE 80 MG: 80 TABLET ORAL at 08:10

## 2024-10-01 RX ADMIN — LEVOTHYROXINE SODIUM 75 MCG: 75 TABLET ORAL at 06:10

## 2024-10-01 NOTE — SUBJECTIVE & OBJECTIVE
Interval History: NAEON. HDS. AF. 2L. Patient's daughter reports good urine output on IV Lasix 40mg. I/O was unable to be accurately charted because patient urinated on self. Otherwise patient reports SOB and bilateral leg edema has significantly improved    Pt's daughter (nurse) is bedside and provides history.     Review of Systems   Constitutional: Negative.   HENT: Negative.     Eyes: Negative.    Cardiovascular:  Positive for leg swelling. Negative for chest pain, dyspnea on exertion, irregular heartbeat, near-syncope, orthopnea, palpitations and paroxysmal nocturnal dyspnea.   Respiratory:  Negative for shortness of breath.    Endocrine: Negative.    Skin: Negative.    Musculoskeletal:  Negative for muscle weakness.   Gastrointestinal: Negative.    Genitourinary: Negative.    Neurological: Negative.    Psychiatric/Behavioral: Negative.       Objective:     Vital Signs (Most Recent):  Temp: 98.1 °F (36.7 °C) (10/01/24 1126)  Pulse: 74 (10/01/24 1126)  Resp: 18 (10/01/24 1126)  BP: (!) 101/58 (10/01/24 1126)  SpO2: 95 % (10/01/24 1126) Vital Signs (24h Range):  Temp:  [97.7 °F (36.5 °C)-98.1 °F (36.7 °C)] 98.1 °F (36.7 °C)  Pulse:  [58-89] 74  Resp:  [17-18] 18  SpO2:  [93 %-96 %] 95 %  BP: ()/(52-67) 101/58     Weight: 63.6 kg (140 lb 3.4 oz)  Body mass index is 24.84 kg/m².     SpO2: 95 %         Intake/Output Summary (Last 24 hours) at 10/1/2024 1234  Last data filed at 10/1/2024 1146  Gross per 24 hour   Intake 222 ml   Output 900 ml   Net -678 ml       Lines/Drains/Airways       Peripheral Intravenous Line  Duration                  Peripheral IV - Single Lumen Anterior;Left Forearm -- days                       Physical Exam  Constitutional:       General: She is not in acute distress.     Appearance: She is not ill-appearing.   Cardiovascular:      Rate and Rhythm: Normal rate and regular rhythm.      Heart sounds: Murmur (AS and MR) heard.   Pulmonary:      Effort: Pulmonary effort is normal. No  respiratory distress.      Breath sounds: Normal breath sounds. No stridor. No wheezing, rhonchi or rales.      Comments: Resting comfortably on 2L NC  Chest:      Chest wall: No tenderness.   Abdominal:      General: There is distension.      Palpations: Abdomen is soft. There is no mass.      Tenderness: There is no abdominal tenderness. There is no guarding or rebound.      Hernia: No hernia is present.   Musculoskeletal:         General: Swelling present.      Right lower leg: Edema present.      Left lower leg: Edema present.   Skin:     General: Skin is warm and dry.   Neurological:      Mental Status: She is alert.            Significant Labs: CMP   Recent Labs   Lab 09/30/24  0637 10/01/24  0405    143   K 4.0 4.0    104   CO2 29 32*   * 146*   BUN 21 21   CREATININE 1.0 0.9   CALCIUM 8.7 9.5   ANIONGAP 9 7*       Significant Imaging:  reviewed

## 2024-10-01 NOTE — ASSESSMENT & PLAN NOTE
Patient is identified as having Combined Systolic and Diastolic heart failure that is Acute on chronic. CHF is currently uncontrolled due to Continued edema of extremities and JVD.   Patient has severe aortic stenosis diagnosed more than 3 years ago. She has refused TAVR. The acute decompensation is likely in the setting of severe aortic stenosis.    Latest ECHO performed and demonstrates- Results for orders placed during the hospital encounter of 09/27/24    Echo    Interpretation Summary    Left Ventricle: The left ventricle is normal in size. Moderately increased ventricular mass. Mildly increased wall thickness. Mild septal thickening. There is moderate concentric hypertrophy. Global hypokinesis present. There is severely reduced systolic function with a visually estimated ejection fraction of 20 - 25%. Ejection fraction by visual approximation is 23% with marked worsening compared with over 2 years ago.    Right Ventricle: Normal right ventricular cavity size. Wall thickness is normal. Systolic function is normal.    Left Atrium: Left atrium is severely dilated.    Aortic Valve: There is severe aortic valve sclerosis. Severely restricted motion. There is severe ( critical) stenosis. Aortic valve area by VTI is 0.31 cm2. Aortic valve peak velocity is 4.63 m/s. Mean gradient is 60 mmHg. The dimensionless index is 0.10. There is trace aortic regurgitation.    Mitral Valve: There is mild bileaflet sclerosis. There is mild mitral annular calcification present. There is moderate to moderate-severe regurgitation.    Tricuspid Valve: There is moderate regurgitation.    Pulmonary Artery: There is moderate pulmonary hypertension. The estimated pulmonary artery systolic pressure is 55 mmHg.    IVC/SVC: Intermediate venous pressure at 8 mmHg.    Pericardium: There is a  trivial-small posterior effusion at the base and trivial under the RA.  Recent Labs   Lab 09/27/24  1155   BNP 2,388*         Plan:   - Continue lasix IV  40 mg  - 1-2L output goal   - transition to PO Lasix 80mg daily   - strict I/O; daily weights   - Patient with newly reduced EF 20-25 %.    - consider starting SGLTi   - daily BMP; keep K > 4 and Mg > 2  - palliative care consulted   - recommend heart failure transitional care clinic referral upon discharge

## 2024-10-01 NOTE — SUBJECTIVE & OBJECTIVE
Interval History: VSS on 2L NC. Will attempt to wean patient today. Given 1 dose of home PO Lasix 80mg. Plan to monitor urine output and symptoms overnight. Plan for discharge home tomorrow pending hospital course.     Review of Systems   Constitutional:  Negative for chills and fever.   Respiratory:  Positive for shortness of breath (improved). Negative for chest tightness.    Cardiovascular:  Negative for chest pain and leg swelling.   Gastrointestinal:  Negative for abdominal pain and nausea.   Neurological:  Negative for dizziness and weakness.     Objective:     Vital Signs (Most Recent):  Temp: 98.1 °F (36.7 °C) (10/01/24 1126)  Pulse: 74 (10/01/24 1126)  Resp: 18 (10/01/24 1126)  BP: (!) 101/58 (10/01/24 1126)  SpO2: 95 % (10/01/24 1126) Vital Signs (24h Range):  Temp:  [97.7 °F (36.5 °C)-98.1 °F (36.7 °C)] 98.1 °F (36.7 °C)  Pulse:  [58-89] 74  Resp:  [17-18] 18  SpO2:  [93 %-96 %] 95 %  BP: ()/(52-67) 101/58     Weight: 63.6 kg (140 lb 3.4 oz)  Body mass index is 24.84 kg/m².    Intake/Output Summary (Last 24 hours) at 10/1/2024 1413  Last data filed at 10/1/2024 1346  Gross per 24 hour   Intake 222 ml   Output 1700 ml   Net -1478 ml         Physical Exam  Vitals and nursing note reviewed.   Constitutional:       General: She is not in acute distress.     Appearance: Normal appearance. She is not ill-appearing.   HENT:      Head: Normocephalic and atraumatic.      Right Ear: External ear normal.      Left Ear: External ear normal.   Eyes:      Extraocular Movements: Extraocular movements intact.      Conjunctiva/sclera: Conjunctivae normal.      Pupils: Pupils are equal, round, and reactive to light.   Neck:      Vascular: No JVD.   Cardiovascular:      Rate and Rhythm: Normal rate and regular rhythm.      Pulses: Normal pulses.      Heart sounds: Normal heart sounds. No murmur heard.  Pulmonary:      Effort: No respiratory distress.      Breath sounds: Rales (faint) present.      Comments: On 2L  NC  Abdominal:      General: Abdomen is flat. Bowel sounds are normal. There is distension (improved).      Palpations: Abdomen is soft.      Tenderness: There is no abdominal tenderness.   Musculoskeletal:         General: Normal range of motion.      Cervical back: Normal range of motion and neck supple. No tenderness.      Right lower leg: No edema.      Left lower leg: No edema.   Skin:     General: Skin is warm and dry.      Capillary Refill: Capillary refill takes less than 2 seconds.      Coloration: Skin is not jaundiced.   Neurological:      General: No focal deficit present.      Mental Status: She is alert and oriented to person, place, and time. Mental status is at baseline.   Psychiatric:         Mood and Affect: Mood normal.         Behavior: Behavior normal.             Significant Labs: All pertinent labs within the past 24 hours have been reviewed.  BMP:   Recent Labs   Lab 10/01/24  0405   *      K 4.0      CO2 32*   BUN 21   CREATININE 0.9   CALCIUM 9.5   MG 2.1     CBC:   Recent Labs   Lab 09/30/24  0637   WBC 7.37   HGB 12.0   HCT 39.2        CMP:   Recent Labs   Lab 09/30/24  0637 10/01/24  0405    143   K 4.0 4.0    104   CO2 29 32*   * 146*   BUN 21 21   CREATININE 1.0 0.9   CALCIUM 8.7 9.5   ANIONGAP 9 7*       Significant Imaging: I have reviewed all pertinent imaging results/findings within the past 24 hours.

## 2024-10-01 NOTE — ASSESSMENT & PLAN NOTE
Acute Respiratory Failure  Patient is identified as having Diastolic (HFpEF) heart failure that is Acute on chronic. CHF is currently uncontrolled due to Continued edema of extremities, Weight gain of 9 pounds, and JVD, Rales/crackles on pulmonary exam, and Pulmonary edema/pleural effusion on CXR. Latest ECHO performed and demonstrates- Results for orders placed during the hospital encounter of 07/14/22    Echo Saline Bubble? No    Interpretation Summary  · The left ventricle is normal in size with mild eccentric hypertrophy and low normal systolic function.  · The estimated ejection fraction is 55%.  · Grade III left ventricular diastolic dysfunction.  · Normal right ventricular size with mildly reduced right ventricular systolic function.  · Severe left atrial enlargement.  · Mild right atrial enlargement.  · There is severe aortic valve stenosis.  · Aortic valve area is 0.55 cm2; peak velocity is 4.32 m/s; mean gradient is 53 mmHg.  · Mild aortic regurgitation.  · Moderate-to-severe mitral regurgitation.  · Moderate tricuspid regurgitation.  · Normal central venous pressure (3 mmHg).  · The estimated PA systolic pressure is 51 mmHg.  · There is mild-moderate pulmonary hypertension.  . Continue Beta Blocker, ACE/ARB, and Furosemide and monitor clinical status closely. Holding BB and ACE/ARB in setting of hypotension  Monitor on telemetry.   Patient is on CHF pathway.    Monitor strict Is&Os and daily weights.    Place on fluid restriction of 1.5 L.   Cardiology has been consulted given difficulty diuresing with the aortic stenosis, appreciate recs. Given Lasix 40mg IV x 1 yesterday with good urine output and stable BP. Will give home PO lasix 80mg x 1 today  Continue to stress to patient importance of self efficacy and  on diet for CHF.   Last BNP reviewed- and noted below   Recent Labs   Lab 09/27/24  1155   BNP 2,388*

## 2024-10-01 NOTE — ASSESSMENT & PLAN NOTE
- known, chronic  - patient was to have a TAVR in 2021, but patient refused  - repeat echo below  - Palliative care consulted given severe disease, patient and family declining outpatient follow up at this time    Results for orders placed during the hospital encounter of 09/27/24    Echo    Interpretation Summary    Left Ventricle: The left ventricle is normal in size. Moderately increased ventricular mass. Mildly increased wall thickness. Mild septal thickening. There is moderate concentric hypertrophy. Global hypokinesis present. There is severely reduced systolic function with a visually estimated ejection fraction of 20 - 25%. Ejection fraction by visual approximation is 23% with marked worsening compared with over 2 years ago.    Right Ventricle: Normal right ventricular cavity size. Wall thickness is normal. Systolic function is normal.    Left Atrium: Left atrium is severely dilated.    Aortic Valve: There is severe aortic valve sclerosis. Severely restricted motion. There is severe ( critical) stenosis. Aortic valve area by VTI is 0.31 cm2. Aortic valve peak velocity is 4.63 m/s. Mean gradient is 60 mmHg. The dimensionless index is 0.10. There is trace aortic regurgitation.    Mitral Valve: There is mild bileaflet sclerosis. There is mild mitral annular calcification present. There is moderate to moderate-severe regurgitation.    Tricuspid Valve: There is moderate regurgitation.    Pulmonary Artery: There is moderate pulmonary hypertension. The estimated pulmonary artery systolic pressure is 55 mmHg.    IVC/SVC: Intermediate venous pressure at 8 mmHg.    Pericardium: There is a  trivial-small posterior effusion at the base and trivial under the RA.

## 2024-10-01 NOTE — ASSESSMENT & PLAN NOTE
Hyperkalemia is likely due to BIJAL.The patients most recent potassium results are listed below.  Recent Labs     09/29/24  0247 09/30/24  0637 10/01/24  0405   K 3.9 4.0 4.0       Plan  - Monitor for arrhythmias with EKG and/or continuous telemetry.   - Treat the hyperkalemia with Furosemide.   - Monitor potassium: Daily  - The patient's hyperkalemia is resolved

## 2024-10-01 NOTE — PLAN OF CARE
Goals to be met by: 10/14/24 (14 days)     Patient will increase functional independence with ADLs by performing:    UE Dressing with Set-up Assistance.  LE Dressing with Set-up Assistance.  Grooming while standing with Stand-by Assistance.  Toileting from toilet with Stand-by Assistance for hygiene and clothing management.   Toilet transfer to toilet with Contact Guard Assistance.   No

## 2024-10-01 NOTE — PLAN OF CARE
Problem: Physical Therapy  Goal: Physical Therapy Goal  Description: Goals to be met by: 10/15/2024    Patient will increase functional independence with mobility by performin. Supine to sit with Supervision  2. Sit to stand transfer with Supervision using LRAD  3. Gait  x 50 feet with Supervision using LRAD.     Outcome: Progressing     Eval completed. Goals appropriate.

## 2024-10-01 NOTE — NURSING
Home Oxygen Evaluation    Date Performed:     1) Patient's Home O2 Sat on room air, while at rest: 85        If O2 sats on room air at rest are 88% or below, patient qualifies. No additional testing needed. Document N/A in steps 2 and 3. If 89% or above, complete steps 2.      2) Patient's O2 Sat on room air while exercising: N/a         If O2 sats on room air while exercising remain 89% or above patient does not qualify, no further testing needed Document N/A in step 3. If O2 sats on room air while exercising are 88% or below, continue to step 3.      3) Patient's O2 Sat while exercising on O2: N/A at N/A LPM        Pt sats went up to 91% on 2L nasal canula.     If O2 sats improve on oxygen, patient qualifies for portable oxygen. If not, the patient does not qualify.

## 2024-10-01 NOTE — PROGRESS NOTES
[facility] Step[facility]  Cardiology  Progress Note    Patient Name: Stacia Perry  MRN: [id number]  Admission Date: 9/27/2024  Hospital Length of Stay: 3 days  Code Status: Partial Code   Attending Physician: Keila Phan MD   Primary Care Physician: Maureen Parnell MD  Expected Discharge Date: 10/3/2024  Principal Problem:Acute on chronic combined systolic and diastolic congestive heart failure    Subjective:     Hospital Course:   Stacia Perry is a 89 year old female with PMHx of severe aortic stenosis, HFpEF (LVEF 55% 7/21/2022) now with newly diagnosed HFrEF (LVEF 20-25% 9/27/2024), PAF on amiodarone and eliquis 5 mg BID, HTN, HLD and lung adenocarcinoma s/p stereotactic body radiotherapy (2022) presented to [facility] for acute decompensated heart failure with worsening SOB, lower extremity edema and bilateral crackles. She was initially given IV Lasix 80mg in the ED but transitioned to 40mg which she has good response in urine output. Her symptoms has improved with diuresis. Cardiology was consulted for management of her diuretic management. In addition, patient was seen by palliative medicine and family decieded to remain partial code. Symptoms continued to improve and patient was transitioned to PO Lasix 80mg daily with instructions to adjust medications with daily weights and fluid retention. She will be discharged home and be referred to heart failure transitional care clinic.     Interval History: NAEON. HDS. AF. 2L. Patient's daughter reports good urine output on IV Lasix 40mg. I/O was unable to be accurately charted because patient urinated on self. Otherwise patient reports SOB and bilateral leg edema has significantly improved    Pt's daughter (nurse) is bedside and provides history.     Review of Systems   Constitutional: Negative.   HENT: Negative.     Eyes: Negative.    Cardiovascular:  Positive for leg swelling. Negative for chest pain, dyspnea on exertion, irregular heartbeat, near-syncope,  orthopnea, palpitations and paroxysmal nocturnal dyspnea.   Respiratory:  Negative for shortness of breath.    Endocrine: Negative.    Skin: Negative.    Musculoskeletal:  Negative for muscle weakness.   Gastrointestinal: Negative.    Genitourinary: Negative.    Neurological: Negative.    Psychiatric/Behavioral: Negative.       Objective:     Vital Signs (Most Recent):  Temp: 98.1 °F (36.7 °C) (10/01/24 1126)  Pulse: 74 (10/01/24 1126)  Resp: 18 (10/01/24 1126)  BP: (!) 101/58 (10/01/24 1126)  SpO2: 95 % (10/01/24 1126) Vital Signs (24h Range):  Temp:  [97.7 °F (36.5 °C)-98.1 °F (36.7 °C)] 98.1 °F (36.7 °C)  Pulse:  [58-89] 74  Resp:  [17-18] 18  SpO2:  [93 %-96 %] 95 %  BP: ()/(52-67) 101/58     Weight: 63.6 kg (140 lb 3.4 oz)  Body mass index is 24.84 kg/m².     SpO2: 95 %         Intake/Output Summary (Last 24 hours) at 10/1/2024 1234  Last data filed at 10/1/2024 1146  Gross per 24 hour   Intake 222 ml   Output 900 ml   Net -678 ml       Lines/Drains/Airways       Peripheral Intravenous Line  Duration                  Peripheral IV - Single Lumen Anterior;Left Forearm -- days                       Physical Exam  Constitutional:       General: She is not in acute distress.     Appearance: She is not ill-appearing.   Cardiovascular:      Rate and Rhythm: Normal rate and regular rhythm.      Heart sounds: Murmur (AS and MR) heard.   Pulmonary:      Effort: Pulmonary effort is normal. No respiratory distress.      Breath sounds: Normal breath sounds. No stridor. No wheezing, rhonchi or rales.      Comments: Resting comfortably on 2L NC  Chest:      Chest wall: No tenderness.   Abdominal:      General: There is distension.      Palpations: Abdomen is soft. There is no mass.      Tenderness: There is no abdominal tenderness. There is no guarding or rebound.      Hernia: No hernia is present.   Musculoskeletal:         General: Swelling present.      Right lower leg: Edema present.      Left lower leg: Edema  present.   Skin:     General: Skin is warm and dry.   Neurological:      Mental Status: She is alert.            Significant Labs: CMP   Recent Labs   Lab 09/30/24  0637 10/01/24  0405    143   K 4.0 4.0    104   CO2 29 32*   * 146*   BUN 21 21   CREATININE 1.0 0.9   CALCIUM 8.7 9.5   ANIONGAP 9 7*       Significant Imaging:  reviewed  Assessment and Plan:       * Acute on chronic combined systolic and diastolic congestive heart failure  Patient is identified as having Combined Systolic and Diastolic heart failure that is Acute on chronic. CHF is currently uncontrolled due to Continued edema of extremities and JVD.   Patient has severe aortic stenosis diagnosed more than 3 years ago. She has refused TAVR. The acute decompensation is likely in the setting of severe aortic stenosis.    Latest ECHO performed and demonstrates- Results for orders placed during the hospital encounter of 09/27/24    Echo    Interpretation Summary    Left Ventricle: The left ventricle is normal in size. Moderately increased ventricular mass. Mildly increased wall thickness. Mild septal thickening. There is moderate concentric hypertrophy. Global hypokinesis present. There is severely reduced systolic function with a visually estimated ejection fraction of 20 - 25%. Ejection fraction by visual approximation is 23% with marked worsening compared with over 2 years ago.    Right Ventricle: Normal right ventricular cavity size. Wall thickness is normal. Systolic function is normal.    Left Atrium: Left atrium is severely dilated.    Aortic Valve: There is severe aortic valve sclerosis. Severely restricted motion. There is severe ( critical) stenosis. Aortic valve area by VTI is 0.31 cm2. Aortic valve peak velocity is 4.63 m/s. Mean gradient is 60 mmHg. The dimensionless index is 0.10. There is trace aortic regurgitation.    Mitral Valve: There is mild bileaflet sclerosis. There is mild mitral annular calcification present.  There is moderate to moderate-severe regurgitation.    Tricuspid Valve: There is moderate regurgitation.    Pulmonary Artery: There is moderate pulmonary hypertension. The estimated pulmonary artery systolic pressure is 55 mmHg.    IVC/SVC: Intermediate venous pressure at 8 mmHg.    Pericardium: There is a  trivial-small posterior effusion at the base and trivial under the RA.  Recent Labs   Lab 09/27/24  1155   BNP 2,388*         Plan:   - Continue lasix IV 40 mg  - 1-2L output goal   - transition to PO Lasix 80mg daily   - strict I/O; daily weights   - Patient with newly reduced EF 20-25 %.    - consider starting SGLTi   - daily BMP; keep K > 4 and Mg > 2  - palliative care consulted   - recommend heart failure transitional care clinic referral upon discharge     Paroxysmal atrial fibrillation  Patient with Paroxysmal (<7 days) atrial fibrillation which is controlled currently with Amiodarone. Patient is currently in sinus rhythm.[id number] Score: 4. . Anticoagulation indicated. Anticoagulation done with Apixaban 5 mg BID .        VTE Risk Mitigation (From admission, onward)           Ordered     apixaban tablet 5 mg  2 times daily         09/27/24 1421     IP VTE HIGH RISK PATIENT  Once         09/27/24 1331     Place sequential compression device  Until discontinued         09/27/24 1331                    Thank you for your consult. I will sign off. Please contact us if you have any additional questions.     Cahrly Cantu MD  Cardiology  [facility] - [facility]iology Stepdown

## 2024-10-01 NOTE — PT/OT/SLP EVAL
Physical Therapy Evaluation    Patient Name:  Stacia Perry   MRN:  [id number]  Admit Date: 9/27/2024  Admitting Diagnosis:  Acute on chronic combined systolic and diastolic congestive heart failure  Length of Stay: 3 days  Recent Surgery: * No surgery found *      Recommendations:     Discharge Recommendations:  Low Intensity Therapy   Discharge Equipment Recommendations: none   Barriers to discharge: None    Assessment:     Stacia Perry is a 89 y.o. female admitted with a medical diagnosis of Acute on chronic combined systolic and diastolic congestive heart failure. Per daughter, pt is not at PLOF but is close to PLOF. Pt's daughter reported no concerns about her and her sister assisting patient at home. Daughter declined medical equipment. Pt was able to ambulate slowly with one person assistance with and without a RW. Will continue to progress mobility per patient's tolerance.    Problem List: weakness, impaired endurance, impaired functional mobility, gait instability, impaired balance, impaired cardiopulmonary response to activity  Rehab Prognosis: Good; patient would benefit from acute skilled PT services to address these deficits and reach maximum level of function.      Plan:     During this hospitalization, patient to be seen 3 x/week to address the identified rehab impairments via gait training, therapeutic activities, therapeutic exercises, neuromuscular re-education and progress towards the established goals.    Plan of Care Expires:  10/30/24    Subjective   Communicated with RN prior to session.  Patient found  in the bathroom  upon PT entry to room, agreeable to evaluation. Stacia Perry's daughter present during session.    Chief Complaint: Leg Swelling and Shortness of Breath (Patients complains of bilateral leg swelling and shortness of breath x several days= worsens with exertion)    Patient/Family Comments/goals: to get better  Pain/Comfort:  Pain Rating 1: 0/10  Pain Rating  "Post-Intervention 1: 0/10    Living Environment:  Living Environment: pt takes turns living with both daughters. One daughter lives in a STH with 2STE with no handrails with tub/shower combo. Second daughter lives in STH with no TONE with tub/shower combo.    Previous level of function: Pt and daughter stated she is primarily independent with ambulation and ADLs but requires occasional assistance  Equipment Used at Home: wheelchair, other (see comments) daughter stated she only uses the WC if she is going to be somewhere for a long period of time like sports games.   Assistance upon Discharge: pt will have assistance from both daughters. She is only alone when she does not want to run errands with her daughters. Daughter demonstrated understanding of functional performance limitations of pt.     Objective:   Patient found with: telemetry     General Precautions: Standard, Cardiac fall   Orthopedic Precautions:N/A   Braces: N/A   Oxygen Device: Room Air  Vitals: BP (!) 101/58 (BP Location: Left arm, Patient Position: Sitting)   Pulse 74   Temp 98.1 °F (36.7 °C) (Oral)   Resp 18   Ht 5' 3" (1.6 m)   Wt 63.6 kg (140 lb 3.4 oz)   SpO2 95%   Breastfeeding No   BMI 24.84 kg/m²     Exams:  Cognition:   Alert and Cooperative  Oriented to person  Command following: Follows one step commands   Fluency: clear/fluent    RLE ROM: WFL  RLE Strength: grossly 4/5  LLE ROM: WFL  LLE Strength: grossly 4/5      Outcome Measures:  AM-PAC 6 CLICK MOBILITY  Turning over in bed (including adjusting bedclothes, sheets and blankets)?: 3  Sitting down on and standing up from a chair with arms (e.g., wheelchair, bedside commode, etc.): 3  Moving from lying on back to sitting on the side of the bed?: 3  Moving to and from a bed to a chair (including a wheelchair)?: 3  Need to walk in hospital room?: 3  Climbing 3-5 steps with a railing?: 3  Basic Mobility Total Score: 18     Functional Mobility:  Additional staff present: OT  Bed " Mobility:  Not performed 2nd to pt found in the bathroom with daughter     Transfers:   Sit <> Stand Transfer: minimum assistance with no assistive device from chair       Gait:   Patient ambulated: 25ft   Patient required: contact guard  Patient used: rolling walker  Gait Pattern observed: reciprocal gait  Gait Deviation(s): decreased step length, flexed posture, and decreased marisa  Impairments due to: impaired balance, decreased strength, and decreased endurance  Comments:    Pt demo'd slow marisa but no LOB. Pt required frequent facilitation of RW management and verbal cueing for gait quality and attention to task  No SOB         Therapeutic Activities, Exercises, & Education:   Educated pt on PT role/POC  Educated pt on importance of OOB activity and daily ambulation   Pt verbalized understanding         Patient left up in chair with all lines intact, call button in reach, RN notified, and daughter present.    GOALS:   Multidisciplinary Problems       Physical Therapy Goals          Problem: Physical Therapy    Goal Priority Disciplines Outcome Interventions   Physical Therapy Goal     PT, PT/OT Progressing    Description: Goals to be met by: 10/15/2024    Patient will increase functional independence with mobility by performin. Supine to sit with Supervision  2. Sit to stand transfer with Supervision using LRAD  3. Gait  x 50 feet with Supervision using LRAD.                          History:     Past Medical History:   Diagnosis Date    Anticoagulant long-term use     Aortic valve stenosis     Arthritis     Cardiogenic shock 2021    CHF (congestive heart failure)     Diabetes mellitus     Hypercholesteremia     Hypertension     Palliative care encounter 2024    Pneumonia due to infectious organism 2021    Primary adenocarcinoma of upper lobe of right lung 2021    Thyroid disease        Past Surgical History:   Procedure Laterality Date    CATARACT EXTRACTION, BILATERAL      EYE  SURGERY      FOOT FRACTURE SURGERY Right     HYSTERECTOMY         Time Tracking:     PT Received On: 10/01/24  PT Start Time: 1009     PT Stop Time: 1027  PT Total Time (min): 18 min     Billable Minutes: Evaluation 8 and Gait Training 8

## 2024-10-01 NOTE — PT/OT/SLP EVAL
Occupational Therapy  Co Evaluation    Name: Stacia Perry  MRN: [id number]  Admitting Diagnosis: Acute on chronic combined systolic and diastolic congestive heart failure  Recent Surgery: * No surgery found *      Recommendations:     Discharge Recommendations: Low Intensity Therapy  Discharge Equipment Recommendations:     Barriers to discharge:  None    Assessment:     Stacia Perry is a 89 y.o. female with a medical diagnosis of Acute on chronic combined systolic and diastolic congestive heart failure.  Performance deficits affecting function: weakness, impaired endurance, impaired self care skills, impaired functional mobility, decreased coordination. Pt in bathroom with daughter prior therapy session. Pt agreeable to therapy session but stated she was a little tired.     Rehab Prognosis: Good; patient would benefit from acute skilled OT services to address these deficits and reach maximum level of function.       Plan:     Patient to be seen 2 x/week to address the above listed problems via self-care/home management, therapeutic activities, therapeutic exercises  Plan of Care Expires: 11/01/24  Plan of Care Reviewed with: patient, daughter    Subjective     Chief Complaint: pt stated she seems a little weak   Patient/Family Comments/goals: daughter wants to take pt home this evening     Occupational Profile:  Living Environment: pt takes turns living with both daughters. One daughter lives in a ST with 2STE with no handrails with tub/shower combo. Second daughter lives in [facility] with no TONE with tub/shower combo.    Previous level of function: Pt and daughter stated she is primarily independent with ADLs but requires some assistance from time to time.   Equipment Used at Home: wheelchair, other (see comments) daughter stated she only uses the WC if she is going to be somewhere for a long period of time like sports games.   Assistance upon Discharge: pt will have assistance from both daughters. She is only alone  when she does not want to run errands with her daughters. Daughter demonstrated understanding of functional performance limitations of pt.     Pain/Comfort:  Pain Rating 1: 0/10    Patients cultural, spiritual, Worship conflicts given the current situation: no    Objective:     Communicated with: tom prior to session.  Patient found up in chair with oxygen, telemetry upon OT entry to room.  Co-evaluation to optimize pt safety and functional performance   General Precautions: Standard, fall  Orthopedic Precautions: N/A  Braces: N/A  Respiratory Status: Nasal cannula, flow 2 L/min, pt and daughter stated she did not feel the need for oxygen at the time.     Functional Mobility/Transfers:  Pt required MN A for sit>stand from recliner   Pt required CGA for ambulation in hallway    Activities of Daily Living:  Pt completed face care while seated in recliner with SBA due to increased exhaustion following ambulating in the hallway and toileting prior to therapy session.   Upon observation and evaluation of standing tolerance, balance, and endurance anticipate pt  requires MIN A to complete ADLs due to current medical status and functional performance.     Cognitive/Visual Perceptual:  Cognitive/Psychosocial Skills:     -       Oriented to: Person and Situation   -       Follows Commands/attention:Follows one-step commands  -       Communication: clear/fluent  -       Safety awareness/insight to disability: intact   -       Mood/Affect/Coping skills/emotional control: Anxious, Flat affect, and Guarded    Physical Exam:  UE ROM: pt demonstrated ~ degree shoulder flexion   UE MMT: pt demonstrated 3/5 for shoulder flexion & elbow flexion    AMPAC 6 Click ADL:  AMPAC Total Score: 19    Treatment & Education:  Pt educated on role of OT and functional performance limitations   Caregiver (daughter) demonstrated appropriate skills needed to care for pt in the home.     Patient left up in chair with all lines intact and  call button in reach    GOALS:   Multidisciplinary Problems       Occupational Therapy Goals          Problem: Occupational Therapy    Goal Priority Disciplines Outcome Interventions   Occupational Therapy Goal     OT, PT/OT                       Goals to be met by: 10/14/24 (14 days)     Patient will increase functional independence with ADLs by performing:    UE Dressing with Set-up Assistance.  LE Dressing with Set-up Assistance.  Grooming while standing with Stand-by Assistance.  Toileting from toilet with Stand-by Assistance for hygiene and clothing management.   Toilet transfer to toilet with Contact Guard Assistance.  History:     Past Medical History:   Diagnosis Date    Anticoagulant long-term use     Aortic valve stenosis     Arthritis     Cardiogenic shock 5/7/2021    CHF (congestive heart failure)     Diabetes mellitus     Hypercholesteremia     Hypertension     Palliative care encounter 9/30/2024    Pneumonia due to infectious organism 5/6/2021    Primary adenocarcinoma of upper lobe of right lung 7/19/2021    Thyroid disease          Past Surgical History:   Procedure Laterality Date    CATARACT EXTRACTION, BILATERAL      EYE SURGERY      FOOT FRACTURE SURGERY Right     HYSTERECTOMY         Time Tracking:     OT Date of Treatment: 10/01/24  OT Start Time: 1009  OT Stop Time: 1028  OT Total Time (min): 19 min    Billable Minutes:Evaluation 9  Therapeutic Activity 10    10/1/2024

## 2024-10-01 NOTE — PROGRESS NOTES
[facility] Medicine  Progress Note    Patient Name: Stacia Perry  MRN: [id number]  Patient Class: IP- Inpatient   Admission Date: 9/27/2024  Length of Stay: 3 days  Attending Physician: Keila Phan MD  Primary Care Provider: Maureen Parnell MD        Subjective:     Principal Problem:Acute on chronic combined systolic and diastolic congestive heart failure        HPI:  Stacia Perry is a 89 y.o. female with PMHx significant for DM II, HTN, HLD, pAfib on eliquis, aortic stenosis, and HFpEF admitted to hospital medicine for acute CHF exacerbation. Patient was seen in cardiology clinic this morning where she was noted to have significant BLE, JVD, and hypoxemia which prompted her visit to the ED. Endorses worsening SOB, leg swelling, and a ~9 lb weight gain over the past month. Reports compliance with lasix (80mg PO in the morning) and daughter reports giving patient an extra 40mg PO in the evenings for the past few days. Denies fever/chills, diaphoresis, lightheadedness, HA, CP, cough, congestion, abdominal pain, n/v/d, urinary symptoms, changes in BMs, numbness/tingling, weakness. Of note, patient was to have a TAVR in 2021 for aortic stenosis but she refused.    In the ED, SpO2 <94% on RA, improved with supplemental oxygent. Remaining vitals stable. BNP 2388. Troponin negative. K 5.4. Cr 1.3 (BL ~1.0). CXR consistent with pulmonary vascular congestion. EKG with sinus lorena and known 1st degree heart block. Given lasix 80mg IV x1.     Overview/Hospital Course:  Patient admitted to hospital medicine for CHF exacerbation, started on IV lasix with good urine output. Patient with episode of asymptomatic hypotension s/p Lasix 80 IV. Lasix dosing decreased. Echo with EF of 20-25%, severe known aortic stenosis, and elevated venous pressure. Cardiology consulted, appreciate recs. Palliative care consulted, appreciate recs. Transitioned to PO lasix, will monitor response. Plan for discharge  home tomorrow pending overnight course.     Interval History: VSS on 2L NC. Will attempt to wean patient today. Given 1 dose of home PO Lasix 80mg. Plan to monitor urine output and symptoms overnight. Plan for discharge home tomorrow pending hospital course.     Review of Systems   Constitutional:  Negative for chills and fever.   Respiratory:  Positive for shortness of breath (improved). Negative for chest tightness.    Cardiovascular:  Negative for chest pain and leg swelling.   Gastrointestinal:  Negative for abdominal pain and nausea.   Neurological:  Negative for dizziness and weakness.     Objective:     Vital Signs (Most Recent):  Temp: 98.1 °F (36.7 °C) (10/01/24 1126)  Pulse: 74 (10/01/24 1126)  Resp: 18 (10/01/24 1126)  BP: (!) 101/58 (10/01/24 1126)  SpO2: 95 % (10/01/24 1126) Vital Signs (24h Range):  Temp:  [97.7 °F (36.5 °C)-98.1 °F (36.7 °C)] 98.1 °F (36.7 °C)  Pulse:  [58-89] 74  Resp:  [17-18] 18  SpO2:  [93 %-96 %] 95 %  BP: ()/(52-67) 101/58     Weight: 63.6 kg (140 lb 3.4 oz)  Body mass index is 24.84 kg/m².    Intake/Output Summary (Last 24 hours) at 10/1/2024 1413  Last data filed at 10/1/2024 1346  Gross per 24 hour   Intake 222 ml   Output 1700 ml   Net -1478 ml         Physical Exam  Vitals and nursing note reviewed.   Constitutional:       General: She is not in acute distress.     Appearance: Normal appearance. She is not ill-appearing.   HENT:      Head: Normocephalic and atraumatic.      Right Ear: External ear normal.      Left Ear: External ear normal.   Eyes:      Extraocular Movements: Extraocular movements intact.      Conjunctiva/sclera: Conjunctivae normal.      Pupils: Pupils are equal, round, and reactive to light.   Neck:      Vascular: No JVD.   Cardiovascular:      Rate and Rhythm: Normal rate and regular rhythm.      Pulses: Normal pulses.      Heart sounds: Normal heart sounds. No murmur heard.  Pulmonary:      Effort: No respiratory distress.      Breath sounds:  Rales (faint) present.      Comments: On 2L NC  Abdominal:      General: Abdomen is flat. Bowel sounds are normal. There is distension (improved).      Palpations: Abdomen is soft.      Tenderness: There is no abdominal tenderness.   Musculoskeletal:         General: Normal range of motion.      Cervical back: Normal range of motion and neck supple. No tenderness.      Right lower leg: No edema.      Left lower leg: No edema.   Skin:     General: Skin is warm and dry.      Capillary Refill: Capillary refill takes less than 2 seconds.      Coloration: Skin is not jaundiced.   Neurological:      General: No focal deficit present.      Mental Status: She is alert and oriented to person, place, and time. Mental status is at baseline.   Psychiatric:         Mood and Affect: Mood normal.         Behavior: Behavior normal.             Significant Labs: All pertinent labs within the past 24 hours have been reviewed.  BMP:   Recent Labs   Lab 10/01/24  0405   *      K 4.0      CO2 32*   BUN 21   CREATININE 0.9   CALCIUM 9.5   MG 2.1     CBC:   Recent Labs   Lab 09/30/24  0637   WBC 7.37   HGB 12.0   HCT 39.2        CMP:   Recent Labs   Lab 09/30/24  0637 10/01/24  0405    143   K 4.0 4.0    104   CO2 29 32*   * 146*   BUN 21 21   CREATININE 1.0 0.9   CALCIUM 8.7 9.5   ANIONGAP 9 7*       Significant Imaging: I have reviewed all pertinent imaging results/findings within the past 24 hours.    Assessment/Plan:      * Acute on chronic combined systolic and diastolic congestive heart failure  Acute Respiratory Failure  Patient is identified as having Diastolic (HFpEF) heart failure that is Acute on chronic. CHF is currently uncontrolled due to Continued edema of extremities, Weight gain of 9 pounds, and JVD, Rales/crackles on pulmonary exam, and Pulmonary edema/pleural effusion on CXR. Latest ECHO performed and demonstrates- Results for orders placed during the hospital encounter of  07/14/22    Echo Saline Bubble? No    Interpretation Summary  · The left ventricle is normal in size with mild eccentric hypertrophy and low normal systolic function.  · The estimated ejection fraction is 55%.  · Grade III left ventricular diastolic dysfunction.  · Normal right ventricular size with mildly reduced right ventricular systolic function.  · Severe left atrial enlargement.  · Mild right atrial enlargement.  · There is severe aortic valve stenosis.  · Aortic valve area is 0.55 cm2; peak velocity is 4.32 m/s; mean gradient is 53 mmHg.  · Mild aortic regurgitation.  · Moderate-to-severe mitral regurgitation.  · Moderate tricuspid regurgitation.  · Normal central venous pressure (3 mmHg).  · The estimated PA systolic pressure is 51 mmHg.  · There is mild-moderate pulmonary hypertension.  . Continue Beta Blocker, ACE/ARB, and Furosemide and monitor clinical status closely. Holding BB and ACE/ARB in setting of hypotension  Monitor on telemetry.   Patient is on CHF pathway.    Monitor strict Is&Os and daily weights.    Place on fluid restriction of 1.5 L.   Cardiology has been consulted given difficulty diuresing with the aortic stenosis, appreciate recs. Given Lasix 40mg IV x 1 yesterday with good urine output and stable BP. Will give home PO lasix 80mg x 1 today  Continue to stress to patient importance of self efficacy and  on diet for CHF.   Last BNP reviewed- and noted below   Recent Labs   Lab 09/27/24  1155   BNP 2,388*         Hyperkalemia  Hyperkalemia is likely due to BIJAL.The patients most recent potassium results are listed below.  Recent Labs     09/29/24  0247 09/30/24  0637 10/01/24  0405   K 3.9 4.0 4.0       Plan  - Monitor for arrhythmias with EKG and/or continuous telemetry.   - Treat the hyperkalemia with Furosemide.   - Monitor potassium: Daily  - The patient's hyperkalemia is resolved            Paroxysmal atrial fibrillation  Patient with Paroxysmal (<7 days) atrial fibrillation  which is controlled currently with Beta Blocker, Calcium Channel Blocker, and Amiodarone. Patient is currently in sinus rhythm.[id number] Score: 4. Anticoagulation not indicated due to fall risk .    Obesity  Body mass index is 28.68 kg/m². Morbid obesity complicates all aspects of disease management from diagnostic modalities to treatment. Weight loss encouraged and health benefits explained to patient.         Severe aortic stenosis  - known, chronic  - patient was to have a TAVR in 2021, but patient refused  - repeat echo below  - Palliative care consulted given severe disease, patient and family declining outpatient follow up at this time    Results for orders placed during the hospital encounter of 09/27/24    Echo    Interpretation Summary    Left Ventricle: The left ventricle is normal in size. Moderately increased ventricular mass. Mildly increased wall thickness. Mild septal thickening. There is moderate concentric hypertrophy. Global hypokinesis present. There is severely reduced systolic function with a visually estimated ejection fraction of 20 - 25%. Ejection fraction by visual approximation is 23% with marked worsening compared with over 2 years ago.    Right Ventricle: Normal right ventricular cavity size. Wall thickness is normal. Systolic function is normal.    Left Atrium: Left atrium is severely dilated.    Aortic Valve: There is severe aortic valve sclerosis. Severely restricted motion. There is severe ( critical) stenosis. Aortic valve area by VTI is 0.31 cm2. Aortic valve peak velocity is 4.63 m/s. Mean gradient is 60 mmHg. The dimensionless index is 0.10. There is trace aortic regurgitation.    Mitral Valve: There is mild bileaflet sclerosis. There is mild mitral annular calcification present. There is moderate to moderate-severe regurgitation.    Tricuspid Valve: There is moderate regurgitation.    Pulmonary Artery: There is moderate pulmonary hypertension. The estimated pulmonary artery  "systolic pressure is 55 mmHg.    IVC/SVC: Intermediate venous pressure at 8 mmHg.    Pericardium: There is a  trivial-small posterior effusion at the base and trivial under the RA.      HLD (hyperlipidemia)  - chronic  - continue statin      Type 2 diabetes mellitus without complication, without long-term current use of insulin  Patient's FSGs are controlled on current medication regimen.  Last A1c reviewed-   Lab Results   Component Value Date    HGBA1C 7.6 (H) 11/07/2022     Most recent fingerstick glucose reviewed- No results for input(s): "POCTGLUCOSE" in the last 24 hours.  Current correctional scale  Low  Maintain anti-hyperglycemic dose as follows-   Antihyperglycemics (From admission, onward)      Start     Stop Route Frequency Ordered    09/27/24 2100  insulin glargine U-100 (Lantus) pen 10 Units         -- SubQ Nightly 09/27/24 1334    09/27/24 1434  insulin aspart U-100 pen 0-5 Units         -- SubQ Before meals & nightly PRN 09/27/24 1334          Hold Oral hypoglycemics while patient is in the hospital.    Essential hypertension  Chronic, controlled. Latest blood pressure and vitals reviewed-     Temp:  [97.5 °F (36.4 °C)-98.1 °F (36.7 °C)]   Pulse:  [51-78]   Resp:  [11-18]   BP: ()/(51-70)   SpO2:  [95 %-98 %] .   Home meds for hypertension were reviewed and noted below.   Hypertension Medications               carvediloL (COREG) 6.25 MG tablet Take 0.5 tablets (3.125 mg total) by mouth 2 (two) times daily with meals.    furosemide (LASIX) 80 MG tablet Take 1 tablet (80 mg total) by mouth once daily.    valsartan (DIOVAN) 160 MG tablet Take 1/2 (one-half) tablet by mouth once daily    furosemide (LASIX) 40 MG tablet Take 1 tablet (40 mg total) by mouth 2 (two) times daily.            While in the hospital, will manage blood pressure as follows; Continue home antihypertensive regimen> discontinued home meds for now given hypotension with IV lasix, Restart when appropriate    Will utilize p.r.n. " blood pressure medication only if patient's blood pressure greater than 180/110 and she develops symptoms such as worsening chest pain or shortness of breath.      VTE Risk Mitigation (From admission, onward)           Ordered     apixaban tablet 5 mg  2 times daily         09/27/24 1421     IP VTE HIGH RISK PATIENT  Once         09/27/24 1331     Place sequential compression device  Until discontinued         09/27/24 1331                    Discharge Planning   MICHAEL: 10/3/2024     Code Status: Partial Code   Is the patient medically ready for discharge?:     Reason for patient still in hospital (select all that apply): Patient trending condition and Treatment  Discharge Plan A: Home with family                  Shelley Lovelace PA-C  Department of Hospital Medicine   [facility] - Cardiology Stepdown

## 2024-10-02 VITALS
TEMPERATURE: 98 F | HEIGHT: 63 IN | DIASTOLIC BLOOD PRESSURE: 43 MMHG | WEIGHT: 140.19 LBS | SYSTOLIC BLOOD PRESSURE: 89 MMHG | RESPIRATION RATE: 18 BRPM | OXYGEN SATURATION: 90 % | HEART RATE: 76 BPM | BODY MASS INDEX: 24.84 KG/M2

## 2024-10-02 LAB
ANION GAP SERPL CALC-SCNC: 10 MMOL/L (ref 8–16)
BUN SERPL-MCNC: 20 MG/DL (ref 8–23)
CALCIUM SERPL-MCNC: 9.2 MG/DL (ref 8.7–10.5)
CHLORIDE SERPL-SCNC: 103 MMOL/L (ref 95–110)
CO2 SERPL-SCNC: 27 MMOL/L (ref 23–29)
CREAT SERPL-MCNC: 1 MG/DL (ref 0.5–1.4)
EST. GFR  (NO RACE VARIABLE): 53.5 ML/MIN/1.73 M^2
GLUCOSE SERPL-MCNC: 133 MG/DL (ref 70–110)
MAGNESIUM SERPL-MCNC: 2 MG/DL (ref 1.6–2.6)
POCT GLUCOSE: 137 MG/DL (ref 70–110)
POCT GLUCOSE: 187 MG/DL (ref 70–110)
POTASSIUM SERPL-SCNC: 4.1 MMOL/L (ref 3.5–5.1)
SODIUM SERPL-SCNC: 140 MMOL/L (ref 136–145)

## 2024-10-02 PROCEDURE — 83735 ASSAY OF MAGNESIUM: CPT | Performed by: HOSPITALIST

## 2024-10-02 PROCEDURE — 25000003 PHARM REV CODE 250: Performed by: PHYSICIAN ASSISTANT

## 2024-10-02 PROCEDURE — 80048 BASIC METABOLIC PNL TOTAL CA: CPT | Performed by: PHYSICIAN ASSISTANT

## 2024-10-02 RX ORDER — POTASSIUM CHLORIDE 20 MEQ/1
20 TABLET, EXTENDED RELEASE ORAL 2 TIMES DAILY
Qty: 180 TABLET | Refills: 3 | Status: SHIPPED | OUTPATIENT
Start: 2024-10-02 | End: 2025-10-02

## 2024-10-02 RX ORDER — CARVEDILOL 6.25 MG/1
3.12 TABLET ORAL 2 TIMES DAILY WITH MEALS
Qty: 90 TABLET | Refills: 3 | Status: SHIPPED | OUTPATIENT
Start: 2024-10-02

## 2024-10-02 RX ORDER — FUROSEMIDE 40 MG/1
40 TABLET ORAL NIGHTLY PRN
Qty: 60 TABLET | Refills: 11 | Status: SHIPPED | OUTPATIENT
Start: 2024-10-02 | End: 2026-09-22

## 2024-10-02 RX ORDER — VALSARTAN 160 MG/1
80 TABLET ORAL DAILY
Qty: 45 TABLET | Refills: 3 | Status: SHIPPED | OUTPATIENT
Start: 2024-10-02

## 2024-10-02 RX ORDER — FUROSEMIDE 80 MG/1
80 TABLET ORAL DAILY
Qty: 90 TABLET | Refills: 3 | Status: SHIPPED | OUTPATIENT
Start: 2024-10-02 | End: 2025-10-02

## 2024-10-02 RX ADMIN — FUROSEMIDE 80 MG: 80 TABLET ORAL at 08:10

## 2024-10-02 RX ADMIN — APIXABAN 5 MG: 5 TABLET, FILM COATED ORAL at 08:10

## 2024-10-02 RX ADMIN — AMIODARONE HYDROCHLORIDE 200 MG: 200 TABLET ORAL at 08:10

## 2024-10-02 RX ADMIN — CITALOPRAM HYDROBROMIDE 20 MG: 20 TABLET ORAL at 08:10

## 2024-10-02 RX ADMIN — LEVOTHYROXINE SODIUM 75 MCG: 75 TABLET ORAL at 06:10

## 2024-10-02 RX ADMIN — MECLIZINE 25 MG: 12.5 TABLET ORAL at 08:10

## 2024-10-02 NOTE — PLAN OF CARE
Pt given discharge instruction, medication reviewed with the patient, follow up appointments reviewed. All questions and concerns addressed. Pt verbalized understanding. Rx delivered to bedside. IV, telemetry removed. Discharge paperwork given to patient. Pt with belongings in the room waiting for transport.         Problem: Adult Inpatient Plan of Care  Goal: Plan of Care Review  Outcome: Met  Goal: Patient-Specific Goal (Individualized)  Outcome: Met  Goal: Absence of Hospital-Acquired Illness or Injury  Outcome: Met  Goal: Optimal Comfort and Wellbeing  Outcome: Met  Goal: Readiness for Transition of Care  Outcome: Met     Problem: Diabetes Comorbidity  Goal: Blood Glucose Level Within Targeted Range  Outcome: Met     Problem: Wound  Goal: Optimal Coping  Outcome: Met  Goal: Optimal Functional Ability  Outcome: Met  Goal: Absence of Infection Signs and Symptoms  Outcome: Met  Goal: Improved Oral Intake  Outcome: Met  Goal: Optimal Pain Control and Function  Outcome: Met  Goal: Skin Health and Integrity  Outcome: Met  Goal: Optimal Wound Healing  Outcome: Met     Problem: Skin Injury Risk Increased  Goal: Skin Health and Integrity  Outcome: Met     Problem: Fall Injury Risk  Goal: Absence of Fall and Fall-Related Injury  Outcome: Met     Problem: Heart Failure  Goal: Optimal Cardiac Output  Outcome: Met  Goal: Fluid and Electrolyte Balance  Outcome: Met  Goal: Effective Oxygenation and Ventilation  Outcome: Met     Problem: Coping Ineffective  Goal: Effective Coping  Outcome: Met

## 2024-10-02 NOTE — ASSESSMENT & PLAN NOTE
- known, chronic  - patient was to have a TAVR in 2021, but patient refused  - repeat echo below  - Palliative care consulted given severe disease, patient and family declining outpatient follow up at this time    Results for orders placed during the hospital encounter of 09/27/24    Echo    Interpretation Summary    Left Ventricle: The left ventricle is normal in size. Moderately increased ventricular mass. Mildly increased wall thickness. Mild septal thickening. There is moderate concentric hypertrophy. Global hypokinesis present. There is severely reduced systolic function with a visually estimated ejection fraction of 20 - 25%. Ejection fraction by visual approximation is 23% with marked worsening compared with over 2 years ago.    Right Ventricle: Normal right ventricular cavity size. Wall thickness is normal. Systolic function is normal.    Left Atrium: Left atrium is severely dilated.    Aortic Valve: There is severe aortic valve sclerosis. Severely restricted motion. There is severe ( critical) stenosis. Aortic valve area by VTI is 0.31 cm2. Aortic valve peak velocity is 4.63 m/s. Mean gradient is 60 mmHg. The dimensionless index is 0.10. There is trace aortic regurgitation.    Mitral Valve: There is mild bileaflet sclerosis. There is mild mitral annular calcification present. There is moderate to moderate-severe regurgitation.    Tricuspid Valve: There is moderate regurgitation.    Pulmonary Artery: There is moderate pulmonary hypertension. The estimated pulmonary artery systolic pressure is 55 mmHg.    IVC/SVC: Intermediate venous pressure at 8 mmHg.    Pericardium: There is a  trivial-small posterior effusion at the base and trivial under the RA.

## 2024-10-02 NOTE — PLAN OF CARE
SW assigned to pt today.   Pt is projected to d/c today home w/ family with possible HME (bedside commode, shower chair and home O2). Per team pt will also need a f/u with the heart failure clinic and Intrventional Cardiology (ALEKSANDAR sent message to CHW team via in basket and secure chat [facility] dept to review orders).     Discharge Plan A and Plan B have been determined by review of patient's clinical status, future medical and therapeutic needs, and coverage/benefits for post-acute care in coordination with multidisciplinary team members.    Erica Ge, RONEN   [facility]    Case Management Dept  [phone]

## 2024-10-02 NOTE — PLAN OF CARE
10:41 AM Message left for patient daughter regarding IMM    10:42 AM- CHW met with patient/family at bedside. Patient experience rounding completed and reviewed the following.     Do you know your discharge plan? Yes or No,      If yes, what is the plan? (Home, Home Health, Rehab, SNF, LTAC, or Other)  Yes Home    Have you discussed your needs and preferences with your SW/CM? Yes or No  Yes Home    If you are discharging home, do you have help at home? Yes or No Yes (daughter)    Do you think you will need help additional at home at discharge? Yes or No   No    Do you currently have difficulty keeping up with bills, affording medicine or buying food? Yes or No No    Assigned SW/CM notified of any patient/family needs or concerns. Appropriate resources provided to address patient's needs.  Erica Cano, CECE  Case Management  [phone]

## 2024-10-02 NOTE — DISCHARGE INSTRUCTIONS
Please check blood pressure daily around the same time (preferably in the morning prior to morning medications) and document to bring to Primary Care appointment:    If Systolic blood pressure (top number) is at or above 180 and/or diastolic blood pressure (bottom number) is at or above 110   - Please take Coreg and Valsartan as prescribed and repeat blood pressure in 1-2 hours   - If blood pressure continues to meet parameters above, contact primary care provider    If Systolic blood pressure (top number) is between  and/or diastolic blood pressure (bottom number) is between    - Please take Coreg and Valsartan as prescribed    If Systolic blood pressure (top number) is at or below 90 and/or diastolic blood pressure (bottom number) is at or below 60   - Please do not take Coreg or Valsartan and repeat blood pressure in 20-30 minutes   - If blood pressure continues to meet parameters above, do not take medications and contact primary care provider

## 2024-10-02 NOTE — PLAN OF CARE
Problem: Adult Inpatient Plan of Care  Goal: Plan of Care Review  Outcome: Progressing  Goal: Patient-Specific Goal (Individualized)  Outcome: Progressing  Goal: Absence of Hospital-Acquired Illness or Injury  Outcome: Progressing  Goal: Optimal Comfort and Wellbeing  Outcome: Progressing  Goal: Readiness for Transition of Care  Outcome: Progressing     Problem: Diabetes Comorbidity  Goal: Blood Glucose Level Within Targeted Range  Outcome: Progressing     Problem: Wound  Goal: Optimal Coping  Outcome: Progressing  Goal: Optimal Functional Ability  Outcome: Progressing  Goal: Absence of Infection Signs and Symptoms  Outcome: Progressing  Goal: Improved Oral Intake  Outcome: Progressing  Goal: Optimal Pain Control and Function  Outcome: Progressing  Goal: Skin Health and Integrity  Outcome: Progressing  Goal: Optimal Wound Healing  Outcome: Progressing     Problem: Skin Injury Risk Increased  Goal: Skin Health and Integrity  Outcome: Progressing     Problem: Fall Injury Risk  Goal: Absence of Fall and Fall-Related Injury  Outcome: Progressing     Problem: Heart Failure  Goal: Optimal Cardiac Output  Outcome: Progressing  Goal: Fluid and Electrolyte Balance  Outcome: Progressing  Goal: Effective Oxygenation and Ventilation  Outcome: Progressing     Problem: Coping Ineffective  Goal: Effective Coping  Outcome: Progressing

## 2024-10-02 NOTE — PLAN OF CARE
[facility] - Cardiology Stepdown  Discharge Final Note    Primary Care Provider: Maureen Parnell MD    Expected Discharge Date: 10/2/2024    Future Appointments   Date Time Provider Department Center   10/10/2024 10:30 AM LAB, APPOINTMENT [facility] LAB [facility]   10/10/2024 11:00 AM Savanna Gamez PA-C [facility]   10/10/2024 11:00 AM [facility]ART FAILURE NURSE [facility]   11/26/2024 11:30 AM Uriel Romero MD [facility] CARDVAL [facility]         Final Discharge Note (most recent)       Final Note - 10/02/24 1628          Final Note    Assessment Type Final Discharge Note     Anticipated Discharge Disposition Home or Self Care   Pt d/c home w/ daughter    What phone number can be called within the next 1-3 days to see how you are doing after discharge? [phone]        Post-Acute Status    Discharge Delays None known at this time                 Pt d/c home w/ daughter- (Kesha) w/ home O2 (O2 was delivered to pt at bedside and pre pt daughter HME if can, can be delivered to their home-SW informed DME dept on pt daughter request and contact information given to DME dept). Was aware HH was declined and pt daughters will be providing pt transportation home.     RONEN Adair   [facility]    Case Management Dept  [phone]      Important Message from Medicare  Important Message from Medicare regarding Discharge Appeal Rights: Explained to patient/caregiver, Other (comments) (completed via phone with patient's daughter, Jeanette ZHAO witnessed)     Date IMM was signed: 10/02/24  Time IMM was signed: 1042    Contact Info       Uriel Romero MD   Specialty: Cardiology, Interventional Cardiology    [address]  [place] [address]   Phone: [phone]       Next Steps: Go on 11/26/2024    Instructions: Follow up 11/26 at 1130a    Savanna Gamez PA-C   Specialty: Transplant, Cardiology    [address] [facility] [address]   Phone:  [phone]       Next Steps: Go on 10/10/2024    Instructions: Follow up 10/10 at 11a

## 2024-10-02 NOTE — ASSESSMENT & PLAN NOTE
Acute Respiratory Failure  Patient is identified as having Diastolic (HFpEF) heart failure that is Acute on chronic. CHF is currently uncontrolled due to Continued edema of extremities, Weight gain of 9 pounds, and JVD, Rales/crackles on pulmonary exam, and Pulmonary edema/pleural effusion on CXR. Latest ECHO performed and demonstrates- Results for orders placed during the hospital encounter of 07/14/22    Echo Saline Bubble? No    Interpretation Summary  · The left ventricle is normal in size with mild eccentric hypertrophy and low normal systolic function.  · The estimated ejection fraction is 55%.  · Grade III left ventricular diastolic dysfunction.  · Normal right ventricular size with mildly reduced right ventricular systolic function.  · Severe left atrial enlargement.  · Mild right atrial enlargement.  · There is severe aortic valve stenosis.  · Aortic valve area is 0.55 cm2; peak velocity is 4.32 m/s; mean gradient is 53 mmHg.  · Mild aortic regurgitation.  · Moderate-to-severe mitral regurgitation.  · Moderate tricuspid regurgitation.  · Normal central venous pressure (3 mmHg).  · The estimated PA systolic pressure is 51 mmHg.  · There is mild-moderate pulmonary hypertension.  . Continue Beta Blocker, ACE/ARB, and Furosemide and monitor clinical status closely. Restarted home medications with BP parameters  Place on fluid restriction of 1.5 L.   Cardiology has been consulted given difficulty diuresing with the aortic stenosis  Continue to stress to patient importance of self efficacy and  on diet for CHF.   Last BNP reviewed- and noted below   Recent Labs   Lab 09/27/24  1155   BNP 2,388*

## 2024-10-02 NOTE — ASSESSMENT & PLAN NOTE
Chronic, controlled. Latest blood pressure and vitals reviewed-     Temp:  [96.3 °F (35.7 °C)-98.6 °F (37 °C)]   Pulse:  [64-78]   Resp:  [18-20]   BP: ()/(43-60)   SpO2:  [90 %-96 %] .   Home meds for hypertension were reviewed and noted below.   Hypertension Medications               carvediloL (COREG) 6.25 MG tablet Take 0.5 tablets (3.125 mg total) by mouth 2 (two) times daily with meals.    furosemide (LASIX) 80 MG tablet Take 1 tablet (80 mg total) by mouth once daily.    valsartan (DIOVAN) 160 MG tablet Take 1/2 (one-half) tablet by mouth once daily    furosemide (LASIX) 40 MG tablet Take 1 tablet (40 mg total) by mouth 2 (two) times daily.            While in the hospital, will manage blood pressure as follows; Continue home antihypertensive regimen> discontinued home meds for now given hypotension with IV lasix, Restart home meds with BP parameters

## 2024-10-02 NOTE — ASSESSMENT & PLAN NOTE
Hyperkalemia is likely due to BIJAL.The patients most recent potassium results are listed below.  Recent Labs     09/30/24  0637 10/01/24  0405 10/02/24  0453   K 4.0 4.0 4.1       Plan  - The patient's hyperkalemia is resolved

## 2024-10-02 NOTE — DISCHARGE SUMMARY
[facility] - Cardiology [facility] Medicine  Discharge Summary      Patient Name: Stacia Perry  MRN: [id number]  DARRION: [id number]  Patient Class: IP- Inpatient  Admission Date: 9/27/2024  Hospital Length of Stay: 4 days  Discharge Date and Time: 10/2/2024  2:19 PM  Attending Physician: Sherita att. providers found   Discharging Provider: Shelley Lovelace PA-C  Primary Care Provider: Maureen Parnell MD  [facility] Medicine Team: [facility]OSP MED E Shelley Lovelace PA-C  Primary Care Team: [facility] HOSP MED E    HPI:   Stacia Perry is a 89 y.o. female with PMHx significant for DM II, HTN, HLD, pAfib on eliquis, aortic stenosis, and HFpEF admitted to hospital medicine for acute CHF exacerbation. Patient was seen in cardiology clinic this morning where she was noted to have significant BLE, JVD, and hypoxemia which prompted her visit to the ED. Endorses worsening SOB, leg swelling, and a ~9 lb weight gain over the past month. Reports compliance with lasix (80mg PO in the morning) and daughter reports giving patient an extra 40mg PO in the evenings for the past few days. Denies fever/chills, diaphoresis, lightheadedness, HA, CP, cough, congestion, abdominal pain, n/v/d, urinary symptoms, changes in BMs, numbness/tingling, weakness. Of note, patient was to have a TAVR in 2021 for aortic stenosis but she refused.    In the ED, SpO2 <94% on RA, improved with supplemental oxygent. Remaining vitals stable. BNP 2388. Troponin negative. K 5.4. Cr 1.3 (BL ~1.0). CXR consistent with pulmonary vascular congestion. EKG with sinus lorena and known 1st degree heart block. Given lasix 80mg IV x1.     * No surgery found *      Hospital Course:   Patient admitted to hospital medicine for CHF exacerbation, started on IV lasix with good urine output. Patient with episode of asymptomatic hypotension s/p Lasix 80 IV. Lasix dosing decreased. Echo with EF of 20-25%, severe known aortic stenosis, and elevated venous pressure. Cardiology following.  Palliative care consulted,  patient and family declined outpatient follow up. Transitioned to home PO lasix with good urine output. 6 minute walk test completed with qualification of home oxygen. Patient stable for discharge home with family & heart failure clinic follow up. Declined HH. Discussed care plan with patient and family, verbalized understanding. All questions answered.        Goals of Care Treatment Preferences:  Code Status: Partial Code    Health care agent: Pt's daughter's are NOK  Health care agent number: No value filed.                      Consults:   Consults (From admission, onward)          Status Ordering Provider     Inpatient consult to Palliative Care  Once        Provider:  (Not yet assigned)    Completed BERTHA RAI     Inpatient consult to Cardiology  Once        Provider:  (Not yet assigned)    Completed BERTHA RAI     Inpatient consult to Social Work/Case Management  Once        Provider:  (Not yet assigned)    Acknowledged BERTHA RAI     Inpatient consult to Registered Dietitian/Nutritionist  Once        Provider:  (Not yet assigned)    Completed BERTHA RAI            Cardiac/Vascular  * Acute on chronic combined systolic and diastolic congestive heart failure  Acute Respiratory Failure  Patient is identified as having Diastolic (HFpEF) heart failure that is Acute on chronic. CHF is currently uncontrolled due to Continued edema of extremities, Weight gain of 9 pounds, and JVD, Rales/crackles on pulmonary exam, and Pulmonary edema/pleural effusion on CXR. Latest ECHO performed and demonstrates- Results for orders placed during the hospital encounter of 07/14/22    Echo Saline Bubble? No    Interpretation Summary  · The left ventricle is normal in size with mild eccentric hypertrophy and low normal systolic function.  · The estimated ejection fraction is 55%.  · Grade III left ventricular diastolic dysfunction.  · Normal right ventricular size with mildly reduced  right ventricular systolic function.  · Severe left atrial enlargement.  · Mild right atrial enlargement.  · There is severe aortic valve stenosis.  · Aortic valve area is 0.55 cm2; peak velocity is 4.32 m/s; mean gradient is 53 mmHg.  · Mild aortic regurgitation.  · Moderate-to-severe mitral regurgitation.  · Moderate tricuspid regurgitation.  · Normal central venous pressure (3 mmHg).  · The estimated PA systolic pressure is 51 mmHg.  · There is mild-moderate pulmonary hypertension.  . Continue Beta Blocker, ACE/ARB, and Furosemide and monitor clinical status closely. Restarted home medications with BP parameters  Place on fluid restriction of 1.5 L.   Cardiology has been consulted given difficulty diuresing with the aortic stenosis  Continue to stress to patient importance of self efficacy and  on diet for CHF.   Last BNP reviewed- and noted below   Recent Labs   Lab 09/27/24  1155   BNP 2,388*         Severe aortic stenosis  - known, chronic  - patient was to have a TAVR in 2021, but patient refused  - repeat echo below  - Palliative care consulted given severe disease, patient and family declining outpatient follow up at this time    Results for orders placed during the hospital encounter of 09/27/24    Echo    Interpretation Summary    Left Ventricle: The left ventricle is normal in size. Moderately increased ventricular mass. Mildly increased wall thickness. Mild septal thickening. There is moderate concentric hypertrophy. Global hypokinesis present. There is severely reduced systolic function with a visually estimated ejection fraction of 20 - 25%. Ejection fraction by visual approximation is 23% with marked worsening compared with over 2 years ago.    Right Ventricle: Normal right ventricular cavity size. Wall thickness is normal. Systolic function is normal.    Left Atrium: Left atrium is severely dilated.    Aortic Valve: There is severe aortic valve sclerosis. Severely restricted motion. There is  severe ( critical) stenosis. Aortic valve area by VTI is 0.31 cm2. Aortic valve peak velocity is 4.63 m/s. Mean gradient is 60 mmHg. The dimensionless index is 0.10. There is trace aortic regurgitation.    Mitral Valve: There is mild bileaflet sclerosis. There is mild mitral annular calcification present. There is moderate to moderate-severe regurgitation.    Tricuspid Valve: There is moderate regurgitation.    Pulmonary Artery: There is moderate pulmonary hypertension. The estimated pulmonary artery systolic pressure is 55 mmHg.    IVC/SVC: Intermediate venous pressure at 8 mmHg.    Pericardium: There is a  trivial-small posterior effusion at the base and trivial under the RA.      Essential hypertension  Chronic, controlled. Latest blood pressure and vitals reviewed-     Temp:  [96.3 °F (35.7 °C)-98.6 °F (37 °C)]   Pulse:  [64-78]   Resp:  [18-20]   BP: ()/(43-60)   SpO2:  [90 %-96 %] .   Home meds for hypertension were reviewed and noted below.   Hypertension Medications               carvediloL (COREG) 6.25 MG tablet Take 0.5 tablets (3.125 mg total) by mouth 2 (two) times daily with meals.    furosemide (LASIX) 80 MG tablet Take 1 tablet (80 mg total) by mouth once daily.    valsartan (DIOVAN) 160 MG tablet Take 1/2 (one-half) tablet by mouth once daily    furosemide (LASIX) 40 MG tablet Take 1 tablet (40 mg total) by mouth 2 (two) times daily.            While in the hospital, will manage blood pressure as follows; Continue home antihypertensive regimen> discontinued home meds for now given hypotension with IV lasix, Restart home meds with BP parameters    Renal/  Hyperkalemia  Hyperkalemia is likely due to BIJAL.The patients most recent potassium results are listed below.  Recent Labs     09/30/24  0637 10/01/24  0405 10/02/24  0453   K 4.0 4.0 4.1       Plan  - The patient's hyperkalemia is resolved            Endocrine  Type 2 diabetes mellitus without complication, without long-term current use of  "insulin  Patient's FSGs are controlled on current medication regimen.  Last A1c reviewed-   Lab Results   Component Value Date    HGBA1C 6.2 (H) 09/27/2024     Most recent fingerstick glucose reviewed-   Recent Labs   Lab 10/01/24  1644 10/01/24  2119 10/02/24  0642   POCTGLUCOSE 197* 187* 137*     Continue home meds      Final Active Diagnoses:    Diagnosis Date Noted POA    PRINCIPAL PROBLEM:  Acute on chronic combined systolic and diastolic congestive heart failure [I50.43] 09/27/2024 Yes    Palliative care encounter [Z51.5] 09/30/2024 Not Applicable    Advance care planning [Z71.89] 09/30/2024 Not Applicable    Dyspnea [R06.00] 09/30/2024 Unknown    Acute hypoxemic respiratory failure [J96.01] 09/27/2024 Yes    Hyperkalemia [E87.5] 09/27/2024 Yes    Paroxysmal atrial fibrillation [I48.0] 12/19/2022 Yes    Personal history of lung cancer [Z85.118] 11/11/2022 Not Applicable    Obesity [E66.9] 05/09/2021 Yes    Severe aortic stenosis [I35.0] 05/08/2021 Yes     Chronic    Type 2 diabetes mellitus without complication, without long-term current use of insulin [E11.9] 05/06/2021 Yes     Chronic    Essential hypertension [I10] 05/06/2021 Yes     Chronic    HLD (hyperlipidemia) [E78.5] 05/06/2021 Yes     Chronic      Problems Resolved During this Admission:       Discharged Condition: fair    Disposition: Home or Self Care    Follow Up:   Follow-up Information       Uriel Romero MD. Go on 11/26/2024.    Specialties: Cardiology, Interventional Cardiology  Why: Follow up 11/26 at 1130a  Contact information:  [address] [facility] [address]  [phone]               Savanna Gamez PA-C. Go on 10/10/2024.    Specialties: Transplant, Cardiology  Why: Follow up 10/10 at 11a  Contact information:  [address] [facility] [address]  [phone]                           Patient Instructions:      BATH/SHOWER CHAIR FOR HOME USE     Order Specific Question Answer Comments   Height: 5' 3" (1.6 m)  " "  Weight: 63.6 kg (140 lb 3.4 oz)    Does patient have medical equipment at home? wheelchair owns but does not use rollator   Length of need (1-99 months): 99    Type: With back      3 IN 1 COMMODE FOR HOME USE     Order Specific Question Answer Comments   Type: Standard    Height: 5' 3" (1.6 m)    Weight: 63.6 kg (140 lb 3.4 oz)    Does patient have medical equipment at home? wheelchair owns but does not use rollator   Length of need (1-99 months): 99      OXYGEN FOR HOME USE     Order Specific Question Answer Comments   Liter Flow 2    Duration Continuous    Qualifying Test Performed at: Rest    Oxygen saturation: 85    Portable mode: continuous    Route nasal cannula    Device: home concentrator with portable concentrator    Length of need (in months): 99 mos    Patient condition with qualifying saturation CHF    Height: 5' 3" (1.6 m)    Weight: 63.6 kg (140 lb 3.4 oz)    Alternative treatment measures have been tried or considered and deemed clinically ineffective. Yes      ACCEPT - Ambulatory referral/consult to Interventional Cardiology   Standing Status: Future   Referral Priority: Routine Referral Type: Consultation   Referral Reason: Specialty Services Required   Requested Specialty: Interventional Cardiology   Number of Visits Requested: 1     Ambulatory referral/consult to Heart Failure Transitional Care Clinic   Standing Status: Future   Referral Priority: Routine Referral Type: Consultation   Referral Reason: Specialty Services Required   Requested Specialty: Cardiology   Number of Visits Requested: 1     Notify your health care provider if you experience any of the following:  difficulty breathing or increased cough     Notify your health care provider if you experience any of the following:  persistent dizziness, light-headedness, or visual disturbances     Activity as tolerated       Significant Diagnostic Studies: Labs: All labs within the past 24 hours have been reviewed    Pending Diagnostic " Studies:       Procedure Component Value Units Date/Time    HIV 1/2 Ag/Ab (4th Gen) [[id number]] Collected: 09/27/24 1155    Order Status: Sent Lab Status: No result     Specimen: Blood     Hepatitis C Antibody [[id number]] Collected: 09/27/24 1155    Order Status: Sent Lab Status: No result     Specimen: Blood            Medications:  Reconciled Home Medications:      Medication List        CHANGE how you take these medications      carvediloL 6.25 MG tablet  Commonly known as: COREG  Take 0.5 tablets (3.125 mg total) by mouth 2 (two) times daily with meals. Do not give dose if blood pressure is less than 110/70  What changed: additional instructions     * furosemide 80 MG tablet  Commonly known as: LASIX  Take 1 tablet (80 mg total) by mouth once daily.  What changed: Another medication with the same name was changed. Make sure you understand how and when to take each.     * furosemide 40 MG tablet  Commonly known as: LASIX  Take 1 tablet (40 mg total) by mouth nightly as needed. Take in the evening as needed for edema or shortness of breath  What changed:   when to take this  reasons to take this  additional instructions     metFORMIN 500 MG tablet  Commonly known as: GLUCOPHAGE  Take 1,000 mg by mouth 2 (two) times daily with meals.  What changed: Another medication with the same name was removed. Continue taking this medication, and follow the directions you see here.     valsartan 160 MG tablet  Commonly known as: DIOVAN  Take 0.5 tablets (80 mg total) by mouth once daily. Do not give dose if blood pressure is less than 110/70  What changed:   when to take this  additional instructions           * This list has 2 medication(s) that are the same as other medications prescribed for you. Read the directions carefully, and ask your doctor or other care provider to review them with you.                CONTINUE taking these medications      amiodarone 200 MG Tab  Commonly known as: PACERONE  Take 1 tablet by mouth  once daily     atorvastatin 40 MG tablet  Commonly known as: LIPITOR  Take 1 tablet (40 mg total) by mouth once daily.     citalopram 20 MG tablet  Commonly known as: CeleXA  Take 20 mg by mouth once daily.     ELIQUIS 5 mg Tab  Generic drug: apixaban  Take 5 mg by mouth 2 (two) times daily.     FARXIGA 5 mg Tab tablet  Generic drug: dapagliflozin propanediol  Take 1 tablet (5 mg total) by mouth once daily.     levothyroxine 75 MCG tablet  Commonly known as: SYNTHROID  Take 75 mcg by mouth before breakfast.     meclizine 25 mg tablet  Commonly known as: ANTIVERT  Take 25 mg by mouth once daily.     potassium chloride SA 20 MEQ tablet  Commonly known as: K-DUR,KLOR-CON  Take 1 tablet (20 mEq total) by mouth 2 (two) times daily.     TRESIBA FLEXTOUCH U-100 100 unit/mL (3 mL) insulin pen  Generic drug: insulin degludec  Inject 18 Units into the skin every evening.              Indwelling Lines/Drains at time of discharge:   Lines/Drains/Airways       None                   Time spent on the discharge of patient: 35 minutes of the time sent on discharge, including examining the patient, providing discharge instructions, arranging follow up, and documentation           Shelley Lovelace PA-C  Department of Hospital Medicine  [facility] - Cardiology Stepdown

## 2024-10-02 NOTE — PHYSICIAN QUERY
"Please clarify the conflicting documentation in regards to the TYPE of heart failure.      To respond, click "New Note" select your response from the drop down box complete the required information and sign to complete the query process     Acute on chronic combined systolic and diastolic heart failure    "

## 2024-10-02 NOTE — ASSESSMENT & PLAN NOTE
Patient's FSGs are controlled on current medication regimen.  Last A1c reviewed-   Lab Results   Component Value Date    HGBA1C 6.2 (H) 09/27/2024     Most recent fingerstick glucose reviewed-   Recent Labs   Lab 10/01/24  1644 10/01/24  2119 10/02/24  0642   POCTGLUCOSE 197* 187* 137*     Continue home meds

## 2024-10-04 ENCOUNTER — DOCUMENTATION ONLY (OUTPATIENT)
Dept: CARDIOLOGY | Facility: CLINIC | Age: 89
End: 2024-10-04
Payer: MEDICARE

## 2024-10-04 NOTE — PROGRESS NOTES
"[facility]) hospital discharge 48-72 hour phone follow up completed.     Most Recent Hospital Discharge Date: 10/2/2024  Last admission Diagnosis/chief complaint: SOB    TCC LPN Navigator spoke with her daughter Ms. Kesha Marino.    Current Patient reported weight: 142.2lbs    Current Patient reported blood pressure and heart rate: BP-101/61 and HR-65. These vitals were from yesterday. She did check her vitals this morning and wrote them down. She could not get to them because the tablet is in the kitchen and the floor was wet.    Pt reports the following:  []  Shortness of Breath with Activity  []  Shortness of Breath at rest   []  Fatigue  []  Edema   [] Chest pain or tightness  [] Weight Increase since discharge  [x] None of the above    Medications:   Discharge medication reviewed with pt.  Pt reports having medication list available and has all medications at home for use per list. Ms. Marino confirmed her mother does have two separate prescriptions of lasix. She has 80mg to be taken once daily. And 40mg only as needed.      Education:   Confirmed pt received "Home Care Guide for Heart Failure Patients" while admitted. Reviewed key points as listed below.     Recommend 2 -3 gram sodium restriction and 1500 cc-2000 cc fluid restriction.  Encourage physical activity with graded exercise program.  Requested patient to weigh themselves daily, and to notify us if their weight increases by more than 3 lbs in 1 day or 5 lbs in 3 days.   Reminded patient to use "Daily weight and symptom tracker" to record and guide patient on when and how to call HFT[facility]. PT may also use symptom tracker if no scale available  Pt reports being in the Green (color) Zone. If in yellow/red, reminded that they should be calling HFT[facility] today or now.     Watch for these Signs and Symptoms: If any of these occur, contact [facility] immediately:   Increase in shortness of breath with movement   Increase in swelling " in your legs and ankles   Weight gain of more than 3 pounds in a day or 5 pounds in 3 days.   Difficulty breathing when you are lying down   Worsening fatigue or tiredness   Stomach bloating, a full feeling or a loss of appetite   Increased coughing--especially when you are lying down      Pt was able to verbalize back to LPN in their own words correct diet/fluid restrictions, necessity for exercise, warning signs and symptoms, when and how to contact their TCC team.      Pt educated on follow-up plan while in HFT[facility] program to include:   Week 1 -  F/u appt with Provider and Nurse (Date) 10/10/2024. Ms. Marino states she will not be able to get her mother to her appt on that day. She proceeded to tell me she can make it on 10/23/2024. She was informed our services are only for one month and you are pushing her out 3 weeks. Ms. Marino said she already had these things planned. The pt is rescheduled to desired date with weekly calls leading up to the date of rescheduled appt.    Week 2-5 - In person/ Virtual/ phone call check ins    Week 5-7 - Pt will discharge from [facility] and transition to longterm care provider (Cardiology/PCP/ Advanced Heart Failure).      Patient active on myChart? No      Pt given the following contact information for ease of communication: [phone] (Mon-Fri, 8a-5p) & for urgent issues on the weekend to page the Heart Transplant MD on call.  Pt also encouraged utilize [facility] messaging as well.      Will follow up with pt at first clinic visit and Nurse navigator available for pt questions, issues or concerns.

## 2024-10-17 NOTE — PROGRESS NOTES
HF T[facility] Provider Note (Initial Clinic) Consult Note    Age: 89 y.o.  Gender: female  Ethnicity: White  Number of admissions for CHF within the preceding year: 1   Type of Congestive Heart Failure: Combined   Etiology: Valvular   Enrolled in Infusion suite: no    Diagnostic Labs:   EKG - 09/27/2024  CXR - 09/29/2024  ECHO - 09/27/2024  Stress test -   Stress echo -   Pharmacologic stress -   Cardiac catheterization -    Cardiac MRI -     Lab Results   Component Value Date     10/02/2024     10/01/2024    K 4.1 10/02/2024    K 4.0 10/01/2024     10/02/2024     10/01/2024    CO2 27 10/02/2024    CO2 32 (H) 10/01/2024     (H) 10/02/2024     (H) 10/01/2024    BUN 20 10/02/2024    BUN 21 10/01/2024    CREATININE 1.0 10/02/2024    CREATININE 0.9 10/01/2024    CALCIUM 9.2 10/02/2024    CALCIUM 9.5 10/01/2024    PROT 6.9 09/27/2024    PROT 6.1 07/25/2024    ALBUMIN 3.3 (L) 09/27/2024    ALBUMIN 2.9 (L) 07/25/2024    BILITOT 0.7 09/27/2024    BILITOT 1.0 07/25/2024    ALKPHOS 77 09/27/2024    ALKPHOS 85 07/25/2024    AST 31 09/27/2024    AST 24 07/25/2024    ALT 25 09/27/2024    ALT 26 07/25/2024    ANIONGAP 10 10/02/2024    ANIONGAP 7 (L) 10/01/2024    ESTGFRAFRICA >60.0 06/21/2022    ESTGFRAFRICA >60.0 11/30/2021    EGFRNONAA 57.7 (A) 06/21/2022    EGFRNONAA >60.0 11/30/2021       Lab Results   Component Value Date    WBC 7.37 09/30/2024    WBC 7.92 09/29/2024    RBC 3.97 (L) 09/30/2024    RBC 4.06 09/29/2024    HGB 12.0 09/30/2024    HGB 12.6 09/29/2024    HCT 39.2 09/30/2024    HCT 40.2 09/29/2024    MCV 99 (H) 09/30/2024    MCV 99 (H) 09/29/2024    MCH 30.2 09/30/2024    MCH 31.0 09/29/2024    MCHC 30.6 (L) 09/30/2024    MCHC 31.3 (L) 09/29/2024    RDW 19.4 (H) 09/30/2024    RDW 19.2 (H) 09/29/2024     09/30/2024     09/29/2024    MPV 11.5 09/30/2024    MPV 11.1 09/29/2024    IMMGR 0.3 09/30/2024    IMMGR 0.4 09/29/2024    IGABS 0.02 09/30/2024    IGABS 0.03 09/29/2024     LYMPH 1.1 09/30/2024    LYMPH 14.7 (L) 09/30/2024    MONO 1.1 (H) 09/30/2024    MONO 15.5 (H) 09/30/2024    EOS 0.1 09/30/2024    EOS 0.1 09/29/2024    BASO 0.04 09/30/2024    BASO 0.05 09/29/2024    NRBC 0 09/30/2024    NRBC 0 09/29/2024    GRAN 5.0 09/30/2024    GRAN 67.4 09/30/2024    EOSINOPHIL 1.6 09/30/2024    EOSINOPHIL 1.0 09/29/2024    BASOPHIL 0.5 09/30/2024    BASOPHIL 0.6 09/29/2024    PLTEST Appears normal 11/08/2022    PLTEST Appears normal 11/07/2022    ANISO Slight 11/09/2022    ANISO Slight 11/08/2022    HYPO Occasional 11/09/2022       Lab Results   Component Value Date    BNP 2,388 (H) 09/27/2024     (H) 12/19/2022    MG 2.0 10/02/2024    MG 2.1 10/01/2024    PHOS 3.8 05/12/2021    PHOS 4.1 05/11/2021    TROPONINI 0.016 09/27/2024    TROPONINI 0.055 (H) 11/08/2022    HGBA1C 6.2 (H) 09/27/2024    HGBA1C 7.6 (H) 11/07/2022    TSH 1.488 11/08/2022    TSH 1.970 11/30/2021       Lab Results   Component Value Date    CHOL 90 (L) 11/08/2022    TRIG 157 (H) 11/08/2022    HDL 24 (L) 11/08/2022    LDLCALC 34.6 (L) 11/08/2022    CHOLHDL 26.7 11/08/2022    TOTALCHOLEST 3.8 11/08/2022    NONHDLCHOL 66 11/08/2022    COLORU Colorless (A) 09/27/2024    APPEARANCEUA Clear 09/27/2024    PHUR 8.0 09/27/2024    SPECGRAV 1.010 09/27/2024    PROTEINUA Negative 09/27/2024    GLUCUA Trace (A) 09/27/2024    KETONESU Negative 09/27/2024    BILIRUBINUA Negative 09/27/2024    OCCULTUA Negative 09/27/2024    NITRITE Negative 09/27/2024    LEUKOCYTESUR Negative 09/27/2024       No implanted cardiac devices    Current Outpatient Medications on File Prior to Visit   Medication Sig Dispense Refill    amiodarone (PACERONE) 200 MG Tab Take 1 tablet by mouth once daily 90 tablet 3    apixaban (ELIQUIS) 5 mg Tab Take 5 mg by mouth 2 (two) times daily.      atorvastatin (LIPITOR) 40 MG tablet Take 1 tablet (40 mg total) by mouth once daily. 30 tablet 6    carvediloL (COREG) 6.25 MG tablet Take 0.5 tablets (3.125 mg total) by  mouth 2 (two) times daily with meals. Do not give dose if blood pressure is less than 110/70 90 tablet 3    citalopram (CELEXA) 20 MG tablet Take 20 mg by mouth once daily.      dapagliflozin (FARXIGA) 5 mg Tab tablet Take 1 tablet (5 mg total) by mouth once daily. 90 tablet 1    furosemide (LASIX) 40 MG tablet Take 1 tablet (40 mg total) by mouth nightly as needed. Take in the evening as needed for edema or shortness of breath 60 tablet 11    furosemide (LASIX) 80 MG tablet Take 1 tablet (80 mg total) by mouth once daily. 90 tablet 3    levothyroxine (SYNTHROID) 75 MCG tablet Take 75 mcg by mouth before breakfast.       meclizine (ANTIVERT) 25 mg tablet Take 25 mg by mouth once daily.       metformin (GLUCOPHAGE) 500 MG tablet Take 1,000 mg by mouth 2 (two) times daily with meals.       potassium chloride SA (K-DUR,KLOR-CON) 20 MEQ tablet Take 1 tablet (20 mEq total) by mouth 2 (two) times daily. 180 tablet 3    TRESIBA FLEXTOUCH U-100 100 unit/mL (3 mL) InPn Inject 18 Units into the skin every evening. 3 Syringe 3    valsartan (DIOVAN) 160 MG tablet Take 0.5 tablets (80 mg total) by mouth once daily. Do not give dose if blood pressure is less than 110/70 45 tablet 3     No current facility-administered medications on file prior to visit.         HPI:  Patient reports improvement in SOB from recent admission, able to ambulate within home and pace slow. Endorses SOB with walking longer distances. Present to clinic in wheelchair   Patient sleeps on 2 number of pillows   Patient wakes up SOB, has to get out of bed, associated cough- denies PND symptoms   Palpitations - denies   Dizzy, light-headed, pre-syncope or syncope- reports falling and hitting head 10 days ago after losing footing. Denies associated dizziness/lightheadedness or pre-syncope/syncope. Denies N/V, headaches, vision changes, AMS, weakness s/p fall.   Since discharge frequency of performing weights, home weight and weight change- performing daily  weights. Down 10+lbs from admission. Currently weight trending within a few lbs of 137lbs on home scale   Other information felt pertinent to HPI: Ms. Stacia Perry is a 89 yo female with a PMHx of T2DM, HTN, HLD, pAF on eliquis, severe aortic stenosis, HFpEF, lung adenocarcinoma s/p stereotactic body radiotherapy (2022) who [facility] recent admission for ADHF in setting of critical AS and new HFrEF (EF 20-25%). Patient admitted to hospital medicine for CHF exacerbation, started on IV lasix with good urine output. Per chart review previous discussions regarding TAVR but pt ultimately declined. Transitioned to home PO lasix with good urine output. 6 minute walk test completed with qualification of home oxygen.      PHYSICAL:   Vitals:    10/23/24 1018   BP: (!) 108/59   Pulse: 68      @[id number](3)@    JVD: at clavicle sitting   Heart rhythm: regular  Cardiac murmur: Yes     S3: no  S4: no  Lungs: crackles in posterior lung bases  Hepatojugular reflux: yes, midneck sitting  Edema: yes, <1+ BLE edema to level of shin    Echo 9/27/24:    Left Ventricle: The left ventricle is normal in size. Moderately increased ventricular mass. Mildly increased wall thickness. Mild septal thickening. There is moderate concentric hypertrophy. Global hypokinesis present. There is severely reduced systolic function with a visually estimated ejection fraction of 20 - 25%. Ejection fraction by visual approximation is 23% with marked worsening compared with over 2 years ago.    Right Ventricle: Normal right ventricular cavity size. Wall thickness is normal. Systolic function is normal.    Left Atrium: Left atrium is severely dilated.    Aortic Valve: There is severe aortic valve sclerosis. Severely restricted motion. There is severe ( critical) stenosis. Aortic valve area by VTI is 0.31 cm2. Aortic valve peak velocity is 4.63 m/s. Mean gradient is 60 mmHg. The dimensionless index is 0.10. There is trace  aortic regurgitation.    Mitral Valve: There is mild bileaflet sclerosis. There is mild mitral annular calcification present. There is moderate to moderate-severe regurgitation.    Tricuspid Valve: There is moderate regurgitation.    Pulmonary Artery: There is moderate pulmonary hypertension. The estimated pulmonary artery systolic pressure is 55 mmHg.    IVC/SVC: Intermediate venous pressure at 8 mmHg.    Pericardium: There is a  trivial-small posterior effusion at the base and trivial under the RA.    ASSESSMENT: new HFrEF    PLAN:      Patient Instructions:   Instruct the patient to notify this clinic if HH, a physician or an advanced care provider wants to change medication one of their HF medications   Activity and Diet restrictions:   Recommend 2-3 gram sodium restriction and 1500cc- 2000cc fluid restriction.  Encourage physical activity with graded exercise program.  Requested patient to weigh themselves daily, and to notify us if their weight increases by more than 3 lbs in 1 day or 5 lbs in 3 days.    Assigned dry weight on home scale: unsure, currently 137lbs  Medication changes (include current dose and changed dose): NYHA Class III symptoms. Volume up on exam but otherwise not overly symptomatic. Critical AS on TTE during admission with reduction in EF (20-25%). Continue lasix 80mg daily with instructions to take prn afternoon 40mg lasix dose for the next 2 days. Caution to avoid overdiuresis given severe AS. Not taking coreg or valsartan consistently due to SBP 90-100s at home (has taken once or twice since discharge). Unlikely to tolerate further uptitration of GDMT given hypotension. Pending trend in renal function, may need to reduce eliquis to 2.5mg BID.  Upcoming labs and date anticipated: presents to first TCC visit 3 weeks s/p discharge. Plan for weekly phone check ins with RTC prn  Other diagnostic tests ordered: IC appointment scheduled with Dr. Romero 11/26. If not TAVR candidate would  recommend palliative care referral in future. Will message Gen cards for close follow up    Savanna Gamez PA-C

## 2024-10-18 ENCOUNTER — DOCUMENTATION ONLY (OUTPATIENT)
Dept: CARDIOLOGY | Facility: CLINIC | Age: 89
End: 2024-10-18
Payer: MEDICARE

## 2024-10-18 NOTE — PROGRESS NOTES
"Heart Failure [facility]) weekly phone follow up / triage call completed.     TCC LPN Navigator spoke with Ms. Kesha Marino, the pt's daughter.    Current Patient reported weight: 138.4lbs   Patient Goal Weight: (not assigned yet)  Recent Patient reported blood pressure and heart rate: BP-92/46 and HR-70 before medication.     Pt reports the following:  []  Shortness of Breath with Activity  []  Shortness of Breath at rest   []  Fatigue  [x]  Edema only when legs dangle for too long. Family elevate her legs to get the swelling to go down.  [] Chest pain or tightness  [] Weight Increase since discharge  [x] None of the above    Pt reports using "Daily weight and symptom tracker".    Pt reports being in the Green (color) Zone. If in yellow/red, reminded that they should be calling HFTCC today or now.     Medications:   Medication compliance reviewed with pt.  Pt reports having medication list available and has all medications at home for use per list.     Education:   Confirmed pt still has "Heart Failure [facility]itional [facility] Clinic Home Care Guide"  .     Reminded of key points as listed below.     Recommend 2 -3 gram sodium restriction and 1500 cc-2000 cc fluid restriction.  Encourage physical activity with graded exercise program.  Requested patient to weigh themselves daily, and to notify us if their weight increases by more than 3 lbs in 1 day or 5 lbs in 3 days.   Reminded to use "Daily weight and symptom tracker".  Even if pt does not have a scale, to use symptom tracker.       Watch for these Signs and Symptoms: If any of these occur, contact HFT[facility] immediately:   Increase in shortness of breath with movement   Increase in swelling in your legs and ankles   Weight gain of more than 3 pounds in a day or 5 pounds in 3 days.   Difficulty breathing when you are lying down   Worsening fatigue or tiredness   Stomach bloating, a full feeling or a loss of appetite   Increased coughing--especially " when you are lying down      Pt was able to verbalize back to Nurse in their own words correct diet/fluid restrictions, necessity for exercise, warning signs and symptoms, when and how to contact their HFTCC team.      Pt reminded of upcoming appointment.  PT reports they will attend. 10/23/2024      Pt [facility] of how and [facility]  [phone] (Mon-Fri, 8a-5p) & for urgent issues on the weekend to page the Heart Transplant MD on call.  Pt also encouraged utilize [facility] messaging as well.      Pt verbalized understanding and in agreement of plan.       Will follow up with pt at next clinic visit and Nurse navigator available for pt questions, issues or concerns.

## 2024-10-23 ENCOUNTER — DOCUMENTATION ONLY (OUTPATIENT)
Dept: CARDIOLOGY | Facility: CLINIC | Age: 89
End: 2024-10-23

## 2024-10-23 ENCOUNTER — LAB VISIT (OUTPATIENT)
Dept: LAB | Facility: HOSPITAL | Age: 89
End: 2024-10-23
Payer: MEDICARE

## 2024-10-23 ENCOUNTER — OFFICE VISIT (OUTPATIENT)
Dept: CARDIOLOGY | Facility: CLINIC | Age: 89
End: 2024-10-23
Payer: MEDICARE

## 2024-10-23 VITALS
OXYGEN SATURATION: 96 % | SYSTOLIC BLOOD PRESSURE: 108 MMHG | DIASTOLIC BLOOD PRESSURE: 59 MMHG | BODY MASS INDEX: 28.07 KG/M2 | HEART RATE: 68 BPM | WEIGHT: 139.25 LBS | HEIGHT: 59 IN

## 2024-10-23 DIAGNOSIS — E78.00 PURE HYPERCHOLESTEROLEMIA: Chronic | ICD-10-CM

## 2024-10-23 DIAGNOSIS — I50.20 HEART FAILURE WITH REDUCED EJECTION FRACTION: Primary | Chronic | ICD-10-CM

## 2024-10-23 DIAGNOSIS — E11.9 TYPE 2 DIABETES MELLITUS WITHOUT COMPLICATION, WITHOUT LONG-TERM CURRENT USE OF INSULIN: Chronic | ICD-10-CM

## 2024-10-23 DIAGNOSIS — I38 VALVULAR HEART DISEASE: ICD-10-CM

## 2024-10-23 DIAGNOSIS — R06.02 SOB (SHORTNESS OF BREATH): ICD-10-CM

## 2024-10-23 DIAGNOSIS — I35.0 SEVERE AORTIC STENOSIS: Chronic | ICD-10-CM

## 2024-10-23 DIAGNOSIS — Z85.118 PERSONAL HISTORY OF LUNG CANCER: ICD-10-CM

## 2024-10-23 DIAGNOSIS — I48.0 PAROXYSMAL ATRIAL FIBRILLATION: ICD-10-CM

## 2024-10-23 LAB
ALBUMIN SERPL BCP-MCNC: 3.5 G/DL (ref 3.5–5.2)
ALP SERPL-CCNC: 84 U/L (ref 40–150)
ALT SERPL W/O P-5'-P-CCNC: 16 U/L (ref 10–44)
ANION GAP SERPL CALC-SCNC: 11 MMOL/L (ref 8–16)
AST SERPL-CCNC: 17 U/L (ref 10–40)
BASOPHILS # BLD AUTO: 0.04 K/UL (ref 0–0.2)
BASOPHILS NFR BLD: 0.5 % (ref 0–1.9)
BILIRUB SERPL-MCNC: 1.2 MG/DL (ref 0.1–1)
BNP SERPL-MCNC: 2853 PG/ML (ref 0–99)
BUN SERPL-MCNC: 29 MG/DL (ref 8–23)
CALCIUM SERPL-MCNC: 9.6 MG/DL (ref 8.7–10.5)
CHLORIDE SERPL-SCNC: 102 MMOL/L (ref 95–110)
CO2 SERPL-SCNC: 28 MMOL/L (ref 23–29)
CREAT SERPL-MCNC: 1.5 MG/DL (ref 0.5–1.4)
DIFFERENTIAL METHOD BLD: ABNORMAL
EOSINOPHIL # BLD AUTO: 0.1 K/UL (ref 0–0.5)
EOSINOPHIL NFR BLD: 1 % (ref 0–8)
ERYTHROCYTE [DISTWIDTH] IN BLOOD BY AUTOMATED COUNT: 18.3 % (ref 11.5–14.5)
EST. GFR  (NO RACE VARIABLE): 32.9 ML/MIN/1.73 M^2
GLUCOSE SERPL-MCNC: 181 MG/DL (ref 70–110)
HCT VFR BLD AUTO: 44.3 % (ref 37–48.5)
HGB BLD-MCNC: 13.9 G/DL (ref 12–16)
IMM GRANULOCYTES # BLD AUTO: 0.03 K/UL (ref 0–0.04)
IMM GRANULOCYTES NFR BLD AUTO: 0.4 % (ref 0–0.5)
LYMPHOCYTES # BLD AUTO: 0.9 K/UL (ref 1–4.8)
LYMPHOCYTES NFR BLD: 11.4 % (ref 18–48)
MAGNESIUM SERPL-MCNC: 2.2 MG/DL (ref 1.6–2.6)
MCH RBC QN AUTO: 30.9 PG (ref 27–31)
MCHC RBC AUTO-ENTMCNC: 31.4 G/DL (ref 32–36)
MCV RBC AUTO: 98 FL (ref 82–98)
MONOCYTES # BLD AUTO: 0.9 K/UL (ref 0.3–1)
MONOCYTES NFR BLD: 11.7 % (ref 4–15)
NEUTROPHILS # BLD AUTO: 5.9 K/UL (ref 1.8–7.7)
NEUTROPHILS NFR BLD: 75 % (ref 38–73)
NRBC BLD-RTO: 0 /100 WBC
PHOSPHATE SERPL-MCNC: 3.8 MG/DL (ref 2.7–4.5)
PLATELET # BLD AUTO: 250 K/UL (ref 150–450)
PMV BLD AUTO: 11.4 FL (ref 9.2–12.9)
POTASSIUM SERPL-SCNC: 4.9 MMOL/L (ref 3.5–5.1)
PROT SERPL-MCNC: 7.2 G/DL (ref 6–8.4)
RBC # BLD AUTO: 4.5 M/UL (ref 4–5.4)
SODIUM SERPL-SCNC: 141 MMOL/L (ref 136–145)
WBC # BLD AUTO: 7.89 K/UL (ref 3.9–12.7)

## 2024-10-23 PROCEDURE — 99204 OFFICE O/P NEW MOD 45 MIN: CPT | Mod: S$GLB,,,

## 2024-10-23 PROCEDURE — 83735 ASSAY OF MAGNESIUM: CPT

## 2024-10-23 PROCEDURE — 84100 ASSAY OF PHOSPHORUS: CPT

## 2024-10-23 PROCEDURE — 1126F AMNT PAIN NOTED NONE PRSNT: CPT | Mod: CPTII,S$GLB,,

## 2024-10-23 PROCEDURE — 85025 COMPLETE CBC W/AUTO DIFF WBC: CPT

## 2024-10-23 PROCEDURE — 1101F PT FALLS ASSESS-DOCD LE1/YR: CPT | Mod: CPTII,S$GLB,,

## 2024-10-23 PROCEDURE — 1159F MED LIST DOCD IN RCRD: CPT | Mod: CPTII,S$GLB,,

## 2024-10-23 PROCEDURE — 1111F DSCHRG MED/CURRENT MED MERGE: CPT | Mod: CPTII,S$GLB,,

## 2024-10-23 PROCEDURE — 83880 ASSAY OF NATRIURETIC PEPTIDE: CPT

## 2024-10-23 PROCEDURE — 80053 COMPREHEN METABOLIC PANEL: CPT

## 2024-10-23 PROCEDURE — 99999 PR PBB SHADOW E&M-EST. PATIENT-LVL IV: CPT | Mod: PBBFAC,,,

## 2024-10-23 PROCEDURE — 1160F RVW MEDS BY RX/DR IN RCRD: CPT | Mod: CPTII,S$GLB,,

## 2024-10-23 PROCEDURE — 3288F FALL RISK ASSESSMENT DOCD: CPT | Mod: CPTII,S$GLB,,

## 2024-10-23 NOTE — PATIENT INSTRUCTIONS
Continue lasix 80mg once daily.    For the next 2 days, take 40mg as needed lasix dose in the afternoon.    Will plan for weekly phone check ins with return to clinic as needed.    Notify us ([phone]) with any worsening shortness of breath, swelling or 3lb weight gain on home scale.

## 2024-10-23 NOTE — PROGRESS NOTES
"Heart Failure Transitional Care Clinic([facility]) First Week Visit     Pt presents to clinic  10- and accompanied by Daughter Kesha.     Most Recent Hospital Discharge Date:  10-2-24  Last admission Diagnosis/chief complaint:SOB        Visit Vitals:     Wt Readings from Last 3 Encounters:   10/23/24 63.2 kg (139 lb 3.5 oz)   09/29/24 63.6 kg (140 lb 3.4 oz)   09/27/24 69.2 kg (152 lb 8.9 oz)     Temp Readings from Last 3 Encounters:   10/02/24 98.4 °F (36.9 °C) (Oral)   08/15/24 97.8 °F (36.6 °C)   05/17/24 97.1 °F (36.2 °C)     BP Readings from Last 3 Encounters:   10/23/24 (!) 108/59   10/02/24 (!) 89/43   09/27/24 (!) 106/55     Pulse Readings from Last 3 Encounters:   10/23/24 68   10/02/24 76   09/27/24 (!) 56            Pt reports the following:  []  Shortness of Breath with activity  []  Shortness of Breath at rest/ sleeping on 2-3 pillows "some days"  []  Fatigue  []  Edema   [] Chest pain or tightness  [] Weight Increase since discharge  [x] None of the above    Pt reports being in the GREEN(color) Zone. If in yellow/red, reminded that they should be calling [facility] today or now.      Medications:     Pt reports having all medications available and understands how to take them appropriately. Reminded pt to call prior to making any changes to medications.      Education:    [x] Gave pt new  / Confirmed pt has  "Heart Failure Transitional Care Clinic Home Care Guide" .   Reviewed key points as listed below.      Recommend 2 gram sodium restriction and 1500cc fluid restriction.  Encourage physical activity with graded exercise program.  Requested patient to weigh themselves daily, and to notify us if their weight increases by more than 3 lbs in 1 day or 5 lbs in 3 days.      [x] Gave / Reviewed "Daily Weight and Symptom Tracker".  Reviewed with patient when and how to call  [facility] according to "Yellow Zone" and "Red Zone".                  [x] Pt given list of low/high sodium food list. Your Heart Healthy " "Nutrition booklet and [facility] On [facility] magnet given.                   Watch for these Signs and Symptoms: If any of these occur, contact HFTCC immediately:   Increase in shortness of breath with movement   Increase in swelling in your legs and ankles   Weight gain of more than 3 pounds in a night or 5 pounds in 3 days.   Difficulty breathing when you are lying down   Worsening fatigue or tiredness   Stomach bloating, a full feeling or a loss of appetite   Increased coughing--especially when you are lying down     MyChart and Care Companion:              Patient active on myChart? No, pt not interested or does not have supportive technology.    Contacting our Team:              Reviewed with pt how to contact HFTCC RN via phone or [facility] messaging.      HF TCC Program Plan:  Pt educated on follow-up plan while in HFTCC program.   [x]  PT given /reviewed upcoming appointment/check in dates. These will include weekly contact with RN or visits with providers over the next 4-6 weeks.                   [x]  Pt educated that they will transitioned to long term care provider team at week 4-6.  This team will be either Cardiology, PCP or Advanced Heart Failure depending on needs.          Pt was able to verbalize back to RN in their own words correct diet/fluid restrictions, necessity for exercise, warning signs and symptoms, when and how to contact their TCC team .       Plan:   Please see PA-C note.        [x]  Pt given AVS with follow up appointment prn and medication detail list for ease of use.     [x]  Explained to pt they will have a phone "check in" by RN in/on 10-30-24          Will follow up with pt at next clinic visit and RN navigator available for pt questions, issues or concerns.     Please refer to provider visit for additional details and assessment.    Stressed the use of the HFTCC and after hours numbers.    "

## 2024-10-25 ENCOUNTER — TELEPHONE (OUTPATIENT)
Dept: CARDIOLOGY | Facility: CLINIC | Age: 89
End: 2024-10-25
Payer: MEDICARE

## 2024-10-30 ENCOUNTER — TELEPHONE (OUTPATIENT)
Dept: CARDIOLOGY | Facility: CLINIC | Age: 89
End: 2024-10-30
Payer: MEDICARE

## 2024-11-06 ENCOUNTER — DOCUMENTATION ONLY (OUTPATIENT)
Dept: CARDIOLOGY | Facility: CLINIC | Age: 89
End: 2024-11-06
Payer: MEDICARE

## 2024-11-06 NOTE — PROGRESS NOTES
"[facility]) DISCHARGE VISIT - PHONE     Called and spoke to the pt's daughter Ms. Kesha Marino.    Most Recent Hospital Discharge Date: 10/2/2024  Last admission Diagnosis/chief complaint: SOB    Pt discharge completed by phone related to pt convenience.      Pt reports the following:  []  Shortness of Breath with activity  []  Shortness of Breath at rest   []  Fatigue  []  Edema   [] Chest pain or tightness  [] Weight Increase since discharge  [x] None of the above    Medications:   Pt reports having all medications available and understands how to take them appropriately. Reminded pt to call prior to making any changes to medications. Ms. Marino confirmed the pt is taking her diuretic medications as prescribed.     Education:   [x] Confirmed pt still has  "[facility] Home Care Guide" .   Reviewed key points as listed below.     Recommend 2 gram sodium restriction and 1500cc fluid restriction.  Encourage physical activity with graded exercise program.  Requested patient to weigh themselves daily, and to notify us if their weight increases by more than 3 lbs in 1 day or 5 lbs in 3 days.     [x] Reviewed completed "Daily Weight and Symptom Tracker".  Reviewed with patient when and how to call [facility] according to "Yellow Zone" and "Red Zone".       Watch for these Signs and Symptoms: If any of these occur, contact [facility] immediately:   Increase in shortness of breath with movement   Increase in swelling in your legs and ankles   Weight gain of more than 3 pounds in a night or 5 pounds in 3 days.   Difficulty breathing when you are lying down   Worsening fatigue or tiredness   Stomach bloating, a full feeling or a loss of appetite   Increased coughing--especially when you are lying down    Ms. Marino stated her mother did not use the oxygen since she has been home. She tried to have some one pick it up but they would not. I have informed her it has to be " proven she does not need the oxygen first before it is taken away. She was informed to ask on of the doctors she is scheduled with about helping her mom get a six minute walk scheduled. The pt's oxygen was provided through [facility]. Ms. Marino was informed she just need a letter from a provider stating she does not need the oxygen. And to fax the letter to them. When the fax number was offered to Ms. Marino she declined respectfully stating she will let Dr. Romero do it.     MyChart and Care Companion:   Patient active on myChart? No, pt not interested or does not have supportive technology.    HF T[facility] Program Plan:  Pt has successfully completed H[facility] program.  Pt care to be transferred to General Cardiology for long term care.     Pt educated on how to call their offices and how to call [facility] On call in the event of an after hour issue.    PT reminded to continue to follow recommendations made during the [facility] program to include monitoring daily weights, taking medications according to list, following up to appointments per provider recommendations, stop smoking/ start exercising and following a heart friendly low salt, low fluid diet.      Pt was able to verbalize back to LPN in their own words correct diet/fluid restrictions, necessity for exercise, warning signs and symptoms, when and how to contact their  Long term care team .      Plan:     To continue long term cardiology care with General Cardiology.    [x]  Discussed upcoming appointments and/or plan for follow-up care with his/her PCP/Cardiology. The pt has three upcoming cardiology appts. On 11/26/2024 the pt will see Dr. Romero with Interventional Cardiology, 11/29/2024 the pt will see Dr. Trinh with General Cardiology, and 12/13/2024 she will see Dr. Owusu. Ms. Marino stated she would like the appt 11/29/2024 to be canceled. She was encouraged to call our General Cardiology department to have that appt canceled.     All notes created by Savanna  Vera PA-C are available to all providers within our system.    Please refer to provider note for additional details and assessment.

## 2024-11-26 NOTE — PROGRESS NOTES
OUTPATIENT CATHETERIZATION INSTRUCTIONS    You have been scheduled for a procedure in the catheterization lab on Monday, December 16, 2024.     Please report to the Cardiology Waiting Area on the Third floor of the hospital and check in at 7:30 AM.   You will then be taken to the SSCU (Short Stay Cardiac Unit) and prepared for your procedure. Please be aware that this is not the time of your procedure but the time you are to arrive. The procedures are scheduled on an hourly basis; however, emergency cases take precedence over all other cases.       No solid foods 8 hours prior to your procedure.  You may have clear liquids until the time of your admission which should be 2 hours prior to your procedure.  You are encouraged to drink at least 8 ounces of clear liquids prior to your admission to SSCU.  Patients with gastric emptying issues should be fasting for 6- 8 hours prior to the procedure.  Clear liquids include water, black coffee, clear juices, and performance drinks - no pulp or milk.    Heart failure or dialysis patients will be limited to 8 ounces (1 cup) of clear liquids up until 2 hours of the procedure.    3.   You may take your regular morning medications with water. If there are any medications that you should not take you will be instructed to hold them that morning. If you         are diabetic and on Metformin (Glucophage) do not take it the day before, the day of, and for 2 days after your procedure.  4.   If you are on Pradaxa, Eliquis or Coumadin , you can hold them 3 days prior to your procedure.      The procedure will take 1-2 hours to perform. After the procedure, you will return to SSCU on the third floor of the hospital. You will need to lie still (or keep your arm still) for the next 2 to 4 hours to minimize bleeding from the puncture site.  This time is determined by your physician.  Your family may remain in the room with you during this time.       You may be able to be discharged home  "that same afternoon if there is someone to drive you home and there are no complications.  Your doctor will determine, based on your progress, the date and time of your discharge. The results of your procedure will be discussed with you before you are discharged. Any further testing or procedures will be scheduled for you either before you leave or after your discharge..       If you should have any questions, concerns, or need to change the date of your procedure, please call "JOEY Kwong @ [phone]            Special Instructions:    Your last dose of Eliquis will be on Thursday December 12, 2024. It has to be stopped three days before your procedure.    Continue to take your other prescribed medications.      THE ABOVE INSTRUCTIONS WERE GIVEN TO THE PATIENT VERBALLY AND THEY VERBALIZED UNDERSTANDING.  THEY DO NOT REQUIRE ANY SPECIAL NEEDS AND DO NOT HAVE ANY LEARNING BARRIERS.          Directions for Reporting to Cardiology Waiting Area in the Hospital  If you park in the Parking Garage:  Take elevators to the1st floor of the parking garage.  Continue past the gift shop, coffee shop, and piano.  Take a right and go to the gold elevators. (Elevator B)  Take the elevator to the 3rd floor.  Follow the arrow on the sign on the wall that says Cath Lab Registration/EP Lab Registration.  Follow the long hallway all the way around until you come to a big open area.  This is the registration area.  Check in at Reception Desk.    OR    If family is dropping you off:  Have them drop you off at the front of the Hospital under the green overhang.  Enter through the doors and take a right.  Take the E elevators to the 3rd floor Cardiology Waiting Area.  Check in at the Reception Desk in the waiting room.              "

## 2024-11-26 NOTE — PROGRESS NOTES
Subjective:    Patient ID:  Stacia Perry is a 89 y.o. female who presents for evaluation of severe aortic stenosis      Referring physician: Shelley Lovelace     Shortness of Breath  Associated symptoms include leg swelling.     89-year-old female with past medical history of hypertension, hyperlipidemia, chronic systolic heart failure, severe aortic stenosis who was recently hospitalized for volume overload where patient underwent diuresis and underwent an echo that showed severe aortic stenosis and severe mitral regurgitation was sent here for TAVR considerations.  Patient is very frail and is dependent on all her daily activities she was evaluated for TAVR in 2021 but patient declined at that time and also she was found to have a lesion in her lung for which he underwent radiation therapy.  Currently patient complains of having dyspnea on exertion and bilateral lower extremity edema but denies having any chest pain.  She denies having any presyncopal events.  She wants to proceed with TAVR this time      The patient has undergone the following TAVR work-up:   ECHO (Date 9/27/24): Aortic valve area by VTI is 0.31 cm2. Aortic valve peak velocity is 4.63 m/s. Mean gradient is 60 mmHg. The dimensionless index is 0.10 EF= 23%.   [facility] (Date  ): NEEDS ONE   STS: 6%   Frailty: 3/4   Iliacs are >6 on R   LVOT area by CTA is  LVOT area by CTA is 517 cm2 ( 29.0mm X  25.3 mm) and Avg Diameter is 25.7   Incidental findings on CT: Lung lesion for which patient underwent radiation therapy  CT Surgery risk assessment:  Needs to see CT surgery    Rhythm issues:  None  PFTs: FEV1    KCCQ/5 meter walk:   Comorbidities:  CHF, atrial fibrillation, moderate-to-severe mitral regurgitation     Patient is  29 mm Evolut candidate via R TF access.     Past Medical History:   Diagnosis Date    Anticoagulant long-term use     Aortic valve stenosis     Arthritis     Cardiogenic shock 5/7/2021    CHF (congestive heart failure)     Diabetes  mellitus     Hypercholesteremia     Hypertension     Palliative care encounter 9/30/2024    Pneumonia due to infectious organism 5/6/2021    Primary adenocarcinoma of upper lobe of right lung 7/19/2021    Thyroid disease        Past Surgical History:   Procedure Laterality Date    CATARACT EXTRACTION, BILATERAL      EYE SURGERY      FOOT FRACTURE SURGERY Right     HYSTERECTOMY         Current Outpatient Medications on File Prior to Visit   Medication Sig Dispense Refill    amiodarone (PACERONE) 200 MG Tab Take 1 tablet by mouth once daily 90 tablet 3    apixaban (ELIQUIS) 5 mg Tab Take 5 mg by mouth 2 (two) times daily.      atorvastatin (LIPITOR) 40 MG tablet Take 1 tablet (40 mg total) by mouth once daily. 30 tablet 6    citalopram (CELEXA) 20 MG tablet Take 20 mg by mouth once daily.      dapagliflozin (FARXIGA) 5 mg Tab tablet Take 1 tablet (5 mg total) by mouth once daily. 90 tablet 1    furosemide (LASIX) 40 MG tablet Take 1 tablet (40 mg total) by mouth nightly as needed. Take in the evening as needed for edema or shortness of breath 60 tablet 11    furosemide (LASIX) 80 MG tablet Take 1 tablet (80 mg total) by mouth once daily. 90 tablet 3    levothyroxine (SYNTHROID) 75 MCG tablet Take 75 mcg by mouth before breakfast.       meclizine (ANTIVERT) 25 mg tablet Take 25 mg by mouth once daily.       metformin (GLUCOPHAGE) 500 MG tablet Take 1,000 mg by mouth 2 (two) times daily with meals.       potassium chloride SA (K-DUR,KLOR-CON) 20 MEQ tablet Take 1 tablet (20 mEq total) by mouth 2 (two) times daily. 180 tablet 3    TRESIBA FLEXTOUCH U-100 100 unit/mL (3 mL) InPn Inject 18 Units into the skin every evening. 3 Syringe 3    carvediloL (COREG) 6.25 MG tablet Take 0.5 tablets (3.125 mg total) by mouth 2 (two) times daily with meals. Do not give dose if blood pressure is less than 110/70 (Patient not taking: Reported on 11/26/2024) 90 tablet 3    valsartan (DIOVAN) 160 MG tablet Take 0.5 tablets (80 mg total)  "by mouth once daily. Do not give dose if blood pressure is less than 110/70 (Patient not taking: Reported on 11/26/2024) 45 tablet 3     No current facility-administered medications on file prior to visit.       Review of patient's allergies indicates:  No Known Allergies    Social History     Tobacco Use    Smoking status: Never    Smokeless tobacco: Never   Substance Use Topics    Alcohol use: No    Drug use: No       Family History   Problem Relation Name Age of Onset    Diabetes Mother      Heart disease Father      Diabetes Father      Diabetes Sister 1     Diabetes Brother 1     No Known Problems Maternal Aunt      No Known Problems Maternal Uncle      No Known Problems Paternal Aunt      No Known Problems Paternal Uncle      No Known Problems Maternal Grandmother      No Known Problems Maternal Grandfather      No Known Problems Paternal Grandmother      No Known Problems Paternal Grandfather      Heart attack Brother 2     Diabetes Brother 2     Stroke Son      Diabetes Daughter      Anemia Neg Hx      Arrhythmia Neg Hx      Asthma Neg Hx      Clotting disorder Neg Hx      Fainting Neg Hx      Heart failure Neg Hx      Hyperlipidemia Neg Hx      Hypertension Neg Hx      Atrial Septal Defect Neg Hx           Review of Systems   Constitutional: Negative.   HENT: Negative.     Cardiovascular:  Positive for dyspnea on exertion and leg swelling.   Respiratory:  Positive for shortness of breath.    Endocrine: Negative.    Hematologic/Lymphatic: Negative.    Skin: Negative.    Musculoskeletal: Negative.         Objective:     Vitals:    11/26/24 1125 11/26/24 1126   BP: (!) 94/52 (!) 95/52   BP Location: Left arm Right arm   Patient Position: Sitting Sitting   Pulse: 65 63   SpO2: (!) 94%    Weight: 63.5 kg (139 lb 15.9 oz)    Height: 4' 11" (1.499 m)         Physical Exam  Constitutional:       Appearance: Normal appearance.   HENT:      Head: Normocephalic and atraumatic.   Cardiovascular:      Rate and Rhythm: " Normal rate and regular rhythm.      Comments: Late peaking ejection systolic murmur present in the aortic area radiating to carotids  Pansystolic murmur present in the apical area radiating to axilla  Pulmonary:      Effort: Pulmonary effort is normal.      Breath sounds: Normal breath sounds.   Abdominal:      General: Abdomen is flat. Bowel sounds are normal.      Palpations: Abdomen is soft.   Musculoskeletal:         General: Normal range of motion.      Cervical back: Normal range of motion.   Skin:     General: Skin is warm.      Capillary Refill: Capillary refill takes less than 2 seconds.   Neurological:      General: No focal deficit present.      Mental Status: She is alert and oriented to person, place, and time.           Assessmen/Plan       Severe aortic stenosis  Stacia Perry is a 89 y.o. female referred by Shelley Lovelace for evaluation of severe AS (NYHA Class III sx).    The patient has undergone the following TAVR work-up:   ECHO (Date 9/27/24): Aortic valve area by VTI is 0.31 cm2. Aortic valve peak velocity is 4.63 m/s. Mean gradient is 60 mmHg. The dimensionless index is 0.10 EF= 23%.   [facility] (Date  ): NEEDS ONE   STS: 6%   Frailty: 3/4   Iliacs are >6 on R   LVOT area by CTA is  LVOT area by CTA is 517 cm2 ( 29.0mm X  25.3 mm) and Avg Diameter is 25.7   Incidental findings on CT: Lung lesion for which patient underwent radiation therapy  CT Surgery risk assessment:  Needs to see CT surgery    Rhythm issues:  None  PFTs: FEV1    KCCQ/5 meter walk:   Comorbidities:  CHF, atrial fibrillation, moderate-to-severe mitral regurgitation     Patient is  29 mm Evolut candidate via R TF access.     --LHC +/- PCI, patient is a MICHAEL candidate  - Anti-platelet Therapy:    - Access: Right radial  - Catheters: González  - Creatinine/CrCl: 1.2  - Allergies: No shellfish / Iodine allergy  - Pre-Hydration: NS  - Pre-Op Med: Bendaryl 50mg pO   - All patient's questions were answered.  -The risks, benefits and  alternatives of the procedure were explained to the patient.   -The risks of coronary angiography include but are not limited to: bleeding, infection, heart rhythm abnormalities, allergic reactions, kidney injury and potential need for dialysis, stroke and death.   -The risks of moderate sedation include hypotension, respiratory depression, arrhythmias, bronchospasm, and death.   - Informed consent was obtained and the  patient is agreeable to proceed with the procedure.           Type 2 diabetes mellitus without complication, without long-term current use of insulin  A1 well controlled.  Continue to follow up with primary care    HLD (hyperlipidemia)  Continue statins      Chronic combined systolic and diastolic congestive heart failure   Cont coreg,valsartan,Lasix    Adenocarcinoma of  lung   Followed by Hematology , currently in remission   29/21 biopsy, pathology finalized of at least adenocarcinoma in situ with areas suspicious for invasive adenocarcinoma.  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  7/15/21 PET CT again identified the known RUL mass, SUV max 2 and similar size 2.4x1.9cm.  No hypermetabolic lymphadenopathy identified.  Focal hypermetabolic uptake noted in ribs T6, T7, and T8 but are thought to be healing fractures.  No distant metastases identified.  8/16/21-8/25/21 completed SBRT 48 Gy/4 Fx      Atul Farley          IC STAFF  I have seen the patient, reviewed the Fellow's history and physical, assessment and plan. I have personally interviewed and examined the patient and agree with the findings.     LISA Romero MD

## 2024-11-26 NOTE — ASSESSMENT & PLAN NOTE
Stacia Perry is a 89 y.o. female referred by Shelley Lovelace for evaluation of severe AS (NYHA Class III sx).    The patient has undergone the following TAVR work-up:   ECHO (Date 9/27/24): Aortic valve area by VTI is 0.31 cm2. Aortic valve peak velocity is 4.63 m/s. Mean gradient is 60 mmHg. The dimensionless index is 0.10 EF= 23%.   [facility] (Date  ): NEEDS ONE   STS: 8%   Frailty: 4/4   Iliacs are >6 on R   LVOT area by CTA is  cm2 ( mm X  mm) and Avg Diameter is  per my independent review of images on [employer].  Incidental findings on CT: Lung lesion for which patient underwent radiation therapy  CT Surgery risk assessment:  Needs to see CT surgery    Rhythm issues:  None  PFTs: FEV1    KCCQ/5 meter walk:   Comorbidities:  CHF, atrial fibrillation, moderate-to-severe mitral regurgitation        --LHC +/- PCI, patient is a MICHAEL candidate  - Anti-platelet Therapy:    - Access: Right radial  - Catheters: González  - Creatinine/CrCl: 1.2  - Allergies: No shellfish / Iodine allergy  - Pre-Hydration: NS  - Pre-Op Med: Bendaryl 50mg pO   - All patient's questions were answered.  -The risks, benefits and alternatives of the procedure were explained to the patient.   -The risks of coronary angiography include but are not limited to: bleeding, infection, heart rhythm abnormalities, allergic reactions, kidney injury and potential need for dialysis, stroke and death.   -The risks of moderate sedation include hypotension, respiratory depression, arrhythmias, bronchospasm, and death.   - Informed consent was obtained and the  patient is agreeable to proceed with the procedure.

## 2024-11-27 PROBLEM — N17.9 AKI (ACUTE KIDNEY INJURY): Status: ACTIVE | Noted: 2024-01-01

## 2024-11-27 NOTE — ED TRIAGE NOTES
Stacia Perry, a 89 y.o. female presents to the ED w/ complaint of SOB.     [facility] tach[facility]ne[facility], SOB, per daughter, pt has been doubling up on oral lasix at home without relief.     BLE wekrystal noted.     Pt doesn't ware O2 at home, placed on 4L NC. SpO2 was 90 % on RA.     Triage note:  Chief Complaint   Patient presents with    Shortness of Breath     [facility], sob, taking extra lasix     Review of patient's allergies indicates:  No Known Allergies  Past Medical History:   Diagnosis Date    Anticoagulant long-term use     Aortic valve stenosis     Arthritis     Cardiogenic shock 5/7/2021    CHF (congestive heart failure)     Diabetes mellitus     Hypercholesteremia     Hypertension     Palliative care encounter 9/30/2024    Pneumonia due to infectious organism 5/6/2021    Primary adenocarcinoma of upper lobe of right lung 7/19/2021    Thyroid disease         n/a

## 2024-11-27 NOTE — ASSESSMENT & PLAN NOTE
Patient has Combined Systolic and Diastolic heart failure that is Acute on chronic.  NYHA Class 3 at baseline,  requires assistance with all daily activities. On presentation their CHF was decompensated. Evidence of decompensated CHF on presentation includes: edema. BNP elevated at 3162, Lactate 2.95 on admission. CXR significant for interstitial edema. TTE from 09/27/24 significant for decreased LV systolic function with EF of 20-25%.  Started on 4L of O2 via NC.    Suspect 2/2 severe AS with increasingly poor response to PO diuretics in context of recent reinitiation of negative inotrope, afterload reduction.     Plan:  - Diuresis with Lasix Drip   - On 80mg qd +  40 qhs PRN at home   - received 80mg IV in ED   - Gtt 10mg/hr  - Continue dobutamine gtt: 5/hr. Will avoid levo if possible  - weaned off Epi   - Holding home farxiga, coreg, valsartan  - Daily weights (standing if tolerated)   - dry weight unknown. Admit weight: 64.4kg   - Strict I/Os; goal net negative 1.5 - 2 L / day  - Fluid restriction at 1500mL  - Cardiac diet w/ 2 g Na restriction  - Repeat TTE pending  - echo pending  Monitor hemodynamics:   SvO2 26

## 2024-11-27 NOTE — H&P
[facility] - Emergency Dept  Cardiology  History and Physical     Patient Name: Stacia Perry  MRN: [id number]  Admission Date: 11/27/2024  Code Status: DNR   Attending Provider: Jef Alarcon MD   Primary Care Physician: Maureen Parnell MD  Principal Problem:Acute on chronic combined systolic and diastolic congestive heart failure    Patient information was obtained from patient, relative(s), past medical records, and ER records.     Subjective:     Chief Complaint:  dyspnea     HPI:  89F nonsmoker with history of T2DM, pAFib on eliquis/amio, aortic stenosis, and HFpEF 23% (valsartan, farxiga, coreg, lasix) complaining of worsening dyspnea on exertion. Since yesterday she has had dyspnea at rest despite increased use of oral furosemide (80 mg q.a.m. and 40 mg q.h.s. PRN). Her daughter additionally reports dry cough, but no fever or chills. Patient denies any chest/back/abdominal pain, and acknowledges worsening lower extremity swelling despite extra lasix dose. No reported sore throat, neck pain/stiffness, nasal congestion, nausea, vomiting, diarrhea, black bloody stool or any urinary symptoms. Patient was scheduled to have a TAVR in 2021, but ultimately declined due to lung mass that resolved following radiation. Of note, patient was recently discharged on 10/2/2024 after a 4 day hospitalization for CHF exacerbation. Palliative care was consulted and family declined outpatient follow up. She was started on IV lasix and transitioned to PO lasix with good urine output. Patient's last echo was performed on 09/27/2024 which showed EF of 20-25%, severe aortic valve sclerosis, mod to mod-severe MR, mod TR, mod pulm artery hypertension of 55, CVP 8, and trivial-small posterior effusion at the base and trivial under the RA.     Patient seen in Interventional Cardiology clinic the day prior to presentation, where she resumed workup for TAVR, pending [facility]. Her coreg and valsartan were held at discharge and resumed  yesterday following her clinic appointment in addition to taking her evening PRN lasix dose. Has not missed any scheduled daytime lasix doses since last discharge.     On day of presentation, patient's daughter awoke patient and noted increased lethargy, dyspnea, BLE edema with weeping and promptly presented to ED. In the ED, patient's blood pressure was 77/49. K+ 7.5 (hemolyzed), Trop 0.041, BNP 3162 and lactate 2.95. CXR shows diffuse interstitial markings and/or edema. Central line was placed and patient was given lasix 80mg IV and started on lasix gtt (10/hr), epi (0.05), and  (5) with improvement in mentation and MAP to 70. Requiring 4L NC. LA imprvoved to 2.0, procal 0.07. Cr 2.6, up from 1.5 at prev discharge, BL 1.0-1.3    On exam, patient grossly overloaded, daughter at bedside who assists with history. Patient answering questions appropriately, tachypneic but in NAD. Admitting to CCU for hemodynamic support/monitoring, TAVR eval.     Past Medical History:   Diagnosis Date    Anticoagulant long-term use     Aortic valve stenosis     Arthritis     Cardiogenic shock 5/7/2021    CHF (congestive heart failure)     Diabetes mellitus     Hypercholesteremia     Hypertension     Palliative care encounter 9/30/2024    Pneumonia due to infectious organism 5/6/2021    Primary adenocarcinoma of upper lobe of right lung 7/19/2021    Thyroid disease        Past Surgical History:   Procedure Laterality Date    CATARACT EXTRACTION, BILATERAL      EYE SURGERY      FOOT FRACTURE SURGERY Right     HYSTERECTOMY         Review of patient's allergies indicates:  No Known Allergies    No current facility-administered medications on file prior to encounter.     Current Outpatient Medications on File Prior to Encounter   Medication Sig    amiodarone (PACERONE) 200 MG Tab Take 1 tablet by mouth once daily    apixaban (ELIQUIS) 5 mg Tab Take 5 mg by mouth 2 (two) times daily.    atorvastatin (LIPITOR) 40 MG tablet Take 1 tablet  (40 mg total) by mouth once daily.    carvediloL (COREG) 6.25 MG tablet Take 0.5 tablets (3.125 mg total) by mouth 2 (two) times daily with meals. Do not give dose if blood pressure is less than 110/70    citalopram (CELEXA) 20 MG tablet Take 20 mg by mouth once daily.    dapagliflozin (FARXIGA) 5 mg Tab tablet Take 1 tablet (5 mg total) by mouth once daily.    furosemide (LASIX) 40 MG tablet Take 1 tablet (40 mg total) by mouth nightly as needed. Take in the evening as needed for edema or shortness of breath    furosemide (LASIX) 80 MG tablet Take 1 tablet (80 mg total) by mouth once daily.    levothyroxine (SYNTHROID) 75 MCG tablet Take 75 mcg by mouth before breakfast.     meclizine (ANTIVERT) 25 mg tablet Take 25 mg by mouth once daily.     metformin (GLUCOPHAGE) 500 MG tablet Take 1,000 mg by mouth 2 (two) times daily with meals.     potassium chloride SA (K-DUR,KLOR-CON) 20 MEQ tablet Take 1 tablet (20 mEq total) by mouth 2 (two) times daily.    TRESIBA FLEXTOUCH U-100 100 unit/mL (3 mL) InPn Inject 18 Units into the skin every evening.    valsartan (DIOVAN) 160 MG tablet Take 0.5 tablets (80 mg total) by mouth once daily. Do not give dose if blood pressure is less than 110/70     Family History       Problem Relation (Age of Onset)    Diabetes Mother, Father, Sister, Brother, Brother, Daughter    Heart attack Brother    Heart disease Father    No Known Problems Maternal Aunt, Maternal Uncle, Paternal Aunt, Paternal Uncle, Maternal Grandmother, Maternal Grandfather, Paternal Grandmother, Paternal Grandfather    Stroke Son          Tobacco Use    Smoking status: Never    Smokeless tobacco: Never   Substance and Sexual Activity    Alcohol use: No    Drug use: No    Sexual activity: Not Currently     Review of Systems   Constitutional: Positive for diaphoresis, malaise/fatigue and weight gain. Negative for chills and fever.   Cardiovascular:  Positive for cyanosis, dyspnea on exertion, leg swelling and orthopnea.  Negative for chest pain, claudication and syncope.   Respiratory:  Positive for shortness of breath. Negative for cough and wheezing.    Hematologic/Lymphatic: Bruises/bleeds easily.   Musculoskeletal:  Positive for falls and muscle weakness.   Neurological:  Positive for difficulty with concentration.     Objective:     Vital Signs (Most Recent):  Temp: 97.9 °F (36.6 °C) (11/27/24 1131)  Pulse: 67 (11/27/24 1332)  Resp: (!) 27 (11/27/24 1332)  BP: (!) 101/56 (11/27/24 1332)  SpO2: 99 % (11/27/24 1332) Vital Signs (24h Range):  Temp:  [97.9 °F (36.6 °C)] 97.9 °F (36.6 °C)  Pulse:  [34-67] 67  Resp:  [19-32] 27  SpO2:  [94 %-100 %] 99 %  BP: ()/(49-61) 101/56     Weight: 64.4 kg (142 lb)  Body mass index is 28.68 kg/m².    SpO2: 99 %         Intake/Output Summary (Last 24 hours) at 11/27/2024 1359  Last data filed at 11/27/2024 1354  Gross per 24 hour   Intake 50 ml   Output 30 ml   Net 20 ml       Lines/Drains/Airways       Central Venous Catheter Line  Duration             Percutaneous Central Line - Triple Lumen  11/27/24 1250 Internal Jugular Right <1 day              Drain  Duration                  Urethral Catheter 11/27/24 1318 Latex;Double-lumen 16 Fr. <1 day              Peripheral Intravenous Line  Duration                  Peripheral IV - Single Lumen 11/27/24 1157 20 G 1 in Left;Posterior Forearm <1 day                     Physical Exam  Constitutional:       Appearance: She is obese. She is ill-appearing and diaphoretic. She is not toxic-appearing.   HENT:      Head: Normocephalic and atraumatic.      Nose: Nose normal.      Mouth/Throat:      Mouth: Mucous membranes are moist.   Eyes:      General: No scleral icterus.     Extraocular Movements: Extraocular movements intact.   Cardiovascular:      Rate and Rhythm: Normal rate. Rhythm irregular.      Heart sounds: Murmur (I/VI sys RUSB) heard.   Pulmonary:      Breath sounds: Rales present. No rhonchi.   Abdominal:      General: There is  distension.      Tenderness: There is no abdominal tenderness. There is no guarding.   Musculoskeletal:         General: Swelling and signs of injury present.      Right lower leg: Edema present.      Left lower leg: Edema present.   Skin:     Capillary Refill: Capillary refill takes 2 to 3 seconds.      Comments: Cold extremities   Neurological:      Mental Status: She is disoriented.          Significant Labs: All pertinent lab results from the last 24 hours have been reviewed.    Significant Imaging:  reviewed  Assessment and Plan:     * Acute on chronic combined systolic and diastolic congestive heart failure  Patient has Combined Systolic and Diastolic heart failure that is Acute on chronic.  NYHA Class 3 at baseline,  requires assistance with all daily activities. On presentation their CHF was decompensated. Evidence of decompensated CHF on presentation includes: edema. BNP elevated at 3162, Lactate 2.95 on admission. CXR significant for interstitial edema. TTE from 09/27/24 significant for decreased LV systolic function with EF of 20-25%.  Started on 4L of O2 via NC.    Suspect 2/2 severe AS with increasingly poor response to PO diuretics in context of recent reinitiation of negative inotrope, afterload reduction.     Plan:  - Diuresis with Lasix Drip   - On 80mg qd +  40 qhs PRN at home   - received 80mg IV in ED   - Gtt 10mg/hr  - Continue dobutamine gtt: 5/hr. Will avoid levo if possible  - weaned off Epi   - Holding home farxiga, coreg, valsartan  - Daily weights (standing if tolerated)   - dry weight unknown. Admit weight: 64.4kg   - Strict I/Os; goal net negative 1.5 - 2 L / day  - Fluid restriction at 1500mL  - Cardiac diet w/ 2 g Na restriction  - Repeat TTE pending  - echo pending  Monitor hemodynamics:   SvO2 26    BIJAL (acute kidney injury)  BIJAL is likely due to vascular congestion. Baseline creatinine is  1-1.3 . Most recent creatinine and eGFR are listed below.  Recent Labs     11/27/24  1202  11/27/24  1245   CREATININE 2.6* 2.6*   EGFRNORACEVR 17.0* 17.0*      Plan  Baseline Cr 1.0-1.3, discharged at 1.5, returning at 2.6. C/f renal failure, monitoring response to lasix gtt, , Epi   - Monitor urine output, serial BMP, and adjust therapy as needed  - Check urine lytes and renal ultrasound  - Check urine protein creatinine ratio.  - Strict I&Os and daily weights   - Avoid nephrotoxic agents such as NSAIDs, gadolinium and IV radiocontrast.  - Renally dose meds to current GFR.  - Maintain MAP > 65.    Advance care planning  Discussed with patient and daughter at bedside. Consented to central line, pressor support, potential AS intervention. Refusing intubation, CPR.  - DNR    Hyperkalemia  Hyperkalemia is likely due to BIJAL.The patients most recent potassium results are listed below.  Recent Labs     11/27/24  1202 11/27/24  1245   K 7.5* 7.5*     Plan  - Monitor for arrhythmias with EKG and/or continuous telemetry.   - Treat the hyperkalemia with Potassium Binders, Calcium gluconate, IV insulin and dextrose, Nebulized albuterol sulfate, and Furosemide.   - Monitor potassium: Every 6 hours    K 7.5 on arrival, slight hemolysis noted, repeat pending but will temporize and treat given severe BIJAL/ARF, bradycardia, and decreased UOP  - duonebs once in ED  -  1g calcium gluc in ED  - lasix 80 + gtt 10/hr  - insulin regular 5 units + dextrose (D50 if possible)  - trending K        Acute hypoxemic respiratory failure  Not on home oxygen. Suspect hypoxic RF d/t CHF/AS. Blood gas pending.   See CHF    Paroxysmal atrial fibrillation  Patient has paroxysmal (<7 days) atrial fibrillation. Patient is currently in atrial fibrillation. [id number] Score: 4. The patients heart rate in the last 24 hours is as follows:  Pulse  Min: 34  Max: 67     Antiarrhythmics       Anticoagulants       Plan  - Replete lytes with a goal of K>4, Mg >2  - Patient is anticoagulated, see medications listed above.  - Patient's afib is  currently controlled  - Currently afib w HR 50-70  - continue home amiodarone  - hold home coreg  - last eliquis dose 11/27 AM   - starting low intensity heparin gtt 11/27 PM    Severe aortic stenosis  High mortality given CHF (pre-existing?). TAVR candidate per outpatient IC. Echocardiogram with evidence of aortic stenosis that is severe . The patient's most recent echocardiogram result is listed below.    Echo    Result Date: 9/27/2024    Left Ventricle: The left ventricle is normal in size. Moderately   increased ventricular mass. Mildly increased wall thickness. Mild septal   thickening. There is moderate concentric hypertrophy. Global hypokinesis   present. There is severely reduced systolic function with a visually   estimated ejection fraction of 20 - 25%. Ejection fraction by visual   approximation is 23% with marked worsening compared with over 2 years ago.    Right Ventricle: Normal right ventricular cavity size. Wall thickness   is normal. Systolic function is normal.    Left Atrium: Left atrium is severely dilated.    Aortic Valve: There is severe aortic valve sclerosis. Severely   restricted motion. There is severe ( critical) stenosis. Aortic valve area   by VTI is 0.31 cm2. Aortic valve peak velocity is 4.63 m/s. Mean gradient   is 60 mmHg. The dimensionless index is 0.10. There is trace aortic   regurgitation.    Mitral Valve: There is mild bileaflet sclerosis. There is mild mitral   annular calcification present. There is moderate to moderate-severe   regurgitation.    Tricuspid Valve: There is moderate regurgitation.    Pulmonary Artery: There is moderate pulmonary hypertension. The   estimated pulmonary artery systolic pressure is 55 mmHg.    IVC/SVC: Intermediate venous pressure at 8 mmHg.    Pericardium: There is a  trivial-small posterior effusion at the base   and trivial under the RA.        - history of severe volume sensitivity with prior episodes involving profuse diuresis  - pending  outpatient [facility] prior to TAVR.   - IC consult   - echo pending    Cardiogenic shock  See CHF  - trend lactate    HLD (hyperlipidemia)  - Continue home atorvastatin 40mg    Type 2 diabetes mellitus without complication, without long-term current use of insulin  Patient with a hx of T2DM currently controlled. Last A1c at 6.2 on 9/27/24. Home medications include farxiga, metformin (last taken on day of presentation).     Plan:  - Hold oral antihyperglycemics while inpatient  - Follow up on repeat A1c  - Low dose sliding scale insulin PRN  - Blood glucose goal of 140-180     Essential hypertension  Patients blood pressure range in the last 24 hours was: BP  Min: 77/49  Max: 92/51.The patient's home medications include valsartan 160mg qd and coreg 3.125mg BID.     Plan:  - Currently holding all antihypertensives due to hypotension        VTE Risk Mitigation (From admission, onward)           Ordered     heparin 25,000 units in dextrose 5% (100 units/ml) IV bolus from bag LOW INTENSITY nomogram - [facility]  Once        Question:  Heparin Infusion Adjustment (DO NOT MODIFY ANSWER)  Answer:  [email or web address]\Images\Pharmacy\HeparinInfusions\heparin LOW INTENSITY nomogram for [facility] [id number]    11/27/24 1504     heparin 25,000 units in dextrose 5% 250 mL (100 units/mL) infusion LOW INTENSITY nomogram - [facility]  Continuous        Question:  Begin at (units/kg/hr)  Answer:  12    11/27/24 1504     heparin 25,000 units in dextrose 5% (100 units/ml) IV bolus from bag LOW INTENSITY nomogram - [facility]  As needed (PRN)        Question:  Heparin Infusion Adjustment (DO NOT MODIFY ANSWER)  Answer:  \Help/[email or web address]\epic\Images\Pharmacy\HeparinInfusions\heparin LOW INTENSITY nomogram for [facility] [id number]    11/27/24 1504     heparin 25,000 units in dextrose 5% (100 units/ml) IV bolus from bag LOW INTENSITY nomogram - [facility]  As needed (PRN)        Question:  Heparin Infusion Adjustment (DO NOT MODIFY ANSWER)  Answer:   [email or web address]\Images\Pharmacy\HeparinInfusions\heparin LOW INTENSITY nomogram for [facility] [id number]    11/27/24 1504                    Kati He MD  Cardiology   [facility] - Emergency Dept

## 2024-11-27 NOTE — ASSESSMENT & PLAN NOTE
Patient has paroxysmal (<7 days) atrial fibrillation. Patient is currently in atrial fibrillation. [id number] Score: 4. The patients heart rate in the last 24 hours is as follows:  Pulse  Min: 34  Max: 67     Antiarrhythmics       Anticoagulants       Plan  - Replete lytes with a goal of K>4, Mg >2  - Patient is anticoagulated, see medications listed above.  - Patient's afib is currently controlled  - Currently afib w HR 50-70  - continue home amiodarone  - hold home coreg  - last eliquis dose 11/27 AM   - starting low intensity heparin gtt 11/27 PM

## 2024-11-27 NOTE — ED NOTES
Entered room to assist with Central Line insertion. Dr Alarcon and JOSE Berumen ED resident at bedside with central line set up and bedside ultrasound machine. Pt on continuous cardiac monitor, cont pulse ox, auto BP cuff. Pt hypotensive SBP 80s and HR 30s, MD at bedside aware. Pt awake and alert.

## 2024-11-27 NOTE — ASSESSMENT & PLAN NOTE
Hyperkalemia is likely due to BIJAL.The patients most recent potassium results are listed below.  Recent Labs     11/27/24  1202 11/27/24  1245   K 7.5* 7.5*     Plan  - Monitor for arrhythmias with EKG and/or continuous telemetry.   - Treat the hyperkalemia with Potassium Binders, Calcium gluconate, IV insulin and dextrose, Nebulized albuterol sulfate, and Furosemide.   - Monitor potassium: Every 6 hours    K 7.5 on arrival, slight hemolysis noted, repeat pending but will temporize and treat given severe BIJAL/ARF, bradycardia, and decreased UOP  - duonebs once in ED  -  1g calcium gluc in ED  - lasix 80 + gtt 10/hr  - insulin regular 5 units + dextrose (D50 if possible)  - trending K

## 2024-11-27 NOTE — ED NOTES
Assumed care of the patient. Report received from JOEY Steward. Pt on continuous cardiac monitoring, continuous pulse oximetry, and automatic BP cuff cycling Q5min. Pt in hospital gown, side rails up X2, bed low and locked, and call light is placed within reach. One family/visitors at bedside at this time. Pt denies any complaints or needs.

## 2024-11-27 NOTE — ASSESSMENT & PLAN NOTE
Patient presented with low blood pressures and has elevated Lactic acid . Currently being medically managed and she is DNR and doesn't want to undergo any procedures. Cont with Epi ,dobutamine ,lasix.  Agree with medical management.  Will sign off

## 2024-11-27 NOTE — ED PROVIDER NOTES
Encounter Date: 11/27/2024       History     Chief Complaint   Patient presents with    Shortness of Breath     Sent from [facility] clinic, sob, taking extra lasix     HPI  89 Y/O nonsmoker, type 2 diabetic F with Hx of pAFib on eliquis, aortic stenosis, and HFpEF 23% C/O worsening dyspnea on exertion, which was initially on exertion, with decreased exercise tolerance, but since yesterday has had dyspnea at rest despite increase use of oral furosemide (80 mg q.a.m. and 40 mg q.h.s.).  Daughter additionally reports dry cough, but no fever or chills.  Patient denies any chest/back/abdominal pain, and acknowledges worsening lower extremity swelling despite increased use of furosemide.  No reported sore throat, neck pain/stiffness, nasal congestion, nausea, vomiting, diarrhea, black bloody stool or any urinary symptoms.  Patient was scheduled to have a TAVR in 2021, but delayed secondary to severe aortic stenosis/sclerosis with moderate to severe regurgitation of mitral valve, moderate regurgitation tricuspid valve, moderate pulmonary hypertension with a pulmonary artery systolic pressure of 55mmHg and trace/trivial pericardial effusion on echo performed 09/27/2024.         Review of patient's allergies indicates:  No Known Allergies  Past Medical History:   Diagnosis Date    Anticoagulant long-term use     Aortic valve stenosis     Arthritis     Cardiogenic shock 5/7/2021    CHF (congestive heart failure)     Diabetes mellitus     Hypercholesteremia     Hypertension     Palliative care encounter 9/30/2024    Pneumonia due to infectious organism 5/6/2021    Primary adenocarcinoma of upper lobe of right lung 7/19/2021    Thyroid disease      Past Surgical History:   Procedure Laterality Date    CATARACT EXTRACTION, BILATERAL      EYE SURGERY      FOOT FRACTURE SURGERY Right     HYSTERECTOMY       Family History   Problem Relation Name Age of Onset    Diabetes Mother      Heart disease Father      Diabetes Father       Diabetes Sister 1     Diabetes Brother 1     No Known Problems Maternal Aunt      No Known Problems Maternal Uncle      No Known Problems Paternal Aunt      No Known Problems Paternal Uncle      No Known Problems Maternal Grandmother      No Known Problems Maternal Grandfather      No Known Problems Paternal Grandmother      No Known Problems Paternal Grandfather      Heart attack Brother 2     Diabetes Brother 2     Stroke Son      Diabetes Daughter      Anemia Neg Hx      Arrhythmia Neg Hx      Asthma Neg Hx      Clotting disorder Neg Hx      Fainting Neg Hx      Heart failure Neg Hx      Hyperlipidemia Neg Hx      Hypertension Neg Hx      Atrial Septal Defect Neg Hx       Social History     Tobacco Use    Smoking status: Never    Smokeless tobacco: Never   Substance Use Topics    Alcohol use: No    Drug use: No     Review of Systems    Physical Exam     Initial Vitals [11/27/24 1131]   BP Pulse Resp Temp SpO2   (!) 77/49 62 (!) 32 97.9 °F (36.6 °C) (!) 94 %      MAP       --         Vitals:    11/27/24 1653 11/27/24 1715 11/27/24 1730 11/27/24 1749   BP:       BP Location:       Patient Position:       Pulse:  72 (!) 37 (!) 0   Resp: (!) 28 (!) 24 15 (!) 0   Temp:       TempSrc:       SpO2:  (!) 75%  (!) 0%   Weight:       Height:           Physical Exam  GENERAL:  Hypotensive to 77 systolic blood pressure; Hypoxia (89% on room air); Two word sentences secondary to tachypnea; Cooperative; Well-appearing and Non-Toxic; Well-Nourished  HEENT: AT/NC; anicteric sclera; appropriately and collar lips; speaking you word sentences with no slurring of speech or drooling/inability to tolerate oral secretions.  NECK: Supple, FROM with no meningismus, no accessory muscle use. + JVD & hepatojugular reflux with no Carotid Bruits B/L.  THORAX: Atraumatic   HEART: Regular rate and rhythm, no M/G/T.  LUNGS: + Tachypnea, No Retractions, + Bibasilar Rales.  ABDOMEN: Soft, ND, NTTP.   EXTREMITIES: FROM. + 2+ pitting edema up to  bilateral tibial tuberosity; ecchymosis to left lateral lower extremity.  SKIN: Warm, Dry, No Skin Tears or Rashes.  VASCULAR: 2+ pulses Prox/Dist & Symmetrical with No delay.  NEUROLOGIC: AAOx3; answering questions appropriately without focal deficits.    F/U:   4 L nasal cannula increased O2 sat to 99%  Lip appropriate resolved  ____________________  Carmine Alarcon MD, FAAEM  Emergency Medicine Staff  12:15 PM 11/27/2024    ED Course   Central Line    Date/Time: 11/27/2024 12:32 PM    Performed by: Alma Berumen MD  Authorized by: Jef Alarcon MD    Location procedure was performed:  [facility] EMERGENCY DEPARTMENT  Consent Done ?:  Yes  Time out complete?: Verified correct patient, procedure, equipment, staff, and site/side    Indications:  Hemodynamic monitoring, vascular access and med administration  Anesthesia:  Local infiltration  Local anesthetic:  Lidocaine 1% without epinephrine  Anesthetic total (ml):  3  Preparation:  Skin prepped with ChloraPrep  Skin prep agent dried: Skin prep agent completely dried prior to procedure    Sterile barriers: All five maximal sterile barriers used - gloves, gown, cap, mask and large sterile sheet    Hand hygiene: Hand hygiene performed immediately prior to central venous catheter insertion    Location:  Right internal jugular  Catheter type:  Single lumen  Catheter size:  7 Fr  Inserted Catheter Length (cm):  16  Ultrasound guidance: Yes    Vessel Caliber:  Large   patent  Comprressibility:  Normal  Needle advanced into vessel with real time ultrasound guidance.    Guidewire confirmed in vessel.    Image recorded and saved.    Steril sheath on probe.    Sterile gel used.  Manometry: No    Number of attempts:  1  Securement:  Line sutured, chlorhexidine patch, sterile dressing applied and blood return through all ports  Complications: No    Guidewire: guidewire removed intact, verified with nurse    XRay:  Placement verified by x-ray, successful placement and no  pneumothorax on x-ray  Adverse Events:  NoneTermination Site: superior vena cava    Labs Reviewed   CBC W/ AUTO DIFFERENTIAL - Abnormal       Result Value    WBC 9.69      RBC 4.21      Hemoglobin 13.4      Hematocrit 41.5      MCV 99 (*)     MCH 31.8 (*)     MCHC 32.3      RDW 18.3 (*)     Platelets 237      MPV 11.2      Immature Granulocytes 0.5      Gran # (ANC) 7.2      Immature Grans (Abs) 0.05 (*)     Lymph # 1.2      Mono # 1.1 (*)     Eos # 0.0      Baso # 0.05      nRBC 0      Gran % 74.5 (*)     Lymph % 12.4 (*)     Mono % 11.7      Eosinophil % 0.4      Basophil % 0.5      Differential Method Automated      Narrative:     Release to patient->Immediate   COMPREHENSIVE METABOLIC PANEL - Abnormal    Sodium 135 (*)     Potassium 7.5 (*)     Chloride 105      CO2 20 (*)     Glucose 136 (*)     BUN 56 (*)     Creatinine 2.6 (*)     Calcium 9.3      Total Protein 7.4      Albumin 3.5      Total Bilirubin 0.9      Alkaline Phosphatase 114      AST 50 (*)     ALT 47 (*)     eGFR 17.0 (*)     Anion Gap 10      Narrative:     Release to patient->Immediate   URINALYSIS, REFLEX TO URINE CULTURE - Abnormal    Specimen UA Urine, Clean Catch      Color, UA Yellow      Appearance, UA Hazy (*)     pH, UA 5.0      Specific Gravity, UA 1.020      Protein, UA 1+ (*)     Glucose, UA 2+ (*)     Ketones, UA Trace (*)     Bilirubin (UA) Negative      Occult Blood UA Negative      Nitrite, UA Negative      Leukocytes, UA Negative      Narrative:     Specimen Source->Urine   B-TYPE NATRIURETIC PEPTIDE - Abnormal    BNP 3,162 (*)     Narrative:     Release to patient->Immediate   TROPONIN I - Abnormal    Troponin I 0.041 (*)     Narrative:     Release to patient->Immediate   MAGNESIUM - Abnormal    Magnesium 2.7 (*)     Narrative:     Release to patient->Immediate   PHOSPHORUS - Abnormal    Phosphorus 5.4 (*)     Narrative:     Release to patient->Immediate   LACTIC ACID, PLASMA - Abnormal    Lactate (Lactic Acid) 3.6 (*)     COMPREHENSIVE METABOLIC PANEL - Abnormal    Sodium 134 (*)     Potassium 7.5 (*)     Chloride 104      CO2 20 (*)     Glucose 119 (*)     BUN 64 (*)     Creatinine 2.6 (*)     Calcium 9.3      Total Protein 6.6      Albumin 3.1 (*)     Total Bilirubin 0.8      Alkaline Phosphatase 105      AST 44 (*)     ALT 43      eGFR 17.0 (*)     Anion Gap 10     URINALYSIS MICROSCOPIC - Abnormal    RBC, UA 3      WBC, UA 1      Bacteria Few (*)     Squam Epithel, UA 3      Hyaline Casts, UA 54 (*)     Microscopic Comment SEE COMMENT      Narrative:     Specimen Source->Urine   APTT - Abnormal    aPTT 32.9 (*)     Narrative:     Draw baseline aPTT prior to starting the heparin bolus or  infusion  (if patient is on warfarin prior to heparin therapy)   PROTIME-INR - Abnormal    Prothrombin Time 14.0 (*)     INR 1.3 (*)     Narrative:     Draw baseline aPTT prior to starting the heparin bolus or  infusion  (if patient is on warfarin prior to heparin therapy)   CBC W/ AUTO DIFFERENTIAL - Abnormal    WBC 9.33      RBC 3.95 (*)     Hemoglobin 12.4      Hematocrit 39.0      MCV 99 (*)     MCH 31.4 (*)     MCHC 31.8 (*)     RDW 17.8 (*)     Platelets 213      MPV 11.5      Immature Granulocytes 0.5      Gran # (ANC) 6.7      Immature Grans (Abs) 0.05 (*)     Lymph # 1.2      Mono # 1.3 (*)     Eos # 0.0      Baso # 0.04      nRBC 0      Gran % 72.2      Lymph % 13.0 (*)     Mono % 13.7      Eosinophil % 0.2      Basophil % 0.4      Differential Method Automated      Narrative:     Draw baseline aPTT prior to starting the heparin bolus or  infusion  (if patient is on warfarin prior to heparin therapy)   ISTAT LACTATE - Abnormal    POC Lactate 2.95 (*)     Sample VENOUS      Site Other      Allens Test N/A     ISTAT PROCEDURE - Abnormal    POC PO2 26 (*)     POC SATURATED O2 39      Sample VENOUS      Site Other      Allens Test N/A     POCT GLUCOSE - Abnormal    POCT Glucose 136 (*)    POCT GLUCOSE - Abnormal    POCT Glucose 130 (*)     ISTAT PROCEDURE - Abnormal    POC PH 7.339 (*)     POC PCO2 35.7      POC PO2 91      POC HCO3 19.2 (*)     POC BE -7 (*)     POC SATURATED O2 97      POC TCO2 20 (*)     Sample ARTERIAL      Site RR      Allens Test Pass      DelSys Nasal Can      Mode SPONT      Flow 4      Sp02 97     ISTAT LACTATE - Abnormal    POC Lactate 3.18 (*)     Verbal Result Readback Performed Yes      Provider Credentials: MD      Provider Notified: CATALINA      Time Notifed: 1520      Sample ARTERIAL      Site RR      Allens Test Pass     POCT GLUCOSE - Abnormal    POCT Glucose 187 (*)    ISTAT PROCEDURE - Abnormal    POC PH 7.159 (*)     POC PCO2 57.4 (*)     POC PO2 51 (*)     POC HCO3 20.4 (*)     POC BE -8 (*)     POC SATURATED O2 74      POC TCO2 22 (*)     Verbal Result Readback Performed Yes      Provider Credentials: MD      Provider Notified: RACHAEL      Time Notifed: 1657      Sample ARTERIAL      Site Renée/UAC      Allens Test N/A      DelSys Nasal Can      Mode SPONT      Flow 15     HEPATITIS C ANTIBODY    Hepatitis C Ab Non-reactive      Narrative:     Release to patient->Immediate   HIV 1 / 2 ANTIBODY    HIV 1/2 Ag/Ab Non-reactive      Narrative:     Release to patient->Immediate   PROCALCITONIN    Procalcitonin 0.07      Narrative:     Release to patient->Immediate   LACTIC ACID, PLASMA    Lactate (Lactic Acid) 2.0     MAGNESIUM    Magnesium 2.6     TYPE & SCREEN    Group & Rh B POS      Indirect Reji NEG      Specimen Outdate 11/30/2024 23:59          ECG Results              EKG 12-lead (Final result)        Collection Time Result Time QRS Duration OHS QTC Calculation    11/27/24 12:26:29 11/27/24 13:51:20 176 563                     Final result by Interface, Lab In [facility] (11/27/24 13:51:27)                   Narrative:    Test Reason :    Vent. Rate :  64 BPM     Atrial Rate :  57 BPM     P-R Int :    ms          QRS Dur : 176 ms      QT Int : 546 ms       P-R-T Axes :    -57 109 degrees    QTcB Int : 563  ms    Idioventricualr rhythm with LBBB morphology  Abnormal ECG  When compared with ECG of 27-Nov-2024 12:03,  Vent. rate has increased by  29 bpm  Confirmed by Layla Harding ([id number]) on 11/27/2024 1:51:17 PM    Referred By:            Confirmed By: Layla Harding                                     EKG 12-lead (Final result)        Collection Time Result Time QRS Duration OHS QTC Calculation    11/27/24 12:03:42 11/27/24 12:18:03 150 456                     Final result by Interface, Lab In [facility] (11/27/24 12:18:10)                   Narrative:    Test Reason : R06.02,    Vent. Rate :  35 BPM     Atrial Rate :  44 BPM     P-R Int :    ms          QRS Dur : 150 ms      QT Int : 598 ms       P-R-T Axes :    -48 113 degrees    QTcB Int : 456 ms    Idioventricular rhythm  with LBBB morphology  Left axis deviation  Abnormal ECG  When compared with ECG of 27-Nov-2024 11:25,  Rate is slower  Confirmed by Layla Harding ([id number]) on 11/27/2024 12:18:02 PM    Referred By:            Confirmed By: Layla Harding                                     EKG 12-lead (Final result)        Collection Time Result Time QRS Duration OHS QTC Calculation    11/27/24 11:25:05 11/27/24 12:16:55 184 593                     Final result by Interface, Lab In [facility] (11/27/24 12:17:03)                   Narrative:    Test Reason : I95.9,    Vent. Rate :  68 BPM     Atrial Rate :  72 BPM     P-R Int :    ms          QRS Dur : 184 ms      QT Int : 558 ms       P-R-T Axes :    -64 107 degrees    QTcB Int : 593 ms    Undetermined rhythm  Probably accelerated idioventricular rhythm  versus sinus with very long 1  degee AVB and LBBB  Abnormal ECG  When compared with ECG of 27-Sep-2024 11:31,  Current undetermined rhythm precludes rhythm comparison, needs review  Left bundle branch block is now Present  Confirmed by Layla Harding ([id number]) on 11/27/2024 12:16:48 PM    Referred By:            Confirmed By: Layla Harding                                   Imaging Results              X-Ray Chest 1 View (Final result)  Result time 11/27/24 13:04:04      Final result by Harjinder Lanier MD (11/27/24 13:04:04)                   Impression:      See above      Electronically signed by: Harjinder Lanier MD  Date:    11/27/2024  Time:    13:04               Narrative:    EXAMINATION:  XR CHEST 1 VIEW    CLINICAL HISTORY:  post central line;    TECHNIQUE:  Single frontal view of the chest was performed.    COMPARISON:  Non 11/27/2024 e    FINDINGS:  Right-sided central venous catheter in the SVC.  No significant changes in the cardiopulmonary status since the previous study earlier today                                       X-Ray Chest AP Portable (Final result)  Result time 11/27/24 12:29:36      Final result by Harjinder Lanier MD (11/27/24 12:29:36)                   Impression:      See above      Electronically signed by: Harjinder Lanier MD  Date:    11/27/2024  Time:    12:29               Narrative:    EXAMINATION:  XR CHEST AP PORTABLE    CLINICAL HISTORY:  Sepsis;    TECHNIQUE:  Single frontal view of the chest was performed.    COMPARISON:  Non 09/29/2024 e    FINDINGS:  Mild cardiomegaly.  There is diffuse accentuation interstitial markings and/or edema.  No significant airspace consolidation or pleural effusion identified                                       Medications   furosemide injection 80 mg (80 mg Intravenous Given 11/27/24 1257)   albuterol sulfate nebulizer solution 10 mg (10 mg Nebulization Given 11/27/24 1330)   insulin regular injection 5 Units 0.05 mL (5 Units Intravenous Given 11/27/24 1353)   dextrose 10% bolus 250 mL 250 mL (0 mLs Intravenous Stopped 11/27/24 1359)   calcium gluconate 1 g in NS IVPB (premixed) (0 g Intravenous Stopped 11/27/24 1354)   LIDOcaine HCL 10 mg/ml (1%) 10 mg/mL (1 %) injection (  Given 11/27/24 1615)   ondansetron injection 4 mg (4 mg Intravenous Given 11/27/24 1617)   morphine injection 4 mg (4 mg Intravenous  Given 11/27/24 5075)     Medical Decision Making  Cyanosis improved with 4 L nasal cannula.  Bedside ultrasound performed which shows severely decreased EF.  Presentation concerning for cardiogenic shock.  Consulted and discussed with Cardiology, starting epinephrine and dobutamine, Lasix given, right IJ central line placed.  Patient admitted to cardiac ICU for cardiogenic shock.    Amount and/or Complexity of Data Reviewed  Labs: ordered.  Radiology: ordered.    Risk  Prescription drug management.  Decision regarding hospitalization.              Attending Attestation:   Physician Attestation Statement for Resident:  As the supervising MD   Physician Attestation Statement: I have personally seen and examined this patient.   I agree with the above history.  -:   As the supervising MD I agree with the above PE.     As the supervising MD I agree with the above treatment, course, plan, and disposition.            Attending Critical Care:   Critical Care Times:   Direct Patient Care (initial evaluation, reassessments, and time considering the case)................................................................25 minutes.   Ordering, Reviewing, and Interpreting Diagnostic Studies...............................................................................................................5 minutes.   Documentation..................................................................................................................................................................................5 minutes.   Consultation with other Physicians. .................................................................................................................................................10 minutes.   Consultation with the patient's family directly relating to the patient's condition, care, and DNR status (when patient unable)......10 minutes.   ==============================================================  Total Critical Care  Time - exclusive of procedural time: 55 minutes.  ==============================================================  Critical care was necessary to treat or prevent imminent or life-threatening deterioration of the following conditions: hypotension, cardiac arrhythmia and respiratory failure (cardiogenic shock).   Critical care was time spent personally by me on the following activities: obtaining history from patient or relative, examination of patient, review of old charts, ordering lab, x-rays, and/or EKG, development of treatment plan with patient or relative, ordering and performing treatments and interventions, evaluation of patient's response to treatment, discussion with consultants, re-evaluation of patient's conition, interpretation of cardiac measurements, ventilator management and end of life discussions.   Critical Care Condition: critical       Attending ED Notes:   STAFF ATTENDING PHYSICIAN NOTE:  I have individually/jointly evaluated Stacia Perry and discussed their ED management with the resident physician. I have also reviewed their notes, assessments, and procedures documented.  I was present during all critical portions of any procedure(s) performed on Stacia Perry.   ____________________  Carmine Alarcon MD, [facility]  Emergency Medicine Staff                               Clinical Impression:  Final diagnoses:  [R06.02] SOB (shortness of breath)  [I95.9] Hypotension  [R57.0] Cardiogenic shock (Primary)  [R00.1] Bradycardia          ED Disposition Condition    Admit Stable                Alma Berumen MD  Resident  11/27/24 1256       Jef Alarcon MD  12/03/24 [id number]

## 2024-11-27 NOTE — HPI
89-year-old female with past medical history of hypertension, hyperlipidemia, chronic systolic heart failure, severe aortic stenosis who was recently hospitalized for volume overload where patient underwent diuresis and underwent an echo that showed severe aortic stenosis and severe mitral regurgitation .  She was seen in the intervention cardiology clinic yesterday and agreed to proceed with TAVR.  She presents today with worsening shortness for breath and was hypotensive on presentation with elevated lactic acid.  She was placed on multiple pressors.  Also she was made DNR by the family

## 2024-11-27 NOTE — PLAN OF CARE
Significant undocumented UOP around solomon and onto pads. Purewick ordered. Bradycardia to 40s with MAP 50s on , Epi.    Transitioning  to dopamine gtt    Will monitor response and consider starting levo / transitioning epi to levo to maintain MAP 65.     Repeat K 7.5, shifting. Following K+      Kati He MD   [Good] : ~his/her~  mood as  good [Yes] : Yes [Monthly or less (1 pt)] : Monthly or less (1 point) [3 or 4 (1 pt)] : 3 or 4  (1 point) [Never (0 pts)] : Never (0 points) [No falls in past year] : Patient reported no falls in the past year [0] : 2) Feeling down, depressed, or hopeless: Not at all (0) [Patient reported colonoscopy was abnormal] : Patient reported colonoscopy was abnormal [HIV test declined] : HIV test declined [Hepatitis C test declined] : Hepatitis C test declined [None] : None [With Family] : lives with family [Employed] : employed [High School] : high school [] :  [# Of Children ___] : has [unfilled] children [Sexually Active] : sexually active [Feels Safe at Home] : Feels safe at home [Fully functional (bathing, dressing, toileting, transferring, walking, feeding)] : Fully functional (bathing, dressing, toileting, transferring, walking, feeding) [Fully functional (using the telephone, shopping, preparing meals, housekeeping, doing laundry, using] : Fully functional and needs no help or supervision to perform IADLs (using the telephone, shopping, preparing meals, housekeeping, doing laundry, using transportation, managing medications and managing finances) [Reports changes in vision] : Reports changes in vision [Smoke Detector] : smoke detector [Carbon Monoxide Detector] : carbon monoxide detector [Seat Belt] :  uses seat belt [] : No [Change in mental status noted] : No change in mental status noted [Reports changes in hearing] : Reports no changes in hearing [Reports changes in dental health] : Reports no changes in dental health [Guns at Home] : no guns at home [ColonoscopyDate] : 10/2018 [ColonoscopyComments] : diverticulosis, polyp

## 2024-11-27 NOTE — ED NOTES
MD Bhavin with CC at bedside for evaluation. Patient and patient's daughter updated on POC. Care ongoing.

## 2024-11-27 NOTE — ASSESSMENT & PLAN NOTE
Patients blood pressure range in the last 24 hours was: BP  Min: 77/49  Max: 92/51.The patient's home medications include valsartan 160mg qd and coreg 3.125mg BID.     Plan:  - Currently holding all antihypertensives due to hypotension

## 2024-11-27 NOTE — SUBJECTIVE & OBJECTIVE
Past Medical History:   Diagnosis Date    Anticoagulant long-term use     Aortic valve stenosis     Arthritis     Cardiogenic shock 5/7/2021    CHF (congestive heart failure)     Diabetes mellitus     Hypercholesteremia     Hypertension     Palliative care encounter 9/30/2024    Pneumonia due to infectious organism 5/6/2021    Primary adenocarcinoma of upper lobe of right lung 7/19/2021    Thyroid disease        Past Surgical History:   Procedure Laterality Date    CATARACT EXTRACTION, BILATERAL      EYE SURGERY      FOOT FRACTURE SURGERY Right     HYSTERECTOMY         Review of patient's allergies indicates:  No Known Allergies    (Not in a hospital admission)    Family History       Problem Relation (Age of Onset)    Diabetes Mother, Father, Sister, Brother, Brother, Daughter    Heart attack Brother    Heart disease Father    No Known Problems Maternal Aunt, Maternal Uncle, Paternal Aunt, Paternal Uncle, Maternal Grandmother, Maternal Grandfather, Paternal Grandmother, Paternal Grandfather    Stroke Son          Tobacco Use    Smoking status: Never    Smokeless tobacco: Never   Substance and Sexual Activity    Alcohol use: No    Drug use: No    Sexual activity: Not Currently     Review of Systems   Constitutional: Positive for malaise/fatigue and weight gain.   HENT: Negative.     Cardiovascular:  Positive for dyspnea on exertion, leg swelling and orthopnea.   Respiratory: Negative.     Endocrine: Negative.    Musculoskeletal: Negative.    Neurological:  Positive for light-headedness and weakness.     Objective:     Vital Signs (Most Recent):  Temp: 97.9 °F (36.6 °C) (11/27/24 1131)  Pulse: 84 (11/27/24 1637)  Resp: (!) 28 (11/27/24 1653)  BP: (!) 85/56 (11/27/24 1637)  SpO2: (!) 87 % (11/27/24 1637) Vital Signs (24h Range):  Temp:  [97.9 °F (36.6 °C)] 97.9 °F (36.6 °C)  Pulse:  [34-85] 84  Resp:  [18-32] 28  SpO2:  [83 %-100 %] 87 %  BP: ()/(39-76) 85/56     Weight: 64.4 kg (142 lb)  Body mass index is  28.68 kg/m².    SpO2: (!) 87 %         Intake/Output Summary (Last 24 hours) at 11/27/2024 1709  Last data filed at 11/27/2024 1359  Gross per 24 hour   Intake 98.11 ml   Output 30 ml   Net 68.11 ml       Lines/Drains/Airways       Central Venous Catheter Line  Duration             Percutaneous Central Line - Triple Lumen  11/27/24 1250 Internal Jugular Right <1 day              Drain  Duration                  Urethral Catheter 11/27/24 1318 Latex;Double-lumen 16 Fr. <1 day              Peripheral Intravenous Line  Duration                  Peripheral IV - Single Lumen 11/27/24 1157 20 G 1 in Left;Posterior Forearm <1 day                     Physical Exam  Constitutional:       Appearance: She is ill-appearing.   HENT:      Head: Normocephalic and atraumatic.      Nose: Nose normal.   Neck:      Comments: Elevated JVP present  Cardiovascular:      Rate and Rhythm: Normal rate and regular rhythm.   Pulmonary:      Effort: Pulmonary effort is normal.      Breath sounds: Rales present.   Abdominal:      General: Abdomen is flat. Bowel sounds are normal.      Palpations: Abdomen is soft.   Musculoskeletal:         General: Swelling present.      Cervical back: Normal range of motion.   Skin:     Capillary Refill: Capillary refill takes less than 2 seconds.   Neurological:      General: No focal deficit present.      Mental Status: She is alert and oriented to person, place, and time.            Significant Labs: All pertinent lab results from the last 24 hours have been reviewed.

## 2024-11-27 NOTE — ASSESSMENT & PLAN NOTE
BIJAL is likely due to vascular congestion. Baseline creatinine is  1-1.3 . Most recent creatinine and eGFR are listed below.  Recent Labs     11/27/24  1202 11/27/24  1245   CREATININE 2.6* 2.6*   EGFRNORACEVR 17.0* 17.0*      Plan  Baseline Cr 1.0-1.3, discharged at 1.5, returning at 2.6. C/f renal failure, monitoring response to lasix gtt, , Epi   - Monitor urine output, serial BMP, and adjust therapy as needed  - Check urine lytes and renal ultrasound  - Check urine protein creatinine ratio.  - Strict I&Os and daily weights   - Avoid nephrotoxic agents such as NSAIDs, gadolinium and IV radiocontrast.  - Renally dose meds to current GFR.  - Maintain MAP > 65.

## 2024-11-27 NOTE — PLAN OF CARE
Inpatient consult to Cardiology  Consult performed by: Melvin Andersen MD  Consult ordered by: Alma Berumen MD        Cardiogenic shock in the setting of Severe AS    Very symptomatic with congestion, cold and wet  Lactic acid 2.8  AMS, couldn't tell me what hospital she is in and is slow to talk however did answer few questions correct    GOC discussion  -daughter at bedside, d/w patient and her both, patient doesn't want intubation, defibrillation or CPR but ok with central line and all the medications    Plan  - gtt 5 mcg/kg/min ordered  -Lasix bolus 80 given by ED, will start lasix gtt @ 10  -Keep MAP above 55, avoid levo, use epi instead, hopefully  will provide enough hemodynamic support  -echo  -trend lactic acid q2h, discontinue once normal  -SVO2 q12 h starting 1400  -electrolytes ordered q12h starting 1400    Full H&P to follow, to go to ICU with CCU team

## 2024-11-27 NOTE — HPI
89F nonsmoker with history of T2DM, pAFib on eliquis/amio, aortic stenosis, and HFpEF 23% (valsartan, farxiga, coreg, lasix) complaining of worsening dyspnea on exertion. Since yesterday she has had dyspnea at rest despite increased use of oral furosemide (80 mg q.a.m. and 40 mg q.h.s. PRN). Her daughter additionally reports dry cough, but no fever or chills. Patient denies any chest/back/abdominal pain, and acknowledges worsening lower extremity swelling despite extra lasix dose. No reported sore throat, neck pain/stiffness, nasal congestion, nausea, vomiting, diarrhea, black bloody stool or any urinary symptoms. Patient was scheduled to have a TAVR in 2021, but ultimately declined due to lung mass that resolved following radiation. Of note, patient was recently discharged on 10/2/2024 after a 4 day hospitalization for CHF exacerbation. Palliative care was consulted and family declined outpatient follow up. She was started on IV lasix and transitioned to PO lasix with good urine output. Patient's last echo was performed on 09/27/2024 which showed EF of 20-25%, severe aortic valve sclerosis, mod to mod-severe MR, mod TR, mod pulm artery hypertension of 55, CVP 8, and trivial-small posterior effusion at the base and trivial under the RA.     Patient seen in Interventional Cardiology clinic the day prior to presentation, where she resumed workup for TAVR, pending [facility]. Her coreg and valsartan were held at discharge and resumed yesterday following her clinic appointment in addition to taking her evening PRN lasix dose. Has not missed any scheduled daytime lasix doses since last discharge.     On day of presentation, patient's daughter awoke patient and noted increased lethargy, dyspnea, BLE edema with weeping and promptly presented to ED. In the ED, patient's blood pressure was 77/49. K+ 7.5 (hemolyzed), Trop 0.041, BNP 3162 and lactate 2.95. CXR shows diffuse interstitial markings and/or edema. Central line was placed  and patient was given lasix 80mg IV and started on lasix gtt (10/hr), epi (0.05), and  (5) with improvement in mentation and MAP to 70. Requiring 4L NC. LA imprvoved to 2.0, procal 0.07. Cr 2.6, up from 1.5 at prev discharge, BL 1.0-1.3    On exam, patient grossly overloaded, daughter at bedside who assists with history. Patient answering questions appropriately, tachypneic but in NAD. Admitting to CCU for hemodynamic support/monitoring, TAVR eval.

## 2024-11-27 NOTE — SUBJECTIVE & OBJECTIVE
Past Medical History:   Diagnosis Date    Anticoagulant long-term use     Aortic valve stenosis     Arthritis     Cardiogenic shock 5/7/2021    CHF (congestive heart failure)     Diabetes mellitus     Hypercholesteremia     Hypertension     Palliative care encounter 9/30/2024    Pneumonia due to infectious organism 5/6/2021    Primary adenocarcinoma of upper lobe of right lung 7/19/2021    Thyroid disease        Past Surgical History:   Procedure Laterality Date    CATARACT EXTRACTION, BILATERAL      EYE SURGERY      FOOT FRACTURE SURGERY Right     HYSTERECTOMY         Review of patient's allergies indicates:  No Known Allergies    No current facility-administered medications on file prior to encounter.     Current Outpatient Medications on File Prior to Encounter   Medication Sig    amiodarone (PACERONE) 200 MG Tab Take 1 tablet by mouth once daily    apixaban (ELIQUIS) 5 mg Tab Take 5 mg by mouth 2 (two) times daily.    atorvastatin (LIPITOR) 40 MG tablet Take 1 tablet (40 mg total) by mouth once daily.    carvediloL (COREG) 6.25 MG tablet Take 0.5 tablets (3.125 mg total) by mouth 2 (two) times daily with meals. Do not give dose if blood pressure is less than 110/70    citalopram (CELEXA) 20 MG tablet Take 20 mg by mouth once daily.    dapagliflozin (FARXIGA) 5 mg Tab tablet Take 1 tablet (5 mg total) by mouth once daily.    furosemide (LASIX) 40 MG tablet Take 1 tablet (40 mg total) by mouth nightly as needed. Take in the evening as needed for edema or shortness of breath    furosemide (LASIX) 80 MG tablet Take 1 tablet (80 mg total) by mouth once daily.    levothyroxine (SYNTHROID) 75 MCG tablet Take 75 mcg by mouth before breakfast.     meclizine (ANTIVERT) 25 mg tablet Take 25 mg by mouth once daily.     metformin (GLUCOPHAGE) 500 MG tablet Take 1,000 mg by mouth 2 (two) times daily with meals.     potassium chloride SA (K-DUR,KLOR-CON) 20 MEQ tablet Take 1 tablet (20 mEq total) by mouth 2 (two) times daily.     TRESIBA FLEXTOUCH U-100 100 unit/mL (3 mL) InPn Inject 18 Units into the skin every evening.    valsartan (DIOVAN) 160 MG tablet Take 0.5 tablets (80 mg total) by mouth once daily. Do not give dose if blood pressure is less than 110/70     Family History       Problem Relation (Age of Onset)    Diabetes Mother, Father, Sister, Brother, Brother, Daughter    Heart attack Brother    Heart disease Father    No Known Problems Maternal Aunt, Maternal Uncle, Paternal Aunt, Paternal Uncle, Maternal Grandmother, Maternal Grandfather, Paternal Grandmother, Paternal Grandfather    Stroke Son          Tobacco Use    Smoking status: Never    Smokeless tobacco: Never   Substance and Sexual Activity    Alcohol use: No    Drug use: No    Sexual activity: Not Currently     Review of Systems   Constitutional: Positive for diaphoresis, malaise/fatigue and weight gain. Negative for chills and fever.   Cardiovascular:  Positive for cyanosis, dyspnea on exertion, leg swelling and orthopnea. Negative for chest pain, claudication and syncope.   Respiratory:  Positive for shortness of breath. Negative for cough and wheezing.    Hematologic/Lymphatic: Bruises/bleeds easily.   Musculoskeletal:  Positive for falls and muscle weakness.   Neurological:  Positive for difficulty with concentration.     Objective:     Vital Signs (Most Recent):  Temp: 97.9 °F (36.6 °C) (11/27/24 1131)  Pulse: 67 (11/27/24 1332)  Resp: (!) 27 (11/27/24 1332)  BP: (!) 101/56 (11/27/24 1332)  SpO2: 99 % (11/27/24 1332) Vital Signs (24h Range):  Temp:  [97.9 °F (36.6 °C)] 97.9 °F (36.6 °C)  Pulse:  [34-67] 67  Resp:  [19-32] 27  SpO2:  [94 %-100 %] 99 %  BP: ()/(49-61) 101/56     Weight: 64.4 kg (142 lb)  Body mass index is 28.68 kg/m².    SpO2: 99 %         Intake/Output Summary (Last 24 hours) at 11/27/2024 2289  Last data filed at 11/27/2024 1354  Gross per 24 hour   Intake 50 ml   Output 30 ml   Net 20 ml       Lines/Drains/Airways       Central Venous  Catheter Line  Duration             Percutaneous Central Line - Triple Lumen  11/27/24 1250 Internal Jugular Right <1 day              Drain  Duration                  Urethral Catheter 11/27/24 1318 Latex;Double-lumen 16 Fr. <1 day              Peripheral Intravenous Line  Duration                  Peripheral IV - Single Lumen 11/27/24 1157 20 G 1 in Left;Posterior Forearm <1 day                     Physical Exam  Constitutional:       Appearance: She is obese. She is ill-appearing and diaphoretic. She is not toxic-appearing.   HENT:      Head: Normocephalic and atraumatic.      Nose: Nose normal.      Mouth/Throat:      Mouth: Mucous membranes are moist.   Eyes:      General: No scleral icterus.     Extraocular Movements: Extraocular movements intact.   Cardiovascular:      Rate and Rhythm: Normal rate. Rhythm irregular.      Heart sounds: Murmur (I/VI sys RUSB) heard.   Pulmonary:      Breath sounds: Rales present. No rhonchi.   Abdominal:      General: There is distension.      Tenderness: There is no abdominal tenderness. There is no guarding.   Musculoskeletal:         General: Swelling and signs of injury present.      Right lower leg: Edema present.      Left lower leg: Edema present.   Skin:     Capillary Refill: Capillary refill takes 2 to 3 seconds.      Comments: Cold extremities   Neurological:      Mental Status: She is disoriented.          Significant Labs: All pertinent lab results from the last 24 hours have been reviewed.    Significant Imaging:  reviewed   Methotrexate Counseling:  Patient counseled regarding adverse effects of methotrexate including but not limited to nausea, vomiting, abnormalities in liver function tests. Patients may develop mouth sores, rash, diarrhea, and abnormalities in blood counts. The patient understands that monitoring is required including LFT's and blood counts.  There is a rare possibility of scarring of the liver and lung problems that can occur when taking methotrexate. Persistent nausea, loss of appetite, pale stools, dark urine, cough, and shortness of breath should be reported immediately. Patient advised to discontinue methotrexate treatment at least three months before attempting to become pregnant.  I discussed the need for folate supplements while taking methotrexate.  These supplements can decrease side effects during methotrexate treatment. The patient verbalized understanding of the proper use and possible adverse effects of methotrexate.  All of the patient's questions and concerns were addressed.

## 2024-11-27 NOTE — ASSESSMENT & PLAN NOTE
High mortality given CHF (pre-existing?). TAVR candidate per outpatient IC. Echocardiogram with evidence of aortic stenosis that is severe . The patient's most recent echocardiogram result is listed below.    Echo    Result Date: 9/27/2024    Left Ventricle: The left ventricle is normal in size. Moderately   increased ventricular mass. Mildly increased wall thickness. Mild septal   thickening. There is moderate concentric hypertrophy. Global hypokinesis   present. There is severely reduced systolic function with a visually   estimated ejection fraction of 20 - 25%. Ejection fraction by visual   approximation is 23% with marked worsening compared with over 2 years ago.    Right Ventricle: Normal right ventricular cavity size. Wall thickness   is normal. Systolic function is normal.    Left Atrium: Left atrium is severely dilated.    Aortic Valve: There is severe aortic valve sclerosis. Severely   restricted motion. There is severe ( critical) stenosis. Aortic valve area   by VTI is 0.31 cm2. Aortic valve peak velocity is 4.63 m/s. Mean gradient   is 60 mmHg. The dimensionless index is 0.10. There is trace aortic   regurgitation.    Mitral Valve: There is mild bileaflet sclerosis. There is mild mitral   annular calcification present. There is moderate to moderate-severe   regurgitation.    Tricuspid Valve: There is moderate regurgitation.    Pulmonary Artery: There is moderate pulmonary hypertension. The   estimated pulmonary artery systolic pressure is 55 mmHg.    IVC/SVC: Intermediate venous pressure at 8 mmHg.    Pericardium: There is a  trivial-small posterior effusion at the base   and trivial under the RA.        - history of severe volume sensitivity with prior episodes involving profuse diuresis  - pending outpatient LHC prior to TAVR.   - IC consult   - echo pending

## 2024-11-27 NOTE — ASSESSMENT & PLAN NOTE
Discussed with patient and daughter at bedside. Consented to central line, pressor support, potential AS intervention. Refusing intubation, CPR.  - DNR

## 2024-11-27 NOTE — ASSESSMENT & PLAN NOTE
Patient with a hx of T2DM currently controlled. Last A1c at 6.2 on 9/27/24. Home medications include farxiga, metformin (last taken on day of presentation).     Plan:  - Hold oral antihyperglycemics while inpatient  - Follow up on repeat A1c  - Low dose sliding scale insulin PRN  - Blood glucose goal of 140-180

## 2024-11-28 NOTE — PROGRESS NOTES
Pt arrived to SICU at 1730. RT, charge nurse, Rnx3 at bedside. VS loaded on to ICU monitor. HR 50s, BP 40s/30s, and nonrebreather on. RT placed pt on airvo. Levo, Epi, Dopamine, and Lasix infusing. Upon arrival, immediately titrated vasopressors up in hopes to maintain a MAP of 65. Doctor Nina called to bedside. Red called to bedside from waiting area. Time of death reported at 1749 by Doctor Nina.

## 2024-11-28 NOTE — PLAN OF CARE
Death Note    Called to bedside by patient's nurse. Nursing supervisor notified. Family at bedside.  has been called and is also at bedside.    Patient is not responding to verbal or tactile stimuli. Patient does not have a papillary or corneal reflex. Pupils are fixed and dilated. No heart or breath sounds on auscultation. No respirations. No palpable pulses.     Time of death:  11/27/2024 [id number]     Cause of Death:  Cardiogenic shock    Email:   [email or web address]

## 2024-11-28 NOTE — HOSPITAL COURSE
Patient admitted to the CCU service in cardiogenic shock with hypervolemia, lactic acidosis, BIJAL, and hyperkalemia. Team discussed GOC with family on admission who made the decision to make patient DNR/DNI. They also decided against pursuing MCS with IABP placement. Patient started on a lasix gtt with Dobutamine and Epi. Transitioned off Dobutamine to Dopamine d/t bradycardia. Patient placed on HFNC. UOP remained minimal on Lasix gtt and lactate unimproved. Hyperkalemia treated with Ca, Insulin, and Albuterol, in addition to Lasix above. Despite efforts above in treating cardiogenic shock, patient passed shortly after arriving to the ICU.

## 2024-11-28 NOTE — DISCHARGE SUMMARY
[facility]  Cardiology  Discharge Summary      Patient Name: Stacia Perry  MRN: [id number]  Admission Date: 11/27/2024  Hospital Length of Stay: 0 days  Discharge Date and Time:  11/27/2024 7:47 PM  Attending Physician: Sherita att. providers found    Discharging Provider: Lacey Nina MD  Primary Care Physician: Maureen Parnell MD    HPI:   89F nonsmoker with history of T2DM, pAFib on eliquis/amio, aortic stenosis, and HFpEF 23% (valsartan, farxiga, coreg, lasix) complaining of worsening dyspnea on exertion. Since yesterday she has had dyspnea at rest despite increased use of oral furosemide (80 mg q.a.m. and 40 mg q.h.s. PRN). Her daughter additionally reports dry cough, but no fever or chills. Patient denies any chest/back/abdominal pain, and acknowledges worsening lower extremity swelling despite extra lasix dose. No reported sore throat, neck pain/stiffness, nasal congestion, nausea, vomiting, diarrhea, black bloody stool or any urinary symptoms. Patient was scheduled to have a TAVR in 2021, but ultimately declined due to lung mass that resolved following radiation. Of note, patient was recently discharged on 10/2/2024 after a 4 day hospitalization for CHF exacerbation. Palliative care was consulted and family declined outpatient follow up. She was started on IV lasix and transitioned to PO lasix with good urine output. Patient's last echo was performed on 09/27/2024 which showed EF of 20-25%, severe aortic valve sclerosis, mod to mod-severe MR, mod TR, mod pulm artery hypertension of 55, CVP 8, and trivial-small posterior effusion at the base and trivial under the RA.     Patient seen in Interventional Cardiology clinic the day prior to presentation, where she resumed workup for TAVR, pending [facility]. Her coreg and valsartan were held at discharge and resumed yesterday following her clinic appointment in addition to taking her evening PRN lasix dose. Has not missed any scheduled daytime lasix  doses since last discharge.     On day of presentation, patient's daughter awoke patient and noted increased lethargy, dyspnea, BLE edema with weeping and promptly presented to ED. In the ED, patient's blood pressure was 77/49. K+ 7.5 (hemolyzed), Trop 0.041, BNP 3162 and lactate 2.95. CXR shows diffuse interstitial markings and/or edema. Central line was placed and patient was given lasix 80mg IV and started on lasix gtt (10/hr), epi (0.05), and  (5) with improvement in mentation and MAP to 70. Requiring 4L NC. LA imprvoved to 2.0, procal 0.07. Cr 2.6, up from 1.5 at prev discharge, BL 1.0-1.3    On exam, patient grossly overloaded, daughter at bedside who assists with history. Patient answering questions appropriately, tachypneic but in NAD. Admitting to CCU for hemodynamic support/monitoring, TAVR eval.     * No surgery found *     Indwelling Lines/Drains at time of discharge:  Lines/Drains/Airways       Central Venous Catheter Line  Duration             Percutaneous Central Line - Triple Lumen  11/27/24 1250 Internal Jugular Right <1 day              Drain  Duration                  Urethral Catheter 11/27/24 1318 Latex;Double-lumen 16 Fr. <1 day                    Hospital Course:  Patient admitted to the CCU service in cardiogenic shock with hypervolemia, lactic acidosis, BIJAL, and hyperkalemia. Team discussed GOC with family on admission who made the decision to make patient DNR/DNI. They also decided against pursuing MCS with IABP placement. Patient started on a lasix gtt with Dobutamine and Epi. Transitioned off Dobutamine to Dopamine d/t bradycardia. Patient placed on HFNC. UOP remained minimal on Lasix gtt and lactate unimproved. Hyperkalemia treated with Ca, Insulin, and Albuterol, in addition to Lasix above. Despite efforts above in treating cardiogenic shock, patient passed shortly after arriving to the ICU.     Goals of Care Treatment Preferences:  Code Status: DNR    Health care agent: Pt's  daughter's are [facility] agent number: No value filed.                   Consults:   Consults (From admission, onward)          Status Ordering Provider     Inpatient consult to Interventional Cardiology  Once        Provider:  (Not yet assigned)    Acknowledged AMANDA ARNOLD     Inpatient consult to Cardiology  Once        Provider:  (Not yet assigned)    Completed KETAN PRIDE            Significant Diagnostic Studies: Labs: CMP   Recent Labs   Lab 11/27/24  1202 11/27/24  1245   * 134*   K 7.5* 7.5*    104   CO2 20* 20*   * 119*   BUN 56* 64*   CREATININE 2.6* 2.6*   CALCIUM 9.3 9.3   PROT 7.4 6.6   ALBUMIN 3.5 3.1*   BILITOT 0.9 0.8   ALKPHOS 114 105   AST 50* 44*   ALT 47* 43   ANIONGAP 10 10    and CBC   Recent Labs   Lab 11/27/24  1202 11/27/24  1517   WBC 9.69 9.33   HGB 13.4 12.4   HCT 41.5 39.0    213     Radiology: X-Ray: CXR: X-Ray Chest 1 View (CXR):   Results for orders placed or performed during the hospital encounter of 11/27/24   X-Ray Chest 1 View    Narrative    EXAMINATION:  XR CHEST 1 VIEW    CLINICAL HISTORY:  post central line;    TECHNIQUE:  Single frontal view of the chest was performed.    COMPARISON:  Non 11/27/2024 e    FINDINGS:  Right-sided central venous catheter in the SVC.  No significant changes in the cardiopulmonary status since the previous study earlier today      Impression    See above      Electronically signed by: Harjinder Lanier MD  Date:    11/27/2024  Time:    13:04       Pending Diagnostic Studies:       Procedure Component Value Units Date/Time    Comprehensive metabolic panel [[id number]]     Order Status: Sent Lab Status: No result     Specimen: Blood     Lactic acid, plasma [[id number]]     Order Status: Sent Lab Status: No result     Specimen: Blood     Lactic acid, plasma [[id number]]     Order Status: Sent Lab Status: No result     Specimen: Blood     Lactic acid, plasma [[id number]]     Order Status: Sent Lab Status: No result      Specimen: Blood     Magnesium [[id number]]     Order Status: Sent Lab Status: No result     Specimen: Blood             Final Active Diagnoses:    Diagnosis Date Noted POA    PRINCIPAL PROBLEM:  Acute on chronic combined systolic and diastolic congestive heart failure [I50.43] 2024 Yes    BIJAL (acute kidney injury) [N17.9] 2024 Yes    Advance care planning [Z71.89] 2024 Not Applicable    Acute hypoxemic respiratory failure [J96.01] 2024 Yes    Hyperkalemia [E87.5] 2024 Yes    Paroxysmal atrial fibrillation [I48.0] 2022 Yes    Severe aortic stenosis [I35.0] 2021 Yes     Chronic    Cardiogenic shock [R57.0] 2021 Yes    Essential hypertension [I10] 2021 Yes     Chronic    Type 2 diabetes mellitus without complication, without long-term current use of insulin [E11.9] 2021 Yes     Chronic    HLD (hyperlipidemia) [E78.5] 2021 Yes     Chronic      Problems Resolved During this Admission:     No new Assessment & Plan notes have been filed under this hospital service since the last note was generated.  Service: Cardiology      Discharged Condition:     Disposition:     Follow Up:    Patient Instructions:   No discharge procedures on file.  Medications:  None    Time spent on the discharge of patient: 15 minutes    Lacey Nina MD  Cardiology  [facility] Surgical Intensive Care

## 2024-11-28 NOTE — CONSULTS
[facility]  Interventional Cardiology  Consult Note    Patient Name: Staica Perry  MRN: [id number]  Admission Date: 11/27/2024  Hospital Length of Stay: 0 days  Code Status: DNR   Attending Provider: No att. providers found  Consulting Provider: Atul Farley MD  Primary Care Physician: Maureen Parnell MD  Principal Problem:Acute on chronic combined systolic and diastolic congestive heart failure    Patient information was obtained from patient, ER records, and primary team.     Inpatient consult to Interventional Cardiology  Consult performed by: Atul Farley MD  Consult ordered by: Kati He MD        Subjective:     Chief Complaint:  Shortness a breath     HPI:  89-year-old female with past medical history of hypertension, hyperlipidemia, chronic systolic heart failure, severe aortic stenosis who was recently hospitalized for volume overload where patient underwent diuresis and underwent an echo that showed severe aortic stenosis and severe mitral regurgitation .  She was seen in the intervention cardiology clinic yesterday and agreed to proceed with TAVR.  She presents today with worsening shortness for breath and was hypotensive on presentation with elevated lactic acid.  She was placed on multiple pressors.  Also she was made DNR by the family    Past Medical History:   Diagnosis Date    Anticoagulant long-term use     Aortic valve stenosis     Arthritis     Cardiogenic shock 5/7/2021    CHF (congestive heart failure)     Diabetes mellitus     Hypercholesteremia     Hypertension     Palliative care encounter 9/30/2024    Pneumonia due to infectious organism 5/6/2021    Primary adenocarcinoma of upper lobe of right lung 7/19/2021    Thyroid disease        Past Surgical History:   Procedure Laterality Date    CATARACT EXTRACTION, BILATERAL      EYE SURGERY      FOOT FRACTURE SURGERY Right     HYSTERECTOMY         Review of patient's allergies indicates:  No Known  Allergies    (Not in a hospital admission)    Family History       Problem Relation (Age of Onset)    Diabetes Mother, Father, Sister, Brother, Brother, Daughter    Heart attack Brother    Heart disease Father    No Known Problems Maternal Aunt, Maternal Uncle, Paternal Aunt, Paternal Uncle, Maternal Grandmother, Maternal Grandfather, Paternal Grandmother, Paternal Grandfather    Stroke Son          Tobacco Use    Smoking status: Never    Smokeless tobacco: Never   Substance and Sexual Activity    Alcohol use: No    Drug use: No    Sexual activity: Not Currently     Review of Systems   Constitutional: Positive for malaise/fatigue and weight gain.   HENT: Negative.     Cardiovascular:  Positive for dyspnea on exertion, leg swelling and orthopnea.   Respiratory: Negative.     Endocrine: Negative.    Musculoskeletal: Negative.    Neurological:  Positive for light-headedness and weakness.     Objective:     Vital Signs (Most Recent):  Temp: 97.9 °F (36.6 °C) (11/27/24 1131)  Pulse: 84 (11/27/24 1637)  Resp: (!) 28 (11/27/24 1653)  BP: (!) 85/56 (11/27/24 1637)  SpO2: (!) 87 % (11/27/24 1637) Vital Signs (24h Range):  Temp:  [97.9 °F (36.6 °C)] 97.9 °F (36.6 °C)  Pulse:  [34-85] 84  Resp:  [18-32] 28  SpO2:  [83 %-100 %] 87 %  BP: ()/(39-76) 85/56     Weight: 64.4 kg (142 lb)  Body mass index is 28.68 kg/m².    SpO2: (!) 87 %         Intake/Output Summary (Last 24 hours) at 11/27/2024 1709  Last data filed at 11/27/2024 1359  Gross per 24 hour   Intake 98.11 ml   Output 30 ml   Net 68.11 ml       Lines/Drains/Airways       Central Venous Catheter Line  Duration             Percutaneous Central Line - Triple Lumen  11/27/24 1250 Internal Jugular Right <1 day              Drain  Duration                  Urethral Catheter 11/27/24 1318 Latex;Double-lumen 16 Fr. <1 day              Peripheral Intravenous Line  Duration                  Peripheral IV - Single Lumen 11/27/24 1157 20 G 1 in Left;Posterior Forearm <1 day                      Physical Exam  Constitutional:       Appearance: She is ill-appearing.   HENT:      Head: Normocephalic and atraumatic.      Nose: Nose normal.   Neck:      Comments: Elevated JVP present  Cardiovascular:      Rate and Rhythm: Normal rate and regular rhythm.   Pulmonary:      Effort: Pulmonary effort is normal.      Breath sounds: Rales present.   Abdominal:      General: Abdomen is flat. Bowel sounds are normal.      Palpations: Abdomen is soft.   Musculoskeletal:         General: Swelling present.      Cervical back: Normal range of motion.   Skin:     Capillary Refill: Capillary refill takes less than 2 seconds.   Neurological:      General: No focal deficit present.      Mental Status: She is alert and oriented to person, place, and time.            Significant Labs: All pertinent lab results from the last 24 hours have been reviewed.    Assessment and Plan:       Cardiogenic shock  Patient presented with low blood pressures and has elevated Lactic acid . Currently being medically managed and she is DNR and doesn't want to undergo any procedures. Cont with Epi ,dobutamine ,lasix.  Agree with medical management.  Will sign off        Cardiac/Vascular  Severe aortic stenosis  Offered IABP pt declined any invasive approach         VTE Risk Mitigation (From admission, onward)           Ordered     heparin 25,000 units in dextrose 5% (100 units/ml) IV bolus from bag LOW INTENSITY nomogram - [facility]  As needed (PRN)        Question:  Heparin Infusion Adjustment (DO NOT MODIFY ANSWER)  Answer:  \\[facility] [place]\Images\Pharmacy\HeparinInfusions\heparin LOW INTENSITY nomogram for [facility] [id number]    11/27/24 1504     heparin 25,000 units in dextrose 5% (100 units/ml) IV bolus from bag LOW INTENSITY nomogram - [facility]  As needed (PRN)        Question:  Heparin Infusion Adjustment (DO NOT MODIFY ANSWER)  Answer:  \[employer] [place]\[employer]\Images\Pharmacy\HeparinInfusions\heparin LOW INTENSITY nomogram for [facility]  [id number].pdf    11/27/24 1504     heparin 25,000 units in dextrose 5% 250 mL (100 units/mL) infusion LOW INTENSITY nomogram - [facility]  Continuous        Question:  Begin at (units/kg/hr)  Answer:  12 11/27/24 1504                    Thank you for your consult. I will sign off. Please contact us if you have any additional questions.    Atul Farley MD  Interventional Cardiology   [facility]

## 2024-12-02 LAB
BACTERIA BLD CULT: NORMAL
BACTERIA BLD CULT: NORMAL